# Patient Record
Sex: MALE | Race: BLACK OR AFRICAN AMERICAN | NOT HISPANIC OR LATINO | Employment: FULL TIME | URBAN - METROPOLITAN AREA
[De-identification: names, ages, dates, MRNs, and addresses within clinical notes are randomized per-mention and may not be internally consistent; named-entity substitution may affect disease eponyms.]

---

## 2017-02-28 ENCOUNTER — DOCUMENTATION ONLY (OUTPATIENT)
Dept: LAB | Facility: CLINIC | Age: 52
End: 2017-02-28

## 2017-02-28 DIAGNOSIS — E11.9 TYPE 2 DIABETES MELLITUS WITHOUT COMPLICATION, WITHOUT LONG-TERM CURRENT USE OF INSULIN (H): Primary | ICD-10-CM

## 2017-02-28 DIAGNOSIS — E78.5 HYPERLIPIDEMIA LDL GOAL <100: ICD-10-CM

## 2017-02-28 DIAGNOSIS — R79.89 ELEVATED LFTS: ICD-10-CM

## 2017-03-08 DIAGNOSIS — R79.89 ELEVATED LFTS: ICD-10-CM

## 2017-03-08 DIAGNOSIS — E78.5 HYPERLIPIDEMIA LDL GOAL <100: ICD-10-CM

## 2017-03-08 DIAGNOSIS — E11.9 TYPE 2 DIABETES MELLITUS WITHOUT COMPLICATION, WITHOUT LONG-TERM CURRENT USE OF INSULIN (H): ICD-10-CM

## 2017-03-08 LAB
ALBUMIN SERPL-MCNC: 4.1 G/DL (ref 3.4–5)
ALP SERPL-CCNC: 84 U/L (ref 40–150)
ALT SERPL W P-5'-P-CCNC: 75 U/L (ref 0–70)
ANION GAP SERPL CALCULATED.3IONS-SCNC: 7 MMOL/L (ref 3–14)
AST SERPL W P-5'-P-CCNC: 54 U/L (ref 0–45)
BILIRUB SERPL-MCNC: 0.5 MG/DL (ref 0.2–1.3)
BUN SERPL-MCNC: 13 MG/DL (ref 7–30)
CALCIUM SERPL-MCNC: 8.8 MG/DL (ref 8.5–10.1)
CHLORIDE SERPL-SCNC: 106 MMOL/L (ref 94–109)
CHOLEST SERPL-MCNC: 107 MG/DL
CO2 SERPL-SCNC: 26 MMOL/L (ref 20–32)
CREAT SERPL-MCNC: 1.18 MG/DL (ref 0.66–1.25)
GFR SERPL CREATININE-BSD FRML MDRD: 64 ML/MIN/1.7M2
GLUCOSE SERPL-MCNC: 100 MG/DL (ref 70–99)
HBA1C MFR BLD: 7.4 % (ref 4.3–6)
HDLC SERPL-MCNC: 34 MG/DL
LDLC SERPL CALC-MCNC: 56 MG/DL
NONHDLC SERPL-MCNC: 73 MG/DL
POTASSIUM SERPL-SCNC: 3.8 MMOL/L (ref 3.4–5.3)
PROT SERPL-MCNC: 7.5 G/DL (ref 6.8–8.8)
SODIUM SERPL-SCNC: 139 MMOL/L (ref 133–144)
TRIGL SERPL-MCNC: 85 MG/DL

## 2017-03-08 PROCEDURE — 80061 LIPID PANEL: CPT | Performed by: FAMILY MEDICINE

## 2017-03-08 PROCEDURE — 83036 HEMOGLOBIN GLYCOSYLATED A1C: CPT | Performed by: FAMILY MEDICINE

## 2017-03-08 PROCEDURE — 80053 COMPREHEN METABOLIC PANEL: CPT | Performed by: FAMILY MEDICINE

## 2017-03-08 PROCEDURE — 36415 COLL VENOUS BLD VENIPUNCTURE: CPT | Performed by: FAMILY MEDICINE

## 2017-03-10 ENCOUNTER — OFFICE VISIT (OUTPATIENT)
Dept: FAMILY MEDICINE | Facility: CLINIC | Age: 52
End: 2017-03-10
Payer: MEDICAID

## 2017-03-10 ENCOUNTER — TELEPHONE (OUTPATIENT)
Dept: FAMILY MEDICINE | Facility: CLINIC | Age: 52
End: 2017-03-10

## 2017-03-10 VITALS
WEIGHT: 248.13 LBS | DIASTOLIC BLOOD PRESSURE: 88 MMHG | BODY MASS INDEX: 33.61 KG/M2 | SYSTOLIC BLOOD PRESSURE: 148 MMHG | HEART RATE: 64 BPM | HEIGHT: 72 IN | TEMPERATURE: 98.4 F

## 2017-03-10 DIAGNOSIS — R79.89 ELEVATED LFTS: ICD-10-CM

## 2017-03-10 DIAGNOSIS — E11.65 TYPE 2 DIABETES MELLITUS WITH HYPERGLYCEMIA, WITHOUT LONG-TERM CURRENT USE OF INSULIN (H): Primary | ICD-10-CM

## 2017-03-10 DIAGNOSIS — I10 BENIGN ESSENTIAL HYPERTENSION: ICD-10-CM

## 2017-03-10 DIAGNOSIS — E11.9 TYPE 2 DIABETES MELLITUS WITHOUT COMPLICATION, WITHOUT LONG-TERM CURRENT USE OF INSULIN (H): Primary | ICD-10-CM

## 2017-03-10 DIAGNOSIS — E78.5 HYPERLIPIDEMIA LDL GOAL <100: ICD-10-CM

## 2017-03-10 DIAGNOSIS — M67.40 GANGLION CYST: ICD-10-CM

## 2017-03-10 DIAGNOSIS — G56.02 CARPAL TUNNEL SYNDROME OF LEFT WRIST: ICD-10-CM

## 2017-03-10 DIAGNOSIS — E66.01 MORBID OBESITY DUE TO EXCESS CALORIES (H): ICD-10-CM

## 2017-03-10 DIAGNOSIS — E11.65 TYPE 2 DIABETES MELLITUS WITH HYPERGLYCEMIA, WITHOUT LONG-TERM CURRENT USE OF INSULIN (H): ICD-10-CM

## 2017-03-10 DIAGNOSIS — Z13.89 SCREENING FOR DIABETIC PERIPHERAL NEUROPATHY: ICD-10-CM

## 2017-03-10 PROCEDURE — 99214 OFFICE O/P EST MOD 30 MIN: CPT | Performed by: FAMILY MEDICINE

## 2017-03-10 PROCEDURE — 99207 C FOOT EXAM  NO CHARGE: CPT | Performed by: FAMILY MEDICINE

## 2017-03-10 RX ORDER — ATORVASTATIN CALCIUM 20 MG/1
20 TABLET, FILM COATED ORAL DAILY
Qty: 90 TABLET | Refills: 3 | Status: SHIPPED | OUTPATIENT
Start: 2017-03-10 | End: 2018-03-06

## 2017-03-10 RX ORDER — LISINOPRIL 5 MG/1
5 TABLET ORAL DAILY
Qty: 90 TABLET | Refills: 0 | Status: SHIPPED | OUTPATIENT
Start: 2017-03-10 | End: 2018-03-06

## 2017-03-10 RX ORDER — GLIMEPIRIDE 4 MG/1
4 TABLET ORAL 2 TIMES DAILY
Qty: 180 TABLET | Refills: 0 | Status: SHIPPED | OUTPATIENT
Start: 2017-03-10 | End: 2018-03-06

## 2017-03-10 RX ORDER — LIRAGLUTIDE 6 MG/ML
1.8 INJECTION SUBCUTANEOUS DAILY
Qty: 3 ML | Refills: 3 | Status: SHIPPED
Start: 2017-03-10 | End: 2018-03-06

## 2017-03-10 NOTE — TELEPHONE ENCOUNTER
Per faxed request recieved, the patient's insurance requires a prior authorization for one or more of the patient's medications.  Please initiate prior authorization or call/fax pharmacy to change patient's medication.    Medication: liraglutide (VICTOZA) 18 MG/3ML soln     Prescription Insurance: Cover [employer]  Phone: [phone]    Additional Comments: Fax received and placed in MA folder    Paris Nunez,

## 2017-03-10 NOTE — PATIENT INSTRUCTIONS
Carpal Tunnel Syndrome        What is carpal tunnel syndrome?   Carpal tunnel syndrome is a common, painful disorder of the wrist and hand.   How does it occur?   Carpal tunnel syndrome is caused by pressure on the median nerve in your wrist. People who use their hands and wrists in the same motion over and over tend to develop carpal tunnel syndrome. It is common in cashiers, , assembly-line workers, and people who work on the computer.   Swelling from a broken bone or other injury can cause pressure on the nerve as well. Diseases such as arthritis, diabetes, or hypothyroidism can cause swelling and pressure in the wrist. Sometimes it happens during pregnancy.   What are the symptoms?   The symptoms include:   pain, numbness, or tingling in your hand and wrist, especially in the thumb and index and middle fingers; pain may radiate up into the forearm   increased pain with increased use of your hand, such as when you are driving or reading the newspaper   increased pain at night   weak  and tendency to drop objects held in the hand   sensitivity to cold   muscle deterioration especially in the thumb (in later stages)   How is it diagnosed?   Your healthcare provider will review your symptoms, examine you, and discuss the ways you use your hands. He or she may also do the following tests:   Your provider may tap the inside middle of your wrist over the median nerve. You may feel pain or a sensation like an electric shock.   You may be asked to bend your wrist down for one minute to see if this causes symptoms.   Your provider may arrange to test the response of your nerves and muscles to electrical stimulation.   How is it treated?   If you have a disease that is causing carpal tunnel syndrome (such as rheumatoid arthritis), treating the disease may relieve your symptoms.   Other treatment focuses on relieving irritation and pressure on the nerve in your wrist. To relieve pressure your  healthcare provider may suggest:   restricting use of your hand or changing the way you use it   changing the position of your desk, computer, and chair to one that irritates your wrist less   wearing a wrist splint   exercises   Your provider may prescribe a steroid medicine or a nonsteroidal anti-inflammatory medicine, such as ibuprofen. Nonsteroidal anti-inflammatory medicines (NSAIDs) may cause stomach bleeding and other problems. These risks increase with age. Read the label and take as directed. Unless recommended by your healthcare provider, do not take for more than 10 days.   Your provider may give you an injection of a corticosteroid medicine.   In some cases surgery may be necessary.   How long will the effects last?   How long the symptoms of carpal tunnel syndrome last depends on the cause and your response to treatment. Sometimes the symptoms go away without any treatment, or they may be relieved by nonsurgical treatment. Surgery may be needed to relieve the symptoms if they do not respond to treatment or they get worse. Surgery usually relieves the symptoms, especially if there is no permanent damage to the nerve.   Symptoms of carpal tunnel syndrome that occur during pregnancy usually disappear following delivery.   How can I take care of myself?   Follow your healthcare provider's recommendations. Also try the following:   Raise your arm on a pillow when you sit or lie down.   Avoid activities that overuse your hand.   When you use a computer mouse, use it with the hand that does not have carpal tunnel syndrome.   Find a different way to use your hand by using another tool or try to use the other hand.   Avoid bending your wrists.   When can I return to my normal activities?   Everyone recovers from an injury at a different rate. Return to your activities depends on how soon your wrist recovers, not by how many days or weeks it has been since your injury has occurred. In general, the longer you have  symptoms before you start treatment, the longer it will take to get better. The goal is to return to your normal activities as soon as is safely possible. If you return too soon you may worsen your injury.   You may return to your activities when you are able to painlessly  objects and have full range of motion and strength back in your wrist.   What can I do to help prevent carpal tunnel syndrome?   Make sure that your hands and wrists are supported, especially if you do repetitive work. Take regular breaks from the repetitive motion. Do not rest your wrists on hard or ridged surfaces for long periods of time.   In some cases the cause is not known and carpal tunnel syndrome cannot be prevented.     Carpal Tunnel Rehabilitation Exercises        You may do all of these exercises right away.   Wrist Range of Motion   0. Flexion: Gently bend your wrist forward. Hold for 5 seconds. Do 3 sets of 10.   0. Extension: Gently bend your wrist backward. Hold this position 5 seconds. Do 3 sets of 10.   0. Side to side: Gently move your wrist from side to side (a handshake motion). Hold for 5 seconds in each direction. Do 3 sets of 10.   Wrist stretch: Press the back of the hand on your injured side with your other hand to help bend your wrist. Hold for 15 to 30 seconds. Next, stretch the hand back by pressing the fingers in a backward direction. Hold for 15 to 30 seconds. Keep the arm on your injured side straight during this exercise. Do 3 sets.   Mid-trap exercise: Lie on your stomach on a firm surface and place a folded pillow underneath your chest. Place your arms out straight to your sides with your elbows straight and thumbs toward the ceiling. Slowly raise your arms toward the ceiling as you squeeze your shoulder blades together. Lower slowly. Do 3 sets of 15. As the exercise gets easier to do, hold soup cans or small weights in your hands.   Pectoralis stretch:  a doorway or corner with both hands slightly  above your head on the door frame or wall. Slowly lean forward until you feel a stretch in the front of your shoulders. Hold 15 to 30 seconds. Repeat 3 times.   Scalene stretch: Sit or stand and clasp both hands behind your back. Lower your left shoulder and tilt your head toward the right until you feel a stretch. Hold this position for 15 to 30 seconds and then come back to the starting position. Then lower your right shoulder and tilt your head toward the left. Hold for 15 to 30 seconds. Repeat 3 times on each side.   Thoracic extension: While sitting in a chair, clasp both arms behind your head. Gently arch backward and look up toward the ceiling. Repeat 10 times. Do this several times each day.   Scapular squeeze: While sitting or standing with your arms by your sides, squeeze your shoulder blades together and hold for 5 seconds. Do 3 sets of 10.   Wrist extension: Hold a soup can or hammer handle in your hand with your palm facing down. Slowly bend your wrist up. Slowly lower the weight down into the starting position. Do 3 sets of 10. Gradually increase the weight of the object you are holding.    strengthening: Squeeze a soft rubber ball and hold the squeeze for 5 seconds. Do 3 sets of 10.   Published by [employer].  This content is reviewed periodically and is subject to change as new health information becomes available. The information is intended to inform and educate and is not a replacement for medical evaluation, advice, diagnosis or treatment by a healthcare professional.   Written by Leslie Osborne, MS, PT, and Belinda Mccoy PT, [facility], [facility], for [email or web address].   ? 2010 [facility] and/or its affiliates. All Rights Reserved.   Copyright   Clinical Reference Systems 2011              PLAN:   Eat breakfast daily  Decrease portion sizes  Decrease eating out  No meals in front of TV screen  Purge house of food triggers  No meal skipping  Dietician visit of education  Meal planning  Increase activity as  tolerated    MY DIABETES TODAY:    1)  Goal A1C is under <7.0  Mine is:      Lab Results   Component Value Date    A1C 7.4 03/08/2017       2)  Goal LDL (bad cholesterol) under 70  (measured at least yearly)- I am currently at:   Lab Results   Component Value Date    LDL 56 03/08/2017       3)  Goal blood pressure under 130/80- mine was 148/88 today    4)  Take aspirin daily yes    5)  No tobacco use          ACTION PLAN CHANGES FROM TODAY:    Care Plan changes:    Start victoza   Stretches for numb thumb    Labs:     I will not need to fast for this lab appointment.    Follow up:    I will follow up with my physician:  In three months.    Will recheck A1c and liver at that time    Sirena Talley D.O.

## 2017-03-10 NOTE — NURSING NOTE
Chief Complaint   Patient presents with     Chronic Disease Management       Initial There were no vitals taken for this visit. Estimated body mass index is 34.07 kg/(m^2) as calculated from the following:    Height as of 7/26/16: 6' (1.829 m).    Weight as of 7/26/16: 251 lb 3.2 oz (113.9 kg).  Medication Reconciliation: complete   Humera Corley, CMA

## 2017-03-10 NOTE — PROGRESS NOTES
SUBJECTIVE:                                                    William Montes is a 51 year old male who presents to clinic today for the following health issues:      Diabetes Follow-up    Patient is checking blood sugars: once daily.  Results are as follows:         am - 80 to 120    Diabetic concerns: None     Symptoms of hypoglycemia (low blood sugar): shaky, dizzy, weak, Symptoms occur if sugars < 70's to low 80s.     Paresthesias (numbness or burning in feet) or sores: No     Date of last diabetic eye exam: Summer 2016    Metformin 1000 mg bid, amaryl 4 mg bid     Lab Results   Component Value Date    A1C 7.4 03/08/2017    A1C 8.3 06/08/2016    A1C 6.9 12/08/2014    A1C 6.9 12/04/2014    A1C 7.0 04/11/2014        Hyperlipidemia Follow-Up      Rate your low fat/cholesterol diet?: not monitoring fat    Taking statin?  Yes, possible muscle aches from statin    Other lipid medications/supplements?:  None  LDL Cholesterol Calculated   Date Value Ref Range Status   03/08/2017 56 <100 mg/dL Final     Comment:     Desirable:       <100 mg/dl        Hypertension Follow-up      Outpatient blood pressures are checked and from 130-140/80    Low Salt Diet: no added salt    Lisinopril 2.5 mg daily      He has a history of EDWARDS and has persistent elevated LFT's.        Amount of exercise or physical activity: Tries to at least 4 times per week    Problems taking medications regularly: Yes,  Hasn't started taking Victoza yet.  Just got back from [facility].    Medication side effects: muscle aches  Diet: regular (no restrictions)    Has has right arm numbness at night time that started a couple months ago.  When he gets the numbness he said if he rubs his arm it help it go away.   He noticed it in his thumb starting about 9 months ago.  The numbness goes up the arm at times.  He gets some pain in the neck also.     Also has a lump on right wrist that he has had for about 3-4 months.  Denies any pain with it. It is getting  vinod.      Problem list and histories reviewed & adjusted, as indicated.  Additional history: as documented    BP Readings from Last 3 Encounters:   03/10/17 148/88   07/26/16 132/77   06/13/16 132/82    Wt Readings from Last 3 Encounters:   03/10/17 248 lb 2 oz (112.5 kg)   07/26/16 251 lb 3.2 oz (113.9 kg)   06/08/16 240 lb (108.9 kg)           Reviewed and updated as needed this visit by clinical staff  Tobacco  Allergies  Med Hx  Surg Hx  Fam Hx  Soc Hx      Reviewed and updated as needed this visit by Provider         ROS:  Constitutional, HEENT, cardiovascular, pulmonary, GI, , musculoskeletal, neuro, skin, endocrine and psych systems are negative, except as otherwise noted.    OBJECTIVE:                                                    /88  Pulse 64  Temp 98.4  F (36.9  C) (Oral)  Ht 6' (1.829 m)  Wt 248 lb 2 oz (112.5 kg)  BMI 33.65 kg/m2  Body mass index is 33.65 kg/(m^2).  GENERAL: healthy, alert and no distress  HENT: normal cephalic/atraumatic, oropharynx clear, oral mucous membranes moist and oropharxnx crowded  NECK: no adenopathy, no asymmetry, masses, or scars and thyroid normal to palpation  RESP: lungs clear to auscultation - no rales, rhonchi or wheezes  CV: regular rate and rhythm, normal S1 S2, no S3 or S4, no murmur, click or rub, no peripheral edema and peripheral pulses strong  MS: normal range of motion, no edema and 2 cm soft mass on the plantar aspect of the right wrist  Neuro: negative dottie test, negative tinel's median and ulnar bilaterally   SKIN: no suspicious lesions or rashes  PSYCH: mentation appears normal, affect normal/bright  Diabetic foot exam: normal DP and PT pulses, no trophic changes or ulcerative lesions, normal sensory exam and normal monofilament exam    Diagnostic Test Results:  none      ASSESSMENT/PLAN:                                                    Diabetes Type II, A1c Uncontrolled, non-insulin dependent   Associated with the following  complications    Renal Complications:  None    Ophthalmologic Complications: None    Neurologic Complications: None    Peripheral Vascular Complications:  None    Other: None   Plan:  The following changes are made - start victozia     Hyperlipidemia; controlled   Plan:  No changes in the patient's current treatment plan    Hypertension; slightly worsened   Associated with the following complications:    Diabetes   Plan:  The following changes are made - increase to lisinopril 5 mg daily       BMI:   Estimated body mass index is 33.65 kg/(m^2) as calculated from the following:    Height as of this encounter: 6' (1.829 m).    Weight as of this encounter: 248 lb 2 oz (112.5 kg).   Weight management plan: Discussed healthy diet and exercise guidelines and patient will follow up in 3 months in clinic to re-evaluate.        ASSESSMENT/PLAN:      ICD-10-CM    1. Type 2 diabetes mellitus without complication, without long-term current use of insulin (H) E11.9    2. Screening for diabetic peripheral neuropathy Z13.89 FOOT EXAM  NO CHARGE [10331.114]   3. Hyperlipidemia LDL goal <100 E78.5 atorvastatin (LIPITOR) 20 MG tablet   4. Elevated LFTs R79.89    5. Type 2 diabetes mellitus with hyperglycemia, without long-term current use of insulin (H) E11.65 metFORMIN (GLUCOPHAGE) 500 MG tablet     glimepiride (AMARYL) 4 MG tablet   6. Benign essential hypertension I10 lisinopril (PRINIVIL/ZESTRIL) 5 MG tablet   7. Carpal tunnel syndrome of left wrist G56.02    8. Ganglion cyst M67.40    9. Morbid obesity due to excess calories (H) E66.01 liraglutide (VICTOZA) 18 MG/3ML soln   10. Type 2 diabetes mellitus without complication (H) E11.9 liraglutide (VICTOZA) 18 MG/3ML soln       Patient Instructions                   Carpal Tunnel Syndrome        What is carpal tunnel syndrome?   Carpal tunnel syndrome is a common, painful disorder of the wrist and hand.   How does it occur?   Carpal tunnel syndrome is caused by pressure on the  median nerve in your wrist. People who use their hands and wrists in the same motion over and over tend to develop carpal tunnel syndrome. It is common in cashiers, , assembly-line workers, and people who work on the computer.   Swelling from a broken bone or other injury can cause pressure on the nerve as well. Diseases such as arthritis, diabetes, or hypothyroidism can cause swelling and pressure in the wrist. Sometimes it happens during pregnancy.   What are the symptoms?   The symptoms include:   pain, numbness, or tingling in your hand and wrist, especially in the thumb and index and middle fingers; pain may radiate up into the forearm   increased pain with increased use of your hand, such as when you are driving or reading the newspaper   increased pain at night   weak  and tendency to drop objects held in the hand   sensitivity to cold   muscle deterioration especially in the thumb (in later stages)   How is it diagnosed?   Your healthcare provider will review your symptoms, examine you, and discuss the ways you use your hands. He or she may also do the following tests:   Your provider may tap the inside middle of your wrist over the median nerve. You may feel pain or a sensation like an electric shock.   You may be asked to bend your wrist down for one minute to see if this causes symptoms.   Your provider may arrange to test the response of your nerves and muscles to electrical stimulation.   How is it treated?   If you have a disease that is causing carpal tunnel syndrome (such as rheumatoid arthritis), treating the disease may relieve your symptoms.   Other treatment focuses on relieving irritation and pressure on the nerve in your wrist. To relieve pressure your healthcare provider may suggest:   restricting use of your hand or changing the way you use it   changing the position of your desk, computer, and chair to one that irritates your wrist less   wearing a wrist splint   exercises    Your provider may prescribe a steroid medicine or a nonsteroidal anti-inflammatory medicine, such as ibuprofen. Nonsteroidal anti-inflammatory medicines (NSAIDs) may cause stomach bleeding and other problems. These risks increase with age. Read the label and take as directed. Unless recommended by your healthcare provider, do not take for more than 10 days.   Your provider may give you an injection of a corticosteroid medicine.   In some cases surgery may be necessary.   How long will the effects last?   How long the symptoms of carpal tunnel syndrome last depends on the cause and your response to treatment. Sometimes the symptoms go away without any treatment, or they may be relieved by nonsurgical treatment. Surgery may be needed to relieve the symptoms if they do not respond to treatment or they get worse. Surgery usually relieves the symptoms, especially if there is no permanent damage to the nerve.   Symptoms of carpal tunnel syndrome that occur during pregnancy usually disappear following delivery.   How can I take care of myself?   Follow your healthcare provider's recommendations. Also try the following:   Raise your arm on a pillow when you sit or lie down.   Avoid activities that overuse your hand.   When you use a computer mouse, use it with the hand that does not have carpal tunnel syndrome.   Find a different way to use your hand by using another tool or try to use the other hand.   Avoid bending your wrists.   When can I return to my normal activities?   Everyone recovers from an injury at a different rate. Return to your activities depends on how soon your wrist recovers, not by how many days or weeks it has been since your injury has occurred. In general, the longer you have symptoms before you start treatment, the longer it will take to get better. The goal is to return to your normal activities as soon as is safely possible. If you return too soon you may worsen your injury.   You may return to  your activities when you are able to painlessly  objects and have full range of motion and strength back in your wrist.   What can I do to help prevent carpal tunnel syndrome?   Make sure that your hands and wrists are supported, especially if you do repetitive work. Take regular breaks from the repetitive motion. Do not rest your wrists on hard or ridged surfaces for long periods of time.   In some cases the cause is not known and carpal tunnel syndrome cannot be prevented.     Carpal Tunnel Rehabilitation Exercises        You may do all of these exercises right away.   Wrist Range of Motion   0. Flexion: Gently bend your wrist forward. Hold for 5 seconds. Do 3 sets of 10.   0. Extension: Gently bend your wrist backward. Hold this position 5 seconds. Do 3 sets of 10.   0. Side to side: Gently move your wrist from side to side (a handshake motion). Hold for 5 seconds in each direction. Do 3 sets of 10.   Wrist stretch: Press the back of the hand on your injured side with your other hand to help bend your wrist. Hold for 15 to 30 seconds. Next, stretch the hand back by pressing the fingers in a backward direction. Hold for 15 to 30 seconds. Keep the arm on your injured side straight during this exercise. Do 3 sets.   Mid-trap exercise: Lie on your stomach on a firm surface and place a folded pillow underneath your chest. Place your arms out straight to your sides with your elbows straight and thumbs toward the ceiling. Slowly raise your arms toward the ceiling as you squeeze your shoulder blades together. Lower slowly. Do 3 sets of 15. As the exercise gets easier to do, hold soup cans or small weights in your hands.   Pectoralis stretch:  a doorway or corner with both hands slightly above your head on the door frame or wall. Slowly lean forward until you feel a stretch in the front of your shoulders. Hold 15 to 30 seconds. Repeat 3 times.   Scalene stretch: Sit or stand and clasp both hands behind your  back. Lower your left shoulder and tilt your head toward the right until you feel a stretch. Hold this position for 15 to 30 seconds and then come back to the starting position. Then lower your right shoulder and tilt your head toward the left. Hold for 15 to 30 seconds. Repeat 3 times on each side.   Thoracic extension: While sitting in a chair, clasp both arms behind your head. Gently arch backward and look up toward the ceiling. Repeat 10 times. Do this several times each day.   Scapular squeeze: While sitting or standing with your arms by your sides, squeeze your shoulder blades together and hold for 5 seconds. Do 3 sets of 10.   Wrist extension: Hold a soup can or hammer handle in your hand with your palm facing down. Slowly bend your wrist up. Slowly lower the weight down into the starting position. Do 3 sets of 10. Gradually increase the weight of the object you are holding.    strengthening: Squeeze a soft rubber ball and hold the squeeze for 5 seconds. Do 3 sets of 10.   Published by [employer].  This content is reviewed periodically and is subject to change as new health information becomes available. The information is intended to inform and educate and is not a replacement for medical evaluation, advice, diagnosis or treatment by a healthcare professional.   Written by Leslie Osborne, MS, PT, and Belinda Mccoy PT, [facility], [facility], for [employer].   ? 2010 [facility] and/or its affiliates. All Rights Reserved.   Copyright   Clinical Reference Systems 2011              PLAN:   Eat breakfast daily  Decrease portion sizes  Decrease eating out  No meals in front of TV screen  Purge house of food triggers  No meal skipping  Dietician visit of education  Meal planning  Increase activity as tolerated    MY DIABETES TODAY:    1)  Goal A1C is under <7.0  Mine is:      Lab Results   Component Value Date    A1C 7.4 03/08/2017       2)  Goal LDL (bad cholesterol) under 70  (measured at least yearly)- I am currently  at:   Lab Results   Component Value Date    LDL 56 03/08/2017       3)  Goal blood pressure under 130/80- mine was 148/88 today    4)  Take aspirin daily yes    5)  No tobacco use          ACTION PLAN CHANGES FROM TODAY:    Care Plan changes:    Start victoza   Stretches for numb thumb    Labs:     I will not need to fast for this lab appointment.    Follow up:    I will follow up with my physician:  In three months.    Will recheck A1c and liver at that time    Sirena Talley D.O.                FUTURE APPOINTMENTS:       - Follow-up visit in 3 months for HTN and DM.  Check HTN next week in the pharmacy     Sirena Talley DO  [facility]

## 2017-03-10 NOTE — MR AVS SNAPSHOT
After Visit Summary   3/10/2017    William Montes    MRN: [id number]           Patient Information     Date Of Birth          1965        Visit Information        Provider Department      3/10/2017 9:20 AM Sirena Talley DO [facility]        Today's Diagnoses     Type 2 diabetes mellitus without complication, without long-term current use of insulin (H)    -  1    Screening for diabetic peripheral neuropathy        Hyperlipidemia LDL goal <100        Elevated LFTs        Type 2 diabetes mellitus with hyperglycemia, without long-term current use of insulin (H)        Benign essential hypertension        Carpal tunnel syndrome of left wrist        Ganglion cyst          Care Instructions                  Carpal Tunnel Syndrome        What is carpal tunnel syndrome?   Carpal tunnel syndrome is a common, painful disorder of the wrist and hand.   How does it occur?   Carpal tunnel syndrome is caused by pressure on the median nerve in your wrist. People who use their hands and wrists in the same motion over and over tend to develop carpal tunnel syndrome. It is common in cashiers, , assembly-line workers, and people who work on the computer.   Swelling from a broken bone or other injury can cause pressure on the nerve as well. Diseases such as arthritis, diabetes, or hypothyroidism can cause swelling and pressure in the wrist. Sometimes it happens during pregnancy.   What are the symptoms?   The symptoms include:   pain, numbness, or tingling in your hand and wrist, especially in the thumb and index and middle fingers; pain may radiate up into the forearm   increased pain with increased use of your hand, such as when you are driving or reading the newspaper   increased pain at night   weak  and tendency to drop objects held in the hand   sensitivity to cold   muscle deterioration especially in the thumb (in later stages)   How is it diagnosed?   Your healthcare provider  will review your symptoms, examine you, and discuss the ways you use your hands. He or she may also do the following tests:   Your provider may tap the inside middle of your wrist over the median nerve. You may feel pain or a sensation like an electric shock.   You may be asked to bend your wrist down for one minute to see if this causes symptoms.   Your provider may arrange to test the response of your nerves and muscles to electrical stimulation.   How is it treated?   If you have a disease that is causing carpal tunnel syndrome (such as rheumatoid arthritis), treating the disease may relieve your symptoms.   Other treatment focuses on relieving irritation and pressure on the nerve in your wrist. To relieve pressure your healthcare provider may suggest:   restricting use of your hand or changing the way you use it   changing the position of your desk, computer, and chair to one that irritates your wrist less   wearing a wrist splint   exercises   Your provider may prescribe a steroid medicine or a nonsteroidal anti-inflammatory medicine, such as ibuprofen. Nonsteroidal anti-inflammatory medicines (NSAIDs) may cause stomach bleeding and other problems. These risks increase with age. Read the label and take as directed. Unless recommended by your healthcare provider, do not take for more than 10 days.   Your provider may give you an injection of a corticosteroid medicine.   In some cases surgery may be necessary.   How long will the effects last?   How long the symptoms of carpal tunnel syndrome last depends on the cause and your response to treatment. Sometimes the symptoms go away without any treatment, or they may be relieved by nonsurgical treatment. Surgery may be needed to relieve the symptoms if they do not respond to treatment or they get worse. Surgery usually relieves the symptoms, especially if there is no permanent damage to the nerve.   Symptoms of carpal tunnel syndrome that occur during pregnancy  usually disappear following delivery.   How can I take care of myself?   Follow your healthcare provider's recommendations. Also try the following:   Raise your arm on a pillow when you sit or lie down.   Avoid activities that overuse your hand.   When you use a computer mouse, use it with the hand that does not have carpal tunnel syndrome.   Find a different way to use your hand by using another tool or try to use the other hand.   Avoid bending your wrists.   When can I return to my normal activities?   Everyone recovers from an injury at a different rate. Return to your activities depends on how soon your wrist recovers, not by how many days or weeks it has been since your injury has occurred. In general, the longer you have symptoms before you start treatment, the longer it will take to get better. The goal is to return to your normal activities as soon as is safely possible. If you return too soon you may worsen your injury.   You may return to your activities when you are able to painlessly  objects and have full range of motion and strength back in your wrist.   What can I do to help prevent carpal tunnel syndrome?   Make sure that your hands and wrists are supported, especially if you do repetitive work. Take regular breaks from the repetitive motion. Do not rest your wrists on hard or ridged surfaces for long periods of time.   In some cases the cause is not known and carpal tunnel syndrome cannot be prevented.     Carpal Tunnel Rehabilitation Exercises        You may do all of these exercises right away.   Wrist Range of Motion   0. Flexion: Gently bend your wrist forward. Hold for 5 seconds. Do 3 sets of 10.   0. Extension: Gently bend your wrist backward. Hold this position 5 seconds. Do 3 sets of 10.   0. Side to side: Gently move your wrist from side to side (a handshake motion). Hold for 5 seconds in each direction. Do 3 sets of 10.   Wrist stretch: Press the back of the hand on your injured side  with your other hand to help bend your wrist. Hold for 15 to 30 seconds. Next, stretch the hand back by pressing the fingers in a backward direction. Hold for 15 to 30 seconds. Keep the arm on your injured side straight during this exercise. Do 3 sets.   Mid-trap exercise: Lie on your stomach on a firm surface and place a folded pillow underneath your chest. Place your arms out straight to your sides with your elbows straight and thumbs toward the ceiling. Slowly raise your arms toward the ceiling as you squeeze your shoulder blades together. Lower slowly. Do 3 sets of 15. As the exercise gets easier to do, hold soup cans or small weights in your hands.   Pectoralis stretch:  a doorway or corner with both hands slightly above your head on the door frame or wall. Slowly lean forward until you feel a stretch in the front of your shoulders. Hold 15 to 30 seconds. Repeat 3 times.   Scalene stretch: Sit or stand and clasp both hands behind your back. Lower your left shoulder and tilt your head toward the right until you feel a stretch. Hold this position for 15 to 30 seconds and then come back to the starting position. Then lower your right shoulder and tilt your head toward the left. Hold for 15 to 30 seconds. Repeat 3 times on each side.   Thoracic extension: While sitting in a chair, clasp both arms behind your head. Gently arch backward and look up toward the ceiling. Repeat 10 times. Do this several times each day.   Scapular squeeze: While sitting or standing with your arms by your sides, squeeze your shoulder blades together and hold for 5 seconds. Do 3 sets of 10.   Wrist extension: Hold a soup can or hammer handle in your hand with your palm facing down. Slowly bend your wrist up. Slowly lower the weight down into the starting position. Do 3 sets of 10. Gradually increase the weight of the object you are holding.    strengthening: Squeeze a soft rubber ball and hold the squeeze for 5 seconds. Do 3  sets of 10.   Published by [employer].  This content is reviewed periodically and is subject to change as new health information becomes available. The information is intended to inform and educate and is not a replacement for medical evaluation, advice, diagnosis or treatment by a healthcare professional.   Written by Leslie Osborne, MS, PT, and Belinda Mccoy, PT, [facility], [facility], for [employer].   ? 2010 [facility] and/or its affiliates. All Rights Reserved.   Copyright   [employer] Reference Systems 2011              PLAN:   Eat breakfast daily  Decrease portion sizes  Decrease eating out  No meals in front of TV screen  Purge house of food triggers  No meal skipping  Dietician visit of education  Meal planning  Increase activity as tolerated    MY DIABETES TODAY:    1)  Goal A1C is under <7.0  Mine is:      Lab Results   Component Value Date    A1C 7.4 03/08/2017       2)  Goal LDL (bad cholesterol) under 70  (measured at least yearly)- I am currently at:   Lab Results   Component Value Date    LDL 56 03/08/2017       3)  Goal blood pressure under 130/80- mine was 148/88 today    4)  Take aspirin daily yes    5)  No tobacco use          ACTION PLAN CHANGES FROM TODAY:    Care Plan changes:    Start victoza   Stretches for numb thumb    Labs:     I will not need to fast for this lab appointment.    Follow up:    I will follow up with my physician:  In three months.    Will recheck A1c and liver at that time    Sirean Talley D.O.            Follow-ups after your visit        Who to contact     If you have questions or need follow up information about today's clinic visit or your schedule please contact [facility] directly at [phone].  Normal or non-critical lab and imaging results will be communicated to you by MyChart, letter or phone within 4 business days after the clinic has received the results. If you do not hear from us within 7 days, please contact the clinic through MyChart or phone. If you  have a critical or abnormal lab result, we will notify you by phone as soon as possible.  Submit refill requests through [employer] or call your pharmacy and they will forward the refill request to us. Please allow 3 business days for your refill to be completed.          Additional Information About Your Visit        [employer] Information     [employer] gives you secure access to your electronic health record. If you see a primary care provider, you can also send messages to your care team and make appointments. If you have questions, please call your primary care clinic.  If you do not have a primary care provider, please call [phone] and they will assist you.        Care EveryWhere ID     This is your Care EveryWhere ID. This could be used by other organizations to access your [facility] medical records  [id number]        Your Vitals Were     Pulse Temperature Height BMI (Body Mass Index)          64 98.4  F (36.9  C) (Oral) 6' (1.829 m) 33.65 kg/m2         Blood Pressure from Last 3 Encounters:   03/10/17 148/88   07/26/16 132/77   06/13/16 132/82    Weight from Last 3 Encounters:   03/10/17 248 lb 2 oz (112.5 kg)   07/26/16 251 lb 3.2 oz (113.9 kg)   06/08/16 240 lb (108.9 kg)              We Performed the Following     FOOT EXAM  NO CHARGE [19697.114]          Today's Medication Changes          These changes are accurate as of: 3/10/17 10:04 AM.  If you have any questions, ask your nurse or doctor.               These medicines have changed or have updated prescriptions.        Dose/Directions    lisinopril 5 MG tablet   Commonly known as:  PRINIVIL/ZESTRIL   This may have changed:    - medication strength  - how much to take   Used for:  Benign essential hypertension   Changed by:  Sirena Talley DO        Dose:  5 mg   Take 1 tablet (5 mg) by mouth daily   Quantity:  90 tablet   Refills:  0         Stop taking these medicines if you haven't already. Please contact your care team if you have questions.      ACE/ARB NOT PRESCRIBED (INTENTIONAL)   Stopped by:  Sirena Talley DO                Where to get your medicines      These medications were sent to [id number] - [facility], [place] - [address] AT [address]  [address], [place] [address]    Hours:  24-hours Phone:  [phone]     atorvastatin 20 MG tablet    glimepiride 4 MG tablet    lisinopril 5 MG tablet    metFORMIN 500 MG tablet                Primary Care Provider Office Phone # Fax #    Sirena Talley [phone]       [facility] [address] [place] [address]        Thank you!     Thank you for choosing [facility]  for your care. Our goal is always to provide you with excellent care. Hearing back from our patients is one way we can continue to improve our services. Please take a few minutes to complete the written survey that you may receive in the mail after your visit with us. Thank you!             Your Updated Medication List - Protect others around you: Learn how to safely use, store and throw away your medicines at www.disposemymeds.org.          This list is accurate as of: 3/10/17 10:04 AM.  Always use your most recent med list.                   Brand Name Dispense Instructions for use    aspirin 81 MG tablet     100    1 tab po QD (Once per day)       atorvastatin 20 MG tablet    LIPITOR    90 tablet    Take 1 tablet (20 mg) by mouth daily       blood glucose monitoring test strip    ACCU-CHEK SMARTVIEW    100 each    Use to test blood sugar 2 times daily or as directed.       cholecalciferol 1000 UNITS capsule    vitamin  -D     Take 1 capsule by mouth daily       glimepiride 4 MG tablet    AMARYL    180 tablet    Take 1 tablet (4 mg) by mouth 2 times daily       insulin pen needle 31G X 8 MM    B-D U/F    100 each    Use 1 pen needles daily or as directed.       liraglutide 18 MG/3ML soln    VICTOZA    3 mL    Inject 1.8 mg  Subcutaneous daily Start with 0.6mg daily for 1 week, then increase to 1.2mg daily for 1 week, then increase to 1.8mg daily thereafter       lisinopril 5 MG tablet    PRINIVIL/ZESTRIL    90 tablet    Take 1 tablet (5 mg) by mouth daily       metFORMIN 500 MG tablet    GLUCOPHAGE    360 tablet    Take 2 tablets (1,000 mg) by mouth 2 times daily (with meals)       * order for DME     3 Month    Equipment being ordered: Lancets, any brand covered by insurance. Test blood sugar 2 times daily.       * order for DME     3 Month    Equipment being ordered: Test strips for patient's glucometer, any brand covered by insurance. Test blood sugar 2 times daily.       * order for DME     1 Device    Equipment being ordered: Glucometer, any brand covered by insurance. Test blood sugar 2 times daily.       * Notice:  This list has 3 medication(s) that are the same as other medications prescribed for you. Read the directions carefully, and ask your doctor or other care provider to review them with you.

## 2017-03-10 NOTE — TELEPHONE ENCOUNTER
PA put into Dr. Talley' SIGN ME folder.    Fax completed form [facility] agent, fax #[phone]    Humera Corley CMA

## 2017-03-11 ENCOUNTER — MYC MEDICAL ADVICE (OUTPATIENT)
Dept: FAMILY MEDICINE | Facility: CLINIC | Age: 52
End: 2017-03-11

## 2017-03-11 DIAGNOSIS — E11.21 TYPE 2 DIABETES MELLITUS WITH DIABETIC NEPHROPATHY (H): ICD-10-CM

## 2017-03-14 NOTE — TELEPHONE ENCOUNTER
I'm doing back up work for this patient's PCP. I don't really know what is meant by this.  I will forward to MT to assist - we can fill whatever is covered.  Jennifer - can you weigh in / check on this and let us know if we need to change order.  Thank you for the help.  Shane Phipps

## 2017-03-14 NOTE — TELEPHONE ENCOUNTER
"Called insurance company ([employer] [facility] - [phone]) and they said the Victoza is covered IF the patient tries the \"2 pack\" first before ordering the \"3 pack\".    Otherwise \"Byetta\" is covered by patients insurance if need to switch prescription.    How do you want to proceed on this?    Humera Corley, [facility]    "

## 2017-03-16 NOTE — TELEPHONE ENCOUNTER
I think the patient can continue on Victoza, but must be on the 1.2mg dose for 1 month (which is a 2-pack) before insurance allows him to increase to the 1.8mg dose (the 3-pack). I would suggest changing the Rx to:  Start with 0.6mg daily for 1 week, then increase to 1.2mg daily.     Have patient follow-up in 1 month for recheck, then can increase to 1.8mg QD.    Jennifer Levy, Pharm.D., [facility]CP  Medication Therapy Management Pharmacist  [phone]

## 2017-03-16 NOTE — TELEPHONE ENCOUNTER
See below. Please call in to the pharmacy those specific details and clarify with them.   Thank you.  Shane Phipps, MPAS, PA-C

## 2017-03-20 PROBLEM — E11.65 TYPE 2 DIABETES MELLITUS WITH HYPERGLYCEMIA, WITHOUT LONG-TERM CURRENT USE OF INSULIN (H): Status: ACTIVE | Noted: 2017-03-20

## 2017-03-20 NOTE — TELEPHONE ENCOUNTER
Ok. Reviewed. Will hold for PCP in this case - Dr. Talley returns tomorrow and can determine a plan.  Shane Phipps, MPAS, PA-C

## 2017-03-20 NOTE — TELEPHONE ENCOUNTER
"Called the pharmacy and they ran the 2 pack through patients insurance and it would still need a PA for that one also.  However, they said that \"Byetta\" is an alternate that is covered by patients insurance.    How do you want to proceed on this?    Humera Corley, [facility]    "

## 2017-03-21 ENCOUNTER — MYC MEDICAL ADVICE (OUTPATIENT)
Dept: FAMILY MEDICINE | Facility: CLINIC | Age: 52
End: 2017-03-21

## 2017-03-21 NOTE — TELEPHONE ENCOUNTER
MyChart message was read by patient today at 10:26am.    Will close this encounter.    Humera Corley CMA

## 2017-03-21 NOTE — TELEPHONE ENCOUNTER
[employer] message was read by patient.    Will close this encounter.    Humera Corley [facility]

## 2017-03-21 NOTE — TELEPHONE ENCOUNTER
Patient's phone is not accepting calls at this time. [employer] message sent to patient.     Will postpone to make sure patient reads message.    Rosie Simons MA

## 2017-04-06 ENCOUNTER — TELEPHONE (OUTPATIENT)
Dept: FAMILY MEDICINE | Facility: CLINIC | Age: 52
End: 2017-04-06

## 2017-04-06 DIAGNOSIS — E11.65 TYPE 2 DIABETES MELLITUS WITH HYPERGLYCEMIA, WITHOUT LONG-TERM CURRENT USE OF INSULIN (H): Primary | ICD-10-CM

## 2017-04-06 NOTE — TELEPHONE ENCOUNTER
Per pharmacy, the patient's insurance requires a prior authorization for one or more of the patient's medications.  Please initiate prior authorization or call/fax pharmacy to change patient's medication.    Medication: Accu-check smartview test strips  Strength:   Sig: Use to test blood sugar 2 times daily or as directed.    Ph[facility]acy: [facility]  Phone: [phone]  Fax: [phone]    Prescription Insurance: [place] [facility]  Phone:   ID:     Additional Comments: Prior authorization needed

## 2017-04-07 NOTE — TELEPHONE ENCOUNTER
"Dr. Talley, do you want to proceed with a PA for the \"Accu-check Smartview Test Strips\"?   Or will you need to place order for new glucometer and test strips that IS covered by insurance?  Humera Corley, CMA    "

## 2018-03-06 ENCOUNTER — OFFICE VISIT (OUTPATIENT)
Dept: FAMILY MEDICINE | Facility: CLINIC | Age: 53
End: 2018-03-06
Payer: COMMERCIAL

## 2018-03-06 VITALS
HEART RATE: 71 BPM | RESPIRATION RATE: 16 BRPM | SYSTOLIC BLOOD PRESSURE: 130 MMHG | HEIGHT: 72 IN | DIASTOLIC BLOOD PRESSURE: 76 MMHG | OXYGEN SATURATION: 97 % | TEMPERATURE: 98.9 F | BODY MASS INDEX: 32.94 KG/M2 | WEIGHT: 243.2 LBS

## 2018-03-06 DIAGNOSIS — E11.65 TYPE 2 DIABETES MELLITUS WITH HYPERGLYCEMIA, WITHOUT LONG-TERM CURRENT USE OF INSULIN (H): Primary | ICD-10-CM

## 2018-03-06 DIAGNOSIS — M75.41 IMPINGEMENT SYNDROME OF SHOULDER REGION, RIGHT: ICD-10-CM

## 2018-03-06 DIAGNOSIS — Z12.11 SCREEN FOR COLON CANCER: ICD-10-CM

## 2018-03-06 DIAGNOSIS — Z13.89 SCREENING FOR DIABETIC PERIPHERAL NEUROPATHY: ICD-10-CM

## 2018-03-06 DIAGNOSIS — M22.2X1 PATELLOFEMORAL PAIN SYNDROME OF BOTH KNEES: ICD-10-CM

## 2018-03-06 DIAGNOSIS — H57.89 IRRITATION OF LEFT EYE: ICD-10-CM

## 2018-03-06 DIAGNOSIS — E78.5 HYPERLIPIDEMIA LDL GOAL <100: ICD-10-CM

## 2018-03-06 DIAGNOSIS — E66.01 MORBID OBESITY DUE TO EXCESS CALORIES (H): ICD-10-CM

## 2018-03-06 DIAGNOSIS — M22.2X2 PATELLOFEMORAL PAIN SYNDROME OF BOTH KNEES: ICD-10-CM

## 2018-03-06 DIAGNOSIS — L30.9 DERMATITIS: ICD-10-CM

## 2018-03-06 DIAGNOSIS — I10 BENIGN ESSENTIAL HYPERTENSION: ICD-10-CM

## 2018-03-06 LAB — HBA1C MFR BLD: 6.9 % (ref 4.3–6)

## 2018-03-06 PROCEDURE — 80061 LIPID PANEL: CPT | Performed by: FAMILY MEDICINE

## 2018-03-06 PROCEDURE — 82043 UR ALBUMIN QUANTITATIVE: CPT | Performed by: FAMILY MEDICINE

## 2018-03-06 PROCEDURE — 99214 OFFICE O/P EST MOD 30 MIN: CPT | Performed by: FAMILY MEDICINE

## 2018-03-06 PROCEDURE — 80048 BASIC METABOLIC PNL TOTAL CA: CPT | Performed by: FAMILY MEDICINE

## 2018-03-06 PROCEDURE — 83036 HEMOGLOBIN GLYCOSYLATED A1C: CPT | Performed by: FAMILY MEDICINE

## 2018-03-06 PROCEDURE — 36415 COLL VENOUS BLD VENIPUNCTURE: CPT | Performed by: FAMILY MEDICINE

## 2018-03-06 RX ORDER — LIRAGLUTIDE 6 MG/ML
1.8 INJECTION SUBCUTANEOUS DAILY
Qty: 3 ML | Refills: 3 | Status: SHIPPED | OUTPATIENT
Start: 2018-03-06 | End: 2018-08-13

## 2018-03-06 RX ORDER — ATORVASTATIN CALCIUM 20 MG/1
20 TABLET, FILM COATED ORAL DAILY
Qty: 90 TABLET | Refills: 3 | Status: SHIPPED | OUTPATIENT
Start: 2018-03-06 | End: 2019-03-06

## 2018-03-06 RX ORDER — GLIMEPIRIDE 4 MG/1
4 TABLET ORAL DAILY
Qty: 90 TABLET | Refills: 1 | Status: SHIPPED | OUTPATIENT
Start: 2018-03-06 | End: 2018-10-08

## 2018-03-06 RX ORDER — TRIAMCINOLONE ACETONIDE 1 MG/G
CREAM TOPICAL
Qty: 30 G | Refills: 0 | Status: SHIPPED | OUTPATIENT
Start: 2018-03-06 | End: 2018-11-18

## 2018-03-06 RX ORDER — LISINOPRIL 5 MG/1
5 TABLET ORAL DAILY
Qty: 90 TABLET | Refills: 1 | Status: SHIPPED | OUTPATIENT
Start: 2018-03-06 | End: 2018-10-08

## 2018-03-06 ASSESSMENT — PAIN SCALES - GENERAL: PAINLEVEL: NO PAIN (0)

## 2018-03-06 NOTE — MR AVS SNAPSHOT
After Visit Summary   3/6/2018    William Montes    MRN: [id number]           Patient Information     Date Of Birth          1965        Visit Information        Provider Department      3/6/2018 11:00 AM Sirena Talley,  [facility]        Today's Diagnoses     Type 2 diabetes mellitus with hyperglycemia, without long-term current use of insulin (H)    -  1    Hyperlipidemia LDL goal <100        Benign essential hypertension        Screen for colon cancer        Screening for diabetic peripheral neuropathy        Morbid obesity due to excess calories (H)        Irritation of left eye        Patellofemoral pain syndrome of both knees        Impingement syndrome of shoulder region, right        Dermatitis          Care Instructions    See the eye doctor for the eye irritation and diabetic eye exam    Do exercises for shoulder and knee     Use cream for rash     Follow up in 6 months for Diabetes     Sirena Talley D.OKyle            Follow-ups after your visit        Additional Services     OPHTHALMOLOGY ADULT REFERRAL       Your provider has referred you to: [facility]: [facility]ina [phone]   [email or web address]    Please be aware that coverage of these services is subject to the terms and limitations of your health insurance plan.  Call member services at your health plan with any benefit or coverage questions.      Please bring the following with you to your appointment:    (1) Any X-Rays, CTs or MRIs which have been performed.  Contact the facility where they were done to arrange for  prior to your scheduled appointment.    (2) List of current medications  (3) This referral request   (4) Any documents/labs given to you for this referral                  Future tests that were ordered for you today     Open Future Orders        Priority Expected Expires Ordered    Fecal colorectal cancer screen (FIT) Routine 3/27/2018 5/29/2018  3/6/2018            Who to contact     If you have questions or need follow up information about today's clinic visit or your schedule please contact [facility] directly at [phone].  Normal or non-critical lab and imaging results will be communicated to you by MyChart, letter or phone within 4 business days after the clinic has received the results. If you do not hear from us within 7 days, please contact the clinic through MyChart or phone. If you have a critical or abnormal lab result, we will notify you by phone as soon as possible.  Submit refill requests through [employer] or call your pharmacy and they will forward the refill request to us. Please allow 3 business days for your refill to be completed.          Additional Information About Your Visit        [employer] Information     [employer] gives you secure access to your electronic health record. If you see a primary care provider, you can also send messages to your care team and make appointments. If you have questions, please call your primary care clinic.  If you do not have a primary care provider, please call [phone] and they will assist you.        Care EveryWhere ID     This is your Care EveryWhere ID. This could be used by other organizations to access your [facility] medical records  [id number]        Your Vitals Were     Pulse Temperature Respirations Height Pulse Oximetry BMI (Body Mass Index)    71 98.9  F (37.2  C) (Oral) 16 6' (1.829 m) 97% 32.98 kg/m2       Blood Pressure from Last 3 Encounters:   03/06/18 130/76   03/10/17 148/88   07/26/16 132/77    Weight from Last 3 Encounters:   03/06/18 243 lb 3.2 oz (110.3 kg)   03/10/17 248 lb 2 oz (112.5 kg)   07/26/16 251 lb 3.2 oz (113.9 kg)              We Performed the Following     Albumin Random Urine Quantitative with Creat Ratio     Basic metabolic panel  (Ca, Cl, CO2, Creat, Gluc, K, Na, BUN)     HEMOGLOBIN A1C     Lipid panel reflex to direct LDL Fasting      OPHTHALMOLOGY ADULT REFERRAL          Today's Medication Changes          These changes are accurate as of 3/6/18 11:59 AM.  If you have any questions, ask your nurse or doctor.               Start taking these medicines.        Dose/Directions    triamcinolone 0.1 % cream   Commonly known as:  KENALOG   Used for:  Dermatitis   Started by:  Sirena Talley DO        Apply sparingly to affected area three times daily for 14 days.   Quantity:  30 g   Refills:  0         These medicines have changed or have updated prescriptions.        Dose/Directions    * order for DME   This may have changed:  Another medication with the same name was changed. Make sure you understand how and when to take each.   Used for:  Type 2 diabetes mellitus with hyperglycemia, without long-term current use of insulin (H)   Changed by:  Sirena Talley DO        Glucometer, brand as covered by insurance.   Quantity:  1 each   Refills:  3       * order for DME   This may have changed:  additional instructions   Used for:  Type 2 diabetes mellitus with hyperglycemia, without long-term current use of insulin (H)   Changed by:  Sirena Talley DO        Lancets.  Daily   Quantity:  100 each   Refills:  4       * order for DME   This may have changed:  Another medication with the same name was changed. Make sure you understand how and when to take each.   Used for:  Type 2 diabetes mellitus with hyperglycemia, without long-term current use of insulin (H)   Changed by:  Sirena Talley DO        Test strips for pt's glucometer, brand as covered by insurance. Test daily   Quantity:  100 each   Refills:  4       * Notice:  This list has 3 medication(s) that are the same as other medications prescribed for you. Read the directions carefully, and ask your doctor or other care provider to review them with you.      Stop taking these medicines if you haven't already. Please contact your care team if you have questions.     exenatide 5 MCG/0.02ML Sopn injection    Commonly known as:  BYETTA 5 MCG PEN   Stopped by:  Sirena Talley DO                Where to get your medicines      These medications were sent to [id number] - [place], [place] - [address] AT [facility]  [address] [address]    Hours:  24-hours Phone:  [phone]     atorvastatin 20 MG tablet    glimepiride 4 MG tablet    lisinopril 5 MG tablet    metFORMIN 500 MG tablet    triamcinolone 0.1 % cream         Some of these will need a paper prescription and others can be bought over the counter.  Ask your nurse if you have questions.     Bring a paper prescription for each of these medications     liraglutide 18 MG/3ML soln    order for DME    order for DME                Primary Care Provider Office Phone # Fax #    Sirena DO Gerri [phone]       [address] [address]        Equal Access to Services     GREG FENG : Hadii leni helm hadasho Soomaali, waaxda luqadaha, qaybta kaalmada adeegyada, waxay herbertin hayfrancisco j newberry . So [facility] [phone].    ATENCIÓN: Si habla español, tiene a chanel disposición servicios gratuitos de asistencia lingüística. Llame al [phone].    We comply with applicable federal civil rights laws and [place] laws. We do not discriminate on the basis of race, color, national origin, age, disability, sex, sexual orientation, or gender identity.            Thank you!     Thank you for choosing [facility]  for your care. Our goal is always to provide you with excellent care. Hearing back from our patients is one way we can continue to improve our services. Please take a few minutes to complete the written survey that you may receive in the mail after your visit with us. Thank you!             Your Updated Medication List - Protect others around you: Learn how to safely use, store and throw away your medicines at www.disposemymeds.org.          This list is  accurate as of 3/6/18 11:59 AM.  Always use your most recent med list.                   Brand Name Dispense Instructions for use Diagnosis    aspirin 81 MG tablet     100    1 tab po QD (Once per day)    Type II or unspecified type diabetes mellitus without mention of complication, not stated as uncontrolled       atorvastatin 20 MG tablet    LIPITOR    90 tablet    Take 1 tablet (20 mg) by mouth daily    Hyperlipidemia LDL goal <100       blood glucose monitoring test strip    ACCU-CHEK SMARTVIEW    200 each    Use to test blood sugar 2 times daily or as directed.    Type 2 diabetes mellitus with diabetic nephropathy (H)       cholecalciferol 1000 UNITS capsule    vitamin  -D     Take 1 capsule by mouth daily        glimepiride 4 MG tablet    AMARYL    90 tablet    Take 1 tablet (4 mg) by mouth daily    Type 2 diabetes mellitus with hyperglycemia, without long-term current use of insulin (H)       insulin pen needle 31G X 8 MM    B-D U/F    100 each    Use 1 pen needles daily or as directed.    Morbid obesity due to excess calories (H), Type 2 diabetes mellitus without complication (H)       liraglutide 18 MG/3ML soln    VICTOZA    3 mL    Inject 1.8 mg Subcutaneous daily Start with 0.6mg daily for 1 week, then increase to 1.2mg daily for 1 week, then increase to 1.8mg daily thereafter    Morbid obesity due to excess calories (H)       lisinopril 5 MG tablet    PRINIVIL/ZESTRIL    90 tablet    Take 1 tablet (5 mg) by mouth daily    Benign essential hypertension       metFORMIN 500 MG tablet    GLUCOPHAGE    360 tablet    Take 2 tablets (1,000 mg) by mouth 2 times daily (with meals)    Type 2 diabetes mellitus with hyperglycemia, without long-term current use of insulin (H)       * order for DME     3 Month    Equipment being ordered: Lancets, any brand covered by insurance. Test blood sugar 2 times daily.    Type 2 diabetes, HbA1c goal < 7% (H)       * order for DME     3 Month    Equipment being ordered: Test  strips for patient's glucometer, any brand covered by insurance. Test blood sugar 2 times daily.    Type 2 diabetes, HbA1c goal < 7% (H)       * order for DME     1 Device    Equipment being ordered: Glucometer, any brand covered by insurance. Test blood sugar 2 times daily.    Type 2 diabetes, HbA1c goal < 7% (H)       * order for DME     1 each    Glucometer, brand as covered by insurance.    Type 2 diabetes mellitus with hyperglycemia, without long-term current use of insulin (H)       * order for DME     100 each    Lancets.  Daily    Type 2 diabetes mellitus with hyperglycemia, without long-term current use of insulin (H)       * order for DME     100 each    Test strips for pt's glucometer, brand as covered by insurance. Test daily    Type 2 diabetes mellitus with hyperglycemia, without long-term current use of insulin (H)       triamcinolone 0.1 % cream    KENALOG    30 g    Apply sparingly to affected area three times daily for 14 days.    Dermatitis       * Notice:  This list has 6 medication(s) that are the same as other medications prescribed for you. Read the directions carefully, and ask your doctor or other care provider to review them with you.

## 2018-03-06 NOTE — PROGRESS NOTES
SUBJECTIVE:   William Montes is a 52 year old male who presents to clinic today for the following health issues:      Diabetes Follow-up    Patient is checking blood sugars: once daily.  Results are as follows:         am - 83, this am , 88 3/05/2018    Diabetic concerns: None     Symptoms of hypoglycemia (low blood sugar): shaky, weak, lethargy     Paresthesias (numbness or burning in feet) or sores: No     Date of last diabetic eye exam: about a year ago     He was prescribed a 3 month supply about a year ago of amaryl, victoza , and metformin.  He was getting medication in [facility]     He has been much better controlled since starting the vicotza he thinks it has helped him lose weight also   Lab Results   Component Value Date    A1C 7.4 03/08/2017    A1C 8.3 06/08/2016    A1C 6.9 12/08/2014    A1C 6.9 12/04/2014    A1C 7.0 04/11/2014           BP Readings from Last 2 Encounters:   03/06/18 130/76   03/10/17 148/88     Hemoglobin A1C (%)   Date Value   03/08/2017 7.4 (H)   06/08/2016 8.3 (H)     LDL Cholesterol Calculated (mg/dL)   Date Value   03/08/2017 56   06/08/2016 41     Hyperlipidemia Follow-Up      Rate your low fat/cholesterol diet?: fair    Taking statin?  Yes, possible muscle aches from statin    Other lipid medications/supplements?:  None    lipitor 20 mg daily      Amount of exercise or physical activity: 4-5 days/week for an average of 30-45 minutes    Problems taking medications regularly: No    Medication side effects: muscle aches- with chol. medf     Diet: low salt, low fat/cholesterol and carbohydrate counting        Hypertension Follow-up      Outpatient blood pressures are not being checked.    Low Salt Diet: no added salt    Lisinopril 5 mg daily       Problem list and histories reviewed & adjusted, as indicated.  Additional history: as documented    BP Readings from Last 3 Encounters:   03/06/18 130/76   03/10/17 148/88   07/26/16 132/77    Wt Readings from Last 3 Encounters:   03/06/18  243 lb 3.2 oz (110.3 kg)   03/10/17 248 lb 2 oz (112.5 kg)   07/26/16 251 lb 3.2 oz (113.9 kg)           Reviewed and updated as needed this visit by clinical staff  Tobacco  Allergies       Reviewed and updated as needed this visit by Provider         He is complaining of a sensation like sand is in his left eye.  He thought it was bacterial pink eye.  It started 3-4 weeks ago.  He has also been complaining of a stuffy nose.  It feels dry also with some nose bleeds.  It gets a little red a times.  He is not sneezing much.       He also has noticed some dry scaling itchy skin on his trunk.  This has been a month or so.      He has some knee pain with walking up steps.  It is mild but peristent. It has been more than 3 months.  He denies any knee trauma.  He denies any swelling.  But he has had some knee pain since he was in his 20's.  He has done some exercises at the gym for this.      ROS:  Constitutional, HEENT, cardiovascular, pulmonary, GI, , musculoskeletal, neuro, skin, endocrine and psych systems are negative, except as otherwise noted.    OBJECTIVE:     /76 (BP Location: Right arm, Patient Position: Chair, Cuff Size: Adult Large)  Pulse 71  Temp 98.9  F (37.2  C) (Oral)  Resp 16  Ht 6' (1.829 m)  Wt 243 lb 3.2 oz (110.3 kg)  SpO2 97%  BMI 32.98 kg/m2  Body mass index is 32.98 kg/(m^2).  GENERAL: healthy, alert and no distress  EYES: Eyes grossly normal to inspection, PERRL and conjunctivae and sclerae normal  HENT: ear canals and TM's normal, nose and mouth without ulcers or lesions  NECK: no adenopathy, no asymmetry, masses, or scars and thyroid normal to palpation  RESP: lungs clear to auscultation - no rales, rhonchi or wheezes  CV: regular rate and rhythm, normal S1 S2, no S3 or S4, no murmur, click or rub, no peripheral edema and peripheral pulses strong  MS: BLE exam reveals weak quad with lateral tracking of the patella, the rest of the knee exam was unremarkable.  Anterior right  shoulder.     SKIN: scaling and papules on trunk rare  NEURO: Normal strength and tone, mentation intact and speech normal  PSYCH: mentation appears normal, affect normal/bright    Diagnostic Test Results:  Results for orders placed or performed in visit on 03/06/18 (from the past 24 hour(s))   HEMOGLOBIN A1C   Result Value Ref Range    Hemoglobin A1C 6.9 (H) 4.3 - 6.0 %       ASSESSMENT/PLAN:     Diabetes Type II, A1c Controlled, non-insulin dependent   Associated with the following complications    Renal Complications:  None    Ophthalmologic Complications: None    Neurologic Complications: None    Peripheral Vascular Complications:  None    Other: None   Plan:  No changes in the patient's current treatment plan      Hyperlipidemia; controlled   Plan:  No changes in the patient's current treatment plan    Hypertension; controlled   Associated with the following complications:    Diabetes   Plan:  No changes in the patient's current treatment plan      ASSESSMENT/PLAN:      ICD-10-CM    1. Type 2 diabetes mellitus with hyperglycemia, without long-term current use of insulin (H) E11.65 Albumin Random Urine Quantitative with Creat Ratio     order for DME     order for DME     metFORMIN (GLUCOPHAGE) 500 MG tablet     glimepiride (AMARYL) 4 MG tablet     HEMOGLOBIN A1C     Basic metabolic panel  (Ca, Cl, CO2, Creat, Gluc, K, Na, BUN)   2. Hyperlipidemia LDL goal <100 E78.5 atorvastatin (LIPITOR) 20 MG tablet     Lipid panel reflex to direct LDL Fasting   3. Benign essential hypertension I10 lisinopril (PRINIVIL/ZESTRIL) 5 MG tablet     Basic metabolic panel  (Ca, Cl, CO2, Creat, Gluc, K, Na, BUN)   4. Screen for colon cancer Z12.11 Fecal colorectal cancer screen (FIT)   5. Screening for diabetic peripheral neuropathy Z13.89    6. Morbid obesity due to excess calories (H) E66.01 liraglutide (VICTOZA) 18 MG/3ML soln   7. Irritation of left eye H57.8 OPHTHALMOLOGY ADULT REFERRAL   8. Patellofemoral pain syndrome of both  knees M22.2X1     M22.2X2    9. Impingement syndrome of shoulder region, right M75.41    10. Dermatitis L30.9 triamcinolone (KENALOG) 0.1 % cream       Patient Instructions   See the eye doctor for the eye irritation and diabetic eye exam    Do exercises for shoulder and knee     Use cream for rash     Follow up in 6 months for Diabetes     Sirena Talley D.O.          Sirena Talley, DO  [facility]

## 2018-03-06 NOTE — PATIENT INSTRUCTIONS
See the eye doctor for the eye irritation and diabetic eye exam    Do exercises for shoulder and knee     Use cream for rash     Follow up in 6 months for Diabetes     Sirena Talley D.O.

## 2018-03-06 NOTE — NURSING NOTE
Chief Complaint   Patient presents with     Diabetes     labs done before appt      Lipids       Initial /76 (BP Location: Right arm, Patient Position: Chair, Cuff Size: Adult Large)  Pulse 71  Temp 98.9  F (37.2  C) (Oral)  Resp 16  Ht 6' (1.829 m)  Wt 243 lb 3.2 oz (110.3 kg)  SpO2 97%  BMI 32.98 kg/m2 Estimated body mass index is 32.98 kg/(m^2) as calculated from the following:    Height as of this encounter: 6' (1.829 m).    Weight as of this encounter: 243 lb 3.2 oz (110.3 kg).  Medication Reconciliation: complete     Maude Buck CMA

## 2018-03-06 NOTE — NURSING NOTE
2 DME scrips and one VICTOZA script given to patient.    Katt Sterling CMA ([facility])

## 2018-03-07 LAB
ANION GAP SERPL CALCULATED.3IONS-SCNC: 7 MMOL/L (ref 3–14)
BUN SERPL-MCNC: 15 MG/DL (ref 7–30)
CALCIUM SERPL-MCNC: 8.5 MG/DL (ref 8.5–10.1)
CHLORIDE SERPL-SCNC: 106 MMOL/L (ref 94–109)
CHOLEST SERPL-MCNC: 104 MG/DL
CO2 SERPL-SCNC: 24 MMOL/L (ref 20–32)
CREAT SERPL-MCNC: 1.24 MG/DL (ref 0.66–1.25)
CREAT UR-MCNC: 196 MG/DL
GFR SERPL CREATININE-BSD FRML MDRD: 61 ML/MIN/1.7M2
GLUCOSE SERPL-MCNC: 96 MG/DL (ref 70–99)
HDLC SERPL-MCNC: 33 MG/DL
LDLC SERPL CALC-MCNC: 49 MG/DL
MICROALBUMIN UR-MCNC: 19 MG/L
MICROALBUMIN/CREAT UR: 9.74 MG/G CR (ref 0–17)
NONHDLC SERPL-MCNC: 71 MG/DL
POTASSIUM SERPL-SCNC: 4.1 MMOL/L (ref 3.4–5.3)
SODIUM SERPL-SCNC: 137 MMOL/L (ref 133–144)
TRIGL SERPL-MCNC: 111 MG/DL

## 2018-03-17 PROCEDURE — 82274 ASSAY TEST FOR BLOOD FECAL: CPT | Performed by: FAMILY MEDICINE

## 2018-03-20 ENCOUNTER — MYC MEDICAL ADVICE (OUTPATIENT)
Dept: FAMILY MEDICINE | Facility: CLINIC | Age: 53
End: 2018-03-20

## 2018-03-20 DIAGNOSIS — Z12.11 SCREEN FOR COLON CANCER: ICD-10-CM

## 2018-03-20 LAB — HEMOCCULT STL QL IA: NEGATIVE

## 2018-03-21 ENCOUNTER — OFFICE VISIT (OUTPATIENT)
Dept: FAMILY MEDICINE | Facility: CLINIC | Age: 53
End: 2018-03-21
Payer: COMMERCIAL

## 2018-03-21 VITALS
BODY MASS INDEX: 32.7 KG/M2 | HEART RATE: 72 BPM | HEIGHT: 72 IN | TEMPERATURE: 98.4 F | WEIGHT: 241.4 LBS | DIASTOLIC BLOOD PRESSURE: 70 MMHG | SYSTOLIC BLOOD PRESSURE: 130 MMHG

## 2018-03-21 DIAGNOSIS — N48.9 LESION OF PENIS: ICD-10-CM

## 2018-03-21 DIAGNOSIS — R21 PENILE RASH: Primary | ICD-10-CM

## 2018-03-21 PROCEDURE — 99214 OFFICE O/P EST MOD 30 MIN: CPT | Performed by: INTERNAL MEDICINE

## 2018-03-21 RX ORDER — NYSTATIN 100000 U/G
CREAM TOPICAL 3 TIMES DAILY
Qty: 30 G | Refills: 0 | Status: SHIPPED | OUTPATIENT
Start: 2018-03-21 | End: 2018-04-04

## 2018-03-21 NOTE — PROGRESS NOTES
SUBJECTIVE:  William Montes is an 52 year old male who presents for red spots on penis.  Noticed two red spots on penis noticed two weeks ago.  Initially thought might be herpes, but didn't hurt.  The spots did itch and he took his acyclovir which didn't really help.  Last week went to  near his house and was given one dose zmax and an injection.  Did gonorrhea, chlamydia and syphilis tests which were all negative.  The initial sores have improved and are not longer raised, but feels itchy.  No penile discharge or drainage.  No pain with urination or increased frequency from baseline.         has a past medical history of ABNORMAL LIVER FUNCTION STUDY (1/4/2008); Chronic gingivitis; Esophageal reflux; Glaucoma; History of blood transfusion; Nonspecific abnormal results of liver function study; OBESITY NOS (12/13/2006); Pure hypercholesterolemia; Type II or unspecified type diabetes mellitus without mention of complication, not stated as uncontrolled; and Uncomplicated asthma.  Social History     Social History     Marital status:      Spouse name: Luis     Number of children: 4     Years of education: N/A     Social History Main Topics     Smoking status: Never Smoker     Smokeless tobacco: Never Used     Alcohol use No      Comment: none for 11/2011     Drug use: No     Sexual activity: Yes     Partners: Female     Birth control/ protection: IUD     Other Topics Concern     Parent/Sibling W/ Cabg, Mi Or Angioplasty Before 65f 55m? No     Social History Narrative    Dairy/d 0 servings/d. Caffeine 1 per weekExercise 2 x weekSunscreen used - NoSeatbelts used - YesWorking smoke/CO detectors in the home - YesGuns stored in the home - NoSelf Breast Exams - NoSelf Testicular Exam - YesEye Exam up to date - YesDental Exam up to date - YesPap Smear up to date - NAMammogram up to date - NAPSA up to date - NoDexa Scan up to date - NoFlex Sig / Colonoscopy up to date - NoImmunizations up to date - YesAbuse: Current  or Past(Physical, Sexual or Emotional)- NoDo you feel safe in your environment - YesConner Durham5/12/06        No tattoos or piercings, blood transfusions, no illicit IV or intranasal drug use.      Family History   Problem Relation Age of Onset     Hypertension Mother      DIABETES Sister      DIABETES Cousin      Hypertension Other      C.A.D. No family hx of      Cancer - colorectal No family hx of      Glaucoma No family hx of      Macular Degeneration No family hx of      CANCER No family hx of      CEREBROVASCULAR DISEASE No family hx of      Breast Cancer No family hx of      Prostate Cancer No family hx of      Thyroid Disease No family hx of        ALLERGIES:  No known drug allergies    Current Outpatient Prescriptions   Medication     order for DME     order for DME     metFORMIN (GLUCOPHAGE) 500 MG tablet     glimepiride (AMARYL) 4 MG tablet     atorvastatin (LIPITOR) 20 MG tablet     lisinopril (PRINIVIL/ZESTRIL) 5 MG tablet     liraglutide (VICTOZA) 18 MG/3ML soln     triamcinolone (KENALOG) 0.1 % cream     order for DME     blood glucose monitoring (ACCU-CHEK SMARTVIEW) test strip     insulin pen needle (B-D U/F) 31G X 8 MM     order for DME     order for DME     order for DME     cholecalciferol (VITAMIN  -D) 1000 UNITS capsule     ASPIRIN 81 MG OR TABS     No current facility-administered medications for this visit.          ROS:  ROS is done and is negative for general, constitutional, eye, ENT, Respiratory, cardiovascular, GI, , Skin, musculoskeletal except as noted elsewhere.      OBJECTIVE:  /70 (BP Location: Right arm, Patient Position: Sitting, Cuff Size: Adult Large)  Pulse 72  Temp 98.4  F (36.9  C) (Oral)  Ht 6' (1.829 m)  Wt 241 lb 6.4 oz (109.5 kg)  BMI 32.74 kg/m2  GENERAL APPEARANCE: Alert, in no acute distress  EYES: normal  NOSE:normal  OROPHARYNX:normal  NECK:No adenopathy,masses or thyromegaly  RESP: normal and clear to auscultation  CV:regular rate and rhythm and  no murmurs, clicks, or gallops  ABDOMEN: Abdomen soft, non-tender. BS normal. No masses, organomegaly  SKIN: no ulcers, lesions or rash, except   MUSCULOSKELETAL:Musculoskeletal normal  : two shallow ulcerations noted, approx 4-5 mm diameter, one on distal shaft and one on head of penis, non-tender, no surrounding erythema or edema, no drainage.  The proximal penile head has a 2-3 mm area of  mild erythema almost circumferentially with two 1mm satellite lesions.       RESULTS  Results for orders placed or performed in visit on 03/20/18   Fecal colorectal cancer screen (FIT)   Result Value Ref Range    Occult Blood Scn FIT Negative NEG^Negative   .  No results found for this or any previous visit (from the past 48 hour(s)).    ASSESSMENT/PLAN:    ASSESSMENT / PLAN:  (R21) Penile rash  (primary encounter diagnosis)  Comment: the area where he has itching has findings c/w yeast dermatitis, so will tx with nystatin.  His h/o diabetes increases risk of yeast.  Plan: nystatin (MYCOSTATIN) cream        Reviewed medication instructions and side effects. Follow up if experiences side effects.. I reviewed supportive care, expected course, and signs of concern.  Follow up as needed or if he does not improve within 10 day(s) or if worsens in any way.  Reviewed red flag symptoms and is to go to the ER if experiences any of these.    (N48.9) Lesion of penis  Comment: two lesions.  The shallow ulcerations currently are c/w with possible herpes, chancroid, or syphilis.  His pain level was less than expected with chancroid or herpes (he has a h/o herpes) but may still have been due to either.   He tested negative for syphilis, gonorrhea and chlamydia last week when initially seen elsewhere for these lesions.  He was treated then with abx injection and zmax prior to his test results which would have also treated hemophilus ducreyi, and he did take the acyclovir he has for h/o herpes  Plan: as has had resolution of pain and now  only has itching, the lesions are likely starting to heal.  If they have not fully resolved within 10 days, he is to f/u.  Some itching may be due to yeast as noted above.  I reviewed supportive care, expected course, and signs of concern.  Follow up as needed or if he does not improve within 10 day(s) or if worsens in any way.  Reviewed red flag symptoms and is to go to the ER if experiences any of these.      See [facility] for orders, medications, letters, patient instructions    Mikayla Carrion M.D.

## 2018-03-21 NOTE — MR AVS SNAPSHOT
After Visit Summary   3/21/2018    William Montes    MRN: [id number]           Patient Information     Date Of Birth          1965        Visit Information        Provider Department      3/21/2018 10:00 AM Mikayla Carrion MD [facility]        Today's Diagnoses     Penile rash    -  1    Lesion of penis           Follow-ups after your visit        Follow-up notes from your care team     Return in about 10 days (around 3/31/2018), or if symptoms worsen or fail to improve, for follow up with primary doctor.      Your next 10 appointments already scheduled     Mar 30, 2018  3:00 PM CDT   New Visit with Harry Hodgson MD   [facility] ([place] [id number] [facility] [address] [phone]              Who to contact     If you have questions or need follow up information about today's clinic visit or your schedule please contact [facility] directly at [phone].  Normal or non-critical lab and imaging results will be communicated to you by [employer], letter or phone within 4 business days after the clinic has received the results. If you do not hear from us within 7 days, please contact the clinic through "[employer]t or phone. If you have a critical or abnormal lab result, we will notify you by phone as soon as possible.  Submit refill requests through [employer] or call your pharmacy and they will forward the refill request to us. Please allow 3 business days for your refill to be completed.          Additional Information About Your Visit        MyChart Information     [employer] gives you secure access to your electronic health record. If you see a primary care provider, you can also send messages to your care team and make appointments. If you have questions, please call your primary care clinic.  If you do not have a primary care provider, please call [phone] and they will assist you.        Care EveryWhere ID      This is your Care EveryWhere ID. This could be used by other organizations to access your [place] medical records  [id number]        Your Vitals Were     Pulse Temperature Height BMI (Body Mass Index)          72 98.4  F (36.9  C) (Oral) 6' (1.829 m) 32.74 kg/m2         Blood Pressure from Last 3 Encounters:   03/21/18 130/70   03/06/18 130/76   03/10/17 148/88    Weight from Last 3 Encounters:   03/21/18 241 lb 6.4 oz (109.5 kg)   03/06/18 243 lb 3.2 oz (110.3 kg)   03/10/17 248 lb 2 oz (112.5 kg)              Today, you had the following     No orders found for display         Today's Medication Changes          These changes are accurate as of 3/21/18  4:02 PM.  If you have any questions, ask your nurse or doctor.               Start taking these medicines.        Dose/Directions    nystatin cream   Commonly known as:  MYCOSTATIN   Used for:  Penile rash   Started by:  Mikayla Carrion MD        Apply topically 3 times daily for 14 days   Quantity:  30 g   Refills:  0            Where to get your medicines      These medications were sent to [facility] [address].  [address] [place] [address], [place] [address]     Phone:  [phone]     nystatin cream                Primary Care Provider Office Phone # Fax #    Sirena Talley [phone]       [address] [place] [address]        Equal Access to Services     GREG FENG AH: Hadii leni meyero Soisaias, waaxda luqadaha, qaybta kaalmada louisayamelva, wax andrew avila. So [facility] [phone].    ATENCIÓN: Si habla español, tiene a chanel disposición servicios gratuitos de asistencia lingüística. Llame al [phone].    We comply with applicable federal civil rights laws and [place] laws. We do not discriminate on the basis of race, color, national origin, age, disability, sex, sexual orientation, or gender identity.            Thank you!     Thank you for  choosing [facility]  for your care. Our goal is always to provide you with excellent care. Hearing back from our patients is one way we can continue to improve our services. Please take a few minutes to complete the written survey that you may receive in the mail after your visit with us. Thank you!             Your Updated Medication List - Protect others around you: Learn how to safely use, store and throw away your medicines at www.disposemymeds.org.          This list is accurate as of 3/21/18  4:02 PM.  Always use your most recent med list.                   Brand Name Dispense Instructions for use Diagnosis    aspirin 81 MG tablet     100    1 tab po QD (Once per day)    Type II or unspecified type diabetes mellitus without mention of complication, not stated as uncontrolled       atorvastatin 20 MG tablet    LIPITOR    90 tablet    Take 1 tablet (20 mg) by mouth daily    Hyperlipidemia LDL goal <100       blood glucose monitoring test strip    ACCU-CHEK SMARTVIEW    200 each    Use to test blood sugar 2 times daily or as directed.    Type 2 diabetes mellitus with diabetic nephropathy (H)       cholecalciferol 1000 UNITS capsule    vitamin  -D     Take 1 capsule by mouth daily        glimepiride 4 MG tablet    AMARYL    90 tablet    Take 1 tablet (4 mg) by mouth daily    Type 2 diabetes mellitus with hyperglycemia, without long-term current use of insulin (H)       insulin pen needle 31G X 8 MM    B-D U/F    100 each    Use 1 pen needles daily or as directed.    Morbid obesity due to excess calories (H), Type 2 diabetes mellitus without complication (H)       liraglutide 18 MG/3ML soln    VICTOZA    3 mL    Inject 1.8 mg Subcutaneous daily Start with 0.6mg daily for 1 week, then increase to 1.2mg daily for 1 week, then increase to 1.8mg daily thereafter    Morbid obesity due to excess calories (H)       lisinopril 5 MG tablet    PRINIVIL/ZESTRIL    90 tablet    Take 1 tablet (5 mg) by mouth  daily    Benign essential hypertension       metFORMIN 500 MG tablet    GLUCOPHAGE    360 tablet    Take 2 tablets (1,000 mg) by mouth 2 times daily (with meals)    Type 2 diabetes mellitus with hyperglycemia, without long-term current use of insulin (H)       nystatin cream    MYCOSTATIN    30 g    Apply topically 3 times daily for 14 days    Penile rash       * order for DME     3 Month    Equipment being ordered: Lancets, any brand covered by insurance. Test blood sugar 2 times daily.    Type 2 diabetes, HbA1c goal < 7% (H)       * order for DME     3 Month    Equipment being ordered: Test strips for patient's glucometer, any brand covered by insurance. Test blood sugar 2 times daily.    Type 2 diabetes, HbA1c goal < 7% (H)       * order for DME     1 Device    Equipment being ordered: Glucometer, any brand covered by insurance. Test blood sugar 2 times daily.    Type 2 diabetes, HbA1c goal < 7% (H)       * order for DME     1 each    Glucometer, brand as covered by insurance.    Type 2 diabetes mellitus with hyperglycemia, without long-term current use of insulin (H)       * order for DME     100 each    Lancets.  Daily    Type 2 diabetes mellitus with hyperglycemia, without long-term current use of insulin (H)       * order for DME     100 each    Test strips for pt's glucometer, brand as covered by insurance. Test daily    Type 2 diabetes mellitus with hyperglycemia, without long-term current use of insulin (H)       triamcinolone 0.1 % cream    KENALOG    30 g    Apply sparingly to affected area three times daily for 14 days.    Dermatitis       * Notice:  This list has 6 medication(s) that are the same as other medications prescribed for you. Read the directions carefully, and ask your doctor or other care provider to review them with you.

## 2018-03-21 NOTE — NURSING NOTE
Chief Complaint   Patient presents with     Rash     Genital Area x 2 weeks     ER F/U     Was seen through Urgent Care 3/15/18-- STD tests were all normal. Records in Care Everywhere       Initial /70 (BP Location: Right arm, Patient Position: Sitting, Cuff Size: Adult Large)  Pulse 72  Temp 98.4  F (36.9  C) (Oral)  Ht 6' (1.829 m)  Wt 241 lb 6.4 oz (109.5 kg)  BMI 32.74 kg/m2 Estimated body mass index is 32.74 kg/(m^2) as calculated from the following:    Height as of this encounter: 6' (1.829 m).    Weight as of this encounter: 241 lb 6.4 oz (109.5 kg).  Medication Reconciliation: complete

## 2018-03-30 ENCOUNTER — OFFICE VISIT (OUTPATIENT)
Dept: OPHTHALMOLOGY | Facility: CLINIC | Age: 53
End: 2018-03-30
Payer: COMMERCIAL

## 2018-03-30 DIAGNOSIS — H40.003 GLAUCOMA SUSPECT, BILATERAL: ICD-10-CM

## 2018-03-30 DIAGNOSIS — E11.65 TYPE 2 DIABETES MELLITUS WITH HYPERGLYCEMIA, WITHOUT LONG-TERM CURRENT USE OF INSULIN (H): ICD-10-CM

## 2018-03-30 DIAGNOSIS — H52.4 PRESBYOPIA: ICD-10-CM

## 2018-03-30 DIAGNOSIS — Z01.01 ENCOUNTER FOR EXAMINATION OF EYES AND VISION WITH ABNORMAL FINDINGS: Primary | ICD-10-CM

## 2018-03-30 DIAGNOSIS — E11.3299 BACKGROUND DIABETIC RETINOPATHY (H): ICD-10-CM

## 2018-03-30 PROCEDURE — 92015 DETERMINE REFRACTIVE STATE: CPT | Performed by: OPHTHALMOLOGY

## 2018-03-30 PROCEDURE — 92014 COMPRE OPH EXAM EST PT 1/>: CPT | Performed by: OPHTHALMOLOGY

## 2018-03-30 ASSESSMENT — REFRACTION_WEARINGRX
SPECS_TYPE: PAL
OS_CYLINDER: +1.00
OS_AXIS: 120
OD_SPHERE: +1.00
OD_ADD: +2.00
OD_AXIS: 060
OS_SPHERE: +0.50
OD_CYLINDER: +0.25
OS_ADD: +2.00

## 2018-03-30 ASSESSMENT — REFRACTION_MANIFEST
OD_AXIS: 060
OD_SPHERE: +1.00
OS_ADD: +2.25
OD_CYLINDER: +0.25
OD_ADD: +2.25
OS_SPHERE: +0.75
OS_CYLINDER: +0.50
OS_AXIS: 120

## 2018-03-30 ASSESSMENT — TONOMETRY
OS_IOP_MMHG: 16
OD_IOP_MMHG: 15
IOP_METHOD: APPLANATION

## 2018-03-30 ASSESSMENT — VISUAL ACUITY
OD_CC: 20/20
OS_CC: 20/20
CORRECTION_TYPE: GLASSES
METHOD: SNELLEN - LINEAR

## 2018-03-30 ASSESSMENT — CONF VISUAL FIELD
OS_NORMAL: 1
OD_NORMAL: 1
METHOD: COUNTING FINGERS

## 2018-03-30 NOTE — MR AVS SNAPSHOT
After Visit Summary   3/30/2018    William Montes    MRN: [id number]           Patient Information     Date Of Birth          1965        Visit Information        Provider Department      3/30/2018 3:00 PM Harry Hodgson MD [facility]        Today's Diagnoses     Encounter for examination of eyes and vision with abnormal findings    -  1      Care Instructions    Glasses Rx given - optional  Use artificial tears up to 4 times daily both eyes. (Refresh Tears, Systane Ultra/Balance, or Theratears)   Continue observation with regard to glaucoma suspect status.  Return visit 6 months for intraocular pressure check, glaucoma OCT, and Bhandari Visual Field   Call to schedule [phone].  Harry Hodgson M.D.   Diabetes weakens the blood vessels all over the body, including the eyes. Damage to the blood vessels in the eyes can cause swelling or bleeding into part of the eye (called the retina). This is called diabetic retinopathy (GERALDINE-tin--pu-thee). If not treated, this disease can cause vision loss or blindness.   Symptoms may include blurred or distorted vision, but many people have no symptoms. It's important to see your eye doctor regularly to check for problems.   Early treatment and good control can help protect your vision. Here are the things you can do to help prevent vision loss:      1. Keep your blood sugar levels under tight control.      2. Bring high blood pressure under control.      3. No smoking.      4. Have yearly dilated eye exams.            Follow-ups after your visit        Who to contact     If you have questions or need follow up information about today's clinic visit or your schedule please contact [facility] directly at [phone].  Normal or non-critical lab and imaging results will be communicated to you by MyChart, letter or phone within 4 business days after the clinic has received the results. If you do not hear from us within 7  days, please contact the clinic through [employer] or phone. If you have a critical or abnormal lab result, we will notify you by phone as soon as possible.  Submit refill requests through [employer] or call your pharmacy and they will forward the refill request to us. Please allow 3 business days for your refill to be completed.          Additional Information About Your Visit        [employer] Information     [employer] gives you secure access to your electronic health record. If you see a primary care provider, you can also send messages to your care team and make appointments. If you have questions, please call your primary care clinic.  If you do not have a primary care provider, please call [phone] and they will assist you.        Care EveryWhere ID     This is your Care EveryWhere ID. This could be used by other organizations to access your [facility] medical records  [id number]         Blood Pressure from Last 3 Encounters:   03/21/18 130/70   03/06/18 130/76   03/10/17 148/88    Weight from Last 3 Encounters:   03/21/18 109.5 kg (241 lb 6.4 oz)   03/06/18 110.3 kg (243 lb 3.2 oz)   03/10/17 112.5 kg (248 lb 2 oz)              Today, you had the following     No orders found for display       Primary Care Provider Office Phone # Fax #    Sirena Talley [phone]       [address] [facility] [address]        Equal Access to Services     GREG FENG : Hadii leni ku hadasho Soomaali, waaxda luqadaha, qaybta kaalmada adeegyada, whitley newberry . So [facility] [phone].    ATENCIÓN: Si habla español, tiene a chanel disposición servicios gratuitos de asistencia lingüística. Llame al [phone].    We comply with applicable federal civil rights laws and [place] laws. We do not discriminate on the basis of race, color, national origin, age, disability, sex, sexual orientation, or gender identity.            Thank you!     Thank you for choosing [facility]  for  your care. Our goal is always to provide you with excellent care. Hearing back from our patients is one way we can continue to improve our services. Please take a few minutes to complete the written survey that you may receive in the mail after your visit with us. Thank you!             Your Updated Medication List - Protect others around you: Learn how to safely use, store and throw away your medicines at www.disposemymeds.org.          This list is accurate as of 3/30/18  4:40 PM.  Always use your most recent med list.                   Brand Name Dispense Instructions for use Diagnosis    aspirin 81 MG tablet     100    1 tab po QD (Once per day)    Type II or unspecified type diabetes mellitus without mention of complication, not stated as uncontrolled       atorvastatin 20 MG tablet    LIPITOR    90 tablet    Take 1 tablet (20 mg) by mouth daily    Hyperlipidemia LDL goal <100       blood glucose monitoring test strip    ACCU-CHEK SMARTVIEW    200 each    Use to test blood sugar 2 times daily or as directed.    Type 2 diabetes mellitus with diabetic nephropathy (H)       cholecalciferol 1000 UNITS capsule    vitamin  -D     Take 1 capsule by mouth daily        glimepiride 4 MG tablet    AMARYL    90 tablet    Take 1 tablet (4 mg) by mouth daily    Type 2 diabetes mellitus with hyperglycemia, without long-term current use of insulin (H)       insulin pen needle 31G X 8 MM    B-D U/F    100 each    Use 1 pen needles daily or as directed.    Morbid obesity due to excess calories (H), Type 2 diabetes mellitus without complication (H)       liraglutide 18 MG/3ML soln    VICTOZA    3 mL    Inject 1.8 mg Subcutaneous daily Start with 0.6mg daily for 1 week, then increase to 1.2mg daily for 1 week, then increase to 1.8mg daily thereafter    Morbid obesity due to excess calories (H)       lisinopril 5 MG tablet    PRINIVIL/ZESTRIL    90 tablet    Take 1 tablet (5 mg) by mouth daily    Benign essential hypertension        metFORMIN 500 MG tablet    GLUCOPHAGE    360 tablet    Take 2 tablets (1,000 mg) by mouth 2 times daily (with meals)    Type 2 diabetes mellitus with hyperglycemia, without long-term current use of insulin (H)       nystatin cream    MYCOSTATIN    30 g    Apply topically 3 times daily for 14 days    Penile rash       * order for DME     3 Month    Equipment being ordered: Lancets, any brand covered by insurance. Test blood sugar 2 times daily.    Type 2 diabetes, HbA1c goal < 7% (H)       * order for DME     3 Month    Equipment being ordered: Test strips for patient's glucometer, any brand covered by insurance. Test blood sugar 2 times daily.    Type 2 diabetes, HbA1c goal < 7% (H)       * order for DME     1 Device    Equipment being ordered: Glucometer, any brand covered by insurance. Test blood sugar 2 times daily.    Type 2 diabetes, HbA1c goal < 7% (H)       * order for DME     1 each    Glucometer, brand as covered by insurance.    Type 2 diabetes mellitus with hyperglycemia, without long-term current use of insulin (H)       * order for DME     100 each    Lancets.  Daily    Type 2 diabetes mellitus with hyperglycemia, without long-term current use of insulin (H)       * order for DME     100 each    Test strips for pt's glucometer, brand as covered by insurance. Test daily    Type 2 diabetes mellitus with hyperglycemia, without long-term current use of insulin (H)       triamcinolone 0.1 % cream    KENALOG    30 g    Apply sparingly to affected area three times daily for 14 days.    Dermatitis       * Notice:  This list has 6 medication(s) that are the same as other medications prescribed for you. Read the directions carefully, and ask your doctor or other care provider to review them with you.

## 2018-03-30 NOTE — LETTER
3/30/2018         RE: William Montes  [address] [place] [address]        Dear Colleague,    Thank you for referring your patient, William Montes, to the [facility]. Please see a copy of my visit note below.     Current Eye Medications:  Systane daily as needed both eyes.     Subjective:  Complete eye exam also diabetic. Feels alvaro and itchy for last 6 weeks. Vision is OK with glasses distance and near both eyes. Diabetic for last 18 years, Blood sugar is fine, was 93 this morning.    Lab Results   Component Value Date    A1C 6.9 03/06/2018    A1C 7.4 03/08/2017    A1C 8.3 06/08/2016    A1C 6.9 12/08/2014    A1C 6.9 12/04/2014        Objective:  See Ophthalmology Exam.       Assessment:  Stable intraocular pressure and discs in patient who is a glaucoma suspect.  Mild first time background diabetic retinopathy both eyes.      ICD-10-CM    1. Encounter for examination of eyes and vision with abnormal findings Z01.01 REFRACTIVE STATUS   2. Presbyopia H52.4 REFRACTIVE STATUS   3. Type 2 diabetes mellitus with hyperglycemia, without long-term current use of insulin (H) E11.65 EYE EXAM (SIMPLE-NONBILLABLE)   4. Background diabetic retinopathy, mild, ou E11.3299    5. Glaucoma suspect, bilateral H40.003         Plan:  Glasses Rx given - optional  Use artificial tears up to 4 times daily both eyes. (Refresh Tears, Systane Ultra/Balance, or Theratears)   Continue observation with regard to glaucoma suspect status.  Return visit 6 months for intraocular pressure check, glaucoma OCT, and Bhandari Visual Field   Call to schedule [phone].  Harry Hodgson M.D.           Again, thank you for allowing me to participate in the care of your patient.        Sincerely,        Harry Hodgson MD

## 2018-03-30 NOTE — PROGRESS NOTES
Current Eye Medications:  Systane daily as needed both eyes.     Subjective:  Complete eye exam also diabetic. Feels alvaro and itchy for last 6 weeks. Vision is OK with glasses distance and near both eyes. Diabetic for last 18 years, Blood sugar is fine, was 93 this morning.    Lab Results   Component Value Date    A1C 6.9 03/06/2018    A1C 7.4 03/08/2017    A1C 8.3 06/08/2016    A1C 6.9 12/08/2014    A1C 6.9 12/04/2014        Objective:  See Ophthalmology Exam.       Assessment:  Stable intraocular pressure and discs in patient who is a glaucoma suspect.  Mild first time background diabetic retinopathy both eyes.      ICD-10-CM    1. Encounter for examination of eyes and vision with abnormal findings Z01.01 REFRACTIVE STATUS   2. Presbyopia H52.4 REFRACTIVE STATUS   3. Type 2 diabetes mellitus with hyperglycemia, without long-term current use of insulin (H) E11.65 EYE EXAM (SIMPLE-NONBILLABLE)   4. Background diabetic retinopathy, mild, ou E11.3299    5. Glaucoma suspect, bilateral H40.003         Plan:  Glasses Rx given - optional  Use artificial tears up to 4 times daily both eyes. (Refresh Tears, Systane Ultra/Balance, or Theratears)   Continue observation with regard to glaucoma suspect status.  Return visit 6 months for intraocular pressure check, glaucoma OCT, and Bhandari Visual Field   Call to schedule [phone].  Harry Hodgson M.D.

## 2018-03-30 NOTE — PATIENT INSTRUCTIONS
Glasses Rx given - optional  Use artificial tears up to 4 times daily both eyes. (Refresh Tears, Systane Ultra/Balance, or Theratears)   Continue observation with regard to glaucoma suspect status.  Return visit 6 months for intraocular pressure check, glaucoma OCT, and Bhandari Visual Field   Call to schedule [phone].  Harry Hodgson M.D.       Diabetes weakens the blood vessels all over the body, including the eyes. Damage to the blood vessels in the eyes can cause swelling or bleeding into part of the eye (called the retina). This is called diabetic retinopathy (GERALDINE-tin--puh-thee). If not treated, this disease can cause vision loss or blindness.   Symptoms may include blurred or distorted vision, but many people have no symptoms. It's important to see your eye doctor regularly to check for problems.   Early treatment and good control can help protect your vision. Here are the things you can do to help prevent vision loss:      1. Keep your blood sugar levels under tight control.      2. Bring high blood pressure under control.      3. No smoking.      4. Have yearly dilated eye exams.

## 2018-04-01 PROBLEM — E11.3299 BACKGROUND DIABETIC RETINOPATHY (H): Status: ACTIVE | Noted: 2018-04-01

## 2018-04-01 ASSESSMENT — SLIT LAMP EXAM - LIDS
COMMENTS: NORMAL
COMMENTS: NORMAL

## 2018-04-01 ASSESSMENT — CUP TO DISC RATIO
OS_RATIO: 0.7
OD_RATIO: 0.7

## 2018-04-01 ASSESSMENT — EXTERNAL EXAM - LEFT EYE: OS_EXAM: NORMAL

## 2018-04-01 ASSESSMENT — EXTERNAL EXAM - RIGHT EYE: OD_EXAM: NORMAL

## 2018-08-13 DIAGNOSIS — E66.01 MORBID OBESITY DUE TO EXCESS CALORIES (H): ICD-10-CM

## 2018-08-13 NOTE — TELEPHONE ENCOUNTER
"Requested Prescriptions   Pending Prescriptions Disp Refills     VICTOZA PEN 18 MG/3ML soln [Pharmacy Med Name: VICTOZA 18MG/3ML INJ PEN 3ML]  Last Written Prescription Date:  3/6/2018  Last Fill Quantity: 3 mL,  # refills: 3   Last office visit: 3/6/2018 with prescribing provider:  VINCENT Talley   Future Office Visit:     9 mL 0     Sig: INJECT 0.6MG DAILY FOR 1 WEEK, THEN INCREASE TO 1.2MG DAILY FOR 1 WEEK, THEN INCREASE TO 1.8MG DAILY THEREAFTER    GLP-1 Agonists Protocol Passed    8/13/2018 12:26 PM       Passed - Blood pressure less than 140/90 in past 6 months    BP Readings from Last 3 Encounters:   03/21/18 130/70   03/06/18 130/76   03/10/17 148/88          Passed - LDL on file in past 12 months    Recent Labs   Lab Test  03/06/18   1100   LDL  49            Passed - Microalbumin on file in past 12 months    Recent Labs   Lab Test  03/06/18   1206   MICROL  19   UMALCR  9.74            Passed - HgbA1C in past 3 or 6 months    If HgbA1C is 8 or greater, it needs to be on file within the past 3 months.  If less than 8, must be on file within the past 6 months.     Recent Labs   Lab Test  03/06/18   1100   A1C  6.9*            Passed - Patient is age 18 or older       Passed - Normal serum creatinine on file in past 12 months    Recent Labs   Lab Test  03/06/18   1100   CR  1.24            Passed - Recent (6 mo) or future (30 days) visit within the authorizing provider's specialty    Patient had office visit in the last 6 months or has a visit in the next 30 days with authorizing provider.  See \"Patient Info\" tab in inbasket, or \"Choose Columns\" in Meds & Orders section of the refill encounter.              "

## 2018-08-14 DIAGNOSIS — E66.01 MORBID OBESITY DUE TO EXCESS CALORIES (H): ICD-10-CM

## 2018-08-14 RX ORDER — LIRAGLUTIDE 6 MG/ML
INJECTION SUBCUTANEOUS
Qty: 9 ML | Refills: 0 | Status: SHIPPED | OUTPATIENT
Start: 2018-08-14 | End: 2018-10-08

## 2018-08-14 NOTE — TELEPHONE ENCOUNTER
Prescription approved per [facility] Refill Protocol.  Vanna Doran,Clinic Rn  [place]

## 2018-08-14 NOTE — TELEPHONE ENCOUNTER
"Requested Prescriptions   Pending Prescriptions Disp Refills     insulin pen needle (B-D U/F) 31G X 8 MM  Last Written Prescription Date:  7/26/2016  Last Fill Quantity: 100 each,  # refills: 3   Last office visit: 3/21/2018 with prescribing provider:  Gerri   Future Office Visit:     100 each 3     Sig: Use 1 pen needles daily or as directed.    Diabetic Supplies Protocol Passed    8/14/2018  9:01 AM       Passed - Patient is 18 years of age or older       Passed - Recent (6 mo) or future (30 days) visit within the authorizing provider's specialty    Patient had office visit in the last 6 months or has a visit in the next 30 days with authorizing provider.  See \"Patient Info\" tab in inbasket, or \"Choose Columns\" in Meds & Orders section of the refill encounter.              "

## 2018-08-15 NOTE — TELEPHONE ENCOUNTER
Prescription approved per [facility] Refill Protocol.    Ale Cole RN

## 2018-10-08 ENCOUNTER — OFFICE VISIT (OUTPATIENT)
Dept: FAMILY MEDICINE | Facility: CLINIC | Age: 53
End: 2018-10-08
Payer: COMMERCIAL

## 2018-10-08 VITALS
DIASTOLIC BLOOD PRESSURE: 84 MMHG | TEMPERATURE: 99.1 F | WEIGHT: 249 LBS | BODY MASS INDEX: 33.72 KG/M2 | SYSTOLIC BLOOD PRESSURE: 134 MMHG | HEIGHT: 72 IN | HEART RATE: 84 BPM

## 2018-10-08 DIAGNOSIS — E11.65 TYPE 2 DIABETES MELLITUS WITH HYPERGLYCEMIA, WITHOUT LONG-TERM CURRENT USE OF INSULIN (H): Primary | ICD-10-CM

## 2018-10-08 DIAGNOSIS — Z11.4 SCREENING FOR HIV (HUMAN IMMUNODEFICIENCY VIRUS): ICD-10-CM

## 2018-10-08 DIAGNOSIS — E66.01 MORBID OBESITY DUE TO EXCESS CALORIES (H): ICD-10-CM

## 2018-10-08 DIAGNOSIS — M70.21 OLECRANON BURSITIS OF RIGHT ELBOW: ICD-10-CM

## 2018-10-08 DIAGNOSIS — I10 BENIGN ESSENTIAL HYPERTENSION: ICD-10-CM

## 2018-10-08 DIAGNOSIS — Z23 NEED FOR PROPHYLACTIC VACCINATION AND INOCULATION AGAINST INFLUENZA: ICD-10-CM

## 2018-10-08 DIAGNOSIS — Z13.89 SCREENING FOR DIABETIC PERIPHERAL NEUROPATHY: ICD-10-CM

## 2018-10-08 LAB — HBA1C MFR BLD: 6.7 % (ref 0–5.6)

## 2018-10-08 PROCEDURE — 84443 ASSAY THYROID STIM HORMONE: CPT | Performed by: FAMILY MEDICINE

## 2018-10-08 PROCEDURE — 99207 C FOOT EXAM  NO CHARGE: CPT | Performed by: FAMILY MEDICINE

## 2018-10-08 PROCEDURE — 99214 OFFICE O/P EST MOD 30 MIN: CPT | Mod: 25 | Performed by: FAMILY MEDICINE

## 2018-10-08 PROCEDURE — 90686 IIV4 VACC NO PRSV 0.5 ML IM: CPT | Performed by: FAMILY MEDICINE

## 2018-10-08 PROCEDURE — 90471 IMMUNIZATION ADMIN: CPT | Performed by: FAMILY MEDICINE

## 2018-10-08 PROCEDURE — 83036 HEMOGLOBIN GLYCOSYLATED A1C: CPT | Performed by: FAMILY MEDICINE

## 2018-10-08 PROCEDURE — 36415 COLL VENOUS BLD VENIPUNCTURE: CPT | Performed by: FAMILY MEDICINE

## 2018-10-08 RX ORDER — GLIMEPIRIDE 4 MG/1
4 TABLET ORAL DAILY
Qty: 90 TABLET | Refills: 1 | Status: SHIPPED | OUTPATIENT
Start: 2018-10-08 | End: 2018-11-14

## 2018-10-08 RX ORDER — LIRAGLUTIDE 6 MG/ML
1.8 INJECTION SUBCUTANEOUS DAILY
Qty: 21 ML | Refills: 1 | Status: SHIPPED | OUTPATIENT
Start: 2018-10-08 | End: 2019-02-14

## 2018-10-08 RX ORDER — LISINOPRIL 5 MG/1
5 TABLET ORAL DAILY
Qty: 90 TABLET | Refills: 1 | Status: SHIPPED | OUTPATIENT
Start: 2018-10-08 | End: 2019-04-09

## 2018-10-08 NOTE — NURSING NOTE
Prior to injection verified patient identity using patient's name and date of birth.  Due to injection administration, patient instructed to remain in clinic for 15 minutes  afterwards, and to report any adverse reaction to me immediately.    Humera Corley, CMA

## 2018-10-08 NOTE — MR AVS SNAPSHOT
After Visit Summary   10/8/2018    William Montes    MRN: [id number]           Patient Information     Date Of Birth          1965        Visit Information        Provider Department      10/8/2018 8:40 AM Sirena Talley DO [facility]        Today's Diagnoses     Type 2 diabetes mellitus with hyperglycemia, without long-term current use of insulin (H)    -  1    Morbid obesity due to excess calories (H)        Benign essential hypertension        Olecranon bursitis of right elbow        Screening for HIV (human immunodeficiency virus)        Screening for diabetic peripheral neuropathy        Need for prophylactic vaccination and inoculation against influenza          Care Instructions    You can see the orthopedist for your elbow bursitis.     Ice can help with the pain, and avoid any pressure on the elbow.     See the weight loss clinic.     If you can, it is helpful if you fill out a survey about your clinic visit if it is mailed to your house in a few weeks.   Bursitis of the Elbow (Olecranon)  Your elbow joint contains a small fluid-filled sac called a bursa. The bursa helps the muscles and tendons move smoothly over the bone. It also cushions and protects your elbow. Bursitis is when the bursa is inflamed or swollen. This is most often due to overuse of or injury to the elbow. Symptoms include swelling and pain. If the elbow is red and feels warm to the touch, the bursa itself may be infected.  In most cases, elbow bursitis resolves with medicine and self-care at home. It may take several weeks for the bursa to heal and the swelling to go away. In some cases, your healthcare provider may drain excess fluid from the bursa. Or, he or she may inject medicine directly into the bursa to help relieve symptoms. In severe cases, you may need surgery to remove the bursa may. If there is concern that the bursa is infected, your healthcare provider may prescribe antibiotics to treat  the infection.    Home care  Your healthcare provider may prescribe medicine to help relieve pain and swelling. This may be an over-the-counter pain reliever or prescription pain medicine. Take all medicines as directed. To help treat or prevent infection, your provider may prescribe antibiotics. If these are prescribed, take them as directed until they are gone.  The following are general care guidelines:    Apply an ice pack or bag of frozen peas wrapped in a thin towel to your elbow for 15 to 20 minutes at a time. Do this 3 to 4 times a day until pain and swelling improve.    Keep your elbow raised above the level of your heart whenever possible. This helps reduce swelling. When sitting or lying down, place your arm on a pillow that rests on your chest or on a pillow at your side.    Use an elastic wrap around the elbow joint to compress the area while it is healing. Make the wrap snug but not tight to the point of causing pain.    Rest your elbow to give it time to heal. You may need to wear an elbow pad to help protect and limit the movement of your elbow. During and after healing, avoid leaning on your elbows.  Follow-up care  Follow up with your healthcare provider, or as advised. If you have been referred to a specialist, make that appointment promptly.  When to seek medical advice  Call your healthcare provider right away if any of these occur:    Fever of 100.4 F (38 C) or higher, or as advised    Chills    Increased pain, swelling, warmth, redness, or drainage from the joint    Trouble moving the elbow joint    Numbness or tingling in the hand    Severe pain or swelling in forearm or hand    Loss of pink color and slow return of color after squeezing fingertip or hand  Date Last Reviewed: 6/1/2016 2000-2017 [employer]. [address] [place] [address], [place] [address]. All rights reserved. This information is not intended as a substitute for professional medical care. Always follow your  healthcare professional's instructions.      [facility]   Discharged by : Jennifer Lagos MA    Paper scripts provided to patient : no   If you have any questions regarding to your visit please contact your care team:     Team Silver              Clinic Hours Telephone Number     Dr. Josh Elizabeth PA-C   7am-7pm  Monday - Thursday   7am-5pm  Fridays  [phone]   (Appointment scheduling available 24/7)     RN Line  [phone] option 2     [facility] [place] [facility] [place] - 11am-9pm Monday-Friday Saturday-Sunday- 9am-5pm     [place] -   5pm-9pm Monday-Friday Saturday-Sunday- 9am-5pm    [phone] - [place]    [phone] - [place]     For a Price Quote for your services, please call our Consumer Price Line at [phone].     What options do I have for visits at the clinic other than the traditional office visit?     To expand how we care for you, many of our providers are utilizing electronic visits (e-visits) and telephone visits, when medically appropriate, for interactions with their patients rather than a visit in the clinic. We also offer nurse visits for many medical concerns. Just like any other service, we will bill your insurance company for this type of visit based on time spent on the phone with your provider. Not all insurance companies cover these visits. Please check with your medical insurance if this type of visit is covered. You will be responsible for any charges that are not paid by your insurance.   E-visits via [employer]: generally incur a $35.00 fee.     Telephone visits:   Time spent on the phone: *charged based on time that is spent on the phone in increments of 10 minutes. Estimated cost:   5-10 mins $30.00   11-20 mins. $59.00   21-30 mins. $85.00     Use [employer] (secure email communication and access to your chart) to send your primary care provider a message or make an  appointment. Ask someone on your Team how to sign up for [employer].     As always, Thank you for trusting us with your health care needs!      [facility]:    Scheduling Appointments  [facility]  Call: [phone]    [facility]  Call: [phone]    [facility]  Call: [phone]    For Gastroenterology referrals   [facility]   Clinics and Surgery [place], 4th Floor   [address] [place] [address]   Appointments: [phone]    WHERE TO GO FOR CARE?  Clinic    Make an appointment if you:       Are sick (cold, cough, flu, sore throat, earache or in pain).       Have a small injury (sprain, small cut, burn or broken bone).       Need a physical exam, Pap smear, vaccine or prescription refill.       Have questions about your health or medicines.    To reach us:      Call [phone] ([phone]). Open 24 hours every day. (For counseling services, call [phone].)    Log into [employer] at [email or web address]. (Visit [email or web address] to create an account.) Hospital emergency room    An emergency is a serious or life- threatening problem that must be treated right away.    Call 217 or get to the hospital if you have:      Very bad or sudden:            - Chest pain or pressure         - Bleeding         - Head or belly pain         - Dizziness or trouble seeing, walking or                          Speaking      Problems breathing      Blood in your vomit or you are coughing up blood      A major injury (knocked out, loss of a finger or limb, rape, broken bone protruding from skin)    A mental health crisis. (Or call the Mental Health Crisis line at 1-145.221.2955 or Suicide Prevention Hotline at 1-545.114.3059.)    Open 24 hours every day. You don't need an appointment.     Urgent care    Visit urgent care for sickness or small injuries when the clinic is closed. You don't need an  appointment. To check hours or find an urgent care near you, visit [email or web address]. Online care    Get online care from [facility] for more than 70 common problems, like colds, allergies and infections. Open 24 hours every day at:   [email or web address]   Need help deciding?    For advice about where to be seen, you may call your clinic and ask to speak with a nurse. We're here for you 24 hours every day.         If you are deaf or hard of hearing, please let us know. We provide many free services including sign language interpreters, oral interpreters, TTYs, telephone amplifiers, note takers and written materials.               Follow-ups after your visit        Additional Services     BARIATRIC ADULT REFERRAL       Your provider has referred you to: [facility]: [facility] [phone]. [email or web address]    Please be aware that coverage of these services is subject to the terms and limitations of your health insurance plan.  Call member services at your health plan with any benefit or coverage questions.      Please bring the following with you to your appointment:      (1) List of current medications   (2) This referral request   (3) Any documents/labs given to you for this referral            ORTHO  REFERRAL       [facility] is referring you to the Orthopedic  Services at [facility].       The  Representative will assist you in the coordination of your Orthopedic and Musculoskeletal Care as prescribed by your physician.    The  Representative will call you within 1 business day to help schedule your appointment, or you may contact the  Representative at:    All areas ~ [phone]     Type of Referral : Non Surgical / Sport Medicine       Timeframe requested: Routine    Coverage of these services is subject to the terms and  limitations of your health insurance plan.  Please call member services at your health plan with any benefit or coverage questions.      If X-rays, CT or MRI's have been performed, please contact the facility where they were done to arrange for , prior to your scheduled appointment.  Please bring this referral request to your appointment and present it to your specialist.                  Follow-up notes from your care team     Return in about 6 months (around 4/8/2019) for Diabetes.      Who to contact     If you have questions or need follow up information about today's clinic visit or your schedule please contact [facility] directly at [phone].  Normal or non-critical lab and imaging results will be communicated to you by MyChart, letter or phone within 4 business days after the clinic has received the results. If you do not hear from us within 7 days, please contact the clinic through [employer] or phone. If you have a critical or abnormal lab result, we will notify you by phone as soon as possible.  Submit refill requests through [employer] or call your pharmacy and they will forward the refill request to us. Please allow 3 business days for your refill to be completed.          Additional Information About Your Visit        [employer] Information     [employer] gives you secure access to your electronic health record. If you see a primary care provider, you can also send messages to your care team and make appointments. If you have questions, please call your primary care clinic.  If you do not have a primary care provider, please call [phone] and they will assist you.        Care EveryWhere ID     This is your Care EveryWhere ID. This could be used by other organizations to access your [facility] medical records  [id number]        Your Vitals Were     Pulse Temperature Height BMI (Body Mass Index)          84 99.1  F (37.3  C) (Oral) 6' (1.829 m) 33.77 kg/m2         Blood Pressure from  Last 3 Encounters:   10/08/18 134/84   03/21/18 130/70   03/06/18 130/76    Weight from Last 3 Encounters:   10/08/18 249 lb (112.9 kg)   03/21/18 241 lb 6.4 oz (109.5 kg)   03/06/18 243 lb 3.2 oz (110.3 kg)              We Performed the Following     BARIATRIC ADULT REFERRAL     FLU VACCINE, SPLIT VIRUS, IM (QUADRIVALENT) [51568]- >3 YRS     FOOT EXAM  NO CHARGE [14694.114]     HEMOGLOBIN A1C     ORTHO  REFERRAL     TSH WITH FREE T4 REFLEX     Vaccine Administration, Initial [95327]          Today's Medication Changes          These changes are accurate as of 10/8/18  9:37 AM.  If you have any questions, ask your nurse or doctor.               These medicines have changed or have updated prescriptions.        Dose/Directions    liraglutide 18 MG/3ML soln   Commonly known as:  VICTOZA PEN   This may have changed:  See the new instructions.   Used for:  Morbid obesity due to excess calories (H)   Changed by:  Sirena Talley DO        Dose:  1.8 mg   Inject 1.8 mg Subcutaneous daily   Quantity:  21 mL   Refills:  1       metFORMIN 1000 MG tablet   Commonly known as:  GLUCOPHAGE   This may have changed:  medication strength   Used for:  Type 2 diabetes mellitus with hyperglycemia, without long-term current use of insulin (H)   Changed by:  Sirena Talley DO        Dose:  1000 mg   Take 1 tablet (1,000 mg) by mouth 2 times daily (with meals)   Quantity:  180 tablet   Refills:  1            Where to get your medicines      These medications were sent to [id number] - [place], [place] - [address] AT [facility] [address]  [address], [place] [address]     Phone:  [phone]     glimepiride 4 MG tablet    liraglutide 18 MG/3ML soln    lisinopril 5 MG tablet    metFORMIN 1000 MG tablet                Primary Care Provider Office Phone # Fax #    Sirena Talley [phone] [address] [facility] [address]        Equal Access to Services      GREG FENG : Hadii aad volodymyr rosalie Cooper, waaxda luqadaha, qaybta kaalmada adeahsan, waxcarl andrew hayfrancisco j lopezsarahsabra newberry . So [facility] [phone].    ATENCIÓN: Si habla español, tiene a chanel disposición servicios gratuitos de asistencia lingüística. Llame al [phone].    We comply with applicable federal civil rights laws and [place] laws. We do not discriminate on the basis of race, color, national origin, age, disability, sex, sexual orientation, or gender identity.            Thank you!     Thank you for choosing [facility]  for your care. Our goal is always to provide you with excellent care. Hearing back from our patients is one way we can continue to improve our services. Please take a few minutes to complete the written survey that you may receive in the mail after your visit with us. Thank you!             Your Updated Medication List - Protect others around you: Learn how to safely use, store and throw away your medicines at www.disposemymeds.org.          This list is accurate as of 10/8/18  9:37 AM.  Always use your most recent med list.                   Brand Name Dispense Instructions for use Diagnosis    aspirin 81 MG tablet     100    1 tab po QD (Once per day)    Type II or unspecified type diabetes mellitus without mention of complication, not stated as uncontrolled       atorvastatin 20 MG tablet    LIPITOR    90 tablet    Take 1 tablet (20 mg) by mouth daily    Hyperlipidemia LDL goal <100       cholecalciferol 1000 units capsule    vitamin  -D     Take 1 capsule by mouth daily        glimepiride 4 MG tablet    AMARYL    90 tablet    Take 1 tablet (4 mg) by mouth daily    Type 2 diabetes mellitus with hyperglycemia, without long-term current use of insulin (H)       insulin pen needle 31G X 8 MM    B-D U/F    100 each    Use 1 pen needles daily or as directed.    Morbid obesity due to excess calories (H)       liraglutide 18 MG/3ML soln    VICTOZA PEN    21 mL     Inject 1.8 mg Subcutaneous daily    Morbid obesity due to excess calories (H)       lisinopril 5 MG tablet    PRINIVIL/ZESTRIL    90 tablet    Take 1 tablet (5 mg) by mouth daily    Benign essential hypertension       metFORMIN 1000 MG tablet    GLUCOPHAGE    180 tablet    Take 1 tablet (1,000 mg) by mouth 2 times daily (with meals)    Type 2 diabetes mellitus with hyperglycemia, without long-term current use of insulin (H)       order for DME     3 Month    Equipment being ordered: Lancets, any brand covered by insurance. Test blood sugar 2 times daily.    Type 2 diabetes, HbA1c goal < 7% (H)       * order for DME     1 each    Glucometer, brand as covered by insurance.    Type 2 diabetes mellitus with hyperglycemia, without long-term current use of insulin (H)       * order for DME     100 each    Lancets.  Daily    Type 2 diabetes mellitus with hyperglycemia, without long-term current use of insulin (H)       * order for DME     100 each    Test strips for pt's glucometer, brand as covered by insurance. Test daily    Type 2 diabetes mellitus with hyperglycemia, without long-term current use of insulin (H)       triamcinolone 0.1 % cream    KENALOG    30 g    Apply sparingly to affected area three times daily for 14 days.    Dermatitis       * Notice:  This list has 3 medication(s) that are the same as other medications prescribed for you. Read the directions carefully, and ask your doctor or other care provider to review them with you.

## 2018-10-08 NOTE — PROGRESS NOTES
SUBJECTIVE:   William Montes is a 53 year old male who presents to clinic today for the following health issues:      Diabetes Follow-up    Patient is checking blood sugars: once daily.  Results are as follows:         This morning it was 115.  Is getting between 100-120 usually    Diabetic concerns: None     Symptoms of hypoglycemia (low blood sugar): none     Paresthesias (numbness or burning in feet) or sores: No     Date of last diabetic eye exam: March 2018?    Metformin and Amaryl.  Victoza was added in March to help manage diabetes and weight.     Patient does not believe that Victoza has been helping with weight loss, but it has been better controlling his blood sugars. He reports that he joined a gym, but since he got a new job so he has not found a routine yet.     Hyperlipidemia Follow-Up      Rate your low fat/cholesterol diet?: fair    Taking statin?  Yes, no muscle aches from statin    Other lipid medications/supplements?:  none    Lipitor     Hypertension Follow-up      Outpatient blood pressures are not being checked.    Low Salt Diet: low salt    Lisinopril 5 mg    BP Readings from Last 2 Encounters:   10/08/18 134/84   03/21/18 130/70     Hemoglobin A1C (%)   Date Value   10/08/2018 6.7 (H)   03/06/2018 6.9 (H)     LDL Cholesterol Calculated (mg/dL)   Date Value   03/06/2018 49   03/08/2017 56       Diabetes Management Resources      Right Elbow Pain:  Patient reports right elbow pain onset in June 2018. He states that every time he applies pressure to the elbow it causes him pain. He notes that even when he wears a long sleeve shirt the pressure when he bends his arm causes pain. Patient believes that there may have been some fluid in the joint, and also notes some cracking occasionally.         Amount of exercise or physical activity: None recently    Problems taking medications regularly: No    Medication side effects: none    Diet: regular (no restrictions)        Problem list and histories  reviewed & adjusted, as indicated.  Additional history: as documented    Patient Active Problem List   Diagnosis     Chronic gingivitis     Esophageal reflux     Obesity     Hyperlipidemia LDL goal <100     Vitamin D deficiency     Elevated LFTs     Other chronic nonalcoholic liver disease     Glaucoma suspect, bilateral     Type 2 diabetes mellitus with hyperglycemia, without long-term current use of insulin (H)     Background diabetic retinopathy, mild, ou     Past Surgical History:   Procedure Laterality Date     BIOPSY      My son had bone marrow biopsy     ENT SURGERY      My son has had tympanoplasty     NO HISTORY OF SURGERY       ORTHOPEDIC SURGERY      My mother had left hip replacement surgery       Social History   Substance Use Topics     Smoking status: Never Smoker     Smokeless tobacco: Never Used     Alcohol use No      Comment: none for 11/2011     Family History   Problem Relation Age of Onset     Hypertension Mother      Diabetes Sister      Diabetes Cousin      Hypertension Other      C.A.D. No family hx of      Cancer - colorectal No family hx of      Glaucoma No family hx of      Macular Degeneration No family hx of      Cancer No family hx of      Cerebrovascular Disease No family hx of      Breast Cancer No family hx of      Prostate Cancer No family hx of      Thyroid Disease No family hx of            Reviewed and updated as needed this visit by clinical staff  Tobacco  Allergies  Meds  Med Hx  Surg Hx  Fam Hx  Soc Hx      Reviewed and updated as needed this visit by Provider         ROS:  Constitutional, HEENT, cardiovascular, pulmonary, gi and gu systems are negative, except as otherwise noted.    This document serves as a record of the services and decisions personally performed by CHAN RODAS. It was created on his/her behalf by Jamaica Maguire, a trained medical scribe. The creation of this document is based on the provider's statements to the medical scribe. Jamaica Maguire, October 8,  2018 9:17 AM    OBJECTIVE:     /84 (BP Location: Right arm, Cuff Size: Adult Large)  Pulse 84  Temp 99.1  F (37.3  C) (Oral)  Ht 1.829 m (6')  Wt 112.9 kg (249 lb)  BMI 33.77 kg/m2  Body mass index is 33.77 kg/(m^2).  GENERAL: healthy, alert and no distress  HENT: ear canals and TM's normal, nose and mouth without ulcers or lesions  NECK: no adenopathy, no asymmetry, masses, or scars and thyroid normal to palpation  RESP: lungs clear to auscultation - no rales, rhonchi or wheezes  CV: regular rate and rhythm, normal S1 S2, no S3 or S4, no murmur, click or rub, no peripheral edema and peripheral pulses strong  MS: Tenderness at the right olecranon, no effusion. Ganglion cyst on right wrist.   SKIN: no suspicious lesions or rashes  NEURO: Normal strength and tone, mentation intact and speech normal  PSYCH: mentation appears normal, affect normal/bright  Diabetic foot exam: normal DP and PT pulses, no trophic changes or ulcerative lesions, normal sensory exam and normal monofilament exam    Diagnostic Test Results:  Results for orders placed or performed in visit on 10/08/18 (from the past 24 hour(s))   HEMOGLOBIN A1C   Result Value Ref Range    Hemoglobin A1C 6.7 (H) 0 - 5.6 %       ASSESSMENT/PLAN:     Diabetes Type II, A1c Controlled, non-insulin dependent   Associated with the following complications    Renal Complications:  None    Ophthalmologic Complications: None    Neurologic Complications: None    Peripheral Vascular Complications:  None    Other: None   Plan:  No changes in the patient's current treatment plan      Hyperlipidemia; controlled   Plan:  No changes in the patient's current treatment plan    Hypertension; controlled   Associated with the following complications:    Diabetes   Plan:  No changes in the patient's current treatment plan        ICD-10-CM    1. Type 2 diabetes mellitus with hyperglycemia, without long-term current use of insulin (H) E11.65 HEMOGLOBIN A1C     glimepiride  (AMARYL) 4 MG tablet   2. Morbid obesity due to excess calories (H) E66.01 liraglutide (VICTOZA PEN) 18 MG/3ML soln   3. Benign essential hypertension I10 lisinopril (PRINIVIL/ZESTRIL) 5 MG tablet   4. Screening for HIV (human immunodeficiency virus) Z11.4    5. Screening for diabetic peripheral neuropathy Z13.89 FOOT EXAM  NO CHARGE [10238.137]   6. Need for prophylactic vaccination and inoculation against influenza Z23 FLU VACCINE, SPLIT VIRUS, IM (QUADRIVALENT) [20617]- >3 YRS     Vaccine Administration, Initial [59791]   7. Olecranon bursitis of right elbow M70.21      I refilled the patient's medications. Patient's diabetes is well controlled. I referred the patient to the weight loss clinic per his request. I provided him with information on olecranon bursitis and referred him to ortho. Patient will follow up with me in 6 months for DM.     Patient Instructions   You can see the orthopedist for your elbow bursitis.     Ice can help with the pain, and avoid any pressure on the elbow.     See the weight loss clinic.     If you can, it is helpful if you fill out a survey about your clinic visit if it is mailed to your house in a few weeks.   Bursitis of the Elbow (Olecranon)  Your elbow joint contains a small fluid-filled sac called a bursa. The bursa helps the muscles and tendons move smoothly over the bone. It also cushions and protects your elbow. Bursitis is when the bursa is inflamed or swollen. This is most often due to overuse of or injury to the elbow. Symptoms include swelling and pain. If the elbow is red and feels warm to the touch, the bursa itself may be infected.  In most cases, elbow bursitis resolves with medicine and self-care at home. It may take several weeks for the bursa to heal and the swelling to go away. In some cases, your healthcare provider may drain excess fluid from the bursa. Or, he or she may inject medicine directly into the bursa to help relieve symptoms. In severe cases, you may  need surgery to remove the bursa may. If there is concern that the bursa is infected, your healthcare provider may prescribe antibiotics to treat the infection.    Home care  Your healthcare provider may prescribe medicine to help relieve pain and swelling. This may be an over-the-counter pain reliever or prescription pain medicine. Take all medicines as directed. To help treat or prevent infection, your provider may prescribe antibiotics. If these are prescribed, take them as directed until they are gone.  The following are general care guidelines:    Apply an ice pack or bag of frozen peas wrapped in a thin towel to your elbow for 15 to 20 minutes at a time. Do this 3 to 4 times a day until pain and swelling improve.    Keep your elbow raised above the level of your heart whenever possible. This helps reduce swelling. When sitting or lying down, place your arm on a pillow that rests on your chest or on a pillow at your side.    Use an elastic wrap around the elbow joint to compress the area while it is healing. Make the wrap snug but not tight to the point of causing pain.    Rest your elbow to give it time to heal. You may need to wear an elbow pad to help protect and limit the movement of your elbow. During and after healing, avoid leaning on your elbows.  Follow-up care  Follow up with your healthcare provider, or as advised. If you have been referred to a specialist, make that appointment promptly.  When to seek medical advice  Call your healthcare provider right away if any of these occur:    Fever of 100.4 F (38 C) or higher, or as advised    Chills    Increased pain, swelling, warmth, redness, or drainage from the joint    Trouble moving the elbow joint    Numbness or tingling in the hand    Severe pain or swelling in forearm or hand    Loss of pink color and slow return of color after squeezing fingertip or hand  Date Last Reviewed: 6/1/2016 2000-2017 [employer]. [address] [facility],  [place] [address]. All rights reserved. This information is not intended as a substitute for professional medical care. Always follow your healthcare professional's instructions.            Sirena Talley, DO  [facility]    The information in this document, created by the medical scribe Jamaica Maguire for me, accurately reflects the services I personally performed and the decisions made by me. I have reviewed and approved this document for accuracy prior to leaving the patient care area.      Injectable Influenza Immunization Documentation    1.  Is the person to be vaccinated sick today?   No    2. Does the person to be vaccinated have an allergy to a component   of the vaccine?   No  Egg Allergy Algorithm Link    3. Has the person to be vaccinated ever had a serious reaction   to influenza vaccine in the past?   No    4. Has the person to be vaccinated ever had Guillain-Barré syndrome?   No    Form completed by Humera Corley CMA

## 2018-10-08 NOTE — PATIENT INSTRUCTIONS
You can see the orthopedist for your elbow bursitis.     Ice can help with the pain, and avoid any pressure on the elbow.     See the weight loss clinic.     If you can, it is helpful if you fill out a survey about your clinic visit if it is mailed to your house in a few weeks.   Bursitis of the Elbow (Olecranon)  Your elbow joint contains a small fluid-filled sac called a bursa. The bursa helps the muscles and tendons move smoothly over the bone. It also cushions and protects your elbow. Bursitis is when the bursa is inflamed or swollen. This is most often due to overuse of or injury to the elbow. Symptoms include swelling and pain. If the elbow is red and feels warm to the touch, the bursa itself may be infected.  In most cases, elbow bursitis resolves with medicine and self-care at home. It may take several weeks for the bursa to heal and the swelling to go away. In some cases, your healthcare provider may drain excess fluid from the bursa. Or, he or she may inject medicine directly into the bursa to help relieve symptoms. In severe cases, you may need surgery to remove the bursa may. If there is concern that the bursa is infected, your healthcare provider may prescribe antibiotics to treat the infection.    Home care  Your healthcare provider may prescribe medicine to help relieve pain and swelling. This may be an over-the-counter pain reliever or prescription pain medicine. Take all medicines as directed. To help treat or prevent infection, your provider may prescribe antibiotics. If these are prescribed, take them as directed until they are gone.  The following are general care guidelines:    Apply an ice pack or bag of frozen peas wrapped in a thin towel to your elbow for 15 to 20 minutes at a time. Do this 3 to 4 times a day until pain and swelling improve.    Keep your elbow raised above the level of your heart whenever possible. This helps reduce swelling. When sitting or lying down, place your arm on a  pillow that rests on your chest or on a pillow at your side.    Use an elastic wrap around the elbow joint to compress the area while it is healing. Make the wrap snug but not tight to the point of causing pain.    Rest your elbow to give it time to heal. You may need to wear an elbow pad to help protect and limit the movement of your elbow. During and after healing, avoid leaning on your elbows.  Follow-up care  Follow up with your healthcare provider, or as advised. If you have been referred to a specialist, make that appointment promptly.  When to seek medical advice  Call your healthcare provider right away if any of these occur:    Fever of 100.4 F (38 C) or higher, or as advised    Chills    Increased pain, swelling, warmth, redness, or drainage from the joint    Trouble moving the elbow joint    Numbness or tingling in the hand    Severe pain or swelling in forearm or hand    Loss of pink color and slow return of color after squeezing fingertip or hand  Date Last Reviewed: 6/1/2016 2000-2017 [employer]. [address] [place] [address]. All rights reserved. This information is not intended as a substitute for professional medical care. Always follow your healthcare professional's instructions.      [facility]   Discharged by : Jennifer Lagos MA    Paper scripts provided to patient : no   If you have any questions regarding to your visit please contact your care team:     Team [facility]              Clinic Hours Telephone Number     Dr. Josh Elizabeth PA-C   7am-7pm  Monday - Thursday   7am-5pm  Fridays  [phone]   (Appointment scheduling available 24/7)     RN Line  [phone] option 2     Urgent Care - [place] - 11am-9pm Monday-Friday Saturday-Sunday- 9am-5pm     [place] -   5pm-9pm Monday-Friday Saturday-Sunday- 9am-5pm    [phone] - [place]    [phone]  - [place]     For a Price Quote for your services, please call our Consumer Price Line at [phone].     What options do I have for visits at the clinic other than the traditional office visit?     To expand how we care for you, many of our providers are utilizing electronic visits (e-visits) and telephone visits, when medically appropriate, for interactions with their patients rather than a visit in the clinic. We also offer nurse visits for many medical concerns. Just like any other service, we will bill your insurance company for this type of visit based on time spent on the phone with your provider. Not all insurance companies cover these visits. Please check with your medical insurance if this type of visit is covered. You will be responsible for any charges that are not paid by your insurance.   E-visits via [employer] Lane: generally incur a $35.00 fee.     Telephone visits:   Time spent on the phone: *charged based on time that is spent on the phone in increments of 10 minutes. Estimated cost:   5-10 mins $30.00   11-20 mins. $59.00   21-30 mins. $85.00     Use [employer] [facility] (secure email communication and access to your chart) to send your primary care provider a message or make an appointment. Ask someone on your Team how to sign up for Change Lane.     As always, Thank you for trusting us with your health care needs!      [facility]:    Scheduling Appointments  [facility]  Call: [phone]    [facility]  Call: [phone]    [facility]  Call: [phone]    For Gastroenterology referrals   [facility]   Clinics and Surgery Center, 4th Floor   [address] [place] [id number]   Appointments: [phone]    WHERE TO GO FOR CARE?  Clinic    Make an appointment if you:       Are sick (cold, cough, flu, sore throat, earache or in pain).       Have a small injury (sprain, small cut, burn or broken bone).        Need a physical exam, Pap smear, vaccine or prescription refill.       Have questions about your health or medicines.    To reach us:      Call [phone] ([phone]). Open 24 hours every day. (For counseling services, call [phone].)    Log into [employer] at [email or web address]. (Visit [email or web address] to create an account.) Hospital emergency room    An emergency is a serious or life- threatening problem that must be treated right away.    Call 911 or get to the hospital if you have:      Very bad or sudden:            - Chest pain or pressure         - Bleeding         - Head or belly pain         - Dizziness or trouble seeing, walking or                          Speaking      Problems breathing      Blood in your vomit or you are coughing up blood      A major injury (knocked out, loss of a finger or limb, rape, broken bone protruding from skin)    A mental health crisis. (Or call the Mental Health Crisis line at 1-976.136.6374 or Suicide Prevention Hotline at 1-796.675.1132.)    Open 24 hours every day. You don't need an appointment.     Urgent care    Visit urgent care for sickness or small injuries when the clinic is closed. You don't need an appointment. To check hours or find an urgent care near you, visit [email or web address]. Online care    Get [facility] from [facility] for more than 70 common problems, like colds, allergies and infections. Open 24 hours every day at:   [email or web address]   Need help deciding?    For advice about where to be seen, you may call your clinic and ask to speak with a nurse. We're here for you 24 hours every day.         If you are deaf or hard of hearing, please let us know. We provide many free services including sign language interpreters, oral interpreters, TTYs, telephone amplifiers, note takers and written materials.

## 2018-10-09 LAB — TSH SERPL DL<=0.005 MIU/L-ACNC: 3.21 MU/L (ref 0.4–4)

## 2018-10-11 ENCOUNTER — RADIANT APPOINTMENT (OUTPATIENT)
Dept: GENERAL RADIOLOGY | Facility: CLINIC | Age: 53
End: 2018-10-11
Attending: FAMILY MEDICINE
Payer: COMMERCIAL

## 2018-10-11 ENCOUNTER — OFFICE VISIT (OUTPATIENT)
Dept: ORTHOPEDICS | Facility: CLINIC | Age: 53
End: 2018-10-11
Payer: COMMERCIAL

## 2018-10-11 VITALS
BODY MASS INDEX: 33.72 KG/M2 | DIASTOLIC BLOOD PRESSURE: 81 MMHG | SYSTOLIC BLOOD PRESSURE: 134 MMHG | WEIGHT: 249 LBS | HEIGHT: 72 IN

## 2018-10-11 DIAGNOSIS — I10 BENIGN ESSENTIAL HYPERTENSION: ICD-10-CM

## 2018-10-11 DIAGNOSIS — M25.521 RIGHT ELBOW PAIN: Primary | ICD-10-CM

## 2018-10-11 DIAGNOSIS — E11.65 TYPE 2 DIABETES MELLITUS WITH HYPERGLYCEMIA, WITHOUT LONG-TERM CURRENT USE OF INSULIN (H): ICD-10-CM

## 2018-10-11 DIAGNOSIS — M25.521 RIGHT ELBOW PAIN: ICD-10-CM

## 2018-10-11 PROCEDURE — 99243 OFF/OP CNSLTJ NEW/EST LOW 30: CPT | Performed by: FAMILY MEDICINE

## 2018-10-11 PROCEDURE — 73080 X-RAY EXAM OF ELBOW: CPT | Mod: RT

## 2018-10-11 RX ORDER — LISINOPRIL 5 MG/1
TABLET ORAL
Qty: 90 TABLET | Refills: 0 | OUTPATIENT
Start: 2018-10-11

## 2018-10-11 RX ORDER — GLIMEPIRIDE 4 MG/1
TABLET ORAL
Qty: 90 TABLET | Refills: 0 | OUTPATIENT
Start: 2018-10-11

## 2018-10-11 NOTE — MR AVS SNAPSHOT
After Visit Summary   10/11/2018    William Montes    MRN: [id number]           Patient Information     Date Of Birth          1965        Visit Information        Provider Department      10/11/2018 9:00 AM Lonnie Graves,  [facility]        Today's Diagnoses     Right elbow pain    -  1      Care Instructions    1. Right elbow pain      Bony changes at the olecranon due to traction from the triceps tendon   Otherwise, no concerning findings on x-ray  You have great strength in your elbow    Follow-up as needed.    Official Walk with a Doc Chapter  Join me downstairs in the lobby of the [facility] the 1st Saturday of every month at 1pm for a free health talk. Come, listen and walk at your own pace!              Follow-ups after your visit        Your next 10 appointments already scheduled     Nov 07, 2018  7:45 AM CST   (Arrive by 7:30 AM)   New Bariatric Visit with SETH Balderas [facility] ([facility])    [address] [place] [id number] [phone]            Nov 07, 2018  8:30 AM CST   (Arrive by 8:15 AM)   New Bariatric Nutrition Visit with DARIAN Davis [facility] ([facility])    [address] [place] [id number] [phone]              Who to contact     If you have questions or need follow up information about today's clinic visit or your schedule please contact [facility] directly at [phone].  Normal or non-critical lab and imaging results will be communicated to you by MyChart, letter or phone within 4 business days after the clinic has received the results. If you do not hear from us within 7 days, please contact the clinic through MyChart or phone. If you have a critical or abnormal lab result, we will notify you by phone as soon as  possible.  Submit refill requests through [employer] or call your pharmacy and they will forward the refill request to us. Please allow 3 business days for your refill to be completed.          Additional Information About Your Visit        [employer] Information     [employer] gives you secure access to your electronic health record. If you see a primary care provider, you can also send messages to your care team and make appointments. If you have questions, please call your primary care clinic.  If you do not have a primary care provider, please call [phone] and they will assist you.        Care EveryWhere ID     This is your Care EveryWhere ID. This could be used by other organizations to access your [facility] medical records  [id number]        Your Vitals Were     Height BMI (Body Mass Index)                6' (1.829 m) 33.77 kg/m2           Blood Pressure from Last 3 Encounters:   10/11/18 134/81   10/08/18 134/84   03/21/18 130/70    Weight from Last 3 Encounters:   10/11/18 249 lb (112.9 kg)   10/08/18 249 lb (112.9 kg)   03/21/18 241 lb 6.4 oz (109.5 kg)               Primary Care Provider Office Phone # Fax #    Sirena Talley [phone]       [facility] [address]        Equal Access to Services     GREG FENG AH: Hadii leni helm hadasho Soomaali, waaxda luqadaha, qaybta kaalmada adeegyada, whitley morales hayfrancisco j avila. So [facility] [phone].    ATENCIÓN: Si habla español, tiene a chanel disposición servicios gratuitos de asistencia lingüística. Llame al [phone].    We comply with applicable federal civil rights laws and [place] laws. We do not discriminate on the basis of race, color, national origin, age, disability, sex, sexual orientation, or gender identity.            Thank you!     Thank you for choosing [facility]  for your care. Our goal is always to provide you with excellent care. Hearing back from our patients is one way we can  continue to improve our services. Please take a few minutes to complete the written survey that you may receive in the mail after your visit with us. Thank you!             Your Updated Medication List - Protect others around you: Learn how to safely use, store and throw away your medicines at www.disposemymeds.org.          This list is accurate as of 10/11/18  3:37 PM.  Always use your most recent med list.                   Brand Name Dispense Instructions for use Diagnosis    aspirin 81 MG tablet     100    1 tab po QD (Once per day)    Type II or unspecified type diabetes mellitus without mention of complication, not stated as uncontrolled       atorvastatin 20 MG tablet    LIPITOR    90 tablet    Take 1 tablet (20 mg) by mouth daily    Hyperlipidemia LDL goal <100       cholecalciferol 1000 units capsule    vitamin  -D     Take 1 capsule by mouth daily        glimepiride 4 MG tablet    AMARYL    90 tablet    Take 1 tablet (4 mg) by mouth daily    Type 2 diabetes mellitus with hyperglycemia, without long-term current use of insulin (H)       insulin pen needle 31G X 8 MM    B-D U/F    100 each    Use 1 pen needles daily or as directed.    Morbid obesity due to excess calories (H)       liraglutide 18 MG/3ML soln    VICTOZA PEN    21 mL    Inject 1.8 mg Subcutaneous daily    Morbid obesity due to excess calories (H)       lisinopril 5 MG tablet    PRINIVIL/ZESTRIL    90 tablet    Take 1 tablet (5 mg) by mouth daily    Benign essential hypertension       metFORMIN 1000 MG tablet    GLUCOPHAGE    180 tablet    Take 1 tablet (1,000 mg) by mouth 2 times daily (with meals)    Type 2 diabetes mellitus with hyperglycemia, without long-term current use of insulin (H)       order for DME     3 Month    Equipment being ordered: Lancets, any brand covered by insurance. Test blood sugar 2 times daily.    Type 2 diabetes, HbA1c goal < 7% (H)       * order for DME     1 each    Glucometer, brand as covered by insurance.     Type 2 diabetes mellitus with hyperglycemia, without long-term current use of insulin (H)       * order for DME     100 each    Lancets.  Daily    Type 2 diabetes mellitus with hyperglycemia, without long-term current use of insulin (H)       * order for DME     100 each    Test strips for pt's glucometer, brand as covered by insurance. Test daily    Type 2 diabetes mellitus with hyperglycemia, without long-term current use of insulin (H)       triamcinolone 0.1 % cream    KENALOG    30 g    Apply sparingly to affected area three times daily for 14 days.    Dermatitis       * Notice:  This list has 3 medication(s) that are the same as other medications prescribed for you. Read the directions carefully, and ask your doctor or other care provider to review them with you.

## 2018-10-11 NOTE — LETTER
10/11/2018         RE: William Montes  [address]  [place] [address]        Dear Colleague,    Thank you for referring your patient, William Montes, to the [facility]. Please see a copy of my visit note below.    ASSESSMENT & PLAN    1. Right elbow pain      Olecrenon enthesophyte per xray  Otherwise benign exam and 5/5 strength    Follow-up as needed.    -----    SUBJECTIVE  William Montes is a/an 53 year old Right handed male who is seen in consultation at the request of  Sirena Talley D.O. for evaluation of right elbow pain. The patient is seen by themselves.    Onset: 4 month(s) ago. Reports insidious onset without acute precipitating event.  Location of Pain: right posterior elbow  Rating of Pain at worst: 6/10  Rating of Pain Currently: 0/10  Worsened by: bumping elbow, resting the elbow on hard surface, wearing a long sleeve shirt  Better with: modifying activity   Treatments tried: ibuprofen  Associated symptoms: posterior elbow swelling  Orthopedic history: NO  Relevant surgical history: NO  Patient Social History: works as a medical case manager    Patient's past medical, surgical, social, and family histories were reviewed today and no changes are noted.    REVIEW OF SYSTEMS:  10 point ROS is negative other than symptoms noted above in HPI, Past Medical History or as stated below  Constitutional: NEGATIVE for fever, chills, change in weight  Skin: NEGATIVE for worrisome rashes, moles or lesions  GI/: NEGATIVE for bowel or bladder changes  Neuro: NEGATIVE for weakness, dizziness or paresthesias    OBJECTIVE:  /81  Ht 6' (1.829 m)  Wt 249 lb (112.9 kg)  BMI 33.77 kg/m2   General: healthy, alert and in no distress  HEENT: no scleral icterus or conjunctival erythema  Skin: no suspicious lesions or rash. No jaundice.  CV: regular rhythm by palpation  Resp: normal respiratory effort without conversational dyspnea   Psych: normal mood and affect  Gait: normal  steady gait with appropriate coordination and balance  Neuro: Normal sensory exam of bilateral hands.   MSK:  RIGHT ELBOW  Inspection:    No swelling, bruising, discoloration, or obvious deformity or asymmetry  Palpation:    NonTender about the common extensor tendon, common flexor tendon, extensor muscle of forearm, flexor muscle of forearm, olecranon bursa. Remainder of bony, ligamentous and tendinous landmarks are nontender.    Crepitus is Absent  Range of Motion:     Extension full / flexion full / pronation full / supination full  Strength:    Flexion full extension full pronation full supination full  Special Tests:    Negative: Pain with resisted wrist extension, pain with resisted middle finger extension, pain with resisted wrist flexion    Independent visualization of the below image:  Recent Results (from the past 24 hour(s))   XR Elbow Right G/E 3 Views    Narrative    RIGHT ELBOW THREE VIEWS  10/11/2018 9:15 AM    HISTORY: Right elbow pain.    COMPARISON:  None.    FINDINGS: There is prominent spur formation arising from the posterior  aspect of the olecranon. No fracture or osseous lesion is seen. The  joint spaces are well preserved. No adjacent soft tissue pathology is  seen.      Impression    IMPRESSION: Prominent spur formation along the posterior olecranon. No  acute abnormality is seen.    KWAN SOMERS MD     Patient's onditions were thoroughly discussed during today's visit with greater than 50% of the visit spent counseling the patient with total time spent face-to-face with the patient being 15 minutes.    Lonnie Graves DO [facility]        Again, thank you for allowing me to participate in the care of your patient.        Sincerely,        Lonnie Graves DO

## 2018-10-11 NOTE — TELEPHONE ENCOUNTER
Duplicates from the same pharmacy and we received a confirmation receipt on 10/8. Sent a message with my call back number asking if they received it.  rAy Corley RN

## 2018-10-11 NOTE — PATIENT INSTRUCTIONS
1. Right elbow pain      Bony changes at the olecranon due to traction from the triceps tendon   Otherwise, no concerning findings on x-ray  You have great strength in your elbow    Follow-up as needed.    Official Walk with a Doc Chapter  Join me downstairs in the lobby of the [facility] the 1st Saturday of every month at 1pm for a free health talk. Come, listen and walk at your own pace!

## 2018-10-11 NOTE — TELEPHONE ENCOUNTER
"Requested Prescriptions   Pending Prescriptions Disp Refills     lisinopril (PRINIVIL/ZESTRIL) 5 MG tablet [Pharmacy Med Name: LISINOPRIL 5MG TABLETS] 90 tablet 0    Last Written Prescription Date:  10/8/18  Last Fill Quantity: 90,  # refills: 1   Last office visit: 10/8/2018 with prescribing provider:  Gerri   Future Office Visit:     Sig: TAKE 1 TABLET(5 MG) BY MOUTH DAILY    ACE Inhibitors (Including Combos) Protocol Passed    10/11/2018  7:48 AM       Passed - Blood pressure under 140/90 in past 12 months    BP Readings from Last 3 Encounters:   10/08/18 134/84   03/21/18 130/70   03/06/18 130/76                Passed - Recent (12 mo) or future (30 days) visit within the authorizing provider's specialty    Patient had office visit in the last 12 months or has a visit in the next 30 days with authorizing provider or within the authorizing provider's specialty.  See \"Patient Info\" tab in inbasket, or \"Choose Columns\" in Meds & Orders section of the refill encounter.           Passed - Patient is age 18 or older       Passed - Normal serum creatinine on file in past 12 months    Recent Labs   Lab Test  03/06/18   1100   CR  1.24            Passed - Normal serum potassium on file in past 12 months    Recent Labs   Lab Test  03/06/18   1100   POTASSIUM  4.1             glimepiride (AMARYL) 4 MG tablet [Pharmacy Med Name: GLIMEPIRIDE 4MG TABLETS] 90 tablet 0    Last Written Prescription Date:  10/8/18  Last Fill Quantity: 90,  # refills: 1   Last office visit: 10/8/2018 with prescribing provider:  Gerri   Future Office Visit:     Sig: TAKE 1 TABLET(4 MG) BY MOUTH DAILY    Sulfonylurea Agents Passed    10/11/2018  7:48 AM       Passed - Blood pressure less than 140/90 in past 6 months    BP Readings from Last 3 Encounters:   10/08/18 134/84   03/21/18 130/70   03/06/18 130/76                Passed - Patient has documented LDL within the past 12 mos.    Recent Labs   Lab Test  03/06/18   1100   LDL  49            " "Passed - Patient has had a Microalbumin in the past 12 mos.    Recent Labs   Lab Test  03/06/18   1206   MICROL  19   UMALCR  9.74            Passed - Patient has documented A1c within the specified period of time.    If HgbA1C is 8 or greater, it needs to be on file within the past 3 months.  If less than 8, must be on file within the past 6 months.     Recent Labs   Lab Test  10/08/18   0834   A1C  6.7*            Passed - Patient is age 18 or older       Passed - Patient has a recent creatinine (normal) within the past 12 mos.    Recent Labs   Lab Test  03/06/18   1100   CR  1.24            Passed - Recent (6 mo) or future (30 days) visit within the authorizing provider's specialty    Patient had office visit in the last 6 months or has a visit in the next 30 days with authorizing provider or within the authorizing provider's specialty.  See \"Patient Info\" tab in inbasket, or \"Choose Columns\" in Meds & Orders section of the refill encounter.            metFORMIN (GLUCOPHAGE) 500 MG tablet [Pharmacy Med Name: METFORMIN 500MG TABLETS] 360 tablet 0    Last Written Prescription Date:  10/8/18  Last Fill Quantity: 180,  # refills: 1   Last office visit: 10/8/2018 with prescribing provider:  pooja   Future Office Visit:     Sig: TAKE 2 TABLETS(1000 MG) BY MOUTH TWICE DAILY WITH MEALS    Biguanide Agents Passed    10/11/2018  7:48 AM       Passed - Blood pressure less than 140/90 in past 6 months    BP Readings from Last 3 Encounters:   10/08/18 134/84   03/21/18 130/70   03/06/18 130/76                Passed - Patient has documented LDL within the past 12 mos.    Recent Labs   Lab Test  03/06/18   1100   LDL  49            Passed - Patient has had a Microalbumin in the past 15 mos.    Recent Labs   Lab Test  03/06/18   1206   MICROL  19   UMALCR  9.74            Passed - Patient is age 10 or older       Passed - Patient has documented A1c within the specified period of time.    If HgbA1C is 8 or greater, it needs to " "be on file within the past 3 months.  If less than 8, must be on file within the past 6 months.     Recent Labs   Lab Test  10/08/18   0834   A1C  6.7*            Passed - Patient's CR is NOT>1.4 OR Patient's EGFR is NOT<45 within past 12 mos.    Recent Labs   Lab Test  03/06/18   1100   GFRESTIMATED  61   GFRESTBLACK  73       Recent Labs   Lab Test  03/06/18   1100   CR  1.24            Passed - Patient does NOT have a diagnosis of CHF.       Passed - Recent (6 mo) or future (30 days) visit within the authorizing provider's specialty    Patient had office visit in the last 6 months or has a visit in the next 30 days with authorizing provider or within the authorizing provider's specialty.  See \"Patient Info\" tab in inbasket, or \"Choose Columns\" in Meds & Orders section of the refill encounter.              "

## 2018-10-11 NOTE — PROGRESS NOTES
ASSESSMENT & PLAN    1. Right elbow pain      Olecrenon enthesophyte per xray  Otherwise benign exam and 5/5 strength    Follow-up as needed.    -----    SUBJECTIVE  William Montes is a/an 53 year old Right handed male who is seen in consultation at the request of  Sirena Talley D.O. for evaluation of right elbow pain. The patient is seen by themselves.    Onset: 4 month(s) ago. Reports insidious onset without acute precipitating event.  Location of Pain: right posterior elbow  Rating of Pain at worst: 6/10  Rating of Pain Currently: 0/10  Worsened by: bumping elbow, resting the elbow on hard surface, wearing a long sleeve shirt  Better with: modifying activity   Treatments tried: ibuprofen  Associated symptoms: posterior elbow swelling  Orthopedic history: NO  Relevant surgical history: NO  Patient Social History: works as a medical case manager    Patient's past medical, surgical, social, and family histories were reviewed today and no changes are noted.    REVIEW OF SYSTEMS:  10 point ROS is negative other than symptoms noted above in HPI, Past Medical History or as stated below  Constitutional: NEGATIVE for fever, chills, change in weight  Skin: NEGATIVE for worrisome rashes, moles or lesions  GI/: NEGATIVE for bowel or bladder changes  Neuro: NEGATIVE for weakness, dizziness or paresthesias    OBJECTIVE:  /81  Ht 6' (1.829 m)  Wt 249 lb (112.9 kg)  BMI 33.77 kg/m2   General: healthy, alert and in no distress  HEENT: no scleral icterus or conjunctival erythema  Skin: no suspicious lesions or rash. No jaundice.  CV: regular rhythm by palpation  Resp: normal respiratory effort without conversational dyspnea   Psych: normal mood and affect  Gait: normal steady gait with appropriate coordination and balance  Neuro: Normal sensory exam of bilateral hands.   MSK:  RIGHT ELBOW  Inspection:    No swelling, bruising, discoloration, or obvious deformity or asymmetry  Palpation:    NonTender about the common  extensor tendon, common flexor tendon, extensor muscle of forearm, flexor muscle of forearm, olecranon bursa. Remainder of bony, ligamentous and tendinous landmarks are nontender.    Crepitus is Absent  Range of Motion:     Extension full / flexion full / pronation full / supination full  Strength:    Flexion full extension full pronation full supination full  Special Tests:    Negative: Pain with resisted wrist extension, pain with resisted middle finger extension, pain with resisted wrist flexion    Independent visualization of the below image:  Recent Results (from the past 24 hour(s))   XR Elbow Right G/E 3 Views    Narrative    RIGHT ELBOW THREE VIEWS  10/11/2018 9:15 AM    HISTORY: Right elbow pain.    COMPARISON:  None.    FINDINGS: There is prominent spur formation arising from the posterior  aspect of the olecranon. No fracture or osseous lesion is seen. The  joint spaces are well preserved. No adjacent soft tissue pathology is  seen.      Impression    IMPRESSION: Prominent spur formation along the posterior olecranon. No  acute abnormality is seen.    KWAN SOMERS MD     Patient's onditions were thoroughly discussed during today's visit with greater than 50% of the visit spent counseling the patient with total time spent face-to-face with the patient being 15 minutes.    Lonnie Graves,  [facility] [place]

## 2018-10-26 ENCOUNTER — TELEPHONE (OUTPATIENT)
Dept: SURGERY | Facility: CLINIC | Age: 53
End: 2018-10-26

## 2018-10-26 NOTE — TELEPHONE ENCOUNTER
Patient interested in weight loss surgery.    Name: William Montes    Left message regarding appointment on Wednesday 11/07/2018.     Instructed to check with insurance company regarding exclusions prior to first appt.    Scheduled to see Marine Flores PA-C at 7:45am followed by an appt with a dietician at 8:30am.      Instructed to view seminar and complete pre-visit questionnaires prior to visit at [email or web address].    [phone] Contact Center phone number

## 2018-11-07 ENCOUNTER — OFFICE VISIT (OUTPATIENT)
Dept: SURGERY | Facility: CLINIC | Age: 53
End: 2018-11-07
Attending: FAMILY MEDICINE
Payer: COMMERCIAL

## 2018-11-07 VITALS
WEIGHT: 249.1 LBS | OXYGEN SATURATION: 98 % | BODY MASS INDEX: 33.74 KG/M2 | HEART RATE: 65 BPM | DIASTOLIC BLOOD PRESSURE: 83 MMHG | HEIGHT: 72 IN | TEMPERATURE: 98.7 F | SYSTOLIC BLOOD PRESSURE: 140 MMHG

## 2018-11-07 DIAGNOSIS — E66.09 CLASS 1 OBESITY DUE TO EXCESS CALORIES WITH BODY MASS INDEX (BMI) OF 33.0 TO 33.9 IN ADULT: ICD-10-CM

## 2018-11-07 DIAGNOSIS — E66.811 CLASS 1 OBESITY DUE TO EXCESS CALORIES WITH BODY MASS INDEX (BMI) OF 33.0 TO 33.9 IN ADULT: ICD-10-CM

## 2018-11-07 DIAGNOSIS — E66.09 CLASS 1 OBESITY DUE TO EXCESS CALORIES WITH BODY MASS INDEX (BMI) OF 33.0 TO 33.9 IN ADULT: Primary | ICD-10-CM

## 2018-11-07 DIAGNOSIS — E66.811 CLASS 1 OBESITY DUE TO EXCESS CALORIES WITH BODY MASS INDEX (BMI) OF 33.0 TO 33.9 IN ADULT: Primary | ICD-10-CM

## 2018-11-07 DIAGNOSIS — E66.9 OBESITY (BMI 30-39.9): Primary | ICD-10-CM

## 2018-11-07 LAB
ALBUMIN SERPL-MCNC: 4 G/DL (ref 3.4–5)
ALP SERPL-CCNC: 68 U/L (ref 40–150)
ALT SERPL W P-5'-P-CCNC: 96 U/L (ref 0–70)
ANION GAP SERPL CALCULATED.3IONS-SCNC: 8 MMOL/L (ref 3–14)
AST SERPL W P-5'-P-CCNC: 75 U/L (ref 0–45)
BILIRUB SERPL-MCNC: 0.4 MG/DL (ref 0.2–1.3)
BUN SERPL-MCNC: 10 MG/DL (ref 7–30)
CALCIUM SERPL-MCNC: 8.6 MG/DL (ref 8.5–10.1)
CHLORIDE SERPL-SCNC: 108 MMOL/L (ref 94–109)
CO2 SERPL-SCNC: 23 MMOL/L (ref 20–32)
CREAT SERPL-MCNC: 1.22 MG/DL (ref 0.66–1.25)
DEPRECATED CALCIDIOL+CALCIFEROL SERPL-MC: 42 UG/L (ref 20–75)
ERYTHROCYTE [DISTWIDTH] IN BLOOD BY AUTOMATED COUNT: 12.5 % (ref 10–15)
GFR SERPL CREATININE-BSD FRML MDRD: 62 ML/MIN/1.7M2
GLUCOSE SERPL-MCNC: 101 MG/DL (ref 70–99)
HCT VFR BLD AUTO: 43.9 % (ref 40–53)
HGB BLD-MCNC: 14.5 G/DL (ref 13.3–17.7)
MCH RBC QN AUTO: 28.8 PG (ref 26.5–33)
MCHC RBC AUTO-ENTMCNC: 33 G/DL (ref 31.5–36.5)
MCV RBC AUTO: 87 FL (ref 78–100)
PLATELET # BLD AUTO: 134 10E9/L (ref 150–450)
POTASSIUM SERPL-SCNC: 4.2 MMOL/L (ref 3.4–5.3)
PROT SERPL-MCNC: 7.6 G/DL (ref 6.8–8.8)
PTH-INTACT SERPL-MCNC: 59 PG/ML (ref 18–80)
RBC # BLD AUTO: 5.03 10E12/L (ref 4.4–5.9)
SODIUM SERPL-SCNC: 139 MMOL/L (ref 133–144)
WBC # BLD AUTO: 3.5 10E9/L (ref 4–11)

## 2018-11-07 RX ORDER — TOPIRAMATE 25 MG/1
TABLET, FILM COATED ORAL
Qty: 90 TABLET | Refills: 1 | Status: SHIPPED | OUTPATIENT
Start: 2018-11-07 | End: 2019-01-11

## 2018-11-07 NOTE — PATIENT INSTRUCTIONS
You saw Marine Flores PA-C today.     Instructions per today's visit:   Get labs on your way out today  See dietician today and then monthly after  Start Topiramate  Continue victoza  Consider stopping glimepiride- Can discuss with Elida at your visit  Follow up with [facility] pharmacist in 4 weeks  Follow up with Marine Flores PA-C in 8 weeks      Please call during clinic hours Monday through Friday 8:00a - 4:00p if you have questions or you can contact us via [employer] at anytime.      General Surgery Call Center: [phone]  Fax: [phone]  Surgery Scheduler: [phone]    Please call the hospital at [phone] to speak with our on call MDs if you have urgent needs after hours, during weekends, or holidays.  It was a pleasure meeting with you today.     Thank you for allowing us the privilege of caring for you. We hope we provided you with the excellent service you deserve.     Please let us know if there is anything else we can do for you so that we can be sure you are leaving completely satisfied with your care experience.     MEDICATION STARTED AT THIS APPOINTMENT  We are starting topiramate at bedtime.  Start one tab, 25 mg, for a week. Go up to 50 mg (2 tabs) for the next week. At the third week, take   3 tabs (75 mg).  Stay at 3 tabs until you are seen again. Call the nurse at [phone] if you have any questions or concerns. (Do not stop taking it if you don't think it's working. For some people it works even though they do not feel much different.)    Topiramate (Topamax) is a medication that is used most often to treat migraine headaches or for seizures. It has also been found to help with weight loss. Although it's not currently FDA approved for weight loss, it has been used safely for a number of years to help people who are carrying extra weight.     Just how topiramate helps with weight loss has not been exactly determined. However it seems to work on areas of the brain to quiet  down signals related to eating.      Topiramate may make you:    >feel less interest in eating in between meals   >think less about food and eating   >find it easier to push the plate away   >find giving up pop easier    >have an easier time eating less    For some of our patients, the pills work right away. They feel and think quite differently about food. Other patients don't feel much of a change but find in fact they have lost weight! Like all weight loss medications, topiramate works best when you help it work.  This means:    1) Have less tempting high calorie (fattening) food around the house or office    2) Have lower calorie food (fruits, vegetables,low fat meats and dairy) for snacks    3) Eat out only one time or less each week.   4) Eat your meals at a table with the TV or computer off.    Side-effects. Topiramate is generally well tolerated. The main side-effects we see are:   Tingling in hands,feet, or face (usually not very troublesome)   Mental confusion and word finding trouble (about 10% of patients have this.)     Feeling sleepy or a bit dopey- this goes away very soon after starting.    One of the dangers of topiramate is the possibility of birth defects--if you get pregnant when you are on it, there is the risk that your baby will be born with a cleft lip or palate.  If you are on topiramate and of child bearing age, you need to be on a reliable form of birth control or refrain from sexual intercourse.     Please refer to the pharmacy insert for more information on side-effects. Since many pharmacists are not familiar with the use of topiramate in weight loss, calling the clinic will get you the most accurate information on the use of this medication for weight loss.     In order to get refills of this or any medication we prescribe you must be seen in the medical weight mgmt clinic every 2-3 months. Please have your pharmacy fax a refill request to [phone].

## 2018-11-07 NOTE — MR AVS SNAPSHOT
"                  MRN:[id number]                      After Visit Summary   11/7/2018    William Montes    MRN: [id number]           Visit Information        Provider Department      11/7/2018 8:30 AM Alannah Bonilla RD [facility] Surgical Weight Management        Care Instructions    Goals:   GOALS:  Relating To Eating:  Eat slowly (20-30 minutes per meal), chewing foods well (25 chews per bite/applesauce consistency)  9\" Plate method (1/2 plate non-starchy vegetables/fruit, 1/4 plate lean protein, 1/4 plate whole grain starch - no more than 1/2 cup carb/meal)    Relating to beverages:  Continue drink 48-64 ounces of fluid per day    Relating to activity:  Increase activity level.  Exercise 3-4 days/week for 30-60 minutes    Follow up with RD in one month    Alannah Bonilla MS, RD, LD  If you need to schedule or reschedule with a dietitian please call [phone].         [employer] Information     [employer] gives you secure access to your electronic health record. If you see a primary care provider, you can also send messages to your care team and make appointments. If you have questions, please call your primary care clinic.  If you do not have a primary care provider, please call [phone] and they will assist you.      [employer] is an electronic gateway that provides easy, online access to your medical records. With [employer], you can request a clinic appointment, read your test results, renew a prescription or communicate with your care team.     To access your existing account, please contact your [facility] or call [phone] for assistance.        Care EveryWhere ID     This is your Care EveryWhere ID. This could be used by other organizations to access your [place] medical records  [id number]        Equal Access to Services     GREG FENG AH: jason Andrea qaybta kaalmada adeegyada, waxay idiin hayaan adeeg kharash la'aan ah. So wacarmen " [phone].    ATENCIÓN: Si habla español, tiene a chanel disposición servicios gratuitos de asistencia lingüística. Llame al [phone].    We comply with applicable federal civil rights laws and [place] laws. We do not discriminate on the basis of race, color, national origin, age, disability, sex, sexual orientation, or gender identity.

## 2018-11-07 NOTE — MR AVS SNAPSHOT
After Visit Summary   11/7/2018    William Montes    MRN: [id number]           Patient Information     Date Of Birth          1965        Visit Information        Provider Department      11/7/2018 7:45 AM Marine Flores PA-C [facility] Management        Today's Diagnoses     Class 1 obesity due to excess calories with body mass index (BMI) of 33.0 to 33.9 in adult    -  1      Care Instructions    You saw Marine Flores PA-C today.     Instructions per today's visit:   Get labs on your way out today  See dietician today and then monthly after  Start Topiramate  Continue victoza  Consider stopping glimepiride- Can discuss with Elida at your visit  Follow up with [facility] pharmacist in 4 weeks  Follow up with Marine Flores PA-C in 8 weeks      Please call during clinic hours Monday through Friday 8:00a - 4:00p if you have questions or you can contact us via [employer] at anytime.      General Surgery Call Center: [phone]  Fax: [phone]  Surgery Scheduler: [phone]    Please call the hospital at [phone] to speak with our on call MDs if you have urgent needs after hours, during weekends, or holidays.  It was a pleasure meeting with you today.     Thank you for allowing us the privilege of caring for you. We hope we provided you with the excellent service you deserve.     Please let us know if there is anything else we can do for you so that we can be sure you are leaving completely satisfied with your care experience.     MEDICATION STARTED AT THIS APPOINTMENT  We are starting topiramate at bedtime.  Start one tab, 25 mg, for a week. Go up to 50 mg (2 tabs) for the next week. At the third week, take   3 tabs (75 mg).  Stay at 3 tabs until you are seen again. Call the nurse at [phone] if you have any questions or concerns. (Do not stop taking it if you don't think it's working. For some people it works even though they do not feel much  different.)    Topiramate (Topamax) is a medication that is used most often to treat migraine headaches or for seizures. It has also been found to help with weight loss. Although it's not currently FDA approved for weight loss, it has been used safely for a number of years to help people who are carrying extra weight.     Just how topiramate helps with weight loss has not been exactly determined. However it seems to work on areas of the brain to quiet down signals related to eating.      Topiramate may make you:    >feel less interest in eating in between meals   >think less about food and eating   >find it easier to push the plate away   >find giving up pop easier    >have an easier time eating less    For some of our patients, the pills work right away. They feel and think quite differently about food. Other patients don't feel much of a change but find in fact they have lost weight! Like all weight loss medications, topiramate works best when you help it work.  This means:    1) Have less tempting high calorie (fattening) food around the house or office    2) Have lower calorie food (fruits, vegetables,low fat meats and dairy) for snacks    3) Eat out only one time or less each week.   4) Eat your meals at a table with the TV or computer off.    Side-effects. Topiramate is generally well tolerated. The main side-effects we see are:   Tingling in hands,feet, or face (usually not very troublesome)   Mental confusion and word finding trouble (about 10% of patients have this.)     Feeling sleepy or a bit dopey- this goes away very soon after starting.    One of the dangers of topiramate is the possibility of birth defects--if you get pregnant when you are on it, there is the risk that your baby will be born with a cleft lip or palate.  If you are on topiramate and of child bearing age, you need to be on a reliable form of birth control or refrain from sexual intercourse.     Please refer to the pharmacy insert for  more information on side-effects. Since many pharmacists are not familiar with the use of topiramate in weight loss, calling the clinic will get you the most accurate information on the use of this medication for weight loss.     In order to get refills of this or any medication we prescribe you must be seen in the medical weight mgmt clinic every 2-3 months. Please have your pharmacy fax a refill request to [phone].                  Follow-ups after your visit        Additional Services     MED THERAPY MANAGE REFERRAL       Your provider has referred you to: **[facility] Scheduling (numerous locations) [phone]   [email or web address]  Elida Fay    Reason for Referral: Started topiramate, considering stopping glimepiride?    The [facility] department will contact you to schedule an appointment.  You may also schedule the appointment by calling [phone].  For [facility] [place] patients, please call [phone] to confirm/schedule your appointment on the next business day.    This service is designed to help you get the most from your medications.  A specially trained Pharmacist will work closely with you and your providers to solve any questions, concerns, issues or problems related to your medications.    Please bring all of your prescription and non-prescription medications (such as vitamins, over-the-counter medications, and herbals) or a detailed medication list to your appointment.    If you have a glucose meter or other home monitoring information, please also bring this to your appointment (i.e. blood glucose log, blood pressure log, pain log, etc.).                  Future tests that were ordered for you today     Open Future Orders        Priority Expected Expires Ordered    CBC with platelets Routine  11/7/2019 11/7/2018    Comprehensive metabolic panel Routine  11/7/2019 11/7/2018  "   Hemoglobin A1c Routine  2/5/2019 11/7/2018    Parathyroid Hormone Intact Routine  11/7/2019 11/7/2018    Vitamin D Deficiency Routine  11/7/2019 11/7/2018            Who to contact     Please call your clinic at [phone] to:    Ask questions about your health    Make or cancel appointments    Discuss your medicines    Learn about your test results    Speak to your doctor            Additional Information About Your Visit        [employer] Information     [employer] gives you secure access to your electronic health record. If you see a primary care provider, you can also send messages to your care team and make appointments. If you have questions, please call your primary care clinic.  If you do not have a primary care provider, please call [phone] and they will assist you.      [employer] is an electronic gateway that provides easy, online access to your medical records. With [employer], you can request a clinic appointment, read your test results, renew a prescription or communicate with your care team.     To access your existing account, please contact your [facility] or call [phone] for assistance.        Care EveryWhere ID     This is your Care EveryWhere ID. This could be used by other organizations to access your [place] medical records  [id number]        Your Vitals Were     Pulse Temperature Height Pulse Oximetry BMI (Body Mass Index)       65 98.7  F (37.1  C) (Oral) 6' 0.05\" 98% 33.74 kg/m2        Blood Pressure from Last 3 Encounters:   11/07/18 140/83   10/11/18 134/81   10/08/18 134/84    Weight from Last 3 Encounters:   11/07/18 249 lb 1.6 oz   10/11/18 249 lb   10/08/18 249 lb              We Performed the Following     MED THERAPY MANAGE REFERRAL          Today's Medication Changes          These changes are accurate as of 11/7/18  9:07 AM.  If you have any questions, ask your nurse or doctor.               Start taking these medicines.        Dose/Directions "    topiramate 25 MG tablet   Commonly known as:  TOPAMAX   Used for:  Class 1 obesity due to excess calories with body mass index (BMI) of 33.0 to 33.9 in adult   Started by:  Marine Flores PA-C        25mg at bedtime for week 1, 50mg at bedtime for 1 week, and 75mg at bedtime thereafter   Quantity:  90 tablet   Refills:  1            Where to get your medicines      These medications were sent to [id number] - [place], [place] - [address]  [address], [place] [address]     Phone:  [phone]     topiramate 25 MG tablet                Primary Care Provider Office Phone # Fax #    Sirena Talley  [phone]       [address] [facility] [address]        Equal Access to Services     [facility] : Hadii leni helm hadasho Soisaias, waaxda luqadaha, qaybta kaalmada adeegyada, whitley newberry . So [facility] [phone].    ATENCIÓN: Si habla español, tiene a chanel disposición servicios gratuitos de asistencia lingüística. Llame al [phone].    We comply with applicable federal civil rights laws and [place] laws. We do not discriminate on the basis of race, color, national origin, age, disability, sex, sexual orientation, or gender identity.            Thank you!     Thank you for choosing [facility] SURGICAL WE[facility] MANAGEMENT  for your care. Our goal is always to provide you with excellent care. Hearing back from our patients is one way we can continue to improve our services. Please take a few minutes to complete the written survey that you may receive in the mail after your visit with us. Thank you!             Your Updated Medication List - Protect others around you: Learn how to safely use, store and throw away your medicines at www.disposemymeds.org.          This list is accurate as of 11/7/18  9:07 AM.  Always use your most recent med list.                   Brand Name Dispense Instructions  for use Diagnosis    aspirin 81 MG tablet     100    1 tab po QD (Once per day)    Type II or unspecified type diabetes mellitus without mention of complication, not stated as uncontrolled       atorvastatin 20 MG tablet    LIPITOR    90 tablet    Take 1 tablet (20 mg) by mouth daily    Hyperlipidemia LDL goal <100       cholecalciferol 1000 units capsule    vitamin  -D     Take 1 capsule by mouth daily        glimepiride 4 MG tablet    AMARYL    90 tablet    Take 1 tablet (4 mg) by mouth daily    Type 2 diabetes mellitus with hyperglycemia, without long-term current use of insulin (H)       insulin pen needle 31G X 8 MM    B-D U/F    100 each    Use 1 pen needles daily or as directed.    Morbid obesity due to excess calories (H)       liraglutide 18 MG/3ML soln    VICTOZA PEN    21 mL    Inject 1.8 mg Subcutaneous daily    Morbid obesity due to excess calories (H)       lisinopril 5 MG tablet    PRINIVIL/ZESTRIL    90 tablet    Take 1 tablet (5 mg) by mouth daily    Benign essential hypertension       metFORMIN 1000 MG tablet    GLUCOPHAGE    180 tablet    Take 1 tablet (1,000 mg) by mouth 2 times daily (with meals)    Type 2 diabetes mellitus with hyperglycemia, without long-term current use of insulin (H)       order for DME     3 Month    Equipment being ordered: Lancets, any brand covered by insurance. Test blood sugar 2 times daily.    Type 2 diabetes, HbA1c goal < 7% (H)       * order for DME     1 each    Glucometer, brand as covered by insurance.    Type 2 diabetes mellitus with hyperglycemia, without long-term current use of insulin (H)       * order for DME     100 each    Lancets.  Daily    Type 2 diabetes mellitus with hyperglycemia, without long-term current use of insulin (H)       * order for DME     100 each    Test strips for pt's glucometer, brand as covered by insurance. Test daily    Type 2 diabetes mellitus with hyperglycemia, without long-term current use of insulin (H)       topiramate 25 MG  tablet    TOPAMAX    90 tablet    25mg at bedtime for week 1, 50mg at bedtime for 1 week, and 75mg at bedtime thereafter    Class 1 obesity due to excess calories with body mass index (BMI) of 33.0 to 33.9 in adult       triamcinolone 0.1 % cream    KENALOG    30 g    Apply sparingly to affected area three times daily for 14 days.    Dermatitis       * Notice:  This list has 3 medication(s) that are the same as other medications prescribed for you. Read the directions carefully, and ask your doctor or other care provider to review them with you.

## 2018-11-07 NOTE — PATIENT INSTRUCTIONS
"Goals:   GOALS:  Relating To Eating:  Eat slowly (20-30 minutes per meal), chewing foods well (25 chews per bite/applesauce consistency)  9\" Plate method (1/2 plate non-starchy vegetables/fruit, 1/4 plate lean protein, 1/4 plate whole grain starch - no more than 1/2 cup carb/meal)    Relating to beverages:  Continue drink 48-64 ounces of fluid per day    Relating to activity:  Increase activity level.  Exercise 3-4 days/week for 30-60 minutes    Follow up with RD in one month    Alannah Bonilla, MS, RD, LD  If you need to schedule or reschedule with a dietitian please call [phone].  "

## 2018-11-07 NOTE — PROGRESS NOTES
"New Bariatric Surgery Consultation Note    RE: William Montes  MR#: [id number]  : 1965      Referring provider:       10/26/2018   Who referred you? Dr. Katie Jackson       Chief Complaint/Reason for visit: evaluation for possible weight loss surgery    Dear Sirena Talley DO (General),    I had the pleasure of seeing your patient, William Montes, to evaluate his obesity and consider him for possible weight loss surgery. As you know, William Montes is 53 year old.  He has a height of 6' .047\", a weight of 249 lbs 1.6 oz, and calculated Body mass index is 33.74 kg/(m^2).    He is interested in weight management due to hx of EDWARDS and type 2 DM.     HISTORY OF PRESENT ILLNESS:  Weight Loss History Reviewed with Patient 10/26/2018   How long have you been overweight? Since late 20's to early 40's   What is the most that you have ever weighed? 250   What is the most weight you have lost? 20   I have tried the following methods to lose weight Watching portions or calories, Slimfast   I have tried the following weight loss medications? (Check all that apply) Xenical/Orlistat/Lupillo, Victoza/liraglutide   Have you ever had weight loss surgery? No       CO-MORBIDITIES OF OBESITY INCLUDE:     10/26/2018   I have the following co-morbidities associated with obesity: Type II Diabetes, GERD (Reflux), Fatty Liver   What was your most recent hemoglobin a1c  6.7   How many years have you had diabetes? 18   Are you taking daily medication for heartburn, acid reflux, or GERD (acid reflux disease)? No       PAST MEDICAL HISTORY:  Past Medical History:   Diagnosis Date     ABNORMAL LIVER FUNCTION STUDY 2008    increased ast, alt Normal since      Chronic gingivitis      Esophageal reflux      Glaucoma      History of blood transfusion     My son has aplastic anemia and has had bld transfusions     Nonspecific abnormal results of liver function study      OBESITY NOS 2006     Pure hypercholesterolemia      Type II or " unspecified type diabetes mellitus without mention of complication, not stated as uncontrolled      Uncomplicated asthma     My mother has asthma       PAST SURGICAL HISTORY:  Past Surgical History:   Procedure Laterality Date     BIOPSY      My son had bone marrow biopsy     ENT SURGERY      My son has had tympanoplasty     NO HISTORY OF SURGERY       ORTHOPEDIC SURGERY      My mother had left hip replacement surgery       FAMILY HISTORY:   Family History   Problem Relation Age of Onset     Hypertension Mother      Diabetes Sister      Diabetes Cousin      Hypertension Other      C.A.D. 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 Yes   Who do you have in your support network that can be available to help you for the first 2 weeks after surgery? my wife   Who can you count on for support throughout your weight loss surgery journey? my wife   Can you afford 3 meals a day?  Yes   Can you afford 50-60 dollars a month for vitamins? Yes   5 boys, 1 left at home    HABITS:     10/26/2018   How often do you drink alcohol? Never   Do you currently use any of the following Nicotine products? No   Have you ever used any of the following nicotine products? No   Have you or are you currently using street drugs or prescription strength medication for which you do not have a prescription for? No   Do you have a history of chemical dependency  (alcohol or drug abuse)? No       PSYCHOLOGICAL HISTORY:   Psychological History Reviewed With Patient 10/26/2018   Have you ever attempted suicide? Never.   Have you had thoughts of suicide in the past year? No   Have you ever been hospitalized for mental illness or a suicide attempt? Never.   Do you have a history of chronic pain? No   Have you ever been diagnosed with fibromyalgia? No   Are you currently being treated for any of the following? (select all that apply) I do not have a mental illness       ROS:     10/26/2018   Skin:  None of the above   HEENT: None of these   Musculoskeletal: Joint Pain   Cardiovascular: None of the above   Pulmonary: Snoring   Gastrointestinal: Heartburn   Genitourinary: None of the above   Hematological: None of the above   Neurological: None of the above       EATING BEHAVIORS:     10/26/2018   Have you or anyone else thought that you had an eating disorder? No   Do you currently binge eat (eat a large amount of food in a short time)? No   Are you an emotional eater? No   Do you get up to eat after falling asleep? No       EXERCISE:     10/26/2018   How often do you exercise? 1 to 2 times per week   What is the duration of your exercise (in minutes)? 60+ Minutes   What types of exercise do you do? gym membership   What keeps you from being more active?  I should be more active but I just have not gotten around to it, Too tired       MEDICATIONS:  Current Outpatient Prescriptions   Medication     ASPIRIN 81 MG OR TABS     atorvastatin (LIPITOR) 20 MG tablet     cholecalciferol (VITAMIN  -D) 1000 UNITS capsule     glimepiride (AMARYL) 4 MG tablet     insulin pen needle (B-D U/F) 31G X 8 MM     liraglutide (VICTOZA PEN) 18 MG/3ML soln     lisinopril (PRINIVIL/ZESTRIL) 5 MG tablet     metFORMIN (GLUCOPHAGE) 1000 MG tablet     order for DME     order for DME     order for DME     order for DME     triamcinolone (KENALOG) 0.1 % cream     No current facility-administered medications  "for this visit.        ALLERGIES:  Allergies   Allergen Reactions     No Known Drug Allergies        LABS/IMAGING/MEDICAL RECORDS REVIEW:           PHYSICAL EXAM:  /83  Pulse 65  Temp 98.7  F (37.1  C) (Oral)  Ht 6' 0.05\"  Wt 249 lb 1.6 oz  SpO2 98%  BMI 33.74 kg/m2  General: NAD  Neurologic: A & O x 3, gait normal  Head: normocephalic, atraumatic  HEENT: PERRL, EOMI.   Respiratory: respirations unlabored  Abdomen: Obese, Soft NT ND   Extremities: No LE swelling   Skin: warm and dry.  No rashes on exposed skin  Psychiatric: Mentation and Affect appear normal      In summary, William Montes has Class II obesity with a body mass index of Body mass index is 33.74 kg/(m^2). kg/m2 and the comorbidities stated above. He is interested in weight management and possibly weight loss surgery but is not a candidate with current BMI 33. There is a EDWARDS study that he may qualify for and I will discuss with Dr Burton.  Labs today  See dietician today and then monthly after  Start Topiramate ramp to 75mg  Continue victoza  Consider stopping glimepiride- Can discuss with Elida at your visit  Follow up with [facility] pharmacist in 4 weeks  Follow up with Marine Flores PA-C in 8 weeks    Sincerely,    Marine Flores PA-C    I spent a total of 30 minutes face to face with William during today's office visit. Over 50% of this time was spent counseling the patient and/or coordinating care.          "

## 2018-11-07 NOTE — PROGRESS NOTES
"New Bariatric Nutrition Consultation Note    Reason For Visit: Nutrition Assessment    William Montes is a 53 year old presenting today for new bariatric nutrition consult.  Pt is going to continue with medical weight loss management at this time. He is interested in weight loss surgery but currently does not meet the BMI requirement for surgery. Patient is accompanied by self. Pt was referred by Marine Flores.      Support System Reviewed With Patient 10/26/2018   Who do you have in your support network that can be available to help you for the first 2 weeks after surgery? my wife   Who can you count on for support throughout your weight loss surgery journey? my wife       ANTHROPOMETRICS:  Estimated body mass index is 33.74 kg/(m^2) as calculated from the following:    Height as of an earlier encounter on 11/7/18: 1.83 m (6' 0.05\").    Weight as of an earlier encounter on 11/7/18: 113 kg (249 lb 1.6 oz).       10/26/2018   I have tried the following methods to lose weight Watching portions or calories, Slimfast       Weight Loss Questions Reviewed With Patient 10/26/2018   How long have you been overweight? Since late 20's to early 40's       SUPPLEMENT INFORMATION:  Vitamin D 1,000 international unit(s)     NUTRITION HISTORY:  Recall Diet Questions Reviewed With Patient 10/26/2018   Describe what you typically consume for breakfast (typical or most recent): wheat bread and fried eggs, oatmeal, breakfast sausage and muffin   Describe what you typically consume for lunch (typical or most recent): pizza, turkey sandwich, gyro   Describe what you typically consume for supper (typical or most recent): rice and stew or rice and greens   Describe what you typically consume as snacks (typical or most recent): Almonds (a lot). peanut. potato chips - not every day some times grapes ( a lot) and a pear.     How many ounces of water, or other low calorie drinks, do you drink daily (8 oz=1 glass)? 24 oz   How many ounces of " "caffeine (coffee, tea, pop) do you drink daily (8 oz=1 glass)? 8 oz - coffee (cream a lot of cream) , no pop  Unsweetened uice tea with lemon    Please indicate the type of milk: 2%   How often do you drink alcohol? Never       Eating Habits 10/26/2018   Do you have any dietary restrictions? No   Do you currently binge eat (eat a large amount of food in a short time)? No   Are you an emotional eater? No   Do you get up to eat after falling asleep? No   What foods do you crave? carbohydrates       ADDITIONAL INFORMATION:  Off and on: gym membership: go on for walks, go for a few days. Try to stay for an hour. Some knee pain (gone now)     Does not like to feel hungry, can't sleep and focus.     Dining Out History Reviewed With Patient 10/26/2018   How often do you dine out? Around once a week.   Where do you dine out? (select all that apply) sit-down restaurants, fast food chains   What types of food do you order when you dine out? pasta, sandwich turkey or chicken, pizza       Physical Activity Reviewed With Patient 10/26/2018   How often do you exercise? 1 to 2 times per week   What is the duration of your exercise (in minutes)? 60+ Minutes   What types of exercise do you do? gym membership   What keeps you from being more active?  I should be more active but I just have not gotten around to it, Too tired       NUTRITION DIAGNOSIS:  Obesity r/t long history of self-monitoring deficit and excessive energy intake aeb BMI >30 kg/m2.    INTERVENTION:  Intervention Provided/Education Provided on post-op diet guidelines, vitamins/minerals essential post-operatively, GI anatomy of bariatric surgeries, ways to help prepare for post-op diet guidelines pre-operatively, portion/calorie-control, 9\" plate method   Provided pt with list of goals RD contact information.    - Spoke with patient today and he states that his two biggest issues were transposing the information that is given to him into what foods his wife makes for " "him and that he does not like to feel hungry. Dicussed the 9\" plate method in depth and discussed how to modify this for foods that he will eat at home.   - Discussed and explained why eating fast can lead to over eating. Pt expressed that he was taught to clean your plate. Explained using smaller dish and saving the rest for left overs.     Questions Reviewed With Patient 10/26/2018   How ready are you to make changes regarding your weight? Number 1 = Not ready at all to make changes up to 10 = very ready. 10   How confident are you that you can change? 1 = Not confident that you will be successful making changes up to 10 = very confident. 10       Patient Understanding: good  Expected Compliance: good    GOALS:  Relating To Eating:  Eat slowly (20-30 minutes per meal), chewing foods well (25 chews per bite/applesauce consistency)  9\" Plate method (1/2 plate non-starchy vegetables/fruit, 1/4 plate lean protein, 1/4 plate whole grain starch - no more than 1/2 cup carb/meal)    Relating to beverages:  Continue drink 48-64 ounces of fluid per day    Relating to activity:  Increase activity level.  Exercise 3-4 days/week for 30-60 minutes    Follow-Up:   Recommend 1 month follow up visits to assist with lifestyle changes or per insurance.      Time spent with patient: 30 minutes.  Alannah Bonilla, MS, RD, LD     "

## 2018-11-07 NOTE — LETTER
"11/7/2018       RE: William Montes  [address] [place] [address] [address]  [place] [address]     Dear Colleague,    Thank you for referring your patient, William Montes, to the [facility] SURGICAL WEIGHT MANAGEMENT at [facility]. Please see a copy of my visit note below.    New Bariatric Nutrition Consultation Note    Reason For Visit: Nutrition Assessment    William Montes is a 53 year old presenting today for new bariatric nutrition consult.  Pt is going to continue with medical weight loss management at this time. He is interested in weight loss surgery but currently does not meet the BMI requirement for surgery. Patient is accompanied by self. Pt was referred by Marine Flores.      Support System Reviewed With Patient 10/26/2018   Who do you have in your support network that can be available to help you for the first 2 weeks after surgery? my wife   Who can you count on for support throughout your weight loss surgery journey? my wife       ANTHROPOMETRICS:  Estimated body mass index is 33.74 kg/(m^2) as calculated from the following:    Height as of an earlier encounter on 11/7/18: 1.83 m (6' 0.05\").    Weight as of an earlier encounter on 11/7/18: 113 kg (249 lb 1.6 oz).       10/26/2018   I have tried the following methods to lose weight Watching portions or calories, Slimfast       Weight Loss Questions Reviewed With Patient 10/26/2018   How long have you been overweight? Since late 20's to early 40's       SUPPLEMENT INFORMATION:  Vitamin D 1,000 international unit(s)     NUTRITION HISTORY:  Recall Diet Questions Reviewed With Patient 10/26/2018   Describe what you typically consume for breakfast (typical or most recent): wheat bread and fried eggs, oatmeal, breakfast sausage and muffin   Describe what you typically consume for lunch (typical or most recent): pizza, turkey sandwich, gyro   Describe what you typically consume for supper (typical or most recent): rice and stew or rice " and greens   Describe what you typically consume as snacks (typical or most recent): Almonds (a lot). peanut. potato chips - not every day some times grapes ( a lot) and a pear.     How many ounces of water, or other low calorie drinks, do you drink daily (8 oz=1 glass)? 24 oz   How many ounces of caffeine (coffee, tea, pop) do you drink daily (8 oz=1 glass)? 8 oz - coffee (cream a lot of cream) , no pop  Unsweetened uice tea with lemon    Please indicate the type of milk: 2%   How often do you drink alcohol? Never       Eating Habits 10/26/2018   Do you have any dietary restrictions? No   Do you currently binge eat (eat a large amount of food in a short time)? No   Are you an emotional eater? No   Do you get up to eat after falling asleep? No   What foods do you crave? carbohydrates       ADDITIONAL INFORMATION:  Off and on: gym membership: go on for walks, go for a few days. Try to stay for an hour. Some knee pain (gone now)     Does not like to feel hungry, can't sleep and focus.     Dining Out History Reviewed With Patient 10/26/2018   How often do you dine out? Around once a week.   Where do you dine out? (select all that apply) sit-down restaurants, fast food chains   What types of food do you order when you dine out? pasta, sandwich turkey or chicken, pizza       Physical Activity Reviewed With Patient 10/26/2018   How often do you exercise? 1 to 2 times per week   What is the duration of your exercise (in minutes)? 60+ Minutes   What types of exercise do you do? gym membership   What keeps you from being more active?  I should be more active but I just have not gotten around to it, Too tired       NUTRITION DIAGNOSIS:  Obesity r/t long history of self-monitoring deficit and excessive energy intake aeb BMI >30 kg/m2.    INTERVENTION:  Intervention Provided/Education Provided on post-op diet guidelines, vitamins/minerals essential post-operatively, GI anatomy of bariatric surgeries, ways to help prepare for  "post-op diet guidelines pre-operatively, portion/calorie-control, 9\" plate method   Provided pt with list of goals RD contact information.    - Spoke with patient today and he states that his two biggest issues were transposing the information that is given to him into what foods his wife makes for him and that he does not like to feel hungry. Dicussed the 9\" plate method in depth and discussed how to modify this for foods that he will eat at home.   - Discussed and explained why eating fast can lead to over eating. Pt expressed that he was taught to clean your plate. Explained using smaller dish and saving the rest for left overs.     Questions Reviewed With Patient 10/26/2018   How ready are you to make changes regarding your weight? Number 1 = Not ready at all to make changes up to 10 = very ready. 10   How confident are you that you can change? 1 = Not confident that you will be successful making changes up to 10 = very confident. 10       Patient Understanding: good  Expected Compliance: good    GOALS:  Relating To Eating:  Eat slowly (20-30 minutes per meal), chewing foods well (25 chews per bite/applesauce consistency)  9\" Plate method (1/2 plate non-starchy vegetables/fruit, 1/4 plate lean protein, 1/4 plate whole grain starch - no more than 1/2 cup carb/meal)    Relating to beverages:  Continue drink 48-64 ounces of fluid per day    Relating to activity:  Increase activity level.  Exercise 3-4 days/week for 30-60 minutes    Follow-Up:   Recommend 1 month follow up visits to assist with lifestyle changes or per insurance.      Time spent with patient: 30 minutes.  Alannah Bonilla, MS, RD, LD       Again, thank you for allowing me to participate in the care of your patient.      Sincerely,    Alannah Bonilla RD      "

## 2018-11-07 NOTE — NURSING NOTE
"(   Chief Complaint   Patient presents with     Consult     NBS, BMI 33.8    )    ( Weight: 249 lb 1.6 oz )  ( Height: 6' 0.05\" )  ( BMI (Calculated): 33.81 )  ( Initial Weight: 249 lb 1.6 oz )  ( Cumulative weight loss (lbs): 0 )  (   )  (   )  ( Waist Circumference (cm): 120 cm )  (   )    ( BP: 140/83 )  (   )  ( Temp: 98.7  F (37.1  C) )  ( Temp src: Oral )  ( Pulse: 65 )  (   )  ( SpO2: 98 % )    (   Patient Active Problem List   Diagnosis     Chronic gingivitis     Esophageal reflux     Obesity     Hyperlipidemia LDL goal <100     Vitamin D deficiency     Elevated LFTs     Other chronic nonalcoholic liver disease     Glaucoma suspect, bilateral     Type 2 diabetes mellitus with hyperglycemia, without long-term current use of insulin (H)     Background diabetic retinopathy, mild, ou    )  (   Current Outpatient Prescriptions   Medication Sig Dispense Refill     ASPIRIN 81 MG OR TABS 1 tab po QD (Once per day) 100 3     atorvastatin (LIPITOR) 20 MG tablet Take 1 tablet (20 mg) by mouth daily 90 tablet 3     cholecalciferol (VITAMIN  -D) 1000 UNITS capsule Take 1 capsule by mouth daily       glimepiride (AMARYL) 4 MG tablet Take 1 tablet (4 mg) by mouth daily 90 tablet 1     insulin pen needle (B-D U/F) 31G X 8 MM Use 1 pen needles daily or as directed. 100 each 3     liraglutide (VICTOZA PEN) 18 MG/3ML soln Inject 1.8 mg Subcutaneous daily 21 mL 1     lisinopril (PRINIVIL/ZESTRIL) 5 MG tablet Take 1 tablet (5 mg) by mouth daily 90 tablet 1     metFORMIN (GLUCOPHAGE) 1000 MG tablet Take 1 tablet (1,000 mg) by mouth 2 times daily (with meals) 180 tablet 1     order for DME Lancets.  Daily 100 each 4     order for DME Test strips for pt's glucometer, brand as covered by insurance. Test daily 100 each 4     order for DME Glucometer, brand as covered by insurance. 1 each 3     order for DME Equipment being ordered: Lancets, any brand covered by insurance. Test blood sugar 2 times daily. 3 Month prn     triamcinolone " (KENALOG) 0.1 % cream Apply sparingly to affected area three times daily for 14 days. (Patient not taking: Reported on 11/7/2018) 30 g 0    )  ( Diabetes Eval:    )    ( Pain Eval:  Data Unavailable )    ( Wound Eval:       )    (   History   Smoking Status     Never Smoker   Smokeless Tobacco     Never Used    )    ( Signed By:  Jacqui Hudson; November 7, 2018; 7:37 AM )

## 2018-11-07 NOTE — LETTER
"2018       RE: William Montes  [facility] [place] [address]     Dear Colleague,    Thank you for referring your patient, William Montes, to the [facility] SURGICAL WEIGHT MANAGEMENT at [facility]. Please see a copy of my visit note below.    New Bariatric Surgery Consultation Note    RE: William Montes  MR#: [id number]  : 1965      Referring provider:       10/26/2018   Who referred you? Dr. Katie Jackson       Chief Complaint/Reason for visit: evaluation for possible weight loss surgery    Dear Sirena Talley DO (General),    I had the pleasure of seeing your patient, William Montes, to evaluate his obesity and consider him for possible weight loss surgery. As you know, William Montes is 53 year old.  He has a height of 6' .047\", a weight of 249 lbs 1.6 oz, and calculated Body mass index is 33.74 kg/(m^2).    He is interested in weight management due to hx of EDWARDS and type 2 DM.     HISTORY OF PRESENT ILLNESS:  Weight Loss History Reviewed with Patient 10/26/2018   How long have you been overweight? Since late 20's to early 40's   What is the most that you have ever weighed? 250   What is the most weight you have lost? 20   I have tried the following methods to lose weight Watching portions or calories, Slimfast   I have tried the following weight loss medications? (Check all that apply) Xenical/Orlistat/Lupillo, Victoza/liraglutide   Have you ever had weight loss surgery? No       CO-MORBIDITIES OF OBESITY INCLUDE:     10/26/2018   I have the following co-morbidities associated with obesity: Type II Diabetes, GERD (Reflux), Fatty Liver   What was your most recent hemoglobin a1c  6.7   How many years have you had diabetes? 18   Are you taking daily medication for heartburn, acid reflux, or GERD (acid reflux disease)? No       PAST MEDICAL HISTORY:  Past Medical History:   Diagnosis Date     ABNORMAL LIVER FUNCTION STUDY 2008    increased ast, alt Normal " since 2008     Chronic gingivitis      Esophageal reflux      Glaucoma      History of blood transfusion     My son has aplastic anemia and has had bld transfusions     Nonspecific abnormal results of liver function study      OBESITY NOS 12/13/2006     Pure hypercholesterolemia      Type II or unspecified type diabetes mellitus without mention of complication, not stated as uncontrolled      Uncomplicated asthma     My mother has asthma       PAST SURGICAL HISTORY:  Past Surgical History:   Procedure Laterality Date     BIOPSY      My son had bone marrow biopsy     ENT SURGERY      My son has had tympanoplasty     NO HISTORY OF SURGERY       ORTHOPEDIC SURGERY      My mother had left hip replacement surgery       FAMILY HISTORY:   Family History   Problem Relation Age of Onset     Hypertension Mother      Diabetes Sister      Diabetes Cousin      Hypertension Other      C.A.D. 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 Yes   Who do you have in your support network that can be available to help you for the first 2 weeks after surgery? my wife   Who can you count on for support throughout your weight loss surgery journey? my wife   Can you afford 3 meals a day?  Yes   Can you afford 50-60 dollars a month for vitamins? Yes   5 boys, 1 left at home    HABITS:     10/26/2018   How often do you drink alcohol? Never   Do you currently use  any of the following Nicotine products? No   Have you ever used any of the following nicotine products? No   Have you or are you currently using street drugs or prescription strength medication for which you do not have a prescription for? No   Do you have a history of chemical dependency (alcohol or drug abuse)? No       PSYCHOLOGICAL HISTORY:   Psychological History Reviewed With Patient 10/26/2018   Have you ever attempted suicide? Never.   Have you had thoughts of suicide in the past year? No   Have you ever been hospitalized for mental illness or a suicide attempt? Never.   Do you have a history of chronic pain? No   Have you ever been diagnosed with fibromyalgia? No   Are you currently being treated for any of the following? (select all that apply) I do not have a mental illness       ROS:     10/26/2018   Skin:  None of the above   HEENT: None of these   Musculoskeletal: Joint Pain   Cardiovascular: None of the above   Pulmonary: Snoring   Gastrointestinal: Heartburn   Genitourinary: None of the above   Hematological: None of the above   Neurological: None of the above       EATING BEHAVIORS:     10/26/2018   Have you or anyone else thought that you had an eating disorder? No   Do you currently binge eat (eat a large amount of food in a short time)? No   Are you an emotional eater? No   Do you get up to eat after falling asleep? No       EXERCISE:     10/26/2018   How often do you exercise? 1 to 2 times per week   What is the duration of your exercise (in minutes)? 60+ Minutes   What types of exercise do you do? gym membership   What keeps you from being more active?  I should be more active but I just have not gotten around to it, Too tired       MEDICATIONS:  Current Outpatient Prescriptions   Medication     ASPIRIN 81 MG OR TABS     atorvastatin (LIPITOR) 20 MG tablet     cholecalciferol (VITAMIN  -D) 1000 UNITS capsule     glimepiride (AMARYL) 4 MG tablet     insulin pen needle (B-D U/F) 31G X 8 MM      "liraglutide (VICTOZA PEN) 18 MG/3ML soln     lisinopril (PRINIVIL/ZESTRIL) 5 MG tablet     metFORMIN (GLUCOPHAGE) 1000 MG tablet     order for DME     order for DME     order for DME     order for DME     triamcinolone (KENALOG) 0.1 % cream     No current facility-administered medications for this visit.        ALLERGIES:  Allergies   Allergen Reactions     No Known Drug Allergies        LABS/IMAGING/MEDICAL RECORDS REVIEW:           PHYSICAL EXAM:  /83  Pulse 65  Temp 98.7  F (37.1  C) (Oral)  Ht 6' 0.05\"  Wt 249 lb 1.6 oz  SpO2 98%  BMI 33.74 kg/m2  General: NAD  Neurologic: A & O x 3, gait normal  Head: normocephalic, atraumatic  HEENT: PERRL, EOMI.   Respiratory: respirations unlabored  Abdomen: Obese, Soft NT ND   Extremities: No LE swelling   Skin: warm and dry.  No rashes on exposed skin  Psychiatric: Mentation and Affect appear normal      In summary, William Montes has Class II obesity with a body mass index of Body mass index is 33.74 kg/(m^2). kg/m2 and the comorbidities stated above. He is interested in weight management and possibly weight loss surgery but is not a candidate with current BMI 33. There is a EDWARDS study that he may qualify for and I will discuss with Dr Burton.  Labs today  See dietician today and then monthly after  Start Topiramate ramp to 75mg  Continue victoza  Consider stopping glimepiride- Can discuss with Elida at your visit  Follow up with [facility] pharmacist in 4 weeks  Follow up with Marine Flores PA-C in 8 weeks    Sincerely,    Marine Flores PA-C    I spent a total of 30 minutes face to face with William during today's office visit. Over 50% of this time was spent counseling the patient and/or coordinating care.                "

## 2018-11-07 NOTE — MR AVS SNAPSHOT
"              After Visit Summary   11/7/2018    William Montes    MRN: [id number]           Patient Information     Date Of Birth          1965        Visit Information        Provider Department      11/7/2018 8:30 AM Alannah Bonilla RD  [facility]        Today's Diagnoses     Class 1 obesity due to excess calories with body mass index (BMI) of 33.0 to 33.9 in adult    -  1      Care Instructions    Goals:   GOALS:  Relating To Eating:  Eat slowly (20-30 minutes per meal), chewing foods well (25 chews per bite/applesauce consistency)  9\" Plate method (1/2 plate non-starchy vegetables/fruit, 1/4 plate lean protein, 1/4 plate whole grain starch - no more than 1/2 cup carb/meal)    Relating to beverages:  Continue drink 48-64 ounces of fluid per day    Relating to activity:  Increase activity level.  Exercise 3-4 days/week for 30-60 minutes    Follow up with RD in one month    Alannah Bonilla, MS, RD, LD  If you need to schedule or reschedule with a dietitian please call [phone].          Follow-ups after your visit        Future tests that were ordered for you today     Open Future Orders        Priority Expected Expires Ordered    CBC with platelets Routine  11/7/2019 11/7/2018    Comprehensive metabolic panel Routine  11/7/2019 11/7/2018    Hemoglobin A1c Routine  2/5/2019 11/7/2018    Parathyroid Hormone Intact Routine  11/7/2019 11/7/2018    Vitamin D Deficiency Routine  11/7/2019 11/7/2018            Who to contact     Please call your clinic at [phone] to:    Ask questions about your health    Make or cancel appointments    Discuss your medicines    Learn about your test results    Speak to your doctor            Additional Information About Your Visit        [employer] Information     [employer] gives you secure access to your electronic health record. If you see a primary care provider, you can also send messages to your care team and make appointments. If you have " questions, please call your primary care clinic.  If you do not have a primary care provider, please call [phone] and they will assist you.      [employer] is an electronic gateway that provides easy, online access to your medical records. With [employer], you can request a clinic appointment, read your test results, renew a prescription or communicate with your care team.     To access your existing account, please contact your [facility] or call [phone] for assistance.        Care EveryWhere ID     This is your Care EveryWhere ID. This could be used by other organizations to access your [place] medical records  [id number]         Blood Pressure from Last 3 Encounters:   11/07/18 140/83   10/11/18 134/81   10/08/18 134/84    Weight from Last 3 Encounters:   11/07/18 249 lb 1.6 oz   10/11/18 249 lb   10/08/18 249 lb              Today, you had the following     No orders found for display         Today's Medication Changes          These changes are accurate as of 11/7/18  9:07 AM.  If you have any questions, ask your nurse or doctor.               Start taking these medicines.        Dose/Directions    topiramate 25 MG tablet   Commonly known as:  TOPAMAX   Used for:  Class 1 obesity due to excess calories with body mass index (BMI) of 33.0 to 33.9 in adult   Started by:  Marine Flores PA-C        25mg at bedtime for week 1, 50mg at bedtime for 1 week, and 75mg at bedtime thereafter   Quantity:  90 tablet   Refills:  1            Where to get your medicines      These medications were sent to [id number] - [place], [place] - [address] AT [address]  [address], [place] [address]     Phone:  [phone]     topiramate 25 MG tablet                Primary Care Provider Office Phone # Fax #    Sirena Talley [phone] [address] [address]        Equal Access to Services      GREG FENG : Hadii aad volodymyr rosalie Cooper, waaxda luqadaha, qaybta kaalmada clarisse, whitley andrew hayfrancisco j lopezsarahsabra newberry . So [facility] [phone].    ATENCIÓN: Si habla español, tiene a chanel disposición servicios gratuitos de asistencia lingüística. Llame al [phone].    We comply with applicable federal civil rights laws and [place] laws. We do not discriminate on the basis of race, color, national origin, age, disability, sex, sexual orientation, or gender identity.            Thank you!     Thank you for choosing [facility]  for your care. Our goal is always to provide you with excellent care. Hearing back from our patients is one way we can continue to improve our services. Please take a few minutes to complete the written survey that you may receive in the mail after your visit with us. Thank you!             Your Updated Medication List - Protect others around you: Learn how to safely use, store and throw away your medicines at www.disposemymeds.org.          This list is accurate as of 11/7/18  9:07 AM.  Always use your most recent med list.                   Brand Name Dispense Instructions for use Diagnosis    aspirin 81 MG tablet     100    1 tab po QD (Once per day)    Type II or unspecified type diabetes mellitus without mention of complication, not stated as uncontrolled       atorvastatin 20 MG tablet    LIPITOR    90 tablet    Take 1 tablet (20 mg) by mouth daily    Hyperlipidemia LDL goal <100       cholecalciferol 1000 units capsule    vitamin  -D     Take 1 capsule by mouth daily        glimepiride 4 MG tablet    AMARYL    90 tablet    Take 1 tablet (4 mg) by mouth daily    Type 2 diabetes mellitus with hyperglycemia, without long-term current use of insulin (H)       insulin pen needle 31G X 8 MM    B-D U/F    100 each    Use 1 pen needles daily or as directed.    Morbid obesity due to excess calories (H)       liraglutide 18 MG/3ML soln    VICTOZA PEN    21 mL     Inject 1.8 mg Subcutaneous daily    Morbid obesity due to excess calories (H)       lisinopril 5 MG tablet    PRINIVIL/ZESTRIL    90 tablet    Take 1 tablet (5 mg) by mouth daily    Benign essential hypertension       metFORMIN 1000 MG tablet    GLUCOPHAGE    180 tablet    Take 1 tablet (1,000 mg) by mouth 2 times daily (with meals)    Type 2 diabetes mellitus with hyperglycemia, without long-term current use of insulin (H)       order for DME     3 Month    Equipment being ordered: Lancets, any brand covered by insurance. Test blood sugar 2 times daily.    Type 2 diabetes, HbA1c goal < 7% (H)       * order for DME     1 each    Glucometer, brand as covered by insurance.    Type 2 diabetes mellitus with hyperglycemia, without long-term current use of insulin (H)       * order for DME     100 each    Lancets.  Daily    Type 2 diabetes mellitus with hyperglycemia, without long-term current use of insulin (H)       * order for DME     100 each    Test strips for pt's glucometer, brand as covered by insurance. Test daily    Type 2 diabetes mellitus with hyperglycemia, without long-term current use of insulin (H)       topiramate 25 MG tablet    TOPAMAX    90 tablet    25mg at bedtime for week 1, 50mg at bedtime for 1 week, and 75mg at bedtime thereafter    Class 1 obesity due to excess calories with body mass index (BMI) of 33.0 to 33.9 in adult       triamcinolone 0.1 % cream    KENALOG    30 g    Apply sparingly to affected area three times daily for 14 days.    Dermatitis       * Notice:  This list has 3 medication(s) that are the same as other medications prescribed for you. Read the directions carefully, and ask your doctor or other care provider to review them with you.

## 2018-11-12 ENCOUNTER — OFFICE VISIT (OUTPATIENT)
Dept: PHARMACY | Facility: CLINIC | Age: 53
End: 2018-11-12
Payer: COMMERCIAL

## 2018-11-12 VITALS
DIASTOLIC BLOOD PRESSURE: 83 MMHG | WEIGHT: 250.4 LBS | SYSTOLIC BLOOD PRESSURE: 134 MMHG | BODY MASS INDEX: 33.92 KG/M2 | HEART RATE: 69 BPM

## 2018-11-12 DIAGNOSIS — E66.811 CLASS 1 OBESITY DUE TO EXCESS CALORIES WITH BODY MASS INDEX (BMI) OF 33.0 TO 33.9 IN ADULT, UNSPECIFIED WHETHER SERIOUS COMORBIDITY PRESENT: Primary | ICD-10-CM

## 2018-11-12 DIAGNOSIS — E66.09 CLASS 1 OBESITY DUE TO EXCESS CALORIES WITH BODY MASS INDEX (BMI) OF 33.0 TO 33.9 IN ADULT, UNSPECIFIED WHETHER SERIOUS COMORBIDITY PRESENT: Primary | ICD-10-CM

## 2018-11-12 DIAGNOSIS — E78.5 HYPERLIPIDEMIA LDL GOAL <100: ICD-10-CM

## 2018-11-12 DIAGNOSIS — E11.65 TYPE 2 DIABETES MELLITUS WITH HYPERGLYCEMIA, WITHOUT LONG-TERM CURRENT USE OF INSULIN (H): ICD-10-CM

## 2018-11-12 DIAGNOSIS — E55.9 VITAMIN D DEFICIENCY: ICD-10-CM

## 2018-11-12 PROCEDURE — 99605 MTMS BY PHARM NP 15 MIN: CPT | Performed by: PHARMACIST

## 2018-11-12 PROCEDURE — 99607 MTMS BY PHARM ADDL 15 MIN: CPT | Performed by: PHARMACIST

## 2018-11-12 NOTE — MR AVS SNAPSHOT
After Visit Summary   11/12/2018    William Montes    MRN: [id number]           Patient Information     Date Of Birth          1965        Visit Information        Provider Department      11/12/2018 3:30 PM Elida Fay, [facility] MTM        Care Instructions    Recommendations from today's MTM visit:                                                    MTM (medication therapy management) is a service provided by a clinical pharmacist designed to help you get the most of out of your medicines.   Today we reviewed what your medicines are for, how to know if they are working, that your medicines are safe and how to make your medicine regimen as easy as possible.     1. Can stop glimepiride. In order to make sure blood sugars do not become elevated, please make the dietary adjustments we discussed. Implementing more protein and less carbohydrates.    2. Start Topiramate: 25 mg nightly for 1 week, 50 mg nightly for 1 week, then 75 mg nightly thereafter.     To schedule another MTM appointment, please call the clinic directly or you may call the MTM scheduling line at [phone] or toll-free at [phone].     My Clinical Pharmacist's contact information:                                                      It was a pleasure talking with you today!  Please feel free to contact me with any questions or concerns you have.      Elida Fay, PharmD  Medication Therapy Management Pharmacist   [facility]   Phone: [phone]    You may receive a survey about the MTM services you received.  I would appreciate your feedback to help me serve you better in the future. Please fill it out and return it when you can. Your comments will be anonymous.                    Follow-ups after your visit        Your next 10 appointments already scheduled     Dec 10, 2018  8:30 AM CST   (Arrive by 8:15 AM)   Return Nutrition with DARIAN Acosta  [facility] ([facility])    [address] [id number] [phone]            Feb 14, 2019  7:45 AM CST   (Arrive by 7:30 AM)   Return Bariatric Visit with Marine Flores PA-C   [facility] ([facility])    [address] [place] [id number] [phone]              Who to contact     If you have questions or need follow up information about today's clinic visit or your schedule please contact [facility] directly at [phone].  Normal or non-critical lab and imaging results will be communicated to you by eBreviahart, letter or phone within 4 business days after the clinic has received the results. If you do not hear from us within 7 days, please contact the clinic through myeasydocst or phone. If you have a critical or abnormal lab result, we will notify you by phone as soon as possible.  Submit refill requests through [employer] or call your pharmacy and they will forward the refill request to us. Please allow 3 business days for your refill to be completed.          Additional Information About Your Visit        [employer] Information     [employer] gives you secure access to your electronic health record. If you see a primary care provider, you can also send messages to your care team and make appointments. If you have questions, please call your primary care clinic.  If you do not have a primary care provider, please call [phone] and they will assist you.        Care EveryWhere ID     This is your Care EveryWhere ID. This could be used by other organizations to access your [place] medical records  [id number]        Your Vitals Were     Pulse BMI (Body Mass Index)                69 33.92 kg/m2           Blood Pressure from Last 3 Encounters:   11/12/18 134/83   11/07/18 140/83   10/11/18 134/81    Weight from Last 3 Encounters:    11/12/18 250 lb 6.4 oz (113.6 kg)   11/07/18 249 lb 1.6 oz (113 kg)   10/11/18 249 lb (112.9 kg)              Today, you had the following     No orders found for display       Primary Care Provider Office Phone # Fax #    Sirena Talley [phone]       [address] [facility] [address]        Equal Access to Services     GREG FENG : Hadii aad ku hadasho Soomaali, waaxda luqadaha, qaybta kaalmada adeegyada, waxay idiin hayaan adeamanda lopezsarahsabra newberry . So [facility] [phone].    ATENCIÓN: Si prudencio leslie, tiene a chanel disposición servicios gratuitos de asistencia lingüística. Llame al [phone].    We comply with applicable federal civil rights laws and [place] laws. We do not discriminate on the basis of race, color, national origin, age, disability, sex, sexual orientation, or gender identity.            Thank you!     Thank you for choosing [facility]  for your care. Our goal is always to provide you with excellent care. Hearing back from our patients is one way we can continue to improve our services. Please take a few minutes to complete the written survey that you may receive in the mail after your visit with us. Thank you!             Your Updated Medication List - Protect others around you: Learn how to safely use, store and throw away your medicines at www.disposemymeds.org.          This list is accurate as of 11/12/18  4:14 PM.  Always use your most recent med list.                   Brand Name Dispense Instructions for use Diagnosis    aspirin 81 MG tablet     100    1 tab po QD (Once per day)    Type II or unspecified type diabetes mellitus without mention of complication, not stated as uncontrolled       atorvastatin 20 MG tablet    LIPITOR    90 tablet    Take 1 tablet (20 mg) by mouth daily    Hyperlipidemia LDL goal <100       cholecalciferol 1000 units capsule    vitamin  -D     Take 1 capsule by mouth daily        glimepiride 4 MG tablet    AMARYL    90  tablet    Take 1 tablet (4 mg) by mouth daily    Type 2 diabetes mellitus with hyperglycemia, without long-term current use of insulin (H)       insulin pen needle 31G X 8 MM    B-D U/F    100 each    Use 1 pen needles daily or as directed.    Morbid obesity due to excess calories (H)       liraglutide 18 MG/3ML soln    VICTOZA PEN    21 mL    Inject 1.8 mg Subcutaneous daily    Morbid obesity due to excess calories (H)       lisinopril 5 MG tablet    PRINIVIL/ZESTRIL    90 tablet    Take 1 tablet (5 mg) by mouth daily    Benign essential hypertension       metFORMIN 1000 MG tablet    GLUCOPHAGE    180 tablet    Take 1 tablet (1,000 mg) by mouth 2 times daily (with meals)    Type 2 diabetes mellitus with hyperglycemia, without long-term current use of insulin (H)       order for DME     3 Month    Equipment being ordered: Lancets, any brand covered by insurance. Test blood sugar 2 times daily.    Type 2 diabetes, HbA1c goal < 7% (H)       * order for DME     1 each    Glucometer, brand as covered by insurance.    Type 2 diabetes mellitus with hyperglycemia, without long-term current use of insulin (H)       * order for DME     100 each    Lancets.  Daily    Type 2 diabetes mellitus with hyperglycemia, without long-term current use of insulin (H)       * order for DME     100 each    Test strips for pt's glucometer, brand as covered by insurance. Test daily    Type 2 diabetes mellitus with hyperglycemia, without long-term current use of insulin (H)       topiramate 25 MG tablet    TOPAMAX    90 tablet    25mg at bedtime for week 1, 50mg at bedtime for 1 week, and 75mg at bedtime thereafter        triamcinolone 0.1 % cream    KENALOG    30 g    Apply sparingly to affected area three times daily for 14 days.    Dermatitis       * Notice:  This list has 3 medication(s) that are the same as other medications prescribed for you. Read the directions carefully, and ask your doctor or other care provider to review them with  you.

## 2018-11-12 NOTE — PATIENT INSTRUCTIONS
Recommendations from today's MTM visit:                                                    MTM (medication therapy management) is a service provided by a clinical pharmacist designed to help you get the most of out of your medicines.   Today we reviewed what your medicines are for, how to know if they are working, that your medicines are safe and how to make your medicine regimen as easy as possible.     1. Can stop glimepiride. In order to make sure blood sugars do not become elevated, please make the dietary adjustments we discussed. Implementing more protein and less carbohydrates.    2. Start Topiramate: 25 mg nightly for 1 week, 50 mg nightly for 1 week, then 75 mg nightly thereafter.     To schedule another MTM appointment, please call the clinic directly or you may call the MTM scheduling line at [phone] or toll-free at [phone].     My Clinical Pharmacist's contact information:                                                      It was a pleasure talking with you today!  Please feel free to contact me with any questions or concerns you have.      Elida Fay, PharmD  Medication Therapy Management Pharmacist   [facility]   Phone: [phone]    You may receive a survey about the MTM services you received.  I would appreciate your feedback to help me serve you better in the future. Please fill it out and return it when you can. Your comments will be anonymous.

## 2018-11-12 NOTE — PROGRESS NOTES
SUBJECTIVE/OBJECTIVE:                           William Montes is a 53 year old male coming in for an initial visit for Medication Therapy Management.  He was referred to me from Marine Flores PA-C.    Chief Complaint: Would like to know more about topiramate, did not start yet.     Allergies/ADRs: Reviewed in Epic  Tobacco: No tobacco use  Alcohol: none  Caffeine: 1-2 cups/day of coffee  PMH: Reviewed in Epic    Medication Adherence/Access:  no issues reported    Obesity: Current medication(s) include: not started Topiramate yet, patient is leary of starting as he looked online and became concerned about potential side effects, wanted to discuss medication with pharmacist before starting. Medication was prescribed on  by Marine Flores PA-C . Insurance does not approve weight loss surgery as he does not qualify.   Diet/Eating Habits: Patient reports using a smaller bowl since meeting with dietician. Breakfast: oatmeal and peanut butter sandwich, lunch: bringing food from home spaghetti or pizza, sandwhich: brown rice, greens, meat (chicken, fish).  Failed medications include: Orlistat - not effective.     Current weight today: 250 lbs 6.4 oz   Weight when weight loss medication was prescribed: 249 lb 1.6 oz. BMI 33.81 kg/m^2.    Diabetes:  Pt currently taking metformin 1000 mg BID, Victoza 1.8 mg daily, glimepiride 4 mg daily before a meal. Lived with Diabetes since . Pt is not experiencing side effects. Patient does not like the fact that the glimepiride could be exacerbating weight gain. He would like to trial off glimepiride. He understands that he will need to make better dietary considerations if going to stop glimepiride.  Patient states that oftentimes he is worried about hypoglycemia with glimepiride, so will eat more carbohydrates than what he feels like he would if he was not on glimepiride.  SMBG: one time daily.   Ranges (patient reported): 100-120, AM fastin, 101   Patient is not  experiencing hypoglycemia  Recent symptoms of high blood sugar? none  Eye exam: up to date  Foot exam: up to date  ACEi/ARB: Yes: lisinopril 5 mg once daily.   Urine Albumin:   Lab Results   Component Value Date    UMALCR 9.74 03/06/2018      Aspirin: Taking 81mg daily and denies side effects.  Lab Results   Component Value Date    A1C 6.7 10/08/2018    A1C 6.9 03/06/2018    A1C 7.4 03/08/2017    A1C 8.3 06/08/2016    A1C 6.9 12/08/2014     Hyperlipidemia: Current therapy includes atorvastatin 20mg once daily.  Pt reports no significant myalgias or other side effects.    Hx Vitamin D Deficiency: Currently taking vitamin D 1000 international unit(s) daily. No concerns today.   Last Vitamin D: 42 on 11/7/18.    Today's Vitals: /83  Pulse 69  Wt 250 lb 6.4 oz (113.6 kg)  BMI 33.92 kg/m2    ASSESSMENT:                             Current medications were reviewed today.     Medication Adherence: good, no issues identified    Obesity: Needs improvement.  After discussion of topiramate, patient feels comfortable with starting.  Patient to start topiramate titration to 75 mg nightly at bedtime.  Discussed that if he starts to have AM grogginess can move to taking before dinner.    Diabetes:  Needs improvement. A1c is at goal of <7%.  Patient would benefit from trial off of glimepiride to see if this reduces impact on weight due to A1c being at goal.  Patient to make improvements to diet which would include decreasing daily carbohydrates, such as reducing daily intake of rice    Hyperlipidemia: Stable. LDL goal <100.    Hx Vitamin D Deficiency: Stable.     PLAN:                            1.  Patient to start titration of topiramate to 75 mg nightly at bedtime.    2.  Patient to trial off of glimepiride.  Patient to work on improving diet and reducing carbohydrates.    I spent 45 minutes with this patient today. All changes were made via verbal approval with Marine Flores PA-C. A copy of the visit note was  provided to the patient's referring provider.    Will follow up in 1 month.    The patient was given a summary of these recommendations as an after visit summary.     Chart documentation was completed in part with Dragon voice-recognition software. Even though reviewed, some grammatical, spelling, and word errors may remain.    Elida Fay, PharmD  Medication Therapy Management Pharmacist   [facility]   Phone: [phone]

## 2018-11-12 NOTE — Clinical Note
ROSE no action needed. He did not start topiramate yet. After discussing he is starting topiramate. He is going to trial off glimepiride as blood sugars are in goal range.   Elida SORIANO

## 2018-12-10 ENCOUNTER — OFFICE VISIT (OUTPATIENT)
Dept: GASTROENTEROLOGY | Facility: CLINIC | Age: 53
End: 2018-12-10
Payer: COMMERCIAL

## 2018-12-10 ENCOUNTER — TELEPHONE (OUTPATIENT)
Dept: PHARMACY | Facility: CLINIC | Age: 53
End: 2018-12-10

## 2018-12-10 VITALS — BODY MASS INDEX: 32.31 KG/M2 | WEIGHT: 238.56 LBS

## 2018-12-10 DIAGNOSIS — E66.9 OBESITY: Primary | ICD-10-CM

## 2018-12-10 DIAGNOSIS — Z71.3 NUTRITIONAL COUNSELING: ICD-10-CM

## 2018-12-10 NOTE — PROGRESS NOTES
"Bariatric Nutrition Consultation Note    Reason For Visit: Nutrition Assessment    William Montes is a 53 year old presenting today for follow up medical weight management visit. Pt is going to continue with medical weight loss management at this time. He is interested in weight loss surgery but currently does not meet the BMI requirement for surgery. Patient is accompanied by self. Pt with history that includes obesity, type 2 diabetes, hypercholesteremia, esophageal reflux. Pt was referred by Marine Flores.      Support System Reviewed With Patient 10/26/2018   Who do you have in your support network that can be available to help you for the first 2 weeks after surgery? my wife   Who can you count on for support throughout your weight loss surgery journey? my wife       ANTHROPOMETRICS:  Estimated body mass index is 33.92 kg/m  as calculated from the following:    Height as of 11/7/18: 1.83 m (6' 0.05\").    Last Weight as of 11/12/18: 113.6 kg (250 lb 6.4 oz).  Current weight: 238.9 lbs (-12 lbs from initial visit)    SUPPLEMENT INFORMATION:  Vitamin D 1,000 international unit(s)     NUTRITION HISTORY:  Patient stated that over the past month he has decreased the amount of almonds he eats and now also eating a smaller portion of fruit when having these foods as a snack.. Has 1-2 snacks per day. Typically has one snack between breakfast and lunch and sometimes a second one between dinner and bedtime. He stated that the topiramate has decreased his appetite so able to eat smaller portions. See diet recall below as well as progress towards previous goals.    Diet recall:   Breakfast: cold cereal (honey bunches of oats or shredded wheat with 2% milk) OR eggs and toast   Lunch: sandwich with turkey or ham with cheese and lettuce and tomato with alvarado) sometimes with fruit.  Dinner: ~1 cup rice/cassova/potato with stir chen of fermín/spinachleaves of sweet potato leaves or kidney bean or split peas with " "beef/chicken/shrimp  Snacks: 2 snacksalmonds, apple, pears  Beverages: ~50 oz or more water, ~3x/week has coffee with cream (does not consistently drink it; does not like it much). Sometimes tea/unsweetened iced tea (if eating out)  Alcohol: none since 11/2011.    Frequency of eating/taking out meals: 2x over the past month    Progress towards previous goals:   Relating To Eating:  Eat slowly (20-30 minutes per meal), chewing foods well (25 chews per bite/applesauce consistency)-trying to do this but reported that he is used to eating fast  9\" Plate method (1/2 plate non-starchy vegetables/fruit, 1/4 plate lean protein, 1/4 plate whole grain starch - no more than 1/2 cup carb/meal)-using a smaller bowl now    Relating to beverages:  Continue drink 48-64 ounces of fluid per day-meeting    Relating to activity:  Increase activity level.  Exercise 3-4 days/week for 30-60 minutes-Not meeting. Not currently exercising or going to gym but feels like he is ready to restart exercise at gym at least 3 times per week.    ADDITIONAL INFORMATION:    Physical activity: has a gym membership but has not been exercising or going to the gym (had low energy because he was not eating because was not hungry after initially starting topiramate. Is feeling better and eating, so plans to start using the treadmill and some weights 3 times per week.    NUTRITION DIAGNOSIS:  Obesity r/t long history of self-monitoring deficit and excessive energy intake aeb BMI >30 kg/m2.    INTERVENTION:  Provided diet education review of weight loss guidelines including eating slowly (20-30 minutes at for meals) and eating 3 balanced meals using the Plate method for general portion size recommendations for getting balanced meals. Discussed general exercise recommendation and recommended pt restart going to gym at least 3 times per week for at least 30 minutes. Discussed other tips and suggestions specific to patient and to answer his questions. Commended " "pt on changes made and weight loss. Provided pt with list of goals RD contact information.      Patient Understanding: good  Expected Compliance: good    GOALS:  Relating To Eating:  Eat slowly (20-30 minutes per meal), chewing foods well (25 chews per bite/applesauce consistency)  9\" Plate method (1/2 plate non-starchy vegetables/fruit, 1/4 plate lean protein, 1/4 plate whole grain starch - no more than 1/2 cup carb/meal)    Relating to beverages:  Continue drink 48-64 ounces of fluid per day    Relating to activity:  Increase activity level.  Exercise 3-4 days/week for 30-60 minutes    Follow-Up:   Recommend 1 month follow up visits to assist with lifestyle changes or per insurance.     Time spent with patient: 30 minutes.  Carol Ann Steele, MS, RD, LD       "

## 2018-12-10 NOTE — TELEPHONE ENCOUNTER
Called to follow up on medications. Patient states he is tolerating topiramate without issues. At first was having issues of tingling, but has subsided.     Stopped Glimepiride. Blood sugars are AM fasting 100-120s. No issues of hypo/hyperglycemia.     Patient states that he is doing fine, would like call in 1 month to check in and seeprogress at that time.     Elida Fay, PharmD  Medication Therapy Management Pharmacist   [facility]   Phone: [phone]

## 2018-12-10 NOTE — PATIENT INSTRUCTIONS
"It was nice meeting you today:    GOALS:  Relating To Eating:  Eat slowly (20-30 minutes per meal), chewing foods well (25 chews per bite/applesauce consistency)  9\" Plate method (1/2 plate non-starchy vegetables/fruit, 1/4 plate lean protein, 1/4 plate whole grain starch - no more than 1/2 cup carb/meal)    Relating to beverages:  Continue drink 48-64 ounces of fluid per day    Relating to activity:  Increase activity level.  Exercise 3-4 days/week for 30-60 minutes    Carol Ann Steele, MS, RD, LD    If you would like to schedule a follow up appointment with Carol Ann Steele, Registered Dietitian, please call [phone].    "

## 2018-12-10 NOTE — LETTER
"12/10/2018       RE: William Montes  [address] [place] [address] [address] [place] [address]     Dear Colleague,    Thank you for referring your patient, William Montes, to the [facility] at [facility]. Please see a copy of my visit note below.    Bariatric Nutrition Consultation Note    Reason For Visit: Nutrition Assessment    William Montes is a 53 year old presenting today for follow up medical weight management visit. Pt is going to continue with medical weight loss management at this time. He is interested in weight loss surgery but currently does not meet the BMI requirement for surgery. Patient is accompanied by self. Pt with history that includes obesity, type 2 diabetes, hypercholesteremia, esophageal reflux. Pt was referred by Marine Flores.      Support System Reviewed With Patient 10/26/2018   Who do you have in your support network that can be available to help you for the first 2 weeks after surgery? my wife   Who can you count on for support throughout your weight loss surgery journey? my wife       ANTHROPOMETRICS:  Estimated body mass index is 33.92 kg/m  as calculated from the following:    Height as of 11/7/18: 1.83 m (6' 0.05\").    Last Weight as of 11/12/18: 113.6 kg (250 lb 6.4 oz).  Current weight: 238.9 lbs (-12 lbs from initial visit)    SUPPLEMENT INFORMATION:  Vitamin D 1,000 international unit(s)     NUTRITION HISTORY:  Patient stated that over the past month he has decreased the amount of almonds he eats and now also eating a smaller portion of fruit when having these foods as a snack.. Has 1-2 snacks per day. Typically has one snack between breakfast and lunch and sometimes a second one between dinner and bedtime. He stated that the topiramate has decreased his appetite so able to eat smaller portions. See diet recall below as well as progress towards previous goals.    Diet recall:   Breakfast: cold cereal (honey bunches of " "oats or shredded wheat with 2% milk) OR eggs and toast   Lunch: sandwich with turkey or ham with cheese and lettuce and tomato with alvarado) sometimes with fruit.  Dinner: ~1 cup rice/cassova/potato with stir chen of fermín/spinachleaves of sweet potato leaves or kidney bean or split peas with beef/chicken/shrimp  Snacks: 2 snacksalmonds, apple, pears  Beverages: ~50 oz or more water, ~3x/week has coffee with cream (does not consistently drink it; does not like it much). Sometimes tea/unsweetened iced tea (if eating out)  Alcohol: none since 11/2011.    Frequency of eating/taking out meals: 2x over the past month    Progress towards previous goals:   Relating To Eating:  Eat slowly (20-30 minutes per meal), chewing foods well (25 chews per bite/applesauce consistency)-trying to do this but reported that he is used to eating fast  9\" Plate method (1/2 plate non-starchy vegetables/fruit, 1/4 plate lean protein, 1/4 plate whole grain starch - no more than 1/2 cup carb/meal)-using a smaller bowl now    Relating to beverages:  Continue drink 48-64 ounces of fluid per day-meeting    Relating to activity:  Increase activity level.  Exercise 3-4 days/week for 30-60 minutes-Not meeting. Not currently exercising or going to gym but feels like he is ready to restart exercise at gym at least 3 times per week.    ADDITIONAL INFORMATION:    Physical activity: has a gym membership but has not been exercising or going to the gym (had low energy because he was not eating because was not hungry after initially starting topiramate. Is feeling better and eating, so plans to start using the treadmill and some weights 3 times per week.    NUTRITION DIAGNOSIS:  Obesity r/t long history of self-monitoring deficit and excessive energy intake aeb BMI >30 kg/m2.    INTERVENTION:  Provided diet education review of weight loss guidelines including eating slowly (20-30 minutes at for meals) and eating 3 balanced meals using the Plate method for " "general portion size recommendations for getting balanced meals. Discussed general exercise recommendation and recommended pt restart going to gym at least 3 times per week for at least 30 minutes. Discussed other tips and suggestions specific to patient and to answer his questions. Commended pt on changes made and weight loss. Provided pt with list of goals RD contact information.      Patient Understanding: good  Expected Compliance: good    GOALS:  Relating To Eating:  Eat slowly (20-30 minutes per meal), chewing foods well (25 chews per bite/applesauce consistency)  9\" Plate method (1/2 plate non-starchy vegetables/fruit, 1/4 plate lean protein, 1/4 plate whole grain starch - no more than 1/2 cup carb/meal)    Relating to beverages:  Continue drink 48-64 ounces of fluid per day    Relating to activity:  Increase activity level.  Exercise 3-4 days/week for 30-60 minutes    Follow-Up:   Recommend 1 month follow up visits to assist with lifestyle changes or per insurance.     Time spent with patient: 30 minutes.  Carol Ann Steele, MS, RD, LD       "

## 2019-01-11 ENCOUNTER — MYC REFILL (OUTPATIENT)
Dept: SURGERY | Facility: CLINIC | Age: 54
End: 2019-01-11

## 2019-01-11 DIAGNOSIS — E66.9 OBESITY: Primary | ICD-10-CM

## 2019-01-11 RX ORDER — TOPIRAMATE 25 MG/1
TABLET, FILM COATED ORAL
Qty: 90 TABLET | Refills: 1 | Status: SHIPPED | OUTPATIENT
Start: 2019-01-11 | End: 2019-02-14

## 2019-01-11 NOTE — TELEPHONE ENCOUNTER
Patient requesting refill of Topiramate. Patient has follow up appointment in February. Rx sent to pharmacy.

## 2019-01-31 ENCOUNTER — MYC MEDICAL ADVICE (OUTPATIENT)
Dept: PHARMACY | Facility: CLINIC | Age: 54
End: 2019-01-31

## 2019-01-31 NOTE — TELEPHONE ENCOUNTER
Attempt #2: Sent [employer] message about scheduling follow up appointment with [facility] pharmacist at [facility].     Omega JensenD  Medication Therapy Management Pharmacist   [facility]   Phone: [phone]

## 2019-02-14 ENCOUNTER — OFFICE VISIT (OUTPATIENT)
Dept: SURGERY | Facility: CLINIC | Age: 54
End: 2019-02-14

## 2019-02-14 ENCOUNTER — MYC MEDICAL ADVICE (OUTPATIENT)
Dept: PHARMACY | Facility: CLINIC | Age: 54
End: 2019-02-14

## 2019-02-14 VITALS
OXYGEN SATURATION: 99 % | DIASTOLIC BLOOD PRESSURE: 81 MMHG | BODY MASS INDEX: 30.53 KG/M2 | WEIGHT: 225.4 LBS | HEART RATE: 64 BPM | TEMPERATURE: 98.9 F | SYSTOLIC BLOOD PRESSURE: 128 MMHG

## 2019-02-14 DIAGNOSIS — E66.811 CLASS 1 OBESITY WITH BODY MASS INDEX (BMI) OF 33.0 TO 33.9 IN ADULT, UNSPECIFIED OBESITY TYPE, UNSPECIFIED WHETHER SERIOUS COMORBIDITY PRESENT: ICD-10-CM

## 2019-02-14 DIAGNOSIS — E66.811 CLASS 1 OBESITY WITH SERIOUS COMORBIDITY AND BODY MASS INDEX (BMI) OF 30.0 TO 30.9 IN ADULT, UNSPECIFIED OBESITY TYPE: ICD-10-CM

## 2019-02-14 DIAGNOSIS — E11.8 TYPE 2 DIABETES MELLITUS WITH COMPLICATION, WITHOUT LONG-TERM CURRENT USE OF INSULIN (H): Primary | ICD-10-CM

## 2019-02-14 RX ORDER — LIRAGLUTIDE 6 MG/ML
1.8 INJECTION SUBCUTANEOUS DAILY
Qty: 21 ML | Refills: 5 | Status: SHIPPED | OUTPATIENT
Start: 2019-02-14 | End: 2019-02-14 | Stop reason: ALTCHOICE

## 2019-02-14 RX ORDER — TOPIRAMATE 25 MG/1
50 TABLET, FILM COATED ORAL 2 TIMES DAILY
Qty: 120 TABLET | Refills: 5 | Status: SHIPPED | OUTPATIENT
Start: 2019-02-14 | End: 2019-05-02

## 2019-02-14 NOTE — PROGRESS NOTES
Return Medical Weight Management Note     William Montes  MRN:  [id number]  :  1965  NAKUL:  2019    Dear Sirena Talley,    I had the pleasure of seeing your patient William Montes.  He is a 53 year old male who I am continuing to see for treatment of obesity related to:       10/26/2018   I have the following co-morbidities associated with obesity: Type II Diabetes, GERD (Reflux), Fatty Liver   What was your most recent hemoglobin a1c  6.7   How many years have you had diabetes? 18   Are you taking daily medication for heartburn, acid reflux, or GERD (acid reflux disease)? No       INTERVAL HISTORY:  53 y.o. Male here for [facility] follow up.  Seen in Oct for New Bariatric surgery consult but not a candidate due to BMI 33. Discussed possible EDWARDS study and sent info to  to review his history.  Started on topiramate in Nov  Taking victoza, stopped glimepiride in Nov.   Follow up call by MTM in Dec he was tolerating topiramate and blood sugars under good control with victoza and off glimiperide.   He has lost from 249 lbs to 225 lbs, 24 lbs total.    CURRENT WEIGHT:   225 lbs 6.4 oz    Wt Readings from Last 4 Encounters:   19 102.2 kg (225 lb 6.4 oz)   12/10/18 108.2 kg (238 lb 9 oz)   18 113.6 kg (250 lb 6.4 oz)   18 113 kg (249 lb 1.6 oz)       Height:  Data Unavailable  Body Mass Index:  Body mass index is 30.53 kg/m .  Vitals: /81   Pulse 64   Temp 98.9  F (37.2  C) (Oral)   Wt 102.2 kg (225 lb 6.4 oz)   SpO2 99%   BMI 30.53 kg/m      Initial consult weight was 249 on 18.  Weight change since last seen on 2018 is down 24 pounds.   Total loss is 24 pounds.    Diet and Activity Changes Since Last Visit Reviewed With Patient 2019   I have made the following changes to my diet since my last visit: Working with the dietician seen 2x smaller portion sizes and less carbohydrates.   With regards to my diet, I am still struggling with: Still focus on food  too much. Feelings of hunger when just ate meals, occasionally.   I have made the following changes to my activity/exercise since my last visit: not much   With regards to my activity/exercise, I am still struggling with: too cold to go out       MEDICATIONS:   Current Outpatient Medications   Medication     ASPIRIN 81 MG OR TABS     atorvastatin (LIPITOR) 20 MG tablet     cholecalciferol (VITAMIN  -D) 1000 UNITS capsule     insulin pen needle (B-D U/F) 31G X 8 MM miscellaneous     liraglutide (VICTOZA PEN) 18 MG/3ML solution     lisinopril (PRINIVIL/ZESTRIL) 5 MG tablet     metFORMIN (GLUCOPHAGE) 1000 MG tablet     order for DME     order for DME     order for DME     topiramate (TOPAMAX) 25 MG tablet     order for DME     No current facility-administered medications for this visit.        Weight Loss Medication History Reviewed With Patient 2/14/2019   Which weight loss medications are you currently taking on a regular basis?  Topamax (topiramate)   Are you having any side effects from the weight loss medication that we have prescribed you? Yes   If you are having side effects please describe: Left hand and sole of foot feels tingly sometimes.       ASSESSMENT:   53 y.o. Male here for [facility] follow up.  He was most interested in sleeve gastrectomy but didn't qualify due to BMI 33.  He is doing well on topiramate 75mg and Victoza 1.8 mg.  He is interested in checking to see if the once weekly ozempic is an option for him.  His hunger and cravings are fairly well controlled but occasionally still struggles.     PLAN:   See Marine in 3 months for return medical weight management    Continue Victoza 1.8mg, will discuss once a week Ozempic with MTM pharmacist.    Continue Topiramate and increase to 50mg (2 tablets) twice daily    Will discuss possible EDWARDS study with Henrietta        FOLLOW-UP:    12 weeks.    Time: 15 min spent on evaluation, management, counseling, education, & motivational interviewing with greater than  50 % of the total time was spent on counseling and coordinating care    Sincerely,    Marine Flores PA-C

## 2019-02-14 NOTE — LETTER
2019       RE: William Montes  [address]  [place] [address]     Dear Colleague,    Thank you for referring your patient, William Montes, to the [facility] SURGICAL WEIGHT MANAGEMENT at [facility]. Please see a copy of my visit note below.    Return Medical Weight Management Note     William Montes  MRN:  [id number]  :  1965  NAKUL:  2019    Dear Sirena Talley,    I had the pleasure of seeing your patient William Montes.  He is a 53 year old male who I am continuing to see for treatment of obesity related to:       10/26/2018   I have the following co-morbidities associated with obesity: Type II Diabetes, GERD (Reflux), Fatty Liver   What was your most recent hemoglobin a1c  6.7   How many years have you had diabetes? 18   Are you taking daily medication for heartburn, acid reflux, or GERD (acid reflux disease)? No       INTERVAL HISTORY:  53 y.o. Male here for [facility] follow up.  Seen in Oct for New Bariatric surgery consult but not a candidate due to BMI 33. Discussed possible EDWARDS study and sent info to  to review his history.  Started on topiramate in Nov  Taking victoza, stopped glimepiride in Nov.   Follow up call by MTM in Dec he was tolerating topiramate and blood sugars under good control with victoza and off glimiperide.   He has lost from 249 lbs to 225 lbs, 24 lbs total.    CURRENT WEIGHT:   225 lbs 6.4 oz    Wt Readings from Last 4 Encounters:   19 102.2 kg (225 lb 6.4 oz)   12/10/18 108.2 kg (238 lb 9 oz)   18 113.6 kg (250 lb 6.4 oz)   18 113 kg (249 lb 1.6 oz)       Height:  Data Unavailable  Body Mass Index:  Body mass index is 30.53 kg/m .  Vitals: /81   Pulse 64   Temp 98.9  F (37.2  C) (Oral)   Wt 102.2 kg (225 lb 6.4 oz)   SpO2 99%   BMI 30.53 kg/m       Initial consult weight was 249 on 18.  Weight change since last seen on 2018 is down 24 pounds.   Total loss is 24  pounds.    Diet and Activity Changes Since Last Visit Reviewed With Patient 2/14/2019   I have made the following changes to my diet since my last visit: Working with the dietician seen 2x smaller portion sizes and less carbohydrates.   With regards to my diet, I am still struggling with: Still focus on food too much. Feelings of hunger when just ate meals, occasionally.   I have made the following changes to my activity/exercise since my last visit: not much   With regards to my activity/exercise, I am still struggling with: too cold to go out       MEDICATIONS:   Current Outpatient Medications   Medication     ASPIRIN 81 MG OR TABS     atorvastatin (LIPITOR) 20 MG tablet     cholecalciferol (VITAMIN  -D) 1000 UNITS capsule     insulin pen needle (B-D U/F) 31G X 8 MM miscellaneous     liraglutide (VICTOZA PEN) 18 MG/3ML solution     lisinopril (PRINIVIL/ZESTRIL) 5 MG tablet     metFORMIN (GLUCOPHAGE) 1000 MG tablet     order for DME     order for DME     order for DME     topiramate (TOPAMAX) 25 MG tablet     order for DME     No current facility-administered medications for this visit.        Weight Loss Medication History Reviewed With Patient 2/14/2019   Which weight loss medications are you currently taking on a regular basis?  Topamax (topiramate)   Are you having any side effects from the weight loss medication that we have prescribed you? Yes   If you are having side effects please describe: Left hand and sole of foot feels tingly sometimes.       ASSESSMENT:   53 y.o. Male here for [facility] follow up.  He was most interested in sleeve gastrectomy but didn't qualify due to BMI 33.  He is doing well on topiramate 75mg and Victoza 1.8 mg.  He is interested in checking to see if the once weekly ozempic is an option for him.  His hunger and cravings are fairly well controlled but occasionally still struggles.     PLAN:   See Marine in 3 months for return medical weight management    Continue Victoza 1.8mg, will  discuss once a week Ozempic with MTM pharmacist.    Continue Topiramate and increase to 50mg (2 tablets) twice daily    Will discuss possible EDWARDS study with Henrietta    FOLLOW-UP:    12 weeks.    Time: 15 min spent on evaluation, management, counseling, education, & motivational interviewing with greater than 50 % of the total time was spent on counseling and coordinating care    Sincerely,  Marine Flores PA-C

## 2019-02-14 NOTE — TELEPHONE ENCOUNTER
Patient is to stop Victoza, start Ozempic 0.5 mg once weekly, approved by Marine Flores PA-C.     Lauren Turner Bloch, PharmD  Medication Therapy Management Pharmacist   [facility]   Phone: [phone]

## 2019-02-14 NOTE — NURSING NOTE
Chief Complaint   Patient presents with     Weight Problem     RMWM       Vitals:    02/14/19 0753   BP: 128/81   Pulse: 64   Temp: 98.9  F (37.2  C)   TempSrc: Oral   SpO2: 99%   Weight: 102.2 kg (225 lb 6.4 oz)       Body mass index is 30.53 kg/m .      Paris Chow, CMA

## 2019-02-14 NOTE — PATIENT INSTRUCTIONS
See Marine in 3 months for return medical weight management    Continue Victoza 1.8mg, will discuss once a week Ozempic with MTM pharmacist.    Continue Topiramate and increase to 50mg (2 tablets) twice daily    Will discuss possible EDWARDS study with Henrietta

## 2019-03-06 ENCOUNTER — TELEPHONE (OUTPATIENT)
Dept: FAMILY MEDICINE | Facility: CLINIC | Age: 54
End: 2019-03-06

## 2019-03-06 DIAGNOSIS — E78.5 HYPERLIPIDEMIA LDL GOAL <100: ICD-10-CM

## 2019-03-06 DIAGNOSIS — E11.65 TYPE 2 DIABETES MELLITUS WITH HYPERGLYCEMIA, WITHOUT LONG-TERM CURRENT USE OF INSULIN (H): Primary | ICD-10-CM

## 2019-03-07 RX ORDER — ATORVASTATIN CALCIUM 20 MG/1
TABLET, FILM COATED ORAL
Qty: 30 TABLET | Refills: 0 | Status: SHIPPED | OUTPATIENT
Start: 2019-03-07 | End: 2019-04-09

## 2019-03-07 NOTE — TELEPHONE ENCOUNTER
"Requested Prescriptions   Pending Prescriptions Disp Refills     atorvastatin (LIPITOR) 20 MG tablet [Pharmacy Med Name: ATORVASTATIN 20MG TABLETS]  Last Written Prescription Date:  3/6/2018  Last Fill Quantity: 90 tablet,  # refills: 3   Last office visit: 10/8/2018 with prescribing provider:  VINCENT Talley   Future Office Visit:   Next 5 appointments (look out 90 days)    Mar 14, 2019  8:30 AM CDT  (Arrive by 8:15 AM)  SHORT with Lauren Turner Bloch, RPH [facility] ([facility]) [address] [place] [id number] [phone]   Apr 08, 2019  9:00 AM CDT  Toyin Nur with Sirena Talley DO  [facility] ([address] [address] [phone]          90 tablet 0     Sig: TAKE 1 TABLET(20 MG) BY MOUTH DAILY    Statins Protocol Passed - 3/6/2019  7:23 PM       Passed - LDL on file in past 12 months    Recent Labs   Lab Test 03/06/18  1100   LDL 49            Passed - No abnormal creatine kinase in past 12 months    No lab results found.            Passed - Recent (12 mo) or future (30 days) visit within the authorizing provider's specialty    Patient had office visit in the last 12 months or has a visit in the next 30 days with authorizing provider or within the authorizing provider's specialty.  See \"Patient Info\" tab in inbasket, or \"Choose Columns\" in Meds & Orders section of the refill encounter.             Passed - Medication is active on med list       Passed - Patient is age 18 or older                ONETOUCH ULTRA test strip [Pharmacy Med Name: ONE TOUCH ULTRA BLUE TEST ST(NEW)50]  Last Written Prescription Date:  3/6/2018  Last Fill Quantity: 100 each,  # refills: 4   Last office visit: 10/8/2018 with prescribing provider:  VINCENT Talley   Future Office Visit:   Next 5 appointments (look out 90 days)    Mar 14, 2019  8:30 AM CDT  (Arrive by 8:15 AM)  SHORT with Lauren Turner Bloch, RPH [facility] [place] ([facility]) [address] [address]  [phone]   Apr 08, 2019  9:00 AM CDT  Toyin Nur with Sirena Talley DO  [facility] ([facility]) [address] [place] [id number] [phone]          100 strip 0     Sig: TEST DAILY    Diabetic Supplies Protocol Failed - 3/6/2019  7:23 PM       Failed - Medication is active on med list       Passed - Patient is 18 years of age or older       Passed - Recent (6 mo) or future (30 days) visit within the authorizing provider's specialty    Patient had office visit in the last 6 months or has a visit in the next 30 days with authorizing provider.  See \"Patient Info\" tab in inbasket, or \"Choose Columns\" in Meds & Orders section of the refill encounter.              "

## 2019-03-08 NOTE — TELEPHONE ENCOUNTER
Spoke to patient and he set up apt via [employer] for 04/08. He would like to come in to have labs done the Thursday before, apt set up. Please place orders for needed labs also he would like to have labs to check kidneys and liver.    Thank you,  Pastora PARDO    NE Team Niru

## 2019-03-08 NOTE — TELEPHONE ENCOUNTER
Jazmine given x1 today. Patient needs office visit, please schedule. Patient is due for diabetes visit and fasting labs next month.    Thanks!  José Miguel Jensen RN

## 2019-03-21 ENCOUNTER — OFFICE VISIT (OUTPATIENT)
Dept: PHARMACY | Facility: CLINIC | Age: 54
End: 2019-03-21
Payer: COMMERCIAL

## 2019-03-21 VITALS
BODY MASS INDEX: 29.96 KG/M2 | DIASTOLIC BLOOD PRESSURE: 67 MMHG | SYSTOLIC BLOOD PRESSURE: 127 MMHG | WEIGHT: 221.2 LBS | HEART RATE: 69 BPM

## 2019-03-21 DIAGNOSIS — E11.8 TYPE 2 DIABETES MELLITUS WITH COMPLICATION, WITHOUT LONG-TERM CURRENT USE OF INSULIN (H): ICD-10-CM

## 2019-03-21 DIAGNOSIS — E66.811 CLASS 1 OBESITY WITH BODY MASS INDEX (BMI) OF 33.0 TO 33.9 IN ADULT, UNSPECIFIED OBESITY TYPE, UNSPECIFIED WHETHER SERIOUS COMORBIDITY PRESENT: Primary | ICD-10-CM

## 2019-03-21 PROCEDURE — 99207 ZZC NO CHARGE LOS: CPT | Performed by: PHARMACIST

## 2019-03-21 NOTE — PROGRESS NOTES
THERAPY MANAGEMENT VISIT:                William Montes is a 53 year old male coming in for Therapy Management.  He was referred to me from Marine Flores PA-C.     Chief Complaint: Patient has questions regarding Ozempic.    Obesity: Current medication(s) include: topiramate 50 mg BID, Ozempic 0.5 mg weekly (changed from Victoza to Ozempic 1 month ago). Followed by Marine Flores PA-C, last seen 2/14/19. Insurance does not approve weight loss surgery as he does not qualify. No medication side effects. He is concerned about the potential for thyroid cancer and pancreatitis.   Diet/Eating Habits: Patient reports since medication changes, he is not focused on food, when he does eat he eats less. Gets full sooner. Will try drinking water first when he is hungry and finds out he was more thirsty.   Failed medications include: Orlistat - not effective.     Current weight today: 221 lb 3.2 oz   Initial consult weight: 249 lb 1.6 oz. BMI 33.81 kg/m^2.  Last clinic visit: 225 lb 6.4 oz  Cumulative Weight loss: -27.9 lb, -11.2% weight loss     Diabetes:  Pt currently taking metformin 1000 mg BID, Ozempic 0.5 mg weekly. Pt is not experiencing side effects none. He is hoping int he future to stop one of these medications. He is considering stopping Ozempic in the near future when he gets to a weight he is comfortable with, he is worried about side effects of Ozempic.    SMBG: one time daily.   Ranges (patient reported): Fasting AM always under 100 (usually ), this AM.   Patient is not experiencing hypoglycemia None   Recent symptoms of high blood sugar? none  Eye exam: up to date  Foot exam: up to date  ACEi/ARB: Yes: lisinopril 5 mg once daily.   Urine Albumin:   Lab Results   Component Value Date    UMALCR 9.74 03/06/2018      Aspirin: Taking 81mg daily and denies side effects.  Lab Results   Component Value Date    A1C 6.7 10/08/2018    A1C 6.9 03/06/2018    A1C 7.4 03/08/2017    A1C 8.3 06/08/2016    A1C 6.9  12/08/2014     Today's Vitals: /67   Pulse 69   Wt 221 lb 3.2 oz (100.3 kg)   BMI 29.96 kg/m      DISCUSSION:              Current medications were reviewed today as discussed above.      Medication Adherence: good, no issues identified    Obesity: Stable. Answered patient's questions regarding Ozempic. Patient has seen >10% weight loss since working with weight management clinic. Discussed his questions regarding potential safety risks of GLP1 agonists, and after discussion wishes to continue Ozempic for now. Due to current blood sugars, increased dose of Ozempic is not advised.     Diabetes: Stable. Patient is meeting A1c goal of < 7%. Self monitoring of blood glucose is at goal of fasting  mg/dL.    PLAN:                  1. Continue current regimen.     Lauren Turner Bloch, PharmD  Medication Therapy Management Pharmacist   [facility]   Phone: [phone]

## 2019-04-03 ENCOUNTER — OFFICE VISIT (OUTPATIENT)
Dept: OPHTHALMOLOGY | Facility: CLINIC | Age: 54
End: 2019-04-03
Payer: COMMERCIAL

## 2019-04-03 DIAGNOSIS — E11.65 TYPE 2 DIABETES MELLITUS WITH HYPERGLYCEMIA, WITHOUT LONG-TERM CURRENT USE OF INSULIN (H): Primary | ICD-10-CM

## 2019-04-03 DIAGNOSIS — H52.4 PRESBYOPIA: ICD-10-CM

## 2019-04-03 DIAGNOSIS — E11.3299 BACKGROUND DIABETIC RETINOPATHY (H): ICD-10-CM

## 2019-04-03 DIAGNOSIS — H40.003 GLAUCOMA SUSPECT, BILATERAL: ICD-10-CM

## 2019-04-03 PROCEDURE — 92014 COMPRE OPH EXAM EST PT 1/>: CPT | Performed by: OPHTHALMOLOGY

## 2019-04-03 PROCEDURE — 92015 DETERMINE REFRACTIVE STATE: CPT | Performed by: OPHTHALMOLOGY

## 2019-04-03 ASSESSMENT — CONF VISUAL FIELD
METHOD: COUNTING FINGERS
OD_NORMAL: 1
OS_NORMAL: 1

## 2019-04-03 ASSESSMENT — REFRACTION_MANIFEST
OS_ADD: +2.25
OD_ADD: +2.25
OD_SPHERE: +1.25
OS_SPHERE: +1.25
OS_AXIS: 135
OS_CYLINDER: +0.25
OD_CYLINDER: +0.25
OD_AXIS: 035

## 2019-04-03 ASSESSMENT — REFRACTION_WEARINGRX
OD_ADD: +2.00
OD_SPHERE: +1.00
OD_AXIS: 060
OD_CYLINDER: +0.25
OS_ADD: +2.00
SPECS_TYPE: PAL
OS_CYLINDER: +1.00
OS_SPHERE: +0.50
OS_AXIS: 120

## 2019-04-03 ASSESSMENT — TONOMETRY
OD_IOP_MMHG: 13
IOP_METHOD: APPLANATION
OS_IOP_MMHG: 14

## 2019-04-03 ASSESSMENT — VISUAL ACUITY
METHOD: SNELLEN - LINEAR
OS_CC: 20/20
CORRECTION_TYPE: GLASSES
OD_CC: 20/20

## 2019-04-03 ASSESSMENT — SLIT LAMP EXAM - LIDS
COMMENTS: NORMAL
COMMENTS: NORMAL

## 2019-04-03 ASSESSMENT — EXTERNAL EXAM - LEFT EYE: OS_EXAM: NORMAL

## 2019-04-03 ASSESSMENT — CUP TO DISC RATIO
OS_RATIO: 0.7
OD_RATIO: 0.7

## 2019-04-03 ASSESSMENT — EXTERNAL EXAM - RIGHT EYE: OD_EXAM: NORMAL

## 2019-04-03 NOTE — LETTER
4/3/2019         RE: William Montes  [facility]  [place] [address]        Dear Colleague,    Thank you for referring your patient, William Montes, to the [facility]. Please see a copy of my visit note below.     Current Eye Medications:  none     Subjective:  Complete eye exam. Vision is doing fine both eyes in distance. Having trouble on computer, having to hold glasses up. Patient feels [employer] did a poor job of adjusting them. No eye pain or discomfort in either eye.   Diabetic for last 19 years. Blood sugar has been perfect, 96 this am.    Lab Results   Component Value Date    A1C 6.7 10/08/2018    A1C 6.9 03/06/2018    A1C 7.4 03/08/2017    A1C 8.3 06/08/2016    A1C 6.9 12/08/2014        Objective:  See Ophthalmology Exam.       Assessment:  Stable mild background diabetic retinopathy both eyes.  Stable intraocular pressure and discs in patient who is a glaucoma suspect.      ICD-10-CM    1. Type 2 diabetes mellitus with hyperglycemia, without long-term current use of insulin (H) E11.65 EYE EXAM (SIMPLE-NONBILLABLE)   2. Background diabetic retinopathy, mild, ou E11.3299    3. Glaucoma suspect, bilateral H40.003    4. Presbyopia H52.4 REFRACTION         Plan:  Glasses Rx given - will try computer glasses.  Use artificial tears up to 4 times daily both eyes.  (Refresh Tears, Systane Ultra/Balance, or Theratears)  Continue observation with regard to glaucoma suspect status.  Return visit in 6 months for intraocular pressure check, glaucoma OCT and Bhandari Visual Field.     Harry Hodgson M.D.  [phone]             Again, thank you for allowing me to participate in the care of your patient.        Sincerely,        Harry Hodgson MD

## 2019-04-03 NOTE — PROGRESS NOTES
Current Eye Medications:  none     Subjective:  Complete eye exam. Vision is doing fine both eyes in distance. Having trouble on computer, having to hold glasses up. Patient feels [employer] did a poor job of adjusting them. No eye pain or discomfort in either eye.   Diabetic for last 19 years. Blood sugar has been perfect, 96 this am.    Lab Results   Component Value Date    A1C 6.7 10/08/2018    A1C 6.9 03/06/2018    A1C 7.4 03/08/2017    A1C 8.3 06/08/2016    A1C 6.9 12/08/2014        Objective:  See Ophthalmology Exam.       Assessment:  Stable mild background diabetic retinopathy both eyes.  Stable intraocular pressure and discs in patient who is a glaucoma suspect.      ICD-10-CM    1. Type 2 diabetes mellitus with hyperglycemia, without long-term current use of insulin (H) E11.65 EYE EXAM (SIMPLE-NONBILLABLE)   2. Background diabetic retinopathy, mild, ou E11.3299    3. Glaucoma suspect, bilateral H40.003    4. Presbyopia H52.4 REFRACTION         Plan:  Glasses Rx given - will try computer glasses.  Use artificial tears up to 4 times daily both eyes.  (Refresh Tears, Systane Ultra/Balance, or Theratears)  Continue observation with regard to glaucoma suspect status.  Return visit in 6 months for intraocular pressure check, glaucoma OCT and Bhandari Visual Field.     Harry Hodgson M.D.  [phone]

## 2019-04-03 NOTE — PATIENT INSTRUCTIONS
Glasses Rx given - optional.  Use artificial tears up to 4 times daily both eyes.  (Refresh Tears, Systane Ultra/Balance, or Theratears)  Continue observation with regard to glaucoma suspect status.  Return visit in 6 months for intraocular pressure check, glaucoma OCT and Bhandari Visual Field.     Harry Hodgson M.D.  [phone]

## 2019-04-04 DIAGNOSIS — E11.65 TYPE 2 DIABETES MELLITUS WITH HYPERGLYCEMIA, WITHOUT LONG-TERM CURRENT USE OF INSULIN (H): ICD-10-CM

## 2019-04-04 DIAGNOSIS — E78.5 HYPERLIPIDEMIA LDL GOAL <100: ICD-10-CM

## 2019-04-04 LAB
ALBUMIN SERPL-MCNC: 4.2 G/DL (ref 3.4–5)
ALP SERPL-CCNC: 66 U/L (ref 40–150)
ALT SERPL W P-5'-P-CCNC: 35 U/L (ref 0–70)
ANION GAP SERPL CALCULATED.3IONS-SCNC: 5 MMOL/L (ref 3–14)
AST SERPL W P-5'-P-CCNC: 21 U/L (ref 0–45)
BILIRUB SERPL-MCNC: 0.5 MG/DL (ref 0.2–1.3)
BUN SERPL-MCNC: 15 MG/DL (ref 7–30)
CALCIUM SERPL-MCNC: 9.6 MG/DL (ref 8.5–10.1)
CHLORIDE SERPL-SCNC: 110 MMOL/L (ref 94–109)
CHOLEST SERPL-MCNC: 108 MG/DL
CO2 SERPL-SCNC: 24 MMOL/L (ref 20–32)
CREAT SERPL-MCNC: 1.47 MG/DL (ref 0.66–1.25)
CREAT UR-MCNC: 175 MG/DL
GFR SERPL CREATININE-BSD FRML MDRD: 53 ML/MIN/{1.73_M2}
GLUCOSE SERPL-MCNC: 98 MG/DL (ref 70–99)
HBA1C MFR BLD: 5.6 % (ref 0–5.6)
HDLC SERPL-MCNC: 30 MG/DL
LDLC SERPL CALC-MCNC: 60 MG/DL
MICROALBUMIN UR-MCNC: 8 MG/L
MICROALBUMIN/CREAT UR: 4.32 MG/G CR (ref 0–17)
NONHDLC SERPL-MCNC: 78 MG/DL
POTASSIUM SERPL-SCNC: 4.2 MMOL/L (ref 3.4–5.3)
PROT SERPL-MCNC: 7.7 G/DL (ref 6.8–8.8)
SODIUM SERPL-SCNC: 139 MMOL/L (ref 133–144)
TRIGL SERPL-MCNC: 91 MG/DL

## 2019-04-04 PROCEDURE — 80053 COMPREHEN METABOLIC PANEL: CPT | Performed by: FAMILY MEDICINE

## 2019-04-04 PROCEDURE — 36415 COLL VENOUS BLD VENIPUNCTURE: CPT | Performed by: FAMILY MEDICINE

## 2019-04-04 PROCEDURE — 83036 HEMOGLOBIN GLYCOSYLATED A1C: CPT | Performed by: FAMILY MEDICINE

## 2019-04-04 PROCEDURE — 80061 LIPID PANEL: CPT | Performed by: FAMILY MEDICINE

## 2019-04-04 PROCEDURE — 82043 UR ALBUMIN QUANTITATIVE: CPT | Performed by: FAMILY MEDICINE

## 2019-04-08 ENCOUNTER — OFFICE VISIT (OUTPATIENT)
Dept: FAMILY MEDICINE | Facility: CLINIC | Age: 54
End: 2019-04-08
Payer: COMMERCIAL

## 2019-04-08 VITALS
HEART RATE: 72 BPM | HEIGHT: 72 IN | WEIGHT: 217.4 LBS | BODY MASS INDEX: 29.45 KG/M2 | SYSTOLIC BLOOD PRESSURE: 112 MMHG | TEMPERATURE: 98.7 F | DIASTOLIC BLOOD PRESSURE: 72 MMHG

## 2019-04-08 DIAGNOSIS — I10 ESSENTIAL HYPERTENSION: ICD-10-CM

## 2019-04-08 DIAGNOSIS — R74.8 ELEVATED CREATINE KINASE: ICD-10-CM

## 2019-04-08 DIAGNOSIS — K75.81 NASH (NONALCOHOLIC STEATOHEPATITIS): ICD-10-CM

## 2019-04-08 DIAGNOSIS — E11.65 TYPE 2 DIABETES MELLITUS WITH HYPERGLYCEMIA, WITHOUT LONG-TERM CURRENT USE OF INSULIN (H): Primary | ICD-10-CM

## 2019-04-08 DIAGNOSIS — Z12.11 SCREEN FOR COLON CANCER: ICD-10-CM

## 2019-04-08 PROCEDURE — 99215 OFFICE O/P EST HI 40 MIN: CPT | Performed by: FAMILY MEDICINE

## 2019-04-08 ASSESSMENT — MIFFLIN-ST. JEOR: SCORE: 1859.12

## 2019-04-08 NOTE — PROGRESS NOTES
SUBJECTIVE:                                                    William Montes is a 53 year old male who presents to clinic today for the following health issues:    Diabetes Follow-up    Patient is checking blood sugars: once daily.  Results are as f ollows:         am - 96     Diabetic concerns: None     Symptoms of hypoglycemia (low blood sugar): none     Paresthesias (numbness or burning in feet) or sores: No     Date of last diabetic eye exam: 04/03/19    Metformin 1000 mg BID, Ozempic 0.5 mg once weekly.     BP Readings from Last 2 Encounters:   04/08/19 112/72   03/21/19 127/67     Hemoglobin A1C (%)   Date Value   04/04/2019 5.6   10/08/2018 6.7 (H)     LDL Cholesterol Calculated (mg/dL)   Date Value   04/04/2019 60   03/06/2018 49       Diabetes Management Resources    Hyperlipidemia Follow-Up      Rate your low fat/cholesterol diet?: good    Taking statin?  Yes, no muscle aches from statin    Other lipid medications/supplements?:  none    Hypertension Follow-up      Outpatient blood pressures are not being checked.    Low Salt Diet: low salt      Weight Management:  Patient was started on Topamax about 5 months ago. Since then, he has lost about 30 pounds. He is feeling that this is working well for him.     Pain:  He reports that he takes Advil occasionally to help with pain. He notes that he has whole body aches at night. He also notes that when he takes advil before bed, he does not get up to use the bathroom. Without Advil, he wakes 2-3 times at night. His pain is mostly in his joints, lower back, and he also has some headaches. Patient mentions that he got a new mattress, and it seems to be too soft. He reports that the aches and pain increase after working out, or with increased activity.       Amount of exercise or physical activity: None    Problems taking medications regularly: No    Medication side effects: none    Diet: regular (no restrictions)      Problem list and histories reviewed &  adjusted, as indicated.  Additional history: as documented    BP Readings from Last 3 Encounters:   04/08/19 112/72   03/21/19 127/67   02/14/19 128/81    Wt Readings from Last 3 Encounters:   04/08/19 98.6 kg (217 lb 6.4 oz)   03/21/19 100.3 kg (221 lb 3.2 oz)   02/14/19 102.2 kg (225 lb 6.4 oz)                    ROS:  Constitutional, HEENT, cardiovascular, pulmonary, gi and gu systems are negative, except as otherwise noted.    This document serves as a record of the services and decisions personally performed by CHAN RODAS. It was created on his/her behalf by Jamaica Maguire, a trained medical scribe. The creation of this document is based on the provider's statements to the medical scribe. Jamaica Maguire, April 8, 2019 9:11 AM    OBJECTIVE:     /72 (BP Location: Right arm, Patient Position: Chair, Cuff Size: Adult Large)   Pulse 72   Temp 98.7  F (37.1  C) (Oral)   Ht 1.829 m (6')   Wt 98.6 kg (217 lb 6.4 oz)   BMI 29.48 kg/m    Body mass index is 29.48 kg/m .  GENERAL: healthy, alert and no distress  HENT: ear canals and TM's normal, nose and mouth without ulcers or lesions  RESP: lungs clear to auscultation - no rales, rhonchi or wheezes  CV: regular rate and rhythm, normal S1 S2, no S3 or S4, no murmur, click or rub, no peripheral edema and peripheral pulses strong  ABDOMEN: soft, nontender, without hepatosplenomegaly or masses and bowel sounds normal  MS: no gross musculoskeletal defects noted, no edema  SKIN: no suspicious lesions or rashes  NEURO: Normal strength and tone, mentation intact and speech normal  PSYCH: mentation appears normal, affect normal/bright    ASSESSMENT/PLAN:     Diabetes Type II, A1c Controlled, non-insulin dependent   Associated with the following complications    Renal Complications:  None    Ophthalmologic Complications: None    Neurologic Complications: None    Peripheral Vascular Complications:  None    Other: None   Plan:  Medications:  Discontinue Ozempic.        Hyperlipidemia; controlled   Plan:  No changes in the patient's current treatment plan    Hypertension; controlled   Associated with the following complications:    Diabetes   Plan:  No changes in the patient's current treatment plan        ICD-10-CM    1. Type 2 diabetes mellitus with hyperglycemia, without long-term current use of insulin (H) E11.65 metFORMIN (GLUCOPHAGE) 1000 MG tablet     **Basic metabolic panel FUTURE 2mo   2. EDWARDS (nonalcoholic steatohepatitis) K75.81 GASTROENTEROLOGY ADULT REF CONSULT ONLY     US Abdomen Limited   3. Essential hypertension I10 **Basic metabolic panel FUTURE 2mo   4. Elevated creatine kinase R74.8    5. Screen for colon cancer Z12.11      Patient's diabetes is well controlled. I discontinued his Ozempic, and he will continue metformin. I ordered a repeat liver US to recheck his fatty liver, and he will follow up with GI as he has not been seen for this in about 5 years. I advised him to discontinue use of Tylenol and Advil until he sees GI. He will follow up in 1 month for repeat lab work, and follow up with me in clinic in 3 months.     More than 50 % of the 45 minute visit spent counseling the patient.       Patient Instructions   Try to avoid Tylenol and Advil until you see GI. You can consider using a topical cream for your pain.     Oxyrub is a topical pain relief that does not have a strong smell.    Complete your liver ultrasound, then schedule and appointment with GI.     Discontinue Ozempic.     Complete your colonoscopy.     Follow up for lab work in 1 month.    Follow up with me in 3 months.     If you can, it is helpful if you fill out a survey about your clinic visit if it is mailed to your house in a few weeks.       The information in this document, created by the medical scribmaame Maguire for me, accurately reflects the services I personally performed and the decisions made by me. I have reviewed and approved this document for accuracy prior to leaving  the patient care area.    Sirena Talley DO  [facility]

## 2019-04-08 NOTE — PATIENT INSTRUCTIONS
Try to avoid Tylenol and Advil until you see GI. You can consider using a topical cream for your pain.     Oxyrub is a topical pain relief that does not have a strong smell.    Complete your liver ultrasound, then schedule and appointment with GI.     Discontinue Ozempic.     Complete your colonoscopy.     Follow up for lab work in 1 month.    Follow up with me in 3 months.     If you can, it is helpful if you fill out a survey about your clinic visit if it is mailed to your house in a few weeks.

## 2019-04-09 DIAGNOSIS — E78.5 HYPERLIPIDEMIA LDL GOAL <100: ICD-10-CM

## 2019-04-09 DIAGNOSIS — I10 BENIGN ESSENTIAL HYPERTENSION: ICD-10-CM

## 2019-04-09 NOTE — TELEPHONE ENCOUNTER
"Requested Prescriptions   Pending Prescriptions Disp Refills     ONETOUCH ULTRA test strip [Pharmacy Med Name: ONE TOUCH ULTRA BLUE TEST ST(NEW)50]  Last Written Prescription Date:  3/7/2019  Last Fill Quantity: 100 strip,  # refills: 0   Last office visit: 4/8/2019 with prescribing provider:  VINCENT Talley   Future Office Visit:     100 strip 0     Sig: TEST DAILY       Diabetic Supplies Protocol Passed - 4/9/2019  9:13 AM        Passed - Medication is active on med list        Passed - Patient is 18 years of age or older        Passed - Recent (6 mo) or future (30 days) visit within the authorizing provider's specialty     Patient had office visit in the last 6 months or has a visit in the next 30 days with authorizing provider.  See \"Patient Info\" tab in inbasket, or \"Choose Columns\" in Meds & Orders section of the refill encounter.                    atorvastatin (LIPITOR) 20 MG tablet [Pharmacy Med Name: ATORVASTATIN 20MG TABLETS]  Last Written Prescription Date:  3/7/2019  Last Fill Quantity: 30 tablet,  # refills: 0   Last office visit: 4/8/2019 with prescribing provider:  VINCENT Talley   Future Office Visit:     30 tablet 0     Sig: TAKE 1 TABLET(20 MG) BY MOUTH DAILY       Statins Protocol Passed - 4/9/2019  9:13 AM        Passed - LDL on file in past 12 months     Recent Labs   Lab Test 04/04/19  0902   LDL 60             Passed - No abnormal creatine kinase in past 12 months     No lab results found.             Passed - Recent (12 mo) or future (30 days) visit within the authorizing provider's specialty     Patient had office visit in the last 12 months or has a visit in the next 30 days with authorizing provider or within the authorizing provider's specialty.  See \"Patient Info\" tab in inbasket, or \"Choose Columns\" in Meds & Orders section of the refill encounter.              Passed - Medication is active on med list        Passed - Patient is age 18 or older                lisinopril (PRINIVIL/ZESTRIL) 5 MG " "tablet [Pharmacy Med Name: LISINOPRIL 5MG TABLETS]  Last Written Prescription Date:  10/8/2018  Last Fill Quantity: 90 tablet,  # refills: 1   Last office visit: 4/8/2019 with prescribing provider:  VINCENT Talley   Future Office Visit:     90 tablet 0     Sig: TAKE 1 TABLET(5 MG) BY MOUTH DAILY       ACE Inhibitors (Including Combos) Protocol Failed - 4/9/2019  9:13 AM        Failed - Normal serum creatinine on file in past 12 months     Recent Labs   Lab Test 04/04/19  0902   CR 1.47*             Passed - Blood pressure under 140/90 in past 12 months     BP Readings from Last 3 Encounters:   04/08/19 112/72   03/21/19 127/67   02/14/19 128/81           Passed - Recent (12 mo) or future (30 days) visit within the authorizing provider's specialty     Patient had office visit in the last 12 months or has a visit in the next 30 days with authorizing provider or within the authorizing provider's specialty.  See \"Patient Info\" tab in inbasket, or \"Choose Columns\" in Meds & Orders section of the refill encounter.              Passed - Medication is active on med list        Passed - Patient is age 18 or older        Passed - Normal serum potassium on file in past 12 months     Recent Labs   Lab Test 04/04/19  0902   POTASSIUM 4.2               "

## 2019-04-10 ENCOUNTER — ANCILLARY PROCEDURE (OUTPATIENT)
Dept: ULTRASOUND IMAGING | Facility: CLINIC | Age: 54
End: 2019-04-10
Attending: FAMILY MEDICINE
Payer: COMMERCIAL

## 2019-04-10 DIAGNOSIS — K75.81 NASH (NONALCOHOLIC STEATOHEPATITIS): ICD-10-CM

## 2019-04-10 PROCEDURE — 76705 ECHO EXAM OF ABDOMEN: CPT

## 2019-04-10 RX ORDER — ATORVASTATIN CALCIUM 20 MG/1
TABLET, FILM COATED ORAL
Qty: 30 TABLET | Refills: 0 | Status: SHIPPED | OUTPATIENT
Start: 2019-04-10 | End: 2019-05-02

## 2019-04-10 RX ORDER — LISINOPRIL 5 MG/1
TABLET ORAL
Qty: 90 TABLET | Refills: 0 | Status: SHIPPED | OUTPATIENT
Start: 2019-04-10 | End: 2019-07-06

## 2019-05-02 ENCOUNTER — TELEPHONE (OUTPATIENT)
Dept: GASTROENTEROLOGY | Facility: CLINIC | Age: 54
End: 2019-05-02

## 2019-05-02 ENCOUNTER — OFFICE VISIT (OUTPATIENT)
Dept: ENDOCRINOLOGY | Facility: CLINIC | Age: 54
End: 2019-05-02
Payer: COMMERCIAL

## 2019-05-02 VITALS
OXYGEN SATURATION: 99 % | HEART RATE: 64 BPM | WEIGHT: 219 LBS | DIASTOLIC BLOOD PRESSURE: 68 MMHG | BODY MASS INDEX: 29.66 KG/M2 | HEIGHT: 72 IN | SYSTOLIC BLOOD PRESSURE: 119 MMHG

## 2019-05-02 DIAGNOSIS — E78.5 HYPERLIPIDEMIA LDL GOAL <100: ICD-10-CM

## 2019-05-02 DIAGNOSIS — K75.81 NASH (NONALCOHOLIC STEATOHEPATITIS): Primary | ICD-10-CM

## 2019-05-02 DIAGNOSIS — E66.811 CLASS 1 OBESITY WITH SERIOUS COMORBIDITY AND BODY MASS INDEX (BMI) OF 30.0 TO 30.9 IN ADULT, UNSPECIFIED OBESITY TYPE: ICD-10-CM

## 2019-05-02 RX ORDER — TOPIRAMATE 25 MG/1
75 TABLET, FILM COATED ORAL 2 TIMES DAILY
Qty: 180 TABLET | Refills: 5 | Status: SHIPPED | OUTPATIENT
Start: 2019-05-02 | End: 2019-08-01

## 2019-05-02 ASSESSMENT — MIFFLIN-ST. JEOR: SCORE: 1866.38

## 2019-05-02 NOTE — TELEPHONE ENCOUNTER
"Requested Prescriptions   Pending Prescriptions Disp Refills     atorvastatin (LIPITOR) 20 MG tablet [Pharmacy Med Name: ATORVASTATIN 20MG TABLETS]  Last Written Prescription Date:  4/10/2019  Last Fill Quantity: 30 tabs,  # refills: 0   Last office visit: 4/8/2019 with prescribing provider:  Gerri   Future Office Visit:     30 tablet 0     Sig: TAKE 1 TABLET(20 MG) BY MOUTH DAILY       Statins Protocol Passed - 5/2/2019  6:12 PM        Passed - LDL on file in past 12 months     Recent Labs   Lab Test 04/04/19  0902   LDL 60             Passed - No abnormal creatine kinase in past 12 months     No lab results found.             Passed - Recent (12 mo) or future (30 days) visit within the authorizing provider's specialty     Patient had office visit in the last 12 months or has a visit in the next 30 days with authorizing provider or within the authorizing provider's specialty.  See \"Patient Info\" tab in inbasket, or \"Choose Columns\" in Meds & Orders section of the refill encounter.              Passed - Medication is active on med list        Passed - Patient is age 18 or older          "

## 2019-05-02 NOTE — NURSING NOTE
Chief Complaint   Patient presents with     Weight Problem     RMWM     Vitals:    05/02/19 1333   BP: 119/68   Pulse: 64   SpO2: 99%   Weight: 99.3 kg (219 lb)   Height: 1.829 m (6')     Body mass index is 29.7 kg/m .  Jacqui Hudson CMA

## 2019-05-02 NOTE — PROGRESS NOTES
"Return Medical Weight Management Note     William Montes  MRN:  [id number]  :  1965  NAKUL:  2019    Dear Sirena Talley,    I had the pleasure of seeing your patient William Montes.  He is a 53 year old male who I am continuing to see for treatment of obesity related to:       10/26/2018   I have the following co-morbidities associated with obesity: Type II Diabetes, GERD (Reflux), Fatty Liver   What was your most recent hemoglobin a1c  6.7   How many years have you had diabetes? 18   Are you taking daily medication for heartburn, acid reflux, or GERD (acid reflux disease)? No     \"53 y.o. Male here for [facility] follow up.  Seen in Oct for New Bariatric surgery consult but not a candidate due to BMI 33. Discussed possible EDWARDS study and sent info to  to review his history.  Started on topiramate in Nov  Taking victoza, stopped glimepiride in Nov.   Follow up call by MTM in Dec he was tolerating topiramate and blood sugars under good control with victoza and off glimiperide.   He has lost from 249 lbs to 225 lbs, 24 lbs total.\"      INTERVAL HISTORY:  Off ozempic since 1 month ago because A1C 5.6.  He was worried about side effect of thyroid cancer and wanted to stop it.  .  Doing well on topiramate 50mg twice daily    A1C 5.6 at last check      CURRENT WEIGHT:   219 lbs 0 oz    Wt Readings from Last 4 Encounters:   19 99.3 kg (219 lb)   19 98.6 kg (217 lb 6.4 oz)   19 100.3 kg (221 lb 3.2 oz)   19 102.2 kg (225 lb 6.4 oz)       Height:  6' 0\"  Body Mass Index:  Body mass index is 29.7 kg/m .  Vitals: /68   Pulse 64   Ht 1.829 m (6')   Wt 99.3 kg (219 lb)   SpO2 99%   BMI 29.70 kg/m        Initial consult weight was 249.  Weight change since last seen on Visit date not found is down 6 pounds.   Total loss is 30 pounds.    Diet and Activity Changes Since Last Visit Reviewed With Patient 2019   I have made the following changes to my diet since my last visit: " smaller portion sizes, less carbohydrates   With regards to my diet, I am still struggling with: craving or wanting to eat even though I know I should not be hungry.   I have made the following changes to my activity/exercise since my last visit: minimum exercise   With regards to my activity/exercise, I am still struggling with: wanting to exercise       MEDICATIONS:   Current Outpatient Medications   Medication     ASPIRIN 81 MG OR TABS     atorvastatin (LIPITOR) 20 MG tablet     cholecalciferol (VITAMIN  -D) 1000 UNITS capsule     lisinopril (PRINIVIL/ZESTRIL) 5 MG tablet     metFORMIN (GLUCOPHAGE) 1000 MG tablet     ONETOUCH ULTRA test strip     order for DME     order for DME     topiramate (TOPAMAX) 25 MG tablet     No current facility-administered medications for this visit.        Weight Loss Medication History Reviewed With Patient 5/2/2019   Which weight loss medications are you currently taking on a regular basis?  Topamax (topiramate)   Are you having any side effects from the weight loss medication that we have prescribed you? Yes   If you are having side effects please describe: mild tingling in my left palm and sole       ASSESSMENT/PLAN:    53 y.o. Male here for [facility] follow up.  He has lost 30 lbs since starting.  He is doing well on topiramate 50mg twice daily  Increase topiramate to 75 mg twice daily (3 tabs in the am and 3 tabs in the pm)  He prefers to stay off of Ozempic due to reading about risk of thyroid cancer.  Recent A1C 5.6. Will reassess future A1C and weight loss. Blood sugars recently have been stable since off Ozempic for the last month.    FOLLOW-UP:    12 weeks.    Time: 15 min spent on evaluation, management, counseling, education, & motivational interviewing with greater than 50 % of the total time was spent on counseling and coordinating care    Sincerely,    Marine Flores PA-C

## 2019-05-02 NOTE — LETTER
"2019       RE: William Montes  [address]  [place] [address]     Dear Colleague,    Thank you for referring your patient, William Montes, to the [facility] MEDICAL WEIGHT MANAGEMENT at [facility]. Please see a copy of my visit note below.    Return Medical Weight Management Note     William Montes  MRN:  [id number]  :  1965  NAKUL:  2019    Dear Sirena Talley,    I had the pleasure of seeing your patient William Montes.  He is a 53 year old male who I am continuing to see for treatment of obesity related to:       10/26/2018   I have the following co-morbidities associated with obesity: Type II Diabetes, GERD (Reflux), Fatty Liver   What was your most recent hemoglobin a1c  6.7   How many years have you had diabetes? 18   Are you taking daily medication for heartburn, acid reflux, or GERD (acid reflux disease)? No     \"53 y.o. Male here for M follow up.  Seen in Oct for New Bariatric surgery consult but not a candidate due to BMI 33. Discussed possible EDWARDS study and sent info to  to review his history.  Started on topiramate in Nov  Taking victoza, stopped glimepiride in Nov.   Follow up call by MTM in Dec he was tolerating topiramate and blood sugars under good control with victoza and off glimiperide.   He has lost from 249 lbs to 225 lbs, 24 lbs total.\"      INTERVAL HISTORY:  Off ozempic since 1 month ago because A1C 5.6.  He was worried about side effect of thyroid cancer and wanted to stop it.  .  Doing well on topiramate 50mg twice daily    A1C 5.6 at last check      CURRENT WEIGHT:   219 lbs 0 oz    Wt Readings from Last 4 Encounters:   19 99.3 kg (219 lb)   19 98.6 kg (217 lb 6.4 oz)   19 100.3 kg (221 lb 3.2 oz)   19 102.2 kg (225 lb 6.4 oz)       Height:  6' 0\"  Body Mass Index:  Body mass index is 29.7 kg/m .  Vitals: /68   Pulse 64   Ht 1.829 m (6')   Wt 99.3 kg (219 lb)   SpO2 99%   " BMI 29.70 kg/m         Initial consult weight was 249.  Weight change since last seen on Visit date not found is down 6 pounds.   Total loss is 30 pounds.    Diet and Activity Changes Since Last Visit Reviewed With Patient 5/2/2019   I have made the following changes to my diet since my last visit: smaller portion sizes, less carbohydrates   With regards to my diet, I am still struggling with: craving or wanting to eat even though I know I should not be hungry.   I have made the following changes to my activity/exercise since my last visit: minimum exercise   With regards to my activity/exercise, I am still struggling with: wanting to exercise       MEDICATIONS:   Current Outpatient Medications   Medication     ASPIRIN 81 MG OR TABS     atorvastatin (LIPITOR) 20 MG tablet     cholecalciferol (VITAMIN  -D) 1000 UNITS capsule     lisinopril (PRINIVIL/ZESTRIL) 5 MG tablet     metFORMIN (GLUCOPHAGE) 1000 MG tablet     ONETOUCH ULTRA test strip     order for DME     order for DME     topiramate (TOPAMAX) 25 MG tablet     No current facility-administered medications for this visit.        Weight Loss Medication History Reviewed With Patient 5/2/2019   Which weight loss medications are you currently taking on a regular basis?  Topamax (topiramate)   Are you having any side effects from the weight loss medication that we have prescribed you? Yes   If you are having side effects please describe: mild tingling in my left palm and sole       ASSESSMENT/PLAN:    53 y.o. Male here for [facility] follow up.  He has lost 30 lbs since starting.  He is doing well on topiramate 50mg twice daily  Increase topiramate to 75 mg twice daily (3 tabs in the am and 3 tabs in the pm)  He prefers to stay off of Ozempic due to reading about risk of thyroid cancer.  Recent A1C 5.6. Will reassess future A1C and weight loss. Blood sugars recently have been stable since off Ozempic for the last month.    FOLLOW-UP:    12 weeks.    Time: 15 min spent on  evaluation, management, counseling, education, & motivational interviewing with greater than 50 % of the total time was spent on counseling and coordinating care    Sincerely,    Marine Flores PA-C

## 2019-05-02 NOTE — TELEPHONE ENCOUNTER
Referring Provider: Marine Flores PA-C    What is your current height? 6    What is your current weight?219    Are you on daily home oxygen? no    Have you had a heart or lung transplant? no      In the past year, have you had any heart related issues  Including cardiomyopathy or a MI within 6 months, that required cardiac stenting or other implantable devices? no    What type of implantable device do you have? no    Do you take nitroglycerin? If yes, how often? no    Are you currently taking any blood thinners?Aspirin      (Females) Are you currently pregnant? no  If yes, how many weeks?      Have you had a procedure in the past that was difficult to tolerate with conscious sedation? Any allergies to Fentanyl or Versed no        Do you currently use any of the following?    Are you taking any scheduled prescription narcotics more than once daily? no    Drink alcohol daily? no    Currently using any street drugs or methadone?no    Do you have any history of post-traumatic stress syndrome or mental health issues? no

## 2019-05-02 NOTE — PATIENT INSTRUCTIONS
Increase topiramate to 75 mg twice daily (3 tabs in the am and 3 tabs in the pm)    See Marine Flores in 3 months for return MW follow up    Schedule GI consult today at , referral entered.  Follow up EDWARDS    Ordered colonoscopy today for screening due

## 2019-05-06 RX ORDER — ATORVASTATIN CALCIUM 20 MG/1
TABLET, FILM COATED ORAL
Qty: 90 TABLET | Refills: 0 | Status: SHIPPED | OUTPATIENT
Start: 2019-05-06 | End: 2019-08-02

## 2019-05-06 NOTE — TELEPHONE ENCOUNTER
3 mos follow up advised at 4/8/19 visit.    Lab current for this med.    Prescription approved per [facility] Refill Protocol.    Marion Cota RN  [facility]

## 2019-05-14 ENCOUNTER — TELEPHONE (OUTPATIENT)
Dept: GASTROENTEROLOGY | Facility: CLINIC | Age: 54
End: 2019-05-14

## 2019-05-14 DIAGNOSIS — Z12.11 SPECIAL SCREENING FOR MALIGNANT NEOPLASMS, COLON: Primary | ICD-10-CM

## 2019-05-14 NOTE — TELEPHONE ENCOUNTER
Patient scheduled for Colonoscopy    Indication for procedure. screening    Referring Provider. Eva Flores PA-C    ? no    Arrival time verified? 8:45 am    Facility location verified? [address]; [address] floor    Instructions given regarding prep and procedure    Prep Type golytely e script sent to [facility], [address], [place], [place]    Are you taking any anticoagulants or blood thinners? ASA 81 mg    Instructions given? Yes, received in mail    Electronic implanted devices? no    Pre procedure teaching completed? Yes    Transportation from procedure? Yes, verbalized understanding of expectations of designated  and  6 hour after exam    H&P / Pre op physical completed? n/a

## 2019-05-22 ENCOUNTER — HOSPITAL ENCOUNTER (OUTPATIENT)
Facility: AMBULATORY SURGERY CENTER | Age: 54
End: 2019-05-22
Attending: INTERNAL MEDICINE
Payer: COMMERCIAL

## 2019-05-22 VITALS
DIASTOLIC BLOOD PRESSURE: 83 MMHG | RESPIRATION RATE: 14 BRPM | OXYGEN SATURATION: 97 % | HEART RATE: 49 BPM | BODY MASS INDEX: 29.12 KG/M2 | SYSTOLIC BLOOD PRESSURE: 128 MMHG | WEIGHT: 215 LBS | TEMPERATURE: 97.8 F | HEIGHT: 72 IN

## 2019-05-22 LAB
COLONOSCOPY: NORMAL
GLUCOSE BLDC GLUCOMTR-MCNC: 100 MG/DL (ref 70–99)
GLUCOSE BLDC GLUCOMTR-MCNC: 107 MG/DL (ref 70–99)

## 2019-05-22 RX ORDER — SIMETHICONE
LIQUID (ML) MISCELLANEOUS PRN
Status: DISCONTINUED | OUTPATIENT
Start: 2019-05-22 | End: 2019-05-22 | Stop reason: HOSPADM

## 2019-05-22 RX ORDER — LIDOCAINE 40 MG/G
CREAM TOPICAL
Status: DISCONTINUED | OUTPATIENT
Start: 2019-05-22 | End: 2019-05-22 | Stop reason: HOSPADM

## 2019-05-22 RX ORDER — FENTANYL CITRATE 50 UG/ML
INJECTION, SOLUTION INTRAMUSCULAR; INTRAVENOUS PRN
Status: DISCONTINUED | OUTPATIENT
Start: 2019-05-22 | End: 2019-05-22 | Stop reason: HOSPADM

## 2019-05-22 RX ORDER — ONDANSETRON 2 MG/ML
4 INJECTION INTRAMUSCULAR; INTRAVENOUS
Status: DISCONTINUED | OUTPATIENT
Start: 2019-05-22 | End: 2019-05-22 | Stop reason: HOSPADM

## 2019-05-22 RX ORDER — FLUMAZENIL 0.1 MG/ML
0.2 INJECTION, SOLUTION INTRAVENOUS
Status: ACTIVE | OUTPATIENT
Start: 2019-05-22 | End: 2019-05-22

## 2019-05-22 RX ORDER — ONDANSETRON 4 MG/1
4 TABLET, ORALLY DISINTEGRATING ORAL EVERY 6 HOURS PRN
Status: DISCONTINUED | OUTPATIENT
Start: 2019-05-22 | End: 2019-05-23 | Stop reason: HOSPADM

## 2019-05-22 RX ORDER — NALOXONE HYDROCHLORIDE 0.4 MG/ML
.1-.4 INJECTION, SOLUTION INTRAMUSCULAR; INTRAVENOUS; SUBCUTANEOUS
Status: DISCONTINUED | OUTPATIENT
Start: 2019-05-22 | End: 2019-05-23 | Stop reason: HOSPADM

## 2019-05-22 RX ORDER — ONDANSETRON 2 MG/ML
4 INJECTION INTRAMUSCULAR; INTRAVENOUS EVERY 6 HOURS PRN
Status: DISCONTINUED | OUTPATIENT
Start: 2019-05-22 | End: 2019-05-23 | Stop reason: HOSPADM

## 2019-05-22 ASSESSMENT — MIFFLIN-ST. JEOR: SCORE: 1848.23

## 2019-05-22 NOTE — DISCHARGE INSTRUCTIONS
Discharge Instructions after Colonoscopy  or Sigmoidoscopy    Today you had a _x___ Colonoscopy ____ Sigmoidoscopy    Activity and Diet  You were given medicine for pain. You may be dizzy or sleepy.  For 24 hours:    Do not drive or use heavy equipment.    Do not make important decisions.    Do not drink any alcohol.  You may return to your normal diet and medicines.    Discomfort    Air was placed in your colon during the exam in order to see it. Walking helps to pass the air.    You may take Tylenol (acetaminophen) for pain unless your doctor has told you not to.  Do not take aspirin or ibuprofen (Advil, Motrin, or other anti-inflammatory  drugs) for _____ days.    Follow-up  ____ We took small tissue samples or polyps to study. Your doctor will call you with the results  within two weeks.    When to call:    Call right away if you have:    Unusual pain in belly or chest pain not relieved with passing air.    More than 1 to 2 Tablespoons of bleeding from your rectum.    Fever above 100.6  F (37.5  C).    If you have severe pain, bleeding, or shortness of breath, go to an emergency room.    If you have questions, call:  Monday to Friday, 7 a.m. to 4:30 p.m.  Endoscopy: [phone] (We may have to call you back)    After hours  Hospital: [phone] (Ask for the GI fellow on call)

## 2019-05-22 NOTE — LETTER
May 22, 2019      TO: William Montes  [address] [place] [address]         Dear Mr. William Montes,    We received and reviewed your test results done on 05/22/2019.  You may receive more than one letter if we receive the results on multiple days.  Please share all lab and test results with your primary care provider and keep a copy for your own records.        Your test results are normal.    If you have any questions, feel free contact us at the Call Center [phone].      Resulted Orders   COLONOSCOPY   Result Value Ref Range    COLONOSCOPY       [facility]  [address] [place] [address], [place] [address] [phone]     Endoscopy Department  _______________________________________________________________________________  Patient Name: William Montes            Procedure Date: 5/22/2019 8:59 AM  MRN: [id number]                       Account Number: [id number]  YOB: 1965              Admit Type: Outpatient  Age: 53                                Gender: Male  Note Status: Finalized                Attending MD: Thomas M Leventhal , MD  Total Sedation Time:                    _______________________________________________________________________________     Procedure:           Colonoscopy  Indications:         Screening for colorectal malignant neoplasm  Providers:           Thomas M. Leventhal, MD, Polly Hoyt, RN  Referring MD:        PHYLLIS Cabral  Medicines:           Midazolam 3 mg IV, Fentanyl 150 micrograms IV  Complications:       No immediate complications.  ______________________________ _________________________________________________  Procedure:           Pre-Anesthesia Assessment:                       - Prior to the procedure, a History and Physical was                        performed, and patient medications and allergies were                        reviewed. The patient is competent. The risks and                        benefits of the  procedure and the sedation options and                        risks were discussed with the patient. All questions                        were answered and informed consent was obtained. Patient                        identification and proposed procedure were verified by                        the physician and the nurse in the pre-procedure area.                        Mental Status Examination: alert and oriented. Airway                        Examination: normal oropharyngeal airway and neck                        mobility. Respiratory Examination: clear to                        auscultation. CV Examination: regular rate and  rhythm.                        Prophylactic Antibiotics: The patient does not require                        prophylactic antibiotics. Prior Anticoagulants: The                        patient has taken no previous anticoagulant or                        antiplatelet agents. ASA Grade Assessment: II - A                        patient with mild systemic disease. After reviewing the                        risks and benefits, the patient was deemed in                        satisfactory condition to undergo the procedure. The                        anesthesia plan was to use moderate sedation / analgesia                        (conscious sedation). Immediately prior to                        administration of medications, the patient was                        re-assessed for adequacy to receive sedatives. The heart                        rate, respiratory rate, oxygen saturations, blood                        pressure, adequacy of pulmonary ventilation, and                        respons e to care were monitored throughout the                        procedure. The physical status of the patient was                        re-assessed after the procedure.                       After obtaining informed consent, the colonoscope was                        passed under direct vision. Throughout the  procedure,                        the patient's blood pressure, pulse, and oxygen                        saturations were monitored continuously. The Colonoscope                        was introduced through the anus and advanced to the                        cecum, identified by appendiceal orifice and ileocecal                        valve. The colonoscopy was performed without difficulty.                        The patient tolerated the procedure well. The quality of                        the bowel preparation was good.                                                                                   Findings:       The perianal and digital rectal examinations were normal.        The colon (entire examined portion) appeared normal.       Retroflexion in the right colon was performed.       The retroflexed view of the distal rectum and anal verge was normal and        showed no anal or rectal abnormalities.                                                                                   Moderate Sedation:       The administration of moderate sedation was initiated at 09:35 AM.       Moderate (conscious) sedation was administered by the endoscopy nurse        and supervised by the endoscopist. The following parameters were        monitored: oxygen saturation, heart rate, blood pressure, and response        to care. Total physician intraservice time was 28 minutes.       An independent trained observer was present and continuously monitored        the patient.       This was in 1:1 supervision of sedated patient  Impression:          - The entire examined colon is normal.                       - No specimens collected.  Recommendation:      - Discharge patient  to home.                       - Resume previous diet.                       - Continue present medications.                       - Repeat colonoscopy in 10 years for screening purposes.                       - Return to primary care physician as  previously                        scheduled.                                                                                     ______________________  Thomas M Leventhal, MD  5/22/2019 10:07:24 AM  Number of Addenda: 0    Note Initiated On: 5/22/2019 8:59 AM  Scope In:  Scope Out:             Sincerely,    Juan Diego Cedillo, RN

## 2019-06-10 ENCOUNTER — PRE VISIT (OUTPATIENT)
Dept: GASTROENTEROLOGY | Facility: CLINIC | Age: 54
End: 2019-06-10

## 2019-06-10 ENCOUNTER — OFFICE VISIT (OUTPATIENT)
Dept: GASTROENTEROLOGY | Facility: CLINIC | Age: 54
End: 2019-06-10
Attending: INTERNAL MEDICINE
Payer: COMMERCIAL

## 2019-06-10 VITALS
HEART RATE: 51 BPM | WEIGHT: 217 LBS | DIASTOLIC BLOOD PRESSURE: 72 MMHG | SYSTOLIC BLOOD PRESSURE: 131 MMHG | BODY MASS INDEX: 29.39 KG/M2 | HEIGHT: 72 IN | TEMPERATURE: 98.8 F

## 2019-06-10 DIAGNOSIS — R79.89 ELEVATED LFTS: ICD-10-CM

## 2019-06-10 DIAGNOSIS — K75.81 NASH (NONALCOHOLIC STEATOHEPATITIS): ICD-10-CM

## 2019-06-10 DIAGNOSIS — E11.65 TYPE 2 DIABETES MELLITUS WITH HYPERGLYCEMIA, WITHOUT LONG-TERM CURRENT USE OF INSULIN (H): ICD-10-CM

## 2019-06-10 DIAGNOSIS — K74.00 HEPATIC FIBROSIS: ICD-10-CM

## 2019-06-10 DIAGNOSIS — E78.5 HYPERLIPIDEMIA LDL GOAL <100: ICD-10-CM

## 2019-06-10 DIAGNOSIS — K75.81 NASH (NONALCOHOLIC STEATOHEPATITIS): Primary | ICD-10-CM

## 2019-06-10 PROCEDURE — 91200 LIVER ELASTOGRAPHY: CPT | Mod: ZF

## 2019-06-10 PROCEDURE — G0463 HOSPITAL OUTPT CLINIC VISIT: HCPCS | Mod: ZF

## 2019-06-10 ASSESSMENT — MIFFLIN-ST. JEOR: SCORE: 1852.31

## 2019-06-10 ASSESSMENT — PAIN SCALES - GENERAL: PAINLEVEL: NO PAIN (0)

## 2019-06-10 NOTE — PATIENT INSTRUCTIONS
"- Fibrosis Scan Today    Non-Alcoholic Fatty Liver Disease  - I had a long discussion with the patient about nonalcoholic fatty liver disease (NAFLD).  We discussed how fat deposits in the liver, how this leads to inflammation, and how chronic inflammation (EDWARDS) can ultimately lead to scarring and cirrhosis.  The long-term complications of fatty liver disease were discussed with the patient, including the increased risk of cardiovascular disease complications,the risk of developing diabetes (if not already), and variable progression to cirrhosis and end stage liver disease.    Management of NAFLD/EDWARDS  - I did discuss with the patient about an appropriate diet, exercise and weight loss plan.    - The patient should exercise regularly 4+ times per week, with an average of about 45 minutes per day.   - The patient should be on low-carbohydrate, low-calorie diet (5598-7668 calories per day). The weight loss plan is to lose 7-10% of body weight in the next three to six months' time.  It has shown that patients who exercise regularly can have improvement of insulin resistance and resolution of fatty liver disease, even if they are not able to lose weight.   - Diabetes management is crucial with ideal goal Hgb A1c goal of =/< 7%. If possible addition of insulin sensitizing agents like metformin of liraglutide will be helpful.    - Management of elevated cholesterol is crucial in this population.  The use of \"statins\" (HMG-CoA reductase medications) are an effective means of therapy and are not contra-indicated in those with abnormal liver tests OR those with cirrhosis.  The value of these medications in this population far outweigh the minor risks of abnormal liver tests.  Goal LDL in those with EDWARDS are < 100 mg/dL  - Consider the utility of liberalizing coffee consumption as some data that this may slow progression and reverse effects of EDWARDS-related fibrosis.      You can take up to 2,000 mg of acetaminophen/Tylenol " in a 24 hour period.  It is important to avoid medications such as ibuprofen, Advil, naproxen, Aleve, high-dose aspirin or other NSAIDs in people with advanced liver disease as these can cause kidney injury.

## 2019-06-10 NOTE — NURSING NOTE
/72   Pulse 51   Temp 98.8  F (37.1  C) (Oral)   Ht 1.829 m (6')   Wt 98.4 kg (217 lb)   BMI 29.43 kg/m    Chief Complaint   Patient presents with     Consult     establish care with JERRY, tzimmer cma

## 2019-06-10 NOTE — PROGRESS NOTES
Date of Service: 6/10/2019     Referring Provider: Marine Flores PA-C    Subjective:            William Montes is a 54 year old male presenting for evaluation of abnormal liver tests    History of Present Illness   William Montes is a 54 year old male with past medical history of obesity, diabetes mellitus, hypercholesterolemia, and known fatty liver disease who presents with concerns of fatty liver disease and abnormal liver tests.    First time he notes having abnormal liver test was in 2007, and he was given a diagnosis of fatty liver disease in 2011.  He notes being overweight for much of his adult life.  He struggled with weight loss, and ultimately has been engaged with the weight loss and management clinic at the [facility].  He was evaluated for bariatric surgery however was not a candidate given his minimally elevated BMI.  He has been started on lifestyle modifications and topiramate with excellent results, going from a peak of 251 pounds down to 217 pounds on today's clinic visit.  He has had diabetes for many years which is now very well controlled with metformin and dietary modifications.     He denies significant have history of alcohol use and denies any history of alcohol misuse.  Denies a history of IV or inhaled drugs of abuse.      In regards to social history he is originally from [facility], Moved to [place] between 1996 and 2012.  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  He works as a , previously with 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  Has been back in [place] since 2017.    Past Medical History:  Past Medical History:   Diagnosis Date     ABNORMAL LIVER FUNCTION STUDY 1/4/2008    increased ast, alt Normal since 2008     Chronic gingivitis      Esophageal reflux      Glaucoma      History of blood transfusion     My son has aplastic anemia and has had bld transfusions     Nonspecific  abnormal results of liver function study      OBESITY NOS 12/13/2006     Pure hypercholesterolemia      Type II or unspecified type diabetes mellitus without mention of complication, not stated as uncontrolled      Uncomplicated asthma     My mother has asthma       Surgical History:  Past Surgical History:   Procedure Laterality Date     BIOPSY      My son had bone marrow biopsy     COLONOSCOPY N/A 5/22/2019    Procedure: COLONOSCOPY;  Surgeon: Leventhal, Thomas Michael, MD;  Location: [facility] OR     ENT SURGERY      My son has had tympanoplasty     NO HISTORY OF SURGERY       ORTHOPEDIC SURGERY      My mother had left hip replacement surgery       Social History:  Social History     Tobacco Use     Smoking status: Never Smoker     Smokeless tobacco: Never Used   Substance Use Topics     Alcohol use: No     Comment: none for 11/2011     Drug use: No        Family History:  Family History   Problem Relation Age of Onset     Hypertension Mother      Diabetes Sister      Diabetes Cousin      Hypertension Other      C.A.D. No family hx of      Cancer - colorectal No family hx of      Glaucoma No family hx of      Macular Degeneration No family hx of      Cancer No family hx of      Cerebrovascular Disease No family hx of      Breast Cancer No family hx of      Prostate Cancer No family hx of      Thyroid Disease No family hx of        Medications:  Current Outpatient Medications   Medication     ASPIRIN 81 MG OR TABS     atorvastatin (LIPITOR) 20 MG tablet     cholecalciferol (VITAMIN  -D) 1000 UNITS capsule     lisinopril (PRINIVIL/ZESTRIL) 5 MG tablet     metFORMIN (GLUCOPHAGE) 1000 MG tablet     ONETOUCH ULTRA test strip     order for DME     order for DME     topiramate (TOPAMAX) 25 MG tablet     No current facility-administered medications for this visit.        Review of Systems  A complete 10 point review of systems was asked and answered in the negative unless specifically commented upon in the HPI    Objective:          Vitals:    06/10/19 0826   BP: 131/72   Pulse: 51   Temp: 98.8  F (37.1  C)   TempSrc: Oral   Weight: 98.4 kg (217 lb)   Height: 1.829 m (6')     Body mass index is 29.43 kg/m .     Physical Exam    Vitals reviewed.   Constitutional: Well-developed, well-nourished, in no apparent distress.    HEENT: Normocephalic. No scleral icterus. Moist oral mucosa. Dentition normal  Neck/Lymph: Normal ROM, supple. No thyromegaly.  No lymphadenopathy  Cardiac:  Regular rate and rhythm.  No overt murmurs  Respiratory: Clear to auscultation bilaterally.  No wheezes or rales  GI:  Abdomen soft, non-distended, non-tender. BS present. no shifting dullness. No overt hepatosplenomegaly.     Skin:  Skin is warm and dry. No rash noted.  no jaundice. no spider nevi noted.  no palmar erythema  Peripheral Vascular: no lower extremity edema. 2+ pulses in all extremities  Musculoskeletal:  ROM intact, normal muscle bulk    Psychiatric: Normal mood and affect. Behavior is normal.  Neuro:  no asterixis, no tremor    Labs and Diagnostic tests:  Lab Results   Component Value Date    BILITOTAL 0.5 2019    ALT 35 2019    AST 21 2019    ALKPHOS 66 2019     Lab Results   Component Value Date    ALBUMIN 4.2 2019    PROTTOTAL 7.7 2019          Imagin/10/2019  FINDINGS: There is diffuse fatty infiltration of the liver. No focal  hepatic lesions are identified. The gallbladder is unremarkable, with  no evidence for shadowing gallstones or gallbladder wall thickening.  Negative sonographic Fernando sign. No intra or extrahepatic bile duct  dilatation. The common duct could not be visualized in its entirety.  Limited evaluation of the right kidney is unremarkable. Pancreas is  partially obscured by overlying bowel gas, but appears normal where  seen.      Procedures:  Fibrosis Scan:  Median Fibrosis: 9.2kPa  Consistent with F2-F3 fibrosis    Assessment and Plan:    Abnormal Liver Tests:    -Based on available data  and history, as well as imaging it is felt that the patient's abnormal liver tests are likely related to nonalcoholic fatty liver disease.  We were able to see an excellent trend from November 2018 until April 2019 when his liver tests were cut in third with corresponding weight loss  -Noted that he is negative for chronic hepatitis C and is immune via vaccine to hepatitis B  -He does not have a history of risk factors for alcohol-related liver disease  - We have deferred further liver tests at this time, but will reassess when he is seen in approximately 6 months    Non-Alcoholic Fatty Liver Disease  - I had a long discussion with the patient about nonalcoholic fatty liver disease (NAFLD).  We discussed how fat deposits in the liver, how this leads to inflammation, and how chronic inflammation (EDWARDS) can ultimately lead to scarring and cirrhosis.  The long-term complications of fatty liver disease were discussed with the patient, including the increased risk of cardiovascular disease complications,the risk of developing diabetes (if not already), and variable progression to cirrhosis and end stage liver disease.  - At this time, the only way to differentiate between benign steatosis vs. nonalcoholic steatohepatitis (EDWARDS) is to perform a liver biopsy.  The liver biopsy would be important for diagnostic, as well as a prognostic and management perspective     - If the patient only has benign steatosis, they will have low risk of progression and no treatment will be needed at that time except for lifestyle modifications.  - It is important to note that liver tests alone as a screening test for EDWARDS are not sensitive, as up to 20-30% of patients can have EDWARDS with fibrosis despite having normal liver chemistries.   - We we will obtain a fibrosis scan today for a noninvasive means of evaluating for hepatic fibrosis    Management of NAFLD/EDWARDS  - I did discuss with the patient about an appropriate diet, exercise and  "weight loss plan.    - The patient should exercise regularly 4+ times per week, with an average of about 45 minutes per day.   - The patient should be on low-carbohydrate, low-calorie diet (8695-1545 calories per day). The weight loss plan is to lose 7-10% of body weight in the next three to six months' time.  It has shown that patients who exercise regularly can have improvement of insulin resistance and resolution of fatty liver disease, even if they are not able to lose weight.   - Diabetes management is crucial with ideal goal Hgb A1c goal of =/< 7%. If possible addition of insulin sensitizing agents like metformin of liraglutide will be helpful.    - Management of elevated cholesterol is crucial in this population.  The use of \"statins\" (HMG-CoA reductase medications) are an effective means of therapy and are not contra-indicated in those with abnormal liver tests OR those with cirrhosis.  The value of these medications in this population far outweigh the minor risks of abnormal liver tests.  Goal LDL in those with EDWARDS are < 100 mg/dL  - Consider the utility of liberalizing coffee consumption as some data that this may slow progression and reverse effects of EDWARDS-related fibrosis.  - We plan to order liver tests to be checked every 6 months to assess for dynamic changes, as a potential marker of another cause of chronic liver disease.    Routine Health Care in Patient with Chronic Liver Disease:  - We recommend screening for hepatitis A and B, please vaccinate if not immune  - All patients with liver disease, particularly those with cholestatic liver disease, are at an increased risk for osteoporosis.  We strongly recommend screening for Vitamin D deficiency at least twice yearly with aggressive supplementation/replacement as indicated.    - We also recommend a screening DEXA scan to evaluate for osteoporosis.  If present, should treat with calcium, Vitamin D supplementation, and recommend consideration of " bisphosphonate therapy.  Also recommend follow up DEXA scans to evaluate for improvement of bone density on therapy.  - All patients with liver disease should avoid the use of Non-steroidal Anti-Inflammatory (NSAID) medications as they can cause significant injury to the kidneys in this population    Follow Up:  6-7 months     Thank you very much for the opportunity to participate in the care of this patient.  If you have any further questions, please don't hesitate to contact our office.    Thomas M. Leventhal, M.D.   of Medicine  Advanced & Transplant Hepatology  [facility]

## 2019-06-10 NOTE — LETTER
6/10/2019       RE: Willima Montes  [address]  [place] [address]     Dear Colleague,    Thank you for referring your patient, William Montes, to the [facility] at [facility] Please see a copy of my visit note below.    Date of Service: 6/10/2019     Referring Provider: Marine Flores PA-C    Subjective:            William Montes is a 54 year old male presenting for evaluation of abnormal liver tests    History of Present Illness   William Montes is a 54 year old male with past medical history of obesity, diabetes mellitus, hypercholesterolemia, and known fatty liver disease who presents with concerns of fatty liver disease and abnormal liver tests.    First time he notes having abnormal liver test was in 2007, and he was given a diagnosis of fatty liver disease in 2011.  He notes being overweight for much of his adult life.  He struggled with weight loss, and ultimately has been engaged with the weight loss and management clinic at the [facility].  He was evaluated for bariatric surgery however was not a candidate given his minimally elevated BMI.  He has been started on lifestyle modifications and topiramate with excellent results, going from a peak of 251 pounds down to 217 pounds on today's clinic visit.  He has had diabetes for many years which is now very well controlled with metformin and dietary modifications.     He denies significant have history of alcohol use and denies any history of alcohol misuse.  Denies a history of IV or inhaled drugs of abuse.      In regards to social history he is originally from [place] [facility], Moved to [place] between 1996 and [place].  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  Has been back in [place] since 2017.    Past Medical History:  Past  Medical History:   Diagnosis Date     ABNORMAL LIVER FUNCTION STUDY 1/4/2008    increased ast, alt Normal since 2008     Chronic gingivitis      Esophageal reflux      Glaucoma      History of blood transfusion     My son has aplastic anemia and has had bld transfusions     Nonspecific abnormal results of liver function study      OBESITY NOS 12/13/2006     Pure hypercholesterolemia      Type II or unspecified type diabetes mellitus without mention of complication, not stated as uncontrolled      Uncomplicated asthma     My mother has asthma       Surgical History:  Past Surgical History:   Procedure Laterality Date     BIOPSY      My son had bone marrow biopsy     COLONOSCOPY N/A 5/22/2019    Procedure: COLONOSCOPY;  Surgeon: Leventhal, Thomas Michael, MD;  [facility]: [facility] OR     ENT SURGERY      My son has had tympanoplasty     NO HISTORY OF SURGERY       ORTHOPEDIC SURGERY      My mother had left hip replacement surgery       Social History:  Social History     Tobacco Use     Smoking status: Never Smoker     Smokeless tobacco: Never Used   Substance Use Topics     Alcohol use: No     Comment: none for 11/2011     Drug use: No        Family History:  Family History   Problem Relation Age of Onset     Hypertension Mother      Diabetes Sister      Diabetes Cousin      Hypertension Other      C.A.D. No family hx of      Cancer - colorectal No family hx of      Glaucoma No family hx of      Macular Degeneration No family hx of      Cancer No family hx of      Cerebrovascular Disease No family hx of      Breast Cancer No family hx of      Prostate Cancer No family hx of      Thyroid Disease No family hx of        Medications:  Current Outpatient Medications   Medication     ASPIRIN 81 MG OR TABS     atorvastatin (LIPITOR) 20 MG tablet     cholecalciferol (VITAMIN  -D) 1000 UNITS capsule     lisinopril (PRINIVIL/ZESTRIL) 5 MG tablet     metFORMIN (GLUCOPHAGE) 1000 MG tablet     ONETOUCH ULTRA test strip     order for DME      order for DME     topiramate (TOPAMAX) 25 MG tablet     No current facility-administered medications for this visit.        Review of Systems  A complete 10 point review of systems was asked and answered in the negative unless specifically commented upon in the HPI    Objective:         Vitals:    06/10/19 0826   BP: 131/72   Pulse: 51   Temp: 98.8  F (37.1  C)   TempSrc: Oral   Weight: 98.4 kg (217 lb)   Height: 1.829 m (6')     Body mass index is 29.43 kg/m .     Physical Exam    Vitals reviewed.   Constitutional: Well-developed, well-nourished, in no apparent distress.    HEENT: Normocephalic. No scleral icterus. Moist oral mucosa. Dentition normal  Neck/Lymph: Normal ROM, supple. No thyromegaly.  No lymphadenopathy  Cardiac:  Regular rate and rhythm.  No overt murmurs  Respiratory: Clear to auscultation bilaterally.  No wheezes or rales  GI:  Abdomen soft, non-distended, non-tender. BS present. no shifting dullness. No overt hepatosplenomegaly.     Skin:  Skin is warm and dry. No rash noted.  no jaundice. no spider nevi noted.  no palmar erythema  Peripheral Vascular: no lower extremity edema. 2+ pulses in all extremities  Musculoskeletal:  ROM intact, normal muscle bulk    Psychiatric: Normal mood and affect. Behavior is normal.  Neuro:  no asterixis, no tremor    Labs and Diagnostic tests:  Lab Results   Component Value Date    BILITOTAL 0.5 2019    ALT 35 2019    AST 21 2019    ALKPHOS 66 2019     Lab Results   Component Value Date    ALBUMIN 4.2 2019    PROTTOTAL 7.7 2019          Imagin/10/2019  FINDINGS: There is diffuse fatty infiltration of the liver. No focal  hepatic lesions are identified. The gallbladder is unremarkable, with  no evidence for shadowing gallstones or gallbladder wall thickening.  Negative sonographic Fernando sign. No intra or extrahepatic bile duct  dilatation. The common duct could not be visualized in its entirety.  Limited evaluation of  the right kidney is unremarkable. Pancreas is  partially obscured by overlying bowel gas, but appears normal where  seen.      Procedures:  Fibrosis Scan:  Median Fibrosis: 9.2kPa  Consistent with F2-F3 fibrosis    Assessment and Plan:    Abnormal Liver Tests:    -Based on available data and history, as well as imaging it is felt that the patient's abnormal liver tests are likely related to nonalcoholic fatty liver disease.  We were able to see an excellent trend from November 2018 until April 2019 when his liver tests were cut in third with corresponding weight loss  -Noted that he is negative for chronic hepatitis C and is immune via vaccine to hepatitis B  -He does not have a history of risk factors for alcohol-related liver disease  - We have deferred further liver tests at this time, but will reassess when he is seen in approximately 6 months    Non-Alcoholic Fatty Liver Disease  - I had a long discussion with the patient about nonalcoholic fatty liver disease (NAFLD).  We discussed how fat deposits in the liver, how this leads to inflammation, and how chronic inflammation (EDWARDS) can ultimately lead to scarring and cirrhosis.  The long-term complications of fatty liver disease were discussed with the patient, including the increased risk of cardiovascular disease complications,the risk of developing diabetes (if not already), and variable progression to cirrhosis and end stage liver disease.  - At this time, the only way to differentiate between benign steatosis vs. nonalcoholic steatohepatitis (EDWARDS) is to perform a liver biopsy.  The liver biopsy would be important for diagnostic, as well as a prognostic and management perspective     - If the patient only has benign steatosis, they will have low risk of progression and no treatment will be needed at that time except for lifestyle modifications.  - It is important to note that liver tests alone as a screening test for EDWARDS are not sensitive, as up to 20-30%  "of patients can have EDWARDS with fibrosis despite having normal liver chemistries.   - We we will obtain a fibrosis scan today for a noninvasive means of evaluating for hepatic fibrosis    Management of NAFLD/EDWARDS  - I did discuss with the patient about an appropriate diet, exercise and weight loss plan.    - The patient should exercise regularly 4+ times per week, with an average of about 45 minutes per day.   - The patient should be on low-carbohydrate, low-calorie diet (7120-3626 calories per day). The weight loss plan is to lose 7-10% of body weight in the next three to six months' time.  It has shown that patients who exercise regularly can have improvement of insulin resistance and resolution of fatty liver disease, even if they are not able to lose weight.   - Diabetes management is crucial with ideal goal Hgb A1c goal of =/< 7%. If possible addition of insulin sensitizing agents like metformin of liraglutide will be helpful.    - Management of elevated cholesterol is crucial in this population.  The use of \"statins\" (HMG-CoA reductase medications) are an effective means of therapy and are not contra-indicated in those with abnormal liver tests OR those with cirrhosis.  The value of these medications in this population far outweigh the minor risks of abnormal liver tests.  Goal LDL in those with EDWARDS are < 100 mg/dL  - Consider the utility of liberalizing coffee consumption as some data that this may slow progression and reverse effects of EDWARDS-related fibrosis.  - We plan to order liver tests to be checked every 6 months to assess for dynamic changes, as a potential marker of another cause of chronic liver disease.    Routine Health Care in Patient with Chronic Liver Disease:  - We recommend screening for hepatitis A and B, please vaccinate if not immune  - All patients with liver disease, particularly those with cholestatic liver disease, are at an increased risk for osteoporosis.  We strongly recommend " screening for Vitamin D deficiency at least twice yearly with aggressive supplementation/replacement as indicated.    - We also recommend a screening DEXA scan to evaluate for osteoporosis.  If present, should treat with calcium, Vitamin D supplementation, and recommend consideration of bisphosphonate therapy.  Also recommend follow up DEXA scans to evaluate for improvement of bone density on therapy.  - All patients with liver disease should avoid the use of Non-steroidal Anti-Inflammatory (NSAID) medications as they can cause significant injury to the kidneys in this population    Follow Up:  6-7 months     Thank you very much for the opportunity to participate in the care of this patient.  If you have any further questions, please don't hesitate to contact our office.    Thomas M. Leventhal, M.D.   of Medicine  Advanced & Transplant Hepatology  [facility]

## 2019-08-01 ENCOUNTER — OFFICE VISIT (OUTPATIENT)
Dept: SURGERY | Facility: CLINIC | Age: 54
End: 2019-08-01
Payer: COMMERCIAL

## 2019-08-01 VITALS
DIASTOLIC BLOOD PRESSURE: 75 MMHG | SYSTOLIC BLOOD PRESSURE: 128 MMHG | OXYGEN SATURATION: 98 % | HEART RATE: 47 BPM | WEIGHT: 217.1 LBS | BODY MASS INDEX: 29.4 KG/M2 | HEIGHT: 72 IN

## 2019-08-01 DIAGNOSIS — E66.811 CLASS 1 OBESITY WITH SERIOUS COMORBIDITY AND BODY MASS INDEX (BMI) OF 30.0 TO 30.9 IN ADULT, UNSPECIFIED OBESITY TYPE: ICD-10-CM

## 2019-08-01 DIAGNOSIS — E11.65 TYPE 2 DIABETES MELLITUS WITH HYPERGLYCEMIA, WITHOUT LONG-TERM CURRENT USE OF INSULIN (H): ICD-10-CM

## 2019-08-01 DIAGNOSIS — E66.811 CLASS 1 OBESITY WITH SERIOUS COMORBIDITY AND BODY MASS INDEX (BMI) OF 30.0 TO 30.9 IN ADULT, UNSPECIFIED OBESITY TYPE: Primary | ICD-10-CM

## 2019-08-01 DIAGNOSIS — I10 ESSENTIAL HYPERTENSION: ICD-10-CM

## 2019-08-01 LAB
ALBUMIN SERPL-MCNC: 4.2 G/DL (ref 3.4–5)
ALP SERPL-CCNC: 67 U/L (ref 40–150)
ALT SERPL W P-5'-P-CCNC: 30 U/L (ref 0–70)
ANION GAP SERPL CALCULATED.3IONS-SCNC: 7 MMOL/L (ref 3–14)
AST SERPL W P-5'-P-CCNC: 20 U/L (ref 0–45)
BILIRUB SERPL-MCNC: 0.5 MG/DL (ref 0.2–1.3)
BUN SERPL-MCNC: 13 MG/DL (ref 7–30)
CALCIUM SERPL-MCNC: 8.7 MG/DL (ref 8.5–10.1)
CHLORIDE SERPL-SCNC: 111 MMOL/L (ref 94–109)
CO2 SERPL-SCNC: 21 MMOL/L (ref 20–32)
CREAT SERPL-MCNC: 1.31 MG/DL (ref 0.66–1.25)
GFR SERPL CREATININE-BSD FRML MDRD: 60 ML/MIN/{1.73_M2}
GLUCOSE SERPL-MCNC: 97 MG/DL (ref 70–99)
HBA1C MFR BLD: 6.3 % (ref 0–5.6)
POTASSIUM SERPL-SCNC: 4.1 MMOL/L (ref 3.4–5.3)
PROT SERPL-MCNC: 7.7 G/DL (ref 6.8–8.8)
SODIUM SERPL-SCNC: 139 MMOL/L (ref 133–144)

## 2019-08-01 RX ORDER — TOPIRAMATE 100 MG/1
100 TABLET, FILM COATED ORAL 2 TIMES DAILY
Qty: 60 TABLET | Refills: 3 | Status: SHIPPED | OUTPATIENT
Start: 2019-08-01 | End: 2019-11-11

## 2019-08-01 ASSESSMENT — MIFFLIN-ST. JEOR: SCORE: 1852.76

## 2019-08-01 ASSESSMENT — PAIN SCALES - GENERAL: PAINLEVEL: NO PAIN (0)

## 2019-08-01 NOTE — NURSING NOTE
Chief Complaint   Patient presents with     RECHECK     MWM return.        Vitals:    08/01/19 0845   BP: 128/75   BP Location: Left arm   Patient Position: Sitting   Cuff Size: Adult Large   Pulse: (!) 47   SpO2: 98%   Weight: 217 lb 1.6 oz   Height: 6'       Body mass index is 29.44 kg/m .    Chang Ogden LPN

## 2019-08-01 NOTE — LETTER
"2019       RE: William Montes  [address]  [place] [address]       Return Medical Weight Management Note     William Montes  MRN:  [id number]  :  1965  NAKUL:  2019    Dear Sirena Talley,    I had the pleasure of seeing your patient William Montes.  He is a 54 year old male who I am continuing to see for treatment of obesity related to:       10/26/2018   I have the following co-morbidities associated with obesity: Type II Diabetes, GERD (Reflux), Fatty Liver   What was your most recent hemoglobin a1c  6.7   How many years have you had diabetes? 18   Are you taking daily medication for heartburn, acid reflux, or GERD (acid reflux disease)? No        \"53 y.o. Male here for MWM follow up.  Seen in Oct for New Bariatric surgery consult but not a candidate due to BMI 33. Discussed possible EDWARDS study and sent info to  to review his history.  Started on topiramate in Nov  Taking victoza, stopped glimepiride in Nov.   Follow up call by MTM in Dec he was tolerating topiramate and blood sugars under good control with victoza and off glimiperide.   He has lost from 249 lbs to 225 lbs, 24 lbs total.\"    INTERVAL HISTORY:  Off ozempic since 2019 due to being worried about side effect and wants to stop it.  Lost 2 lbs since last visit 19  Last visit I increased topiramate to 75mg BID.   Last A1C 5.6 19  BS have been low 100's fasting    CURRENT WEIGHT:   217 lbs 1.6 oz    Wt Readings from Last 4 Encounters:   19 98.5 kg (217 lb 1.6 oz)   06/10/19 98.4 kg (217 lb)   19 97.5 kg (215 lb)   19 99.3 kg (219 lb)       Height:  6' 0\"  Body Mass Index:  Body mass index is 29.44 kg/m .  Vitals:  /75 (BP Location: Left arm, Patient Position: Sitting, Cuff Size: Adult Large)   Pulse (!) 47   Ht 1.829 m (6')   Wt 98.5 kg (217 lb 1.6 oz)   SpO2 98%   BMI 29.44 kg/m         Initial consult weight was 249.  Weight change since last seen on 19is " down 2 pounds.   Total loss is 31 pounds.    Diet and Activity Changes Since Last Visit Reviewed With Patient 8/1/2019   I have made the following changes to my diet since my last visit: none   With regards to my diet, I am still struggling with: portion size   I have made the following changes to my activity/exercise since my last visit: exercise 3 times a week   With regards to my activity/exercise, I am still struggling with: I am afraid I will gain weight with exercise       MEDICATIONS:   Current Outpatient Medications   Medication     ASPIRIN 81 MG OR TABS     atorvastatin (LIPITOR) 20 MG tablet     cholecalciferol (VITAMIN  -D) 1000 UNITS capsule     lisinopril (PRINIVIL/ZESTRIL) 5 MG tablet     metFORMIN (GLUCOPHAGE) 1000 MG tablet     ONETOUCH ULTRA test strip     order for DME     order for DME     topiramate (TOPAMAX) 25 MG tablet     No current facility-administered medications for this visit.        Weight Loss Medication History Reviewed With Patient 8/1/2019   Which weight loss medications are you currently taking on a regular basis?  Topamax (topiramate)   Are you having any side effects from the weight loss medication that we have prescribed you? No   If you are having side effects please describe: -       ASSESSMENT:   MWM follow up. He has lost 31 lbs in total.  Tolerating topiramate 75mg BID and feels it is helping.    PLAN:   CMP and A1C today  Increase topiramate to 100mg twice daily    FOLLOW-UP:    12 weeks.    Time: 15 min spent on evaluation, management, counseling, education, & motivational interviewing with greater than 50 % of the total time was spent on counseling and coordinating care    Sincerely,    Marine Flores PA-C

## 2019-08-01 NOTE — PROGRESS NOTES
"Return Medical Weight Management Note     William Montes  MRN:  [id number]  :  1965  NAKUL:  2019    Dear Sirena Talley,    I had the pleasure of seeing your patient William Montes.  He is a 54 year old male who I am continuing to see for treatment of obesity related to:       10/26/2018   I have the following co-morbidities associated with obesity: Type II Diabetes, GERD (Reflux), Fatty Liver   What was your most recent hemoglobin a1c  6.7   How many years have you had diabetes? 18   Are you taking daily medication for heartburn, acid reflux, or GERD (acid reflux disease)? No        \"53 y.o. Male here for MWM follow up.  Seen in Oct for New Bariatric surgery consult but not a candidate due to BMI 33. Discussed possible EDWARDS study and sent info to  to review his history.  Started on topiramate in Nov  Taking victoza, stopped glimepiride in Nov.   Follow up call by MTM in Dec he was tolerating topiramate and blood sugars under good control with victoza and off glimiperide.   He has lost from 249 lbs to 225 lbs, 24 lbs total.\"    INTERVAL HISTORY:  Off ozempic since 2019 due to being worried about side effect and wants to stop it.  Lost 2 lbs since last visit 19  Last visit I increased topiramate to 75mg BID.   Last A1C 5.6 19  BS have been low 100's fasting    CURRENT WEIGHT:   217 lbs 1.6 oz    Wt Readings from Last 4 Encounters:   19 98.5 kg (217 lb 1.6 oz)   06/10/19 98.4 kg (217 lb)   19 97.5 kg (215 lb)   19 99.3 kg (219 lb)       Height:  6' 0\"  Body Mass Index:  Body mass index is 29.44 kg/m .  Vitals:  /75 (BP Location: Left arm, Patient Position: Sitting, Cuff Size: Adult Large)   Pulse (!) 47   Ht 1.829 m (6')   Wt 98.5 kg (217 lb 1.6 oz)   SpO2 98%   BMI 29.44 kg/m        Initial consult weight was 249.  Weight change since last seen on 19is down 2 pounds.   Total loss is 31 pounds.    Diet and Activity Changes Since Last Visit " Reviewed With Patient 8/1/2019   I have made the following changes to my diet since my last visit: none   With regards to my diet, I am still struggling with: portion size   I have made the following changes to my activity/exercise since my last visit: exercise 3 times a week   With regards to my activity/exercise, I am still struggling with: I am afraid I will gain weight with exercise       MEDICATIONS:   Current Outpatient Medications   Medication     ASPIRIN 81 MG OR TABS     atorvastatin (LIPITOR) 20 MG tablet     cholecalciferol (VITAMIN  -D) 1000 UNITS capsule     lisinopril (PRINIVIL/ZESTRIL) 5 MG tablet     metFORMIN (GLUCOPHAGE) 1000 MG tablet     ONETOUCH ULTRA test strip     order for DME     order for DME     topiramate (TOPAMAX) 25 MG tablet     No current facility-administered medications for this visit.        Weight Loss Medication History Reviewed With Patient 8/1/2019   Which weight loss medications are you currently taking on a regular basis?  Topamax (topiramate)   Are you having any side effects from the weight loss medication that we have prescribed you? No   If you are having side effects please describe: -       ASSESSMENT:   MWM follow up. He has lost 31 lbs in total.  Tolerating topiramate 75mg BID and feels it is helping.    PLAN:   CMP and A1C today  Increase topiramate to 100mg twice daily      FOLLOW-UP:    12 weeks.    Time: 15 min spent on evaluation, management, counseling, education, & motivational interviewing with greater than 50 % of the total time was spent on counseling and coordinating care    Sincerely,    Marine Flores PA-C

## 2019-08-02 DIAGNOSIS — E11.65 TYPE 2 DIABETES MELLITUS WITH HYPERGLYCEMIA, WITHOUT LONG-TERM CURRENT USE OF INSULIN (H): ICD-10-CM

## 2019-08-02 DIAGNOSIS — E78.5 HYPERLIPIDEMIA LDL GOAL <100: ICD-10-CM

## 2019-08-02 NOTE — Clinical Note
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  If she is still having upper abdominal discomfort then please have her follow-up with Ms. Williams NP or with myself in the office.. Thx. kjh   FYI

## 2019-08-02 NOTE — TELEPHONE ENCOUNTER
Requested Prescriptions   Pending Prescriptions Disp Refills     metFORMIN (GLUCOPHAGE) 1000 MG tablet [Pharmacy Med Name: METFORMIN 1000MG TABLETS]  Last Written Prescription Date:    Last Fill Quantity: 4/8/2019,  # refills: 180 tablet   Last office visit: 4/8/2019 with prescribing provider:  VINCENT Talley   Future Office Visit:   Next 5 appointments (look out 90 days)    Oct 03, 2019  8:15 AM CDT  Return Visit with Harry Hodgson MD  [facility] ([address] [address]  [phone]          180 tablet 0     Sig: TAKE 1 TABLET(1000 MG) BY MOUTH TWICE DAILY WITH MEALS       Biguanide Agents Passed - 8/2/2019  1:05 PM        Passed - Blood pressure less than 140/90 in past 6 months     BP Readings from Last 3 Encounters:   08/01/19 128/75   06/10/19 131/72   05/22/19 128/83           Passed - Patient has documented LDL within the past 12 mos.     Recent Labs   Lab Test 04/04/19  0902   LDL 60             Passed - Patient has had a Microalbumin in the past 15 mos.     Recent Labs   Lab Test 04/04/19  0906   MICROL 8   UMALCR 4.32             Passed - Patient is age 10 or older        Passed - Patient has documented A1c within the specified period of time.     If HgbA1C is 8 or greater, it needs to be on file within the past 3 months.  If less than 8, must be on file within the past 6 months.     Recent Labs   Lab Test 08/01/19  0944   A1C 6.3*             Passed - Patient's CR is NOT>1.4 OR Patient's EGFR is NOT<45 within past 12 mos.     Recent Labs   Lab Test 08/01/19  0944   GFRESTIMATED 60*   GFRESTBLACK 70       Recent Labs   Lab Test 08/01/19  0944   CR 1.31*             Passed - Patient does NOT have a diagnosis of CHF.        Passed - Medication is active on med list        Passed - Recent (6 mo) or future (30 days) visit within the authorizing provider's specialty     Patient had office visit in the last 6 months or has a visit in the next 30 days  "with authorizing provider or within the authorizing provider's specialty.  See \"Patient Info\" tab in inbasket, or \"Choose Columns\" in Meds & Orders section of the refill encounter.                    ONETOUCH ULTRA test strip [Pharmacy Med Name: ONE TOUCH ULTRA BLUE TEST ST(NEW)50]  Last Written Prescription Date:  4/10/2019  Last Fill Quantity: 100 strip,  # refills: 0   Last office visit: 4/8/2019 with prescribing provider:  VINCENT Talley   Future Office Visit:   Next 5 appointments (look out 90 days)    Oct 03, 2019  8:15 AM CDT  Return Visit with Harry Hodgson MD  [facility] ([facility] [address] [place] [id number]  [phone]          100 strip 0     Sig: TEST DAILY       Diabetic Supplies Protocol Passed - 8/2/2019  1:05 PM        Passed - Medication is active on med list        Passed - Patient is 18 years of age or older        Passed - Recent (6 mo) or future (30 days) visit within the authorizing provider's specialty     Patient had office visit in the last 6 months or has a visit in the next 30 days with authorizing provider.  See \"Patient Info\" tab in inbasket, or \"Choose Columns\" in Meds & Orders section of the refill encounter.                      atorvastatin (LIPITOR) 20 MG tablet [Pharmacy Med Name: ATORVASTATIN 20MG TABLETS]  Last Written Prescription Date:  5/6/2019  Last Fill Quantity: 90 tablet,  # refills: 0   Last office visit: 4/8/2019 with prescribing provider:  VINCENT Talley   Future Office Visit:   Next 5 appointments (look out 90 days)    Oct 03, 2019  8:15 AM CDT  Return Visit with Harry Hodgson MD  [facility] ([facility] [address] [place] [id number]  [phone]          90 tablet 0     Sig: TAKE 1 TABLET(20 MG) BY MOUTH DAILY       Statins Protocol Passed - 8/2/2019  1:05 PM        Passed - LDL on file in past 12 months     Recent Labs   Lab Test 04/04/19  0902   LDL 60             Passed - No " "abnormal creatine kinase in past 12 months     No lab results found.             Passed - Recent (12 mo) or future (30 days) visit within the authorizing provider's specialty     Patient had office visit in the last 12 months or has a visit in the next 30 days with authorizing provider or within the authorizing provider's specialty.  See \"Patient Info\" tab in inbasket, or \"Choose Columns\" in Meds & Orders section of the refill encounter.              Passed - Medication is active on med list        Passed - Patient is age 18 or older          "

## 2019-08-06 RX ORDER — ATORVASTATIN CALCIUM 20 MG/1
TABLET, FILM COATED ORAL
Qty: 90 TABLET | Refills: 0 | Status: SHIPPED | OUTPATIENT
Start: 2019-08-06 | End: 2019-09-05

## 2019-08-06 NOTE — TELEPHONE ENCOUNTER
Prescription approved per [facility] Refill Protocol.  Vanna Doran,Clinic Rn  [facility]

## 2019-08-08 ENCOUNTER — TELEPHONE (OUTPATIENT)
Dept: FAMILY MEDICINE | Facility: CLINIC | Age: 54
End: 2019-08-08

## 2019-08-08 DIAGNOSIS — I10 BENIGN ESSENTIAL HYPERTENSION: ICD-10-CM

## 2019-08-08 RX ORDER — LISINOPRIL 5 MG/1
TABLET ORAL
Qty: 30 TABLET | Refills: 0 | Status: SHIPPED | OUTPATIENT
Start: 2019-08-08 | End: 2019-08-30

## 2019-08-08 NOTE — TELEPHONE ENCOUNTER
Vahid refill sent. At the last office visit on 4/8/19, patient was to follow up in 3 months.   Reached out to patient to assist with scheduling follow up for 9/5/19. Also relayed that we did not have any recent refill requests and informed patient of vahid refill.     Yessenia Walton RN

## 2019-08-08 NOTE — TELEPHONE ENCOUNTER
Reason for Call:  Medication or medication refill:    Do you use a [facility]?  Name of the pharmacy and phone number for the current request:  [facility], [place] [facility]    Name of the medication requested: lisinopril (PRINIVIL/ZESTRIL)     Other request: Patient called and stated his pharmacy keeps telling him they sent the refill request but have not heard back from the clinic. Patient also states he needs this refill as soon as possible since he only has medication left for a few days.    Can we leave a detailed message on this number? YES    Phone number patient can be reached at: Cell number on file:    Telephone Information:   Mobile [phone]       Best Time: ASAP    Call taken on 8/8/2019 at 2:32 PM by Taina Cobos

## 2019-08-30 ENCOUNTER — TELEPHONE (OUTPATIENT)
Dept: FAMILY MEDICINE | Facility: CLINIC | Age: 54
End: 2019-08-30

## 2019-08-30 DIAGNOSIS — E11.65 TYPE 2 DIABETES MELLITUS WITH HYPERGLYCEMIA, WITHOUT LONG-TERM CURRENT USE OF INSULIN (H): ICD-10-CM

## 2019-08-30 DIAGNOSIS — E78.5 HYPERLIPIDEMIA LDL GOAL <100: ICD-10-CM

## 2019-08-30 DIAGNOSIS — I10 BENIGN ESSENTIAL HYPERTENSION: ICD-10-CM

## 2019-08-30 NOTE — TELEPHONE ENCOUNTER
"Requested Prescriptions   Pending Prescriptions Disp Refills     lisinopril (PRINIVIL/ZESTRIL) 5 MG tablet [Pharmacy Med Name: LISINOPRIL 5MG TABLETS]  Last Written Prescription Date:  8/8/2091  Last Fill Quantity: 30 tablet,  # refills: 0   Last office visit: 4/8/2019 with prescribing provider:  VINCENT Talley   Future Office Visit:   Next 5 appointments (look out 90 days)    Sep 05, 2019  9:40 AM CDT  PHYSICAL with Sirena Talley DO  [facility] ([facility] [address] [place] [id number] [phone]   Oct 03, 2019  8:15 AM CDT  Return Visit with Harry Hodgson MD  [facility] ([address] [address] [phone]          30 tablet 0     Sig: TAKE 1 TABLET(5 MG) BY MOUTH DAILY       ACE Inhibitors (Including Combos) Protocol Failed - 8/30/2019  5:08 AM        Failed - Normal serum creatinine on file in past 12 months     Recent Labs   Lab Test 08/01/19  0944   CR 1.31*             Passed - Blood pressure under 140/90 in past 12 months     BP Readings from Last 3 Encounters:   08/01/19 128/75   06/10/19 131/72   05/22/19 128/83             Passed - Recent (12 mo) or future (30 days) visit within the authorizing provider's specialty     Patient had office visit in the last 12 months or has a visit in the next 30 days with authorizing provider or within the authorizing provider's specialty.  See \"Patient Info\" tab in inbasket, or \"Choose Columns\" in Meds & Orders section of the refill encounter.              Passed - Medication is active on med list        Passed - Patient is age 18 or older        Passed - Normal serum potassium on file in past 12 months     Recent Labs   Lab Test 08/01/19  0944   POTASSIUM 4.1               "

## 2019-09-04 NOTE — TELEPHONE ENCOUNTER
Routing refill request to provider for review/approval because:  Labs out of range:  Creatinine - reviewed lab notes from ordering provider but no mention of plan regarding elevated creatinine.    John Zhang RN    Creatinine   Date Value Ref Range Status   08/01/2019 1.31 (H) 0.66 - 1.25 mg/dL Final       Hi William,     Your creatinine is still slightly elevated but down from last check 3 months ago. Your liver enzymes are normal.     Thanks Marine Flores PA-C

## 2019-09-05 ENCOUNTER — OFFICE VISIT (OUTPATIENT)
Dept: FAMILY MEDICINE | Facility: CLINIC | Age: 54
End: 2019-09-05
Payer: COMMERCIAL

## 2019-09-05 ENCOUNTER — ALLIED HEALTH/NURSE VISIT (OUTPATIENT)
Dept: NURSING | Facility: CLINIC | Age: 54
End: 2019-09-05
Payer: COMMERCIAL

## 2019-09-05 VITALS
DIASTOLIC BLOOD PRESSURE: 72 MMHG | HEIGHT: 72 IN | BODY MASS INDEX: 29.26 KG/M2 | SYSTOLIC BLOOD PRESSURE: 116 MMHG | HEART RATE: 42 BPM | TEMPERATURE: 98 F | WEIGHT: 216 LBS

## 2019-09-05 DIAGNOSIS — M22.2X2 PATELLOFEMORAL PAIN SYNDROME OF BOTH KNEES: ICD-10-CM

## 2019-09-05 DIAGNOSIS — E55.9 VITAMIN D DEFICIENCY: ICD-10-CM

## 2019-09-05 DIAGNOSIS — M22.2X1 PATELLOFEMORAL PAIN SYNDROME OF BOTH KNEES: ICD-10-CM

## 2019-09-05 DIAGNOSIS — R00.1 BRADYCARDIA: ICD-10-CM

## 2019-09-05 DIAGNOSIS — E11.65 TYPE 2 DIABETES MELLITUS WITH HYPERGLYCEMIA, WITHOUT LONG-TERM CURRENT USE OF INSULIN (H): ICD-10-CM

## 2019-09-05 DIAGNOSIS — Z00.00 ROUTINE GENERAL MEDICAL EXAMINATION AT A HEALTH CARE FACILITY: Primary | ICD-10-CM

## 2019-09-05 DIAGNOSIS — E78.5 HYPERLIPIDEMIA LDL GOAL <100: ICD-10-CM

## 2019-09-05 LAB
ERYTHROCYTE [DISTWIDTH] IN BLOOD BY AUTOMATED COUNT: 13 % (ref 10–15)
HCT VFR BLD AUTO: 43.4 % (ref 40–53)
HGB BLD-MCNC: 14.3 G/DL (ref 13.3–17.7)
MCH RBC QN AUTO: 29.2 PG (ref 26.5–33)
MCHC RBC AUTO-ENTMCNC: 32.9 G/DL (ref 31.5–36.5)
MCV RBC AUTO: 89 FL (ref 78–100)
PLATELET # BLD AUTO: 134 10E9/L (ref 150–450)
RBC # BLD AUTO: 4.9 10E12/L (ref 4.4–5.9)
WBC # BLD AUTO: 3.4 10E9/L (ref 4–11)

## 2019-09-05 PROCEDURE — 85027 COMPLETE CBC AUTOMATED: CPT | Performed by: FAMILY MEDICINE

## 2019-09-05 PROCEDURE — 99214 OFFICE O/P EST MOD 30 MIN: CPT | Mod: 25 | Performed by: FAMILY MEDICINE

## 2019-09-05 PROCEDURE — 36415 COLL VENOUS BLD VENIPUNCTURE: CPT | Performed by: FAMILY MEDICINE

## 2019-09-05 PROCEDURE — 99207 ZZC NO CHARGE NURSE ONLY: CPT

## 2019-09-05 PROCEDURE — 93000 ELECTROCARDIOGRAM COMPLETE: CPT | Performed by: FAMILY MEDICINE

## 2019-09-05 PROCEDURE — 84443 ASSAY THYROID STIM HORMONE: CPT | Performed by: FAMILY MEDICINE

## 2019-09-05 PROCEDURE — 99396 PREV VISIT EST AGE 40-64: CPT | Performed by: FAMILY MEDICINE

## 2019-09-05 RX ORDER — ATORVASTATIN CALCIUM 20 MG/1
20 TABLET, FILM COATED ORAL DAILY
Qty: 90 TABLET | Refills: 3 | Status: SHIPPED | OUTPATIENT
Start: 2019-09-05 | End: 2020-09-14

## 2019-09-05 RX ORDER — LISINOPRIL 5 MG/1
TABLET ORAL
Qty: 90 TABLET | Refills: 1 | Status: SHIPPED | OUTPATIENT
Start: 2019-09-05 | End: 2020-02-27

## 2019-09-05 ASSESSMENT — ENCOUNTER SYMPTOMS
HEARTBURN: 0
COUGH: 0
SHORTNESS OF BREATH: 0
ARTHRALGIAS: 0
HEADACHES: 0
PALPITATIONS: 0
ABDOMINAL PAIN: 0
HEMATOCHEZIA: 0
DIZZINESS: 0
CONSTIPATION: 0
WEAKNESS: 0
PARESTHESIAS: 0
CHILLS: 0
EYE PAIN: 0
JOINT SWELLING: 0
NAUSEA: 0
DIARRHEA: 0
MYALGIAS: 0
FEVER: 0
FREQUENCY: 0
NERVOUS/ANXIOUS: 0
HEMATURIA: 0
DYSURIA: 0
SORE THROAT: 0

## 2019-09-05 ASSESSMENT — MIFFLIN-ST. JEOR: SCORE: 1843.8

## 2019-09-05 NOTE — PROGRESS NOTES
SUBJECTIVE:   CC: William Montes is an 54 year old male who presents for preventative health visit.     Healthy Habits:     Getting at least 3 servings of Calcium per day:  Yes    Bi-annual eye exam:  Yes    Dental care twice a year:  Yes    Sleep apnea or symptoms of sleep apnea:  None    Diet:  Carbohydrate counting    Frequency of exercise:  2-3 days/week    Duration of exercise:  45-60 minutes    Taking medications regularly:  Yes    Medication side effects:  No muscle aches    PHQ-2 Total Score: 0    Additional concerns today:  No      Today's PHQ-2 Score:   PHQ-2 ( 1999 [employer]) 9/5/2019   Q1: Little interest or pleasure in doing things 0   Q2: Feeling down, depressed or hopeless 0   PHQ-2 Score 0   Q1: Little interest or pleasure in doing things Not at all   Q2: Feeling down, depressed or hopeless Not at all   PHQ-2 Score 0       Abuse: Current or Past(Physical, Sexual or Emotional)- No  Do you feel safe in your environment? Yes    Social History     Tobacco Use     Smoking status: Never Smoker     Smokeless tobacco: Never Used   Substance Use Topics     Alcohol use: No     Comment: none for 11/2011     If you drink alcohol do you typically have >3 drinks per day or >7 drinks per week? No    Alcohol Use 9/5/2019   Prescreen: >3 drinks/day or >7 drinks/week? Not Applicable   Prescreen: >3 drinks/day or >7 drinks/week? -   No flowsheet data found.    Last PSA: No results found for: PSA    Reviewed orders with patient. Reviewed health maintenance and updated orders accordingly - Yes  BP Readings from Last 3 Encounters:   09/05/19 116/72   08/01/19 128/75   06/10/19 131/72    Wt Readings from Last 3 Encounters:   09/05/19 98 kg (216 lb)   08/01/19 98.5 kg (217 lb 1.6 oz)   06/10/19 98.4 kg (217 lb)                  Diabetes Follow-up  A1c was drawn last month, and was 6.3.  His kidney levels were normal.     BP Readings from Last 2 Encounters:   09/05/19 116/72   08/01/19 128/75     Hemoglobin A1C (%)   Date Value    08/01/2019 6.3 (H)   04/04/2019 5.6     LDL Cholesterol Calculated (mg/dL)   Date Value   04/04/2019 60   03/06/2018 49       Diabetes Management Resources    GI  Patient has EDWARDS, saw GI specialist. They were happy that he lost 40 lbs with Topamax. They suggested a liver biopsy and said that they were getting a fibrosis scan.    Patient also did dietary counseling and will follow-up with liver enzymes every 6 months.  Doctor's also recommended hepatis A and B vaccines.   They recommended that he discontinue NSAID use.  They recommended a dexa scan.   He has an appointment scheduled for January.     Hyperlipidemia Follow-Up  Patient is complaint with medication     Are you having any of the following symptoms? (Select all that apply)  No complaints of shortness of breath, chest pain or pressure.  No increased sweating or nausea with activity.  No left-sided neck or arm pain.  No complaints of pain in calves when walking 1-2 blocks.    Are you regularly taking any medication or supplement to lower your cholesterol?   Yes- daily    Are you having muscle aches or other side effects that you think could be caused by your cholesterol lowering medication?  No      Hypertension Follow-up  BP is 116/72 today.     Do you check your blood pressure regularly outside of the clinic? No     Are you following a low salt diet? Yes    Are your blood pressures ever more than 140 on the top number (systolic) OR more   than 90 on the bottom number (diastolic), for example 140/90? No      BRADYCARDIA  Feels that he has always been tired, notes no difference between low pulse energy levels and previously.  Previously had a heart murmur, was diagnosed with mild regurgitation in 1990 from cardio specialist. He did have a follow-up appointment later on and the findings were normal.   Patient denies having a past sleep study. He confirms morning headaches and snoring, but denies the other symptoms of sleep apnea.     COLONOSCOPY  Patient  is UTD on his colonoscopy.     OTHER  He notes minor pain in his knees while walking stairs.     Patient has an itchy spot in the middle of his back, where a mole lies.       Reviewed and updated as needed this visit by clinical staff    Reviewed and updated as needed this visit by Provider        Past Medical History:   Diagnosis Date     ABNORMAL LIVER FUNCTION STUDY 1/4/2008    increased ast, alt Normal since 2008     Chronic gingivitis      Esophageal reflux      Glaucoma      History of blood transfusion     My son has aplastic anemia and has had bld transfusions     Nonspecific abnormal results of liver function study      OBESITY NOS 12/13/2006     Pure hypercholesterolemia      Type II or unspecified type diabetes mellitus without mention of complication, not stated as uncontrolled      Uncomplicated asthma     My mother has asthma      Past Surgical History:   Procedure Laterality Date     BIOPSY      My son had bone marrow biopsy     COLONOSCOPY N/A 5/22/2019    Procedure: COLONOSCOPY;  Surgeon: Leventhal, Thomas Michael, MD;  Location: [facility] OR     ENT SURGERY      My son has had tympanoplasty     NO HISTORY OF SURGERY       ORTHOPEDIC SURGERY      My mother had left hip replacement surgery     OB History   No data available       Review of Systems   Constitutional: Negative for chills and fever.   HENT: Negative for congestion, ear pain, hearing loss and sore throat.    Eyes: Negative for pain and visual disturbance.   Respiratory: Negative for cough and shortness of breath.    Cardiovascular: Negative for chest pain, palpitations and peripheral edema.   Gastrointestinal: Negative for abdominal pain, constipation, diarrhea, heartburn, hematochezia and nausea.   Genitourinary: Negative for discharge, dysuria, frequency, genital sores, hematuria, impotence and urgency.   Musculoskeletal: Negative for arthralgias, joint swelling and myalgias.   Skin: Negative for rash.   Neurological: Negative for dizziness,  "weakness, headaches and paresthesias.   Psychiatric/Behavioral: Negative for mood changes. The patient is not nervous/anxious.      This document serves as a record of the services and decisions personally performed and made by Sirena Talley DO. It was created on her behalf by Shiv Quispe, a trained medical scribe. The creation of this document is based on the provider's statements to the medical scribe.  Shiv Quispe September 5, 2019 10:05 AM      OBJECTIVE:   /72 (BP Location: Right arm, Patient Position: Chair, Cuff Size: Adult Large)   Pulse (!) 42   Temp 98  F (36.7  C) (Oral)   Ht 1.822 m (5' 11.75\")   Wt 98 kg (216 lb)   BMI 29.50 kg/m      Physical Exam  GENERAL: healthy, alert and no distress  HENT: ear canals and TM's normal, nose and mouth without ulcers or lesions  NECK: no adenopathy, no asymmetry, masses, or scars and thyroid normal to palpation  RESP: lungs clear to auscultation - no rales, rhonchi or wheezes  CV: regular rate and rhythm, normal S1 S2, no S3 or S4, no murmur, click or rub, no peripheral edema and peripheral pulses strong  ABDOMEN: soft, nontender, no hepatosplenomegaly, no masses and bowel sounds normal   (male): normal male genitalia without lesions or urethral discharge, no hernia  MS: LE exam reveals weak quad with lateral tracking of the patella, the rest of the knee exam was unremarkable. Patellofemoral syndrome.   SKIN: no suspicious lesions or rashes  NEURO: Normal strength and tone, mentation intact and speech normal  PSYCH: mentation appears normal, affect normal/bright    Diagnostic Test Results:  Labs reviewed in Epic    EKG showed sinus bradycardia    No results found for this or any previous visit (from the past 24 hour(s)).    ASSESSMENT/PLAN:   (Z00.00) Routine general medical examination at a health care facility  (primary encounter diagnosis)  Comment: Health Maintenance      (E11.65) Type 2 diabetes mellitus with hyperglycemia, without long-term current " "use of insulin (H)  Comment: Patient is complaint with his medication and notes no negative side-effects.   Plan: I have renewed his medication. Patient is to take medication as recommended and follow-up as needed.     (E78.5) Hyperlipidemia LDL goal <100  Comment: Patient is complaint with medication and reports no negative side-effects.  Plan: Patient is to take medication as recommended and follow-up.     (E55.9) Vitamin D deficiency  Comment: Patient has a vitamin D deficiency  Plan: Patient is to use vitamin D supplement as recommended and follow-up.     (R00.1) Bradycardia  Comment: Patient had complaints of low pulse. He has seen a cardiologist in the past for mild regurgitation. BP today was 116/72.  He is asymptomatic for bradycardia.  Am concerned about sleep apnea being a possible cause.  But he declines a sleep study at this time  Plan: EKG 12-lead complete w/read - Clinics, TSH with        free T4 reflex, CBC with platelets, Holter         Monitor 48 hour Adult Pediatric, CARDIO          ADULT REFERRAL        An EKG was ordered and the results were reviewed with the patient.     (M22.2X1,  M22.2X2) Patellofemoral pain syndrome of both knees  Comment: Patient noted knee pain after walking on stairs.   Plan: Patient was diagnosed with Patellofemoral pain syndrome. I have advised him to start Patellofemoral exercises and gave him a hand-out as guidance.     COUNSELING:   Reviewed preventive health counseling, as reflected in patient instructions       Regular exercise       Healthy diet/nutrition       Consider Hep C screening for patients born between 1945 and 1965    Estimated body mass index is 29.5 kg/m  as calculated from the following:    Height as of this encounter: 1.822 m (5' 11.75\").    Weight as of this encounter: 98 kg (216 lb).          reports that he has never smoked. He has never used smokeless tobacco.      Counseling Resources:  ATP IV Guidelines  Pooled Cohorts Equation " Calculator  FRAX Risk Assessment  ICSI Preventive Guidelines  Dietary Guidelines for Americans, 2010  USDA's MyPlate  ASA Prophylaxis  Lung CA Screening    The information in this document, created by the medical scribe, Shiv Quispe, for me, accurately reflects the services I personally performed and the decisions made by me. I have reviewed and approved this document for accuracy prior to leaving the patient care area.    Sirena Talley,   [facility]

## 2019-09-05 NOTE — PATIENT INSTRUCTIONS
I will give you a handout regarding strength/stretch exercises to improve your knee pain.    Come back to put on the Holter monitor, you should use this for 2 days as follow-up for your low pulse. I also put in a referral for a cardiologist.       Preventive Health Recommendations  Male Ages 50 - 64    Yearly exam:             See your health care provider every year in order to  o   Review health changes.   o   Discuss preventive care.    o   Review your medicines if your doctor has prescribed any.     Have a cholesterol test every 5 years, or more frequently if you are at risk for high cholesterol/heart disease.     Have a diabetes test (fasting glucose) every three years. If you are at risk for diabetes, you should have this test more often.     Have a colonoscopy at age 50, or have a yearly FIT test (stool test). These exams will check for colon cancer.      Talk with your health care provider about whether or not a prostate cancer screening test (PSA) is right for you.    You should be tested each year for STDs (sexually transmitted diseases), if you re at risk.     Shots: Get a flu shot each year. Get a tetanus shot every 10 years.     Nutrition:    Eat at least 5 servings of fruits and vegetables daily.     Eat whole-grain bread, whole-wheat pasta and brown rice instead of white grains and rice.     Get adequate Calcium and Vitamin D.     Lifestyle    Exercise for at least 150 minutes a week (30 minutes a day, 5 days a week). This will help you control your weight and prevent disease.     Limit alcohol to one drink per day.     No smoking.     Wear sunscreen to prevent skin cancer.     See your dentist every six months for an exam and cleaning.     See your eye doctor every 1 to 2 years.

## 2019-09-05 NOTE — TELEPHONE ENCOUNTER
His creatinine was elevated but his glomerular filtration rate was in the normal limits refills placed

## 2019-09-06 LAB — TSH SERPL DL<=0.005 MIU/L-ACNC: 1.84 MU/L (ref 0.4–4)

## 2019-09-09 NOTE — TELEPHONE ENCOUNTER
RECORDS RECEIVED FROM: Internal   DATE RECEIVED: 9-9   NOTES STATUS DETAILS   OFFICE NOTE from referring provider    Internal 9-5-19 Dr. Talley   OFFICE NOTE from other cardiologist    N/A    DISCHARGE SUMMARY from hospital    N/A    DISCHARGE REPORT from the ER   N/A    OPERATIVE REPORT    N/A    MEDICATION LIST   Internal    LABS     BMP   Internal 3-6-18   CBC   Internal 9-5-19   CMP   Internal 8-1-19   Lipids   Internal 4-4-19   TSH   Internal 9-5-19   DIAGNOSTIC PROCEDURES     EKG   Internal 9-5-19   Monitor Reports   In process Placed 9-5-19   IMAGING (DISC & REPORT)      Echo   N/A    Stress Tests   N/A    Cath   N/A    MRI/MRA   N/A    CT/CTA   N/A

## 2019-09-12 NOTE — PROGRESS NOTES
CARDIOLOGY CLINIC INITIAL CONSULTATION    HPI:  Mr. William Montes is a 53 yo male who receives primary care from Dr. Sirena Talley and also attends the weight loss clinic. He was referred to cardiology clinic by Dr. Talley due to asymptomatic bradycardia.     Mr. Montes is a very pleasant man with a history of T2D, HLD, CKD, NAFLD who was noted on multiple occasions to have heart rates in the 40s. He is completely asymptomatic at this heart rate. Specifically he denies CP, SOB, fatigue, lightheadedness. He exercises regularly and is able to increase his HR to the 160s while doing so. No history of CVD. No orthopnea, PND, LE edema. He is a non-smoker.     William is well treated with statin, ACEI, aspirin. No bleeding issues on aspirin. He is in the weight loss clinic where he has had success with both medical therapy and lifestyle modification.     He works as a mental health .  No family history of ASCVD.    ASSESSMENT AND PLAN  Mr. Montes is a 53 yo male with a history of T2D, HLD, CKD, NAFLD who presents for asymptomatic bradycardia.  He has normal chronotropic competence and a normal ECG and 48 hour holter. As long he remains asymptomatic, there is no need to treat his low heart rate.   Blood pressure is well controlled.  LDL is 60 on a moderate statin dose, a decrease of 50%.   I encouraged him to continue making lifestyle modifications as directed through the weight loss clinic.    Follow up with cardiology clinic as needed.     Thank you for the opportunity to participate in the care of Mr. Montes. Please feel free to contact me with any additional questions or concerns.     Chidi Liang MD    Preventive Cardiology  Pager: [phone]    PAST MEDICAL HISTORY:  Patient Active Problem List   Diagnosis     Chronic gingivitis     Esophageal reflux     Obesity     Hyperlipidemia LDL goal <100     Vitamin D deficiency     Elevated LFTs     Other chronic nonalcoholic liver disease      Glaucoma suspect, bilateral     Type 2 diabetes mellitus with hyperglycemia, without long-term current use of insulin (H)     Background diabetic retinopathy, mild, ou     Past Medical History:   Diagnosis Date     ABNORMAL LIVER FUNCTION STUDY 1/4/2008    increased ast, alt Normal since 2008     Chronic gingivitis      Esophageal reflux      Glaucoma      History of blood transfusion     My son has aplastic anemia and has had bld transfusions     Nonspecific abnormal results of liver function study      OBESITY NOS 12/13/2006     Pure hypercholesterolemia      Type II or unspecified type diabetes mellitus without mention of complication, not stated as uncontrolled      Uncomplicated asthma     My mother has asthma       CURRENT MEDICATIONS:  Current Outpatient Medications   Medication Sig Dispense Refill     ASPIRIN 81 MG OR TABS 1 tab po QD (Once per day) 100 3     atorvastatin (LIPITOR) 20 MG tablet Take 1 tablet (20 mg) by mouth daily 90 tablet 3     cholecalciferol (VITAMIN  -D) 1000 UNITS capsule Take 1 capsule by mouth daily       lisinopril (PRINIVIL/ZESTRIL) 5 MG tablet TAKE 1 TABLET(5 MG) BY MOUTH DAILY 90 tablet 1     metFORMIN (GLUCOPHAGE) 1000 MG tablet TAKE 1 TABLET(1000 MG) BY MOUTH TWICE DAILY WITH MEALS 180 tablet 1     ONETOUCH ULTRA test strip TEST DAILY 100 strip 0     order for DME Lancets.  Daily 100 each 4     order for DME Glucometer, brand as covered by insurance. 1 each 3     topiramate (TOPAMAX) 100 MG tablet Take 1 tablet (100 mg) by mouth 2 times daily 60 tablet 3       PAST SURGICAL HISTORY:  Past Surgical History:   Procedure Laterality Date     BIOPSY      My son had bone marrow biopsy     COLONOSCOPY N/A 5/22/2019    Procedure: COLONOSCOPY;  Surgeon: Leventhal, Thomas Michael, MD;  Location: [facility] OR     ENT SURGERY      My son has had tympanoplasty     NO HISTORY OF SURGERY       ORTHOPEDIC SURGERY      My mother had left hip replacement surgery       ALLERGIES  No known drug  allergies    FAMILY HX:  Family History   Problem Relation Age of Onset     Hypertension Mother      Diabetes Sister      Diabetes Cousin      Hypertension Other      C.A.D. No family hx of      Cancer - colorectal No family hx of      Glaucoma No family hx of      Macular Degeneration No family hx of      Cancer No family hx of      Cerebrovascular Disease No family hx of      Breast Cancer No family hx of      Prostate Cancer No family hx of      Thyroid Disease No family hx of      SOCIAL HX:  Social History     Tobacco Use     Smoking status: Never Smoker     Smokeless tobacco: Never Used   Substance Use Topics     Alcohol use: No     Comment: none for 11/2011     Drug use: No        ROS:  Comprehensive ROS is negative except as noted in HPI.    VITAL SIGNS:  /88 (BP Location: Right arm, Patient Position: Chair, Cuff Size: Adult Regular)   Pulse 73   Ht 1.829 m (6')   Wt 98 kg (216 lb)   SpO2 99%   BMI 29.29 kg/m    Body mass index is 29.29 kg/m .  Wt Readings from Last 2 Encounters:   09/13/19 98 kg (216 lb)   09/05/19 98 kg (216 lb)       PHYSICAL EXAM  Gen: pleasant male sitting comfortably in NAD  Head: nc/at  Eyes: EOMI, PERRL  ENT: no OP lesions or erythema  Neck: supple, no LAD  CV: nml s1/s2, no murmur or gallop; no JVD  Chest: clear lungs  Abd: soft, NT, NABS  Ext: warm, no LE edema  Skin: no rash on limited exam  Neuro: awake, alert, oriented, nml speech and gait  Vasc: 2+ bilateral carotid, brachial, radial, DP and PT pulses; no bruits noted    LABS: personally reviewed  CMP  Recent Labs   Lab Test 08/01/19  0944 04/04/19  0902 11/07/18  0939 03/06/18  1100 03/08/17  0949    139 139 137 139   POTASSIUM 4.1 4.2 4.2 4.1 3.8   CHLORIDE 111* 110* 108 106 106   CO2 21 24 23 24 26   ANIONGAP 7 5 8 7 7   GLC 97 98 101* 96 100*   BUN 13 15 10 15 13   CR 1.31* 1.47* 1.22 1.24 1.18   GFRESTIMATED 60* 53* 62 61 64   GFRESTBLACK 70 61 75 73 78   SHANA 8.7 9.6 8.6 8.5 8.8   PROTTOTAL 7.7 7.7 7.6  --   7.5   ALBUMIN 4.2 4.2 4.0  --  4.1   BILITOTAL 0.5 0.5 0.4  --  0.5   ALKPHOS 67 66 68  --  84   AST 20 21 75*  --  54*   ALT 30 35 96*  --  75*     CBC  Recent Labs   Lab Test 09/05/19  1255 11/07/18  0939 12/08/14  1426   WBC 3.4* 3.5* 4.1   RBC 4.90 5.03 4.78   HGB 14.3 14.5 13.8   HCT 43.4 43.9 42.2   MCV 89 87 88   MCH 29.2 28.8 28.9   MCHC 32.9 33.0 32.7   RDW 13.0 12.5 13.4   * 134* 126*     INR  Recent Labs   Lab Test 04/17/14   INR 1.0     Recent Labs   Lab Test 04/04/19  0902 03/06/18  1100  12/08/14  1426 04/11/14  1010   CHOL 108 104   < > 135 151   HDL 30* 33*   < > 34* 41   LDL 60 49   < > 54 92   TRIG 91 111   < > 237* 90   CHOLHDLRATIO  --   --   --  4.0 3.7    < > = values in this interval not displayed.       Most recent EKG: personally reviewed and discussed with the patient   9/5/19: sinus bradycardia, normal intervals, otherwise normal    48 hour holter monitor: personally reviewed and discussed with the patient.  9/5/19: minimum HR of 44. No concerning arrhythmias.

## 2019-09-13 ENCOUNTER — OFFICE VISIT (OUTPATIENT)
Dept: CARDIOLOGY | Facility: CLINIC | Age: 54
End: 2019-09-13
Attending: FAMILY MEDICINE
Payer: COMMERCIAL

## 2019-09-13 ENCOUNTER — PRE VISIT (OUTPATIENT)
Dept: CARDIOLOGY | Facility: CLINIC | Age: 54
End: 2019-09-13

## 2019-09-13 VITALS
BODY MASS INDEX: 29.26 KG/M2 | DIASTOLIC BLOOD PRESSURE: 88 MMHG | OXYGEN SATURATION: 99 % | SYSTOLIC BLOOD PRESSURE: 138 MMHG | HEIGHT: 72 IN | WEIGHT: 216 LBS | HEART RATE: 73 BPM

## 2019-09-13 DIAGNOSIS — R00.1 BRADYCARDIA: ICD-10-CM

## 2019-09-13 PROCEDURE — G0463 HOSPITAL OUTPT CLINIC VISIT: HCPCS | Mod: ZF

## 2019-09-13 PROCEDURE — 99203 OFFICE O/P NEW LOW 30 MIN: CPT | Mod: ZP | Performed by: INTERNAL MEDICINE

## 2019-09-13 ASSESSMENT — MIFFLIN-ST. JEOR: SCORE: 1847.77

## 2019-09-13 ASSESSMENT — PAIN SCALES - GENERAL: PAINLEVEL: NO PAIN (0)

## 2019-09-13 NOTE — NURSING NOTE
Chief Complaint   Patient presents with     New Patient     present for bradycardia and discuss zio patch results     Vitals were taken and medications reconciled.     Jabier Howard CMA  8:45 AM

## 2019-09-13 NOTE — LETTER
9/13/2019      RE: William Montes  [address]  [facility] [address]       Dear Colleague,    Thank you for the opportunity to participate in the care of your patient, William Montes, at the [facility] HEART [facility]. Please see a copy of my visit note below.    CARDIOLOGY CLINIC INITIAL CONSULTATION    HPI:  Mr. William Montes is a 53 yo male who receives primary care from Dr. Sirena Talley and also attends the weight loss clinic. He was referred to cardiology clinic by Dr. Talley due to asymptomatic bradycardia.     Mr. Montes is a very pleasant man with a history of T2D, HLD, CKD, NAFLD who was noted on multiple occasions to have heart rates in the 40s. He is completely asymptomatic at this heart rate. Specifically he denies CP, SOB, fatigue, lightheadedness. He exercises regularly and is able to increase his HR to the 160s while doing so. No history of CVD. No orthopnea, PND, LE edema. He is a non-smoker.     William is well treated with statin, ACEI, aspirin. No bleeding issues on aspirin. He is in the weight loss clinic where he has had success with both medical therapy and lifestyle modification.     He works as a mental health .  No family history of ASCVD.    ASSESSMENT AND PLAN  Mr. Montes is a 53 yo male with a history of T2D, HLD, CKD, NAFLD who presents for asymptomatic bradycardia.  He has normal chronotropic competence and a normal ECG and 48 hour holter. As long he remains asymptomatic, there is no need to treat his low heart rate.   Blood pressure is well controlled.  LDL is 60 on a moderate statin dose, a decrease of 50%.   I encouraged him to continue making lifestyle modifications as directed through the weight loss clinic.    Follow up with cardiology clinic as needed.     Thank you for the opportunity to participate in the care of Mr. Montes. Please feel free to contact me with any additional questions or concerns.     Chidi Liang,  MD    Preventive Cardiology  Pager: [phone]    PAST MEDICAL HISTORY:  Patient Active Problem List   Diagnosis     Chronic gingivitis     Esophageal reflux     Obesity     Hyperlipidemia LDL goal <100     Vitamin D deficiency     Elevated LFTs     Other chronic nonalcoholic liver disease     Glaucoma suspect, bilateral     Type 2 diabetes mellitus with hyperglycemia, without long-term current use of insulin (H)     Background diabetic retinopathy, mild, ou     Past Medical History:   Diagnosis Date     ABNORMAL LIVER FUNCTION STUDY 1/4/2008    increased ast, alt Normal since 2008     Chronic gingivitis      Esophageal reflux      Glaucoma      History of blood transfusion     My son has aplastic anemia and has had bld transfusions     Nonspecific abnormal results of liver function study      OBESITY NOS 12/13/2006     Pure hypercholesterolemia      Type II or unspecified type diabetes mellitus without mention of complication, not stated as uncontrolled      Uncomplicated asthma     My mother has asthma       CURRENT MEDICATIONS:  Current Outpatient Medications   Medication Sig Dispense Refill     ASPIRIN 81 MG OR TABS 1 tab po QD (Once per day) 100 3     atorvastatin (LIPITOR) 20 MG tablet Take 1 tablet (20 mg) by mouth daily 90 tablet 3     cholecalciferol (VITAMIN  -D) 1000 UNITS capsule Take 1 capsule by mouth daily       lisinopril (PRINIVIL/ZESTRIL) 5 MG tablet TAKE 1 TABLET(5 MG) BY MOUTH DAILY 90 tablet 1     metFORMIN (GLUCOPHAGE) 1000 MG tablet TAKE 1 TABLET(1000 MG) BY MOUTH TWICE DAILY WITH MEALS 180 tablet 1     ONETOUCH ULTRA test strip TEST DAILY 100 strip 0     order for DME Lancets.  Daily 100 each 4     order for DME Glucometer, brand as covered by insurance. 1 each 3     topiramate (TOPAMAX) 100 MG tablet Take 1 tablet (100 mg) by mouth 2 times daily 60 tablet 3       PAST SURGICAL HISTORY:  Past Surgical History:   Procedure Laterality Date     BIOPSY      My son had bone  marrow biopsy     COLONOSCOPY N/A 5/22/2019    Procedure: COLONOSCOPY;  Surgeon: Leventhal, Thomas Michael, MD;  Location: [facility] OR     ENT SURGERY      My son has had tympanoplasty     NO HISTORY OF SURGERY       ORTHOPEDIC SURGERY      My mother had left hip replacement surgery       ALLERGIES  No known drug allergies    FAMILY HX:  Family History   Problem Relation Age of Onset     Hypertension Mother      Diabetes Sister      Diabetes Cousin      Hypertension Other      C.A.D. No family hx of      Cancer - colorectal No family hx of      Glaucoma No family hx of      Macular Degeneration No family hx of      Cancer No family hx of      Cerebrovascular Disease No family hx of      Breast Cancer No family hx of      Prostate Cancer No family hx of      Thyroid Disease No family hx of      SOCIAL HX:  Social History     Tobacco Use     Smoking status: Never Smoker     Smokeless tobacco: Never Used   Substance Use Topics     Alcohol use: No     Comment: none for 11/2011     Drug use: No        ROS:  Comprehensive ROS is negative except as noted in HPI.    VITAL SIGNS:  /88 (BP Location: Right arm, Patient Position: Chair, Cuff Size: Adult Regular)   Pulse 73   Ht 1.829 m (6')   Wt 98 kg (216 lb)   SpO2 99%   BMI 29.29 kg/m     Body mass index is 29.29 kg/m .  Wt Readings from Last 2 Encounters:   09/13/19 98 kg (216 lb)   09/05/19 98 kg (216 lb)       PHYSICAL EXAM  Gen: pleasant male sitting comfortably in NAD  Head: nc/at  Eyes: EOMI, PERRL  ENT: no OP lesions or erythema  Neck: supple, no LAD  CV: nml s1/s2, no murmur or gallop; no JVD  Chest: clear lungs  Abd: soft, NT, NABS  Ext: warm, no LE edema  Skin: no rash on limited exam  Neuro: awake, alert, oriented, nml speech and gait  Vasc: 2+ bilateral carotid, brachial, radial, DP and PT pulses; no bruits noted    LABS: personally reviewed  CMP  Recent Labs   Lab Test 08/01/19  0944 04/04/19  0902 11/07/18  0939 03/06/18  1100 03/08/17  0949    139  139 137 139   POTASSIUM 4.1 4.2 4.2 4.1 3.8   CHLORIDE 111* 110* 108 106 106   CO2 21 24 23 24 26   ANIONGAP 7 5 8 7 7   GLC 97 98 101* 96 100*   BUN 13 15 10 15 13   CR 1.31* 1.47* 1.22 1.24 1.18   GFRESTIMATED 60* 53* 62 61 64   GFRESTBLACK 70 61 75 73 78   SHANA 8.7 9.6 8.6 8.5 8.8   PROTTOTAL 7.7 7.7 7.6  --  7.5   ALBUMIN 4.2 4.2 4.0  --  4.1   BILITOTAL 0.5 0.5 0.4  --  0.5   ALKPHOS 67 66 68  --  84   AST 20 21 75*  --  54*   ALT 30 35 96*  --  75*     CBC  Recent Labs   Lab Test 09/05/19  1255 11/07/18  0939 12/08/14  1426   WBC 3.4* 3.5* 4.1   RBC 4.90 5.03 4.78   HGB 14.3 14.5 13.8   HCT 43.4 43.9 42.2   MCV 89 87 88   MCH 29.2 28.8 28.9   MCHC 32.9 33.0 32.7   RDW 13.0 12.5 13.4   * 134* 126*     INR  Recent Labs   Lab Test 04/17/14   INR 1.0     Recent Labs   Lab Test 04/04/19  0902 03/06/18  1100  12/08/14  1426 04/11/14  1010   CHOL 108 104   < > 135 151   HDL 30* 33*   < > 34* 41   LDL 60 49   < > 54 92   TRIG 91 111   < > 237* 90   CHOLHDLRATIO  --   --   --  4.0 3.7    < > = values in this interval not displayed.       Most recent EKG: personally reviewed and discussed with the patient   9/5/19: sinus bradycardia, normal intervals, otherwise normal    48 hour holter monitor: personally reviewed and discussed with the patient.  9/5/19: minimum HR of 44. No concerning arrhythmias.      Please do not hesitate to contact me if you have any questions/concerns.     Sincerely,     Chidi Liang MD

## 2019-09-13 NOTE — PATIENT INSTRUCTIONS
"You were seen today in the Cardiovascular Clinic at the [facility].     Cardiology Providers you saw during your visit: Dr. Selma Liang     Diagnosis:   Encounter Diagnosis   Name Primary?     Bradycardia         Results: Discussed with you today EKG and cardiac monitor.      Orders:   No orders of the defined types were placed in this encounter.      Current Medication List  Current Outpatient Medications   Medication Sig Dispense Refill     ASPIRIN 81 MG OR TABS 1 tab po QD (Once per day) 100 3     atorvastatin (LIPITOR) 20 MG tablet Take 1 tablet (20 mg) by mouth daily 90 tablet 3     cholecalciferol (VITAMIN  -D) 1000 UNITS capsule Take 1 capsule by mouth daily       lisinopril (PRINIVIL/ZESTRIL) 5 MG tablet TAKE 1 TABLET(5 MG) BY MOUTH DAILY 90 tablet 1     metFORMIN (GLUCOPHAGE) 1000 MG tablet TAKE 1 TABLET(1000 MG) BY MOUTH TWICE DAILY WITH MEALS 180 tablet 1     ONETOUCH ULTRA test strip TEST DAILY 100 strip 0     order for DME Lancets.  Daily 100 each 4     order for DME Glucometer, brand as covered by insurance. 1 each 3     topiramate (TOPAMAX) 100 MG tablet Take 1 tablet (100 mg) by mouth 2 times daily 60 tablet 3       Recommendations:   1. No change in medications.  2. Continue your lifestyle modification program through the weight loss clinic.  3. Return to cardiology clinic as needed.       Please feel free to call me with any questions or concerns.       Erin Heard LPN     Questions: [phone].   First press #1 for the [place] and then press #3 for \"Medical Questions\" to reach us Cardiology Nurses.     [phone].   First press #1 for the [place] and then press #1     On Call Cardiologist for after hours or on weekends: [phone]   option #4 and ask to speak to the on-call Cardiologist.          If you need a medication refill please contact your pharmacy.  Please allow 3 business days for your refill to be " completed.  ________________________________________________________________________________________________________________________________

## 2019-10-03 ENCOUNTER — OFFICE VISIT (OUTPATIENT)
Dept: OPHTHALMOLOGY | Facility: CLINIC | Age: 54
End: 2019-10-03
Payer: COMMERCIAL

## 2019-10-03 ENCOUNTER — HEALTH MAINTENANCE LETTER (OUTPATIENT)
Age: 54
End: 2019-10-03

## 2019-10-03 DIAGNOSIS — H40.003 GLAUCOMA SUSPECT, BILATERAL: Primary | ICD-10-CM

## 2019-10-03 PROCEDURE — 92133 CPTRZD OPH DX IMG PST SGM ON: CPT | Mod: RT | Performed by: OPHTHALMOLOGY

## 2019-10-03 PROCEDURE — 92012 INTRM OPH EXAM EST PATIENT: CPT | Performed by: OPHTHALMOLOGY

## 2019-10-03 PROCEDURE — 92083 EXTENDED VISUAL FIELD XM: CPT | Performed by: OPHTHALMOLOGY

## 2019-10-03 ASSESSMENT — TONOMETRY
OS_IOP_MMHG: 14
OD_IOP_MMHG: 13
IOP_METHOD: APPLANATION

## 2019-10-03 ASSESSMENT — SLIT LAMP EXAM - LIDS
COMMENTS: NORMAL
COMMENTS: NORMAL

## 2019-10-03 ASSESSMENT — VISUAL ACUITY
CORRECTION_TYPE: GLASSES
OD_CC: 20/20
OS_CC: 20/20
METHOD: SNELLEN - LINEAR

## 2019-10-03 ASSESSMENT — EXTERNAL EXAM - LEFT EYE: OS_EXAM: NORMAL

## 2019-10-03 ASSESSMENT — EXTERNAL EXAM - RIGHT EYE: OD_EXAM: NORMAL

## 2019-10-03 NOTE — PATIENT INSTRUCTIONS
Continue observation with regard to glaucoma suspect status.  Return visit in 6 months for complete exam.     Harry Hodgson M.D.  [phone]

## 2019-10-03 NOTE — LETTER
10/3/2019         RE: William Montes  [address]  [facility] [address]        Dear Colleague,    Thank you for referring your patient, William Montes, to the [facility]. Please see a copy of my visit note below.     Current Eye Medications:  none     Subjective:  6 month follow up for intraocular pressure check, glaucoma OCT and Bhandari Visual Field. Vision is doing well with glasses both eyes. No eye pain or discomfort in either eye.     Not excited about getting treatment     Objective:  See Ophthalmology Exam.       Assessment:  Mostly stable glaucoma OCT and Bhandari Visual Field both eyes in patient who is a glaucoma suspect. Intraocular pressure remains low both eyes.      Plan:  Continue observation with regard to glaucoma suspect status.  Return visit in 6 months for complete exam.     Harry Hodgson M.D.  [phone]           Again, thank you for allowing me to participate in the care of your patient.        Sincerely,        Harry Hodgson MD

## 2019-10-03 NOTE — PROGRESS NOTES
Current Eye Medications:  none     Subjective:  6 month follow up for intraocular pressure check, glaucoma OCT and Bhandari Visual Field. Vision is doing well with glasses both eyes. No eye pain or discomfort in either eye.     Not excited about getting treatment     Objective:  See Ophthalmology Exam.       Assessment:  Mostly stable glaucoma OCT and Bhandari Visual Field both eyes in patient who is a glaucoma suspect. Intraocular pressure remains low both eyes.      Plan:  Continue observation with regard to glaucoma suspect status.  Return visit in 6 months for complete exam.     Harry Hodgson M.D.  [phone]

## 2019-10-06 ASSESSMENT — PACHYMETRY
OS_CT(UM): 471
OD_CT(UM): 472

## 2019-10-29 DIAGNOSIS — E78.5 HYPERLIPIDEMIA LDL GOAL <100: ICD-10-CM

## 2019-10-29 NOTE — TELEPHONE ENCOUNTER
"Requested Prescriptions   Pending Prescriptions Disp Refills     ONETOUCH ULTRA test strip [Pharmacy Med Name: ONE TOUCH ULTRA BLUE TEST ST(NEW)50]  Last Written Prescription Date:  8/6/2019  Last Fill Quantity: 100 strips,  # refills: 0   Last office visit: 9/5/2019 with prescribing provider:  Gerri   Future Office Visit:   100 strip 0     Sig: TEST DAILY       Diabetic Supplies Protocol Passed - 10/29/2019  5:13 AM        Passed - Medication is active on med list        Passed - Patient is 18 years of age or older        Passed - Recent (6 mo) or future (30 days) visit within the authorizing provider's specialty     Patient had office visit in the last 6 months or has a visit in the next 30 days with authorizing provider.  See \"Patient Info\" tab in inbasket, or \"Choose Columns\" in Meds & Orders section of the refill encounter.             "

## 2019-10-30 NOTE — TELEPHONE ENCOUNTER
Prescription approved per [facility] Refill Protocol.    Jamaica Walker, RN, BSN, PHN  [facility]: [place]

## 2019-10-31 ENCOUNTER — HEALTH MAINTENANCE LETTER (OUTPATIENT)
Age: 54
End: 2019-10-31

## 2019-11-11 ENCOUNTER — OFFICE VISIT (OUTPATIENT)
Dept: ENDOCRINOLOGY | Facility: CLINIC | Age: 54
End: 2019-11-11
Payer: COMMERCIAL

## 2019-11-11 VITALS
HEART RATE: 57 BPM | DIASTOLIC BLOOD PRESSURE: 72 MMHG | OXYGEN SATURATION: 99 % | SYSTOLIC BLOOD PRESSURE: 121 MMHG | BODY MASS INDEX: 29.31 KG/M2 | HEIGHT: 72 IN | WEIGHT: 216.4 LBS | TEMPERATURE: 98.1 F

## 2019-11-11 DIAGNOSIS — E66.811 CLASS 1 OBESITY WITH BODY MASS INDEX (BMI) OF 33.0 TO 33.9 IN ADULT, UNSPECIFIED OBESITY TYPE, UNSPECIFIED WHETHER SERIOUS COMORBIDITY PRESENT: ICD-10-CM

## 2019-11-11 DIAGNOSIS — E66.811 CLASS 1 OBESITY WITH SERIOUS COMORBIDITY AND BODY MASS INDEX (BMI) OF 30.0 TO 30.9 IN ADULT, UNSPECIFIED OBESITY TYPE: ICD-10-CM

## 2019-11-11 DIAGNOSIS — E11.8 TYPE 2 DIABETES MELLITUS WITH COMPLICATION, WITHOUT LONG-TERM CURRENT USE OF INSULIN (H): ICD-10-CM

## 2019-11-11 DIAGNOSIS — E66.811 CLASS 1 OBESITY WITH SERIOUS COMORBIDITY AND BODY MASS INDEX (BMI) OF 30.0 TO 30.9 IN ADULT, UNSPECIFIED OBESITY TYPE: Primary | ICD-10-CM

## 2019-11-11 LAB
ANION GAP SERPL CALCULATED.3IONS-SCNC: 6 MMOL/L (ref 3–14)
BUN SERPL-MCNC: 15 MG/DL (ref 7–30)
CALCIUM SERPL-MCNC: 8.8 MG/DL (ref 8.5–10.1)
CHLORIDE SERPL-SCNC: 110 MMOL/L (ref 94–109)
CO2 SERPL-SCNC: 23 MMOL/L (ref 20–32)
CREAT SERPL-MCNC: 1.39 MG/DL (ref 0.66–1.25)
GFR SERPL CREATININE-BSD FRML MDRD: 56 ML/MIN/{1.73_M2}
GLUCOSE SERPL-MCNC: 109 MG/DL (ref 70–99)
HBA1C MFR BLD: 6.5 % (ref 0–5.6)
POTASSIUM SERPL-SCNC: 4.2 MMOL/L (ref 3.4–5.3)
SODIUM SERPL-SCNC: 139 MMOL/L (ref 133–144)

## 2019-11-11 RX ORDER — TOPIRAMATE 100 MG/1
100 TABLET, FILM COATED ORAL 2 TIMES DAILY
Qty: 60 TABLET | Refills: 5 | Status: SHIPPED | OUTPATIENT
Start: 2019-11-11 | End: 2020-03-09

## 2019-11-11 ASSESSMENT — PAIN SCALES - GENERAL: PAINLEVEL: NO PAIN (0)

## 2019-11-11 ASSESSMENT — MIFFLIN-ST. JEOR: SCORE: 1849.58

## 2019-11-11 NOTE — PATIENT INSTRUCTIONS
Continue topiramate 100mg   Restart ozempic 0.25 x 4 weeks and then increase to 0.5mg after that  A1C check today  Follow up in 1 month with [facility] pharmacist by phone to follow up Ozempic restart  Follow up in 3 months with Marine Flores return [facility] visit    MEDICATION STARTED AT THIS APPOINTMENT    We are starting a GLP-1 (Glucagon-like Peptide-1) medication. Examples of this medication include Byetta, Bydureon, or Victoza, etc. The medication chosen will depend on insurance coverage, and can be changed to the medication that is covered under your plan. These medications work the same, in that they can help you lose weight and control your blood sugar. One of the ways it works is by slowing down the rate that food leaves your stomach. You feel carrera and will eat less.  It also helps regulate hormones that can help improve your blood sugars.    Usually short acting insulins or oral agents are stopped or decreased by the doctor at the appointment. Low blood sugars are rare but can happen if patients are on insulin or other oral agents. If you notice consistent low sugars or high sugars, your medication may need to be adjusted after your appointment. If this is the case, please call RN and provide her your blood sugar record from the last 3-4 days. The RN will get in touch with the doctor and call you back/[employer] message with recommendations. We tolerate high sugars for a bit, so if sugars are running 180-200, this is ok. As weight starts dropping the blood sugars should too. If readings are consistently over 200 for 1-2 weeks, then you should call the doctor/nurse.    Types of GLP-1 medications include:    Ozempic: Starting dose is 0.25 mg once weekly for 4 weeks, and then increase to 0.5 mg weekly. If after 4 weeks of being on 0.5 mg weekly dosing and still wanting additional weight loss and/or blood sugar benefits, check with your doctor to see if they want to increase the dose to 1 mg weekly. Pen needles come in  the Ozempic pen box.    Side effects of GLP- Medications include: The most common side effects are all GI related and consist of: nausea, constipation, diarrhea, burping, or gassiness. Patients are advised to eat slowly and less, and nausea typically passes if people can stick it out.     The risk of pancreatitis (inflammation of the pancreas) has been associated with this type of medication, but is very rare.  If you have had pancreatitis in the past, this medication may not be for you. Please let us know about any past history of pancreas problems.      Symptoms of pancreatitis include: Pain in your upper stomach area which  may travel to your back and be worse after eating. Your stomach area may be tender to the touch.  You may have vomiting or nausea and/or have a fever. If you should develop any of these symptoms, stop the medication and contact your primary care doctor. They will do a blood test to check for pancreatitis.         There is a small chance you may have some low blood sugar after taking the medication.   The signs of low blood sugar are:  o Weakness  o Shaky   o Hungry  o Sweating  o Confusion      See below for ways to treat low blood sugar without adding in lots of extra calories.      Treating Low Blood Sugar    If you have symptoms of low blood sugar (sweating, shaking, dizzy, confused) eat 15 grams of carbs and wait 15 minutes:      Glucose Tabs are best for sugars under 70 -  Dex4 or BD Glucose tablets are good, you will need to take 3-4 of these to equal 15 grams.       One small box of raisins    4 oz fruit juice box or   cup fruit juice    1 small apple    1 small banana      cup canned fruit in water      English muffin or a slice of bread with jelly     1 low fat frozen waffle with sugar-free syrup      cup cottage cheese with   cup frozen or fresh blueberries    1 cup skim or low-fat milk      cup whole grain cereal    4-6 crackers such as Triscuits      This medication is usually  covered by insurance for patients with a diagnosis of Type 2 Diabetes. Depending on insurance coverage, the medication may be changed to a different formulary, but they all work in the same way. Sometimes a prior authorization is required, which may take up to 1-2 weeks for an insurance company to make a decision if they will cover the medication. Please be patient, you will be notified either way.     Call the nurse at [phone] if you have any questions or concerns. (Do not stop taking it if you don't think it's working. For some people it works without them knowing it.)     In order to get refills of this or any medication we prescribe you must be seen in the medical weight mgmt clinic every 2-4 months. Please have your pharmacy fax a refill request to [phone].

## 2019-11-11 NOTE — NURSING NOTE
Chief Complaint   Patient presents with     RECHECK     Follow up weight mamangement.       Vitals:    11/11/19 0900   BP: 121/72   BP Location: Left arm   Patient Position: Sitting   Cuff Size: Adult Large   Pulse: 57   Temp: 98.1  F (36.7  C)   TempSrc: Oral   SpO2: 99%   Weight: 98.2 kg (216 lb 6.4 oz)   Height: 1.829 m (6')       Body mass index is 29.35 kg/m .                            TRACY WATTS EMT

## 2019-11-11 NOTE — PROGRESS NOTES
"Return Medical Weight Management Note     William Montes  MRN:  [id number]  :  1965  NAKUL:  2019    Dear Sirena Talley,    I had the pleasure of seeing your patient William Montes.  He is a 54 year old male who I am continuing to see for treatment of obesity related to:       10/26/2018   I have the following co-morbidities associated with obesity: Type II Diabetes, GERD (Reflux), Fatty Liver   What was your most recent hemoglobin a1c  6.7   How many years have you had diabetes? 18   Are you taking daily medication for heartburn, acid reflux, or GERD (acid reflux disease)? No        \"53 y.o. Male here for [facility] follow up.  Seen in Oct for New Bariatric surgery consult but not a candidate due to BMI 33. Discussed possible EDWARDS study and sent info to  to review his history.  Started on topiramate in Nov  Taking victoza, stopped glimepiride in Nov.   Follow up call by [facility] in Dec he was tolerating topiramate and blood sugars under good control with victoza and off glimiperide.   He has lost from 249 lbs to 225 lbs, 24 lbs total.\"    INTERVAL HISTORY:  [facility] follow up.  Last visit 19.  Last visit we increased topiramate to 100mg BID    Stopped Ozempic in April because A1C improved to 5.6  Last A1C 6.3 19. Up from 5.6 19  Checking blood sugars and they are increasing since stopping Ozempic  Has been having a feeling of \"something in his throat\" for the last few months and getting worse.  Doesn't feel like reflux or heartburn.  Hasn't seen PCP regarding this.     CURRENT WEIGHT:   216 lbs 6.4 oz    Wt Readings from Last 4 Encounters:   19 98.2 kg (216 lb 6.4 oz)   19 98 kg (216 lb)   19 98 kg (216 lb)   19 98.5 kg (217 lb 1.6 oz)       Height:  6' 0\"  Body Mass Index:  Body mass index is 29.35 kg/m .  Vitals:  /72 (BP Location: Left arm, Patient Position: Sitting, Cuff Size: Adult Large)   Pulse 57   Temp 98.1  F (36.7  C) (Oral)   Ht 1.829 m (6')   Wt " 98.2 kg (216 lb 6.4 oz)   SpO2 99%   BMI 29.35 kg/m      Initial consult weight was 249.  Weight change since last seen on Visit date not found is down 1 pounds.   Total loss is 33 pounds.    Diet and Activity Changes Since Last Visit Reviewed With Patient 11/11/2019   I have made the following changes to my diet since my last visit: none   With regards to my diet, I am still struggling with: none   I have made the following changes to my activity/exercise since my last visit: about the same   With regards to my activity/exercise, I am still struggling with: finding the time       MEDICATIONS:   Current Outpatient Medications   Medication     ASPIRIN 81 MG OR TABS     atorvastatin (LIPITOR) 20 MG tablet     cholecalciferol (VITAMIN  -D) 1000 UNITS capsule     lisinopril (PRINIVIL/ZESTRIL) 5 MG tablet     metFORMIN (GLUCOPHAGE) 1000 MG tablet     ONETOUCH ULTRA test strip     order for DME     order for DME     topiramate (TOPAMAX) 100 MG tablet     No current facility-administered medications for this visit.        Weight Loss Medication History Reviewed With Patient 11/11/2019   Which weight loss medications are you currently taking on a regular basis?  Topamax (topiramate)   Are you having any side effects from the weight loss medication that we have prescribed you? Yes   If you are having side effects please describe: mild tingling in my left foot       ASSESSMENT:   [facility] follow up.  He has lost 33 in total.  Weight stable since last visit.  He wants to restart Ozempic since A1C increasing from 5.6 to 6.3 since stopping it.  He is tolerating the topiramate 100mg twice daily    PLAN:   Start taking omeprazole again to see if this helps with sensation in throat.  See PCP if still feeling this and possibly will need ENT referral.  Nothing obvious on throat exam today in clinic.    Continue topiramate 100mg   Restart ozempic 0.25 x 4 weeks and then increase to 0.5mg after that  A1C check today and BMP  Follow up in  1 month with [facility] pharmacist by phone to follow up ozempic restart  Follow up in 3 months with Marine Flores return [facility] visit        Time: 15 min spent on evaluation, management, counseling, education, & motivational interviewing with greater than 50 % of the total time was spent on counseling and coordinating care      Sincerely,    Marine Flores, PA-C

## 2019-11-11 NOTE — LETTER
"2019       RE: William Montes  [address]  [facility] [address]     Dear Colleague,    Thank you for referring your patient, William Montes, to the [facility] MEDICAL WEIGHT MANAGEMENT at [facility]. Please see a copy of my visit note below.    Return Medical Weight Management Note     William Montes  MRN:  [id number]  :  1965  NAKUL:  2019    Dear Sirena Talley,    I had the pleasure of seeing your patient William Montes.  He is a 54 year old male who I am continuing to see for treatment of obesity related to:       10/26/2018   I have the following co-morbidities associated with obesity: Type II Diabetes, GERD (Reflux), Fatty Liver   What was your most recent hemoglobin a1c  6.7   How many years have you had diabetes? 18   Are you taking daily medication for heartburn, acid reflux, or GERD (acid reflux disease)? No        \"53 y.o. Male here for [facility] follow up.  Seen in Oct for New Bariatric surgery consult but not a candidate due to BMI 33. Discussed possible EDWARDS study and sent info to  to review his history.  Started on topiramate in Nov  Taking victoza, stopped glimepiride in Nov.   Follow up call by MTM in Dec he was tolerating topiramate and blood sugars under good control with victoza and off glimiperide.   He has lost from 249 lbs to 225 lbs, 24 lbs total.\"    INTERVAL HISTORY:  [facility] follow up.  Last visit 19.  Last visit we increased topiramate to 100mg BID    Stopped Ozempic in April because A1C improved to 5.6  Last A1C 6.3 19. Up from 5.6 19  Checking blood sugars and they are increasing since stopping Ozempic  Has been having a feeling of \"something in his throat\" for the last few months and getting worse.  Doesn't feel like reflux or heartburn.  Hasn't seen PCP regarding this.     CURRENT WEIGHT:   216 lbs 6.4 oz    Wt Readings from Last 4 Encounters:   19 98.2 kg (216 lb 6.4 oz)   19 98 kg (216 lb) " "  09/05/19 98 kg (216 lb)   08/01/19 98.5 kg (217 lb 1.6 oz)       Height:  6' 0\"  Body Mass Index:  Body mass index is 29.35 kg/m .  Vitals:  /72 (BP Location: Left arm, Patient Position: Sitting, Cuff Size: Adult Large)   Pulse 57   Temp 98.1  F (36.7  C) (Oral)   Ht 1.829 m (6')   Wt 98.2 kg (216 lb 6.4 oz)   SpO2 99%   BMI 29.35 kg/m       Initial consult weight was 249.  Weight change since last seen on Visit date not found is down 1 pounds.   Total loss is 33 pounds.    Diet and Activity Changes Since Last Visit Reviewed With Patient 11/11/2019   I have made the following changes to my diet since my last visit: none   With regards to my diet, I am still struggling with: none   I have made the following changes to my activity/exercise since my last visit: about the same   With regards to my activity/exercise, I am still struggling with: finding the time       MEDICATIONS:   Current Outpatient Medications   Medication     ASPIRIN 81 MG OR TABS     atorvastatin (LIPITOR) 20 MG tablet     cholecalciferol (VITAMIN  -D) 1000 UNITS capsule     lisinopril (PRINIVIL/ZESTRIL) 5 MG tablet     metFORMIN (GLUCOPHAGE) 1000 MG tablet     ONETOUCH ULTRA test strip     order for DME     order for DME     topiramate (TOPAMAX) 100 MG tablet     No current facility-administered medications for this visit.        Weight Loss Medication History Reviewed With Patient 11/11/2019   Which weight loss medications are you currently taking on a regular basis?  Topamax (topiramate)   Are you having any side effects from the weight loss medication that we have prescribed you? Yes   If you are having side effects please describe: mild tingling in my left foot       ASSESSMENT:   [facility] follow up.  He has lost 33 in total.  Weight stable since last visit.  He wants to restart Ozempic since A1C increasing from 5.6 to 6.3 since stopping it.  He is tolerating the topiramate 100mg twice daily    PLAN:   Start taking omeprazole again to " see if this helps with sensation in throat.  See PCP if still feeling this and possibly will need ENT referral.  Nothing obvious on throat exam today in clinic.    Continue topiramate 100mg   Restart ozempic 0.25 x 4 weeks and then increase to 0.5mg after that  A1C check today and BMP  Follow up in 1 month with [facility] pharmacist by phone to follow up ozempic restart  Follow up in 3 months with Marine Flores return MWM visit        Time: 15 min spent on evaluation, management, counseling, education, & motivational interviewing with greater than 50 % of the total time was spent on counseling and coordinating care      Sincerely,    Marine Flores PA-C

## 2019-12-05 ENCOUNTER — TELEPHONE (OUTPATIENT)
Dept: FAMILY MEDICINE | Facility: CLINIC | Age: 54
End: 2019-12-05

## 2019-12-05 DIAGNOSIS — E11.65 TYPE 2 DIABETES MELLITUS WITH HYPERGLYCEMIA, WITHOUT LONG-TERM CURRENT USE OF INSULIN (H): ICD-10-CM

## 2019-12-05 DIAGNOSIS — R09.A2 GLOBUS SENSATION: Primary | ICD-10-CM

## 2019-12-05 DIAGNOSIS — E66.9 OBESITY: ICD-10-CM

## 2019-12-05 DIAGNOSIS — E66.01 MORBID OBESITY DUE TO EXCESS CALORIES (H): ICD-10-CM

## 2019-12-05 RX ORDER — GLIMEPIRIDE 4 MG/1
TABLET ORAL
Qty: 90 TABLET | Refills: 0 | OUTPATIENT
Start: 2019-12-05

## 2019-12-05 NOTE — TELEPHONE ENCOUNTER
"See plan at last PCP visit 9/5/19:    Instructions         Return in about 6 months (around 3/5/2020) for BP Recheck.     Appears glimepiride was taken off active med list 11/14/18 and reported as \"not taking 2/14/19\".       I called and spoke to patient, he is not on glimepiride and his injectable med comes with it's own needle.    \"Refusal\" routed back to pharmacy with note.    \"as long as I have you on the line\"..he states he has had a feeling of something in his throat for a long time.   Denies trouble breathing or swallowing, is talking easily.   He thinks he has discussed this with Dr. Talley in the past and now wishes to have a referral to ENT.   No openings with provider next week, no availability until 12/30/19.    Patient really prefers to see Dr. Talley if he needs to come in.       Routed to Dr. Talley to address issue and possible referral.    Marion Cota RN  [facility]            "

## 2019-12-05 NOTE — TELEPHONE ENCOUNTER
"Requested Prescriptions   Pending Prescriptions Disp Refills     insulin pen needle (B-D U/F) 31G X 8 MM miscellaneous [Pharmacy Med Name: B-D PEN NDL SHRT [id number](5/16) MONTRELL]  DISCONTINUED        Last Written Prescription Date:  8/15/2018  Last Fill Quantity: 100 each,   # refills: 3  Last Office Visit: 9/5/2019  wVINCENT Renteria    Future Office visit:    Next 5 appointments (look out 90 days)    Dec 11, 2019 12:00 PM CST  (Arrive by 11:45 AM)  SHORT with Lauren Turner Bloch, RPH  [facility] [address] [place] [id number] [phone]   Mar 04, 2020  9:00 AM CST  Return Visit with Harry Hodgson MD  [facility] ([address] [address]  [phone]           Routing refill request to provider for review/approval because:  Drug not active on patient's medication list 100 each 0     Sig: USE 1 PEN DAILY OR AS DIRECTED       Diabetic Supplies Protocol Failed - 12/5/2019  3:02 PM        Failed - Medication is active on med list        Passed - Patient is 18 years of age or older        Passed - Recent (6 mo) or future (30 days) visit within the authorizing provider's specialty     Patient had office visit in the last 6 months or has a visit in the next 30 days with authorizing provider.  See \"Patient Info\" tab in inbasket, or \"Choose Columns\" in Meds & Orders section of the refill encounter.                    glimepiride (AMARYL) 4 MG tablet [Pharmacy Med Name: GLIMEPIRIDE 4MG TABLETS]  DISCONTINUED        Last Written Prescription Date:  10/8/2018  Last Fill Quantity: 90 tablet,   # refills: 1  Last Office Visit: 9/5/2019  VINCENT Mckeon      Future Office visit:    Next 5 appointments (look out 90 days)    Dec 11, 2019 12:00 PM CST  (Arrive by 11:45 AM)  SHORT with Lauren Turner Bloch, RPH  [facility] [address] [place] [address] " "[id number] [phone]   Mar 04, 2020  9:00 AM CST  Return Visit with Harry Hodgson MD  [facility] [address] [facility]  [place] [id number] [phone]           Routing refill request to provider for review/approval because:  Drug not active on patient's medication list   90 tablet 0     Sig: TAKE 1 TABLET(4 MG) BY MOUTH DAILY       Sulfonylurea Agents Failed - 12/5/2019  3:02 PM        Failed - Medication is active on med list        Failed - Patient has a recent creatinine (normal) within the past 12 mos.     Recent Labs   Lab Test 11/11/19  1010   CR 1.39*             Passed - Blood pressure less than 140/90 in past 6 months     BP Readings from Last 3 Encounters:   11/11/19 121/72   09/13/19 138/88   09/05/19 116/72                 Passed - Patient has documented LDL within the past 12 mos.     Recent Labs   Lab Test 04/04/19  0902   LDL 60             Passed - Patient has had a Microalbumin in the past 15 mos.     Recent Labs   Lab Test 04/04/19  0906   MICROL 8   UMALCR 4.32             Passed - Patient has documented A1c within the specified period of time.     If HgbA1C is 8 or greater, it needs to be on file within the past 3 months.  If less than 8, must be on file within the past 6 months.     Recent Labs   Lab Test 11/11/19  1010   A1C 6.5*             Passed - Patient is age 18 or older        Passed - Recent (6 mo) or future (30 days) visit within the authorizing provider's specialty     Patient had office visit in the last 6 months or has a visit in the next 30 days with authorizing provider or within the authorizing provider's specialty.  See \"Patient Info\" tab in inbasket, or \"Choose Columns\" in Meds & Orders section of the refill encounter.            "

## 2019-12-06 ENCOUNTER — TELEPHONE (OUTPATIENT)
Dept: FAMILY MEDICINE | Facility: CLINIC | Age: 54
End: 2019-12-06

## 2019-12-06 DIAGNOSIS — Z12.11 SPECIAL SCREENING FOR MALIGNANT NEOPLASMS, COLON: ICD-10-CM

## 2019-12-06 DIAGNOSIS — E66.01 MORBID OBESITY DUE TO EXCESS CALORIES (H): ICD-10-CM

## 2019-12-06 RX ORDER — LIRAGLUTIDE 6 MG/ML
INJECTION SUBCUTANEOUS
Qty: 21 ML | Refills: 0 | OUTPATIENT
Start: 2019-12-06

## 2019-12-06 RX ORDER — TOPIRAMATE 25 MG/1
TABLET, FILM COATED ORAL
Qty: 90 TABLET | Refills: 0 | OUTPATIENT
Start: 2019-12-06

## 2019-12-06 RX ORDER — POLYETHYLENE GLYCOL-3350 AND ELECTROLYTES 236; 6.74; 5.86; 2.97; 22.74 G/274.31G; G/274.31G; G/274.31G; G/274.31G; G/274.31G
POWDER, FOR SOLUTION ORAL
Qty: 4000 ML | Refills: 0 | OUTPATIENT
Start: 2019-12-06

## 2019-12-06 NOTE — TELEPHONE ENCOUNTER
"I see the med requested by pharmacy, liraglutide/victoza was taken off Epic med list 2/14/19 as \"alternate therapy\" and I now see semaglutide (ozempic) pens sent 11/11/19 to [facility], this request for victoza is from [facility].    See endocrinology visit notes on 11/11/19:    PLAN:   Start taking omeprazole again to see if this helps with sensation in throat.  See PCP if still feeling this and possibly will need ENT referral.  Nothing obvious on throat exam today in clinic.     Continue topiramate 100mg   Restart ozempic 0.25 x 4 weeks and then increase to 0.5mg after that  A1C check today and BMP  Follow up in 1 month with [facility] pharmacist by phone to follow up ozempic restart  Follow up in 3 months with Marine Flores return [facility] visit      I see he has MTM phone visit scheduled for 12/11/19.    Is he out of ozempic?   Appears he probably is or will be soon.    Attempted to call patient at home/mobile number, left message on [employer]; patient was instructed to return call to [facility] clinic RN directly on the RN call back line at [phone].  Did he request victoza?   Is he out of the ozempic?    Also called the [facility] who is requesting the victoza to see if they can clarify any issues.  They do see the new Rx ozempic sent and patient got this on 11/11/19.   This was auto refill request.     \"Refusal\" routed back to pharmacy with note.    Will route to team for call back from patient, will he run out of ozempic before 12/11/19 phone visit with MTM?    Marion Cota, RN  [facility]            "

## 2019-12-06 NOTE — TELEPHONE ENCOUNTER
Requested Prescriptions   Pending Prescriptions Disp Refills     VICTOZA PEN 18 MG/3ML soln [Pharmacy Med Name: VICTOZA 18MG/3ML INJ PEN 3ML]  Last Written Prescription Date:  10/8/2018  Last Fill Quantity: 21 mL,  # refills: 1   Last office visit: 9/5/2019 with prescribing provider:  VINCENT Talley   Future Office Visit:   Next 5 appointments (look out 90 days)    Dec 11, 2019 12:00 PM CST  (Arrive by 11:45 AM)  SHORT with Lauren Turner Bloch, RPH  [facility] ([facility]) [address] [place] [id number]  [phone]   Mar 04, 2020  9:00 AM CST  Return Visit with Harry Hodgson MD  [facility] ([address] [address]  [phone]        21 mL 0     Sig: ADMINISTER 1.8 MG UNDER THE SKIN DAILY       GLP-1 Agonists Protocol Failed - 12/6/2019  9:46 AM        Failed - Medication is active on med list        Failed - Normal serum creatinine on file in past 12 months     Recent Labs   Lab Test 11/11/19  1010   CR 1.39*             Passed - Blood pressure less than 140/90 in past 6 months     BP Readings from Last 3 Encounters:   11/11/19 121/72   09/13/19 138/88   09/05/19 116/72             Passed - LDL on file in past 12 months     Recent Labs   Lab Test 04/04/19  0902   LDL 60             Passed - Microalbumin on file in past 12 months     Recent Labs   Lab Test 04/04/19  0906   MICROL 8   UMALCR 4.32             Passed - HgbA1C in past 3 or 6 months     If HgbA1C is 8 or greater, it needs to be on file within the past 3 months.  If less than 8, must be on file within the past 6 months.     Recent Labs   Lab Test 11/11/19  1010   A1C 6.5*             Passed - Patient is age 18 or older        Passed - Recent (6 mo) or future (30 days) visit within the authorizing provider's specialty     Patient had office visit in the last 6 months or has a visit in the next 30 days with authorizing provider.  See  "\"Patient Info\" tab in inbasket, or \"Choose Columns\" in Meds & Orders section of the refill encounter.            "

## 2019-12-06 NOTE — TELEPHONE ENCOUNTER
Patient returns call to RN line, states he hasn't taken Victoza for a long time, is doing fine with his Ozempic and does not need a refill at this time, will f/u with MTM as scheduled.  Closing encounter.    Ale Cole RN

## 2019-12-23 DIAGNOSIS — K75.81 NASH (NONALCOHOLIC STEATOHEPATITIS): Primary | ICD-10-CM

## 2019-12-25 DIAGNOSIS — E66.811 CLASS 1 OBESITY WITH SERIOUS COMORBIDITY AND BODY MASS INDEX (BMI) OF 30.0 TO 30.9 IN ADULT, UNSPECIFIED OBESITY TYPE: ICD-10-CM

## 2019-12-30 RX ORDER — TOPIRAMATE 100 MG/1
TABLET, FILM COATED ORAL
Qty: 60 TABLET | Refills: 5 | OUTPATIENT
Start: 2019-12-30

## 2020-01-07 ENCOUNTER — TELEPHONE (OUTPATIENT)
Dept: GASTROENTEROLOGY | Facility: CLINIC | Age: 55
End: 2020-01-07

## 2020-01-07 NOTE — TELEPHONE ENCOUNTER
Patient contacted and reminded of upcoming appointment.  Patient confirmed they will be attending.  Patient instructed to bring updated medications list to appointment.    Berenice Harris on 1/7/2020 at 11:16 AM

## 2020-01-08 ENCOUNTER — OFFICE VISIT (OUTPATIENT)
Dept: OTOLARYNGOLOGY | Facility: CLINIC | Age: 55
End: 2020-01-08
Payer: COMMERCIAL

## 2020-01-08 VITALS
SYSTOLIC BLOOD PRESSURE: 149 MMHG | HEART RATE: 63 BPM | BODY MASS INDEX: 29.26 KG/M2 | HEIGHT: 72 IN | RESPIRATION RATE: 12 BRPM | OXYGEN SATURATION: 100 % | WEIGHT: 216 LBS | DIASTOLIC BLOOD PRESSURE: 84 MMHG

## 2020-01-08 DIAGNOSIS — R09.A2 GLOBUS SENSATION: Primary | ICD-10-CM

## 2020-01-08 PROCEDURE — 99203 OFFICE O/P NEW LOW 30 MIN: CPT | Mod: 25 | Performed by: OTOLARYNGOLOGY

## 2020-01-08 PROCEDURE — 31575 DIAGNOSTIC LARYNGOSCOPY: CPT | Performed by: OTOLARYNGOLOGY

## 2020-01-08 ASSESSMENT — MIFFLIN-ST. JEOR: SCORE: 1847.77

## 2020-01-08 NOTE — PATIENT INSTRUCTIONS
General Scheduling Information  To schedule your CT/MRI scan, please contact [place] [facility] at [phone] [address] [place] [address]    To schedule your Surgery, please contact our Specialty Schedulers at [phone]    ENT Clinic Locations Clinic Hours Telephone Number     Aylin Arevalo  [address] [place] [address]   Tuesday:       8:00am -- 4:00pm    Wednesday:  8:00am - 4:00pm   To schedule an appointment with   Dr. Ovalles,   please contact our   Specialty Scheduling Department at:     [phone]       Aylin Arias  [address] [place] [address]   Friday:          8:00am - 4:00pm         Urgent Care Locations Clinic Hours Telephone Numbers     Aylin Burch  [address] [place] [address]     Monday-Friday:     11:00pm - 9:00pm    Saturday-Sunday:  9:00am - 5:00pm   [phone]     Aylin Arias  [address] [place] [address]     Monday-Friday:      5:00pm - 9:00pm     Saturday-Sunday:  9:00am - 5:00pm   [phone]

## 2020-01-08 NOTE — LETTER
1/8/2020         RE: William Montes  [address]  [facility] [address]        Dear Colleague,    Thank you for referring your patient, William Montes, to the [facility]. Please see a copy of my visit note below.    I am seeing this patient in consultation for globus sensation at the request of the provider Dr. Sirena Talley.    Chief Complaint - foreign body sensation in throat    History of Present Illness - William Montes is a 54 year old male who presents with a history of feeling like something is in the back of the throat since the last 9 months. No pain. No real throat clearing. Voicing has seemed worse, more hoarse. Deeper voice. They note a history of relux. The patient tried omeprazole, and this helped. No cough, hemoptysis, odynaphagia, or dysphagia with solids. He takes lisinopril. Never smoker. It has gotten a little better. He has lost weight. He started caffeine via coffee one year ago.     Past Medical History -   Patient Active Problem List   Diagnosis     Chronic gingivitis     Esophageal reflux     Obesity     Hyperlipidemia LDL goal <100     Vitamin D deficiency     Elevated LFTs     Other chronic nonalcoholic liver disease     Glaucoma suspect, bilateral     Type 2 diabetes mellitus with hyperglycemia, without long-term current use of insulin (H)     Background diabetic retinopathy, mild, ou       Current Medications -   Current Outpatient Medications:      ASPIRIN 81 MG OR TABS, 1 tab po QD (Once per day), Disp: 100, Rfl: 3     atorvastatin (LIPITOR) 20 MG tablet, Take 1 tablet (20 mg) by mouth daily, Disp: 90 tablet, Rfl: 3     cholecalciferol (VITAMIN  -D) 1000 UNITS capsule, Take 1 capsule by mouth daily, Disp: , Rfl:      lisinopril (PRINIVIL/ZESTRIL) 5 MG tablet, TAKE 1 TABLET(5 MG) BY MOUTH DAILY, Disp: 90 tablet, Rfl: 1     metFORMIN (GLUCOPHAGE) 1000 MG tablet, TAKE 1 TABLET(1000 MG) BY MOUTH TWICE DAILY WITH MEALS, Disp: 180 tablet, Rfl: 1     ONETOUCH ULTRA test  strip, TEST DAILY, Disp: 100 strip, Rfl: 0     order for DME, Lancets.  Daily, Disp: 100 each, Rfl: 4     order for DME, Glucometer, brand as covered by insurance., Disp: 1 each, Rfl: 3     Semaglutide,0.25 or 0.5MG/DOS, 2 MG/1.5ML SOPN, Inject 0.25 mg subcutaneously once weekly for 4 weeks then 0.5 mg once weekly., Disp: 1 pen, Rfl: 3     topiramate (TOPAMAX) 100 MG tablet, Take 1 tablet (100 mg) by mouth 2 times daily, Disp: 60 tablet, Rfl: 5    Allergies - No Known Allergies    Social History -   Social History     Socioeconomic History     Marital status:      Spouse name: Luis     Number of children: 4     Years of education: Not on file     Highest education level: Not on file   Occupational History     Not on file   Social Needs     Financial resource strain: Not on file     Food insecurity:     Worry: Not on file     Inability: Not on file     Transportation needs:     Medical: Not on file     Non-medical: Not on file   Tobacco Use     Smoking status: Never Smoker     Smokeless tobacco: Never Used   Substance and Sexual Activity     Alcohol use: No     Comment: none for 11/2011     Drug use: No     Sexual activity: Yes     Partners: Female     Birth control/protection: None   Lifestyle     Physical activity:     Days per week: Not on file     Minutes per session: Not on file     Stress: Not on file   Relationships     Social connections:     Talks on phone: Not on file     Gets together: Not on file     Attends Mandaeism service: Not on file     Active member of club or organization: Not on file     Attends meetings of clubs or organizations: Not on file     Relationship status: Not on file     Intimate partner violence:     Fear of current or ex partner: Not on file     Emotionally abused: Not on file     Physically abused: Not on file     Forced sexual activity: Not on file   Other Topics Concern     Parent/sibling w/ CABG, MI or angioplasty before 65F 55M? No   Social History Narrative    Dairy/d  0 servings/d. Caffeine 1 per weekExercise 2 x weekSunscreen used - NoSeatbelts used - YesWorking smoke/CO detectors in the home - YesGuns stored in the home - NoSelf Breast Exams - NoSelf Testicular Exam - YesEye Exam up to date - YesDental Exam up to date - YesPap Smear up to date - NAMammogram up to date - NAPSA up to date - NoDexa Scan up to date - NoFlex Sig / Colonoscopy up to date - NoImmunizations up to date - YesAbuse: Current or Past(Physical, Sexual or Emotional)- NoDo you feel safe in your environment - YesRobert HeffernCMA5/12/06        No tattoos or piercings, blood transfusions, no illicit IV or intranasal drug use.        Family History -   Family History   Problem Relation Age of Onset     Hypertension Mother      Diabetes Sister      Diabetes Cousin      Hypertension Other      C.A.D. No family hx of      Cancer - colorectal No family hx of      Glaucoma No family hx of      Macular Degeneration No family hx of      Cancer No family hx of      Cerebrovascular Disease No family hx of      Breast Cancer No family hx of      Prostate Cancer No family hx of      Thyroid Disease No family hx of        Review of Systems - As per HPI and PMHx, otherwise 10+ comprehensive system review is negative.    Physical Exam  BP (!) 149/84   Pulse 63   Resp 12   Ht 1.829 m (6')   Wt 98 kg (216 lb)   SpO2 100%   BMI 29.29 kg/m     General - The patient is in no distress. Alert and oriented to person and place, answers questions and cooperates with examination appropriately.   Voice and Breathing - The patient was breathing comfortably without the use of accessory muscles. There was no wheezing, stridor, or stertor.  The patients voice was clear and strong.  Eyes - Extraocular movements intact.  Sclera were not icteric or injected, conjunctiva were pink and moist.  Mouth - Examination of the oral cavity showed pink, healthy oral mucosa. No lesions or ulcerations noted.  The tongue was mobile and midline.  Throat  - The walls of the oropharynx were smooth, symmetric, and had no lesions or ulcerations.  The tonsillar pillars and soft palate were symmetric.  The uvula was midline on elevation. Tonsils 1+, no masses or erythema.   Nose - External contour is symmetric, no gross deflection or scars.  Nasal mucosa is pink and moist with no abnormal mucus.  The septum was midline and non-obstructive, turbinates of normal size and position.  No polyps, masses, or purulence noted on examination.  Neck -  Palpation of the occipital, submental, submandibular, internal jugular chain, and supraclavicular nodes did not demonstrate any abnormal lymph nodes or masses. No parotid masses. Palpation of the thyroid was soft and smooth, with no nodules or goiter appreciated.  The trachea was mobile and midline.  Neurologic - CN II-XII are grossly intact, no focal neurologic deficits.   Cardiovascular - carotid pulses are 2+ bilaterally, regular rhythm    Procedure: Flexible Endoscopy  Indication: Globus Sensation    To best visualize the upper airway anatomy and due to the chief complaint and HPI, I proceeded with a fiberoptic examination. Color photographs were taken for the medical record. First I sprayed the right side of the nose with a mixture of lidocaine and neosynephrine.  I then passed the scope through the nasal cavity.  The nasal cavity was unremarkable.  The nasopharynx was mucosally covered and symmetric.  The Eustachian tube openings were unobstructed.  Going further down I had a clear view of the base of tongue which had normal appearing lingual tonsillar tissue.  The base of tongue was free of lesions, masses, and the vallecula was open.  The epiglottis was smooth and mucosally covered.  The supraglottic larynx was then clearly visualized. It was normal. The vocal cords moved smoothly and symmetrically, they were pearly white and no lesions were seen.  The pyriform sinuses were open, and the view of the postcricoid region did not  show any lesions.      A/P - William Montes is a 54 year old male with globus sensation but no evidence of infection, inflammation, or neoplasm on exam to explain the symptoms. I think the most likely etiology is laryngopharyngeal reflux.  He also never used to drink coffee but has been doing so the last year when this problem occurred.  There may be a little bit of anxiety, dehydration, and increased reflux all contributing.  I advised him to stop coffee.  I have provided counseling on lifestyle changes including avoidance of certain foods, sleeping on an incline, and avoiding lying down within 3-4 hours after eating.  Lastly if this fails I want him to try a longer course of omeprazole, approximately 4 to 6 weeks of 20 mg/day.    Adult lifestyle changes to prevent LPR reviewed      Avoid eating and drinking within two to three hours prior to bedtime    Do not drink alcohol    Eat small meals and slowly    Limit problem foods:    o Caffeine  o Carbonated drinks  o Chocolate  o Peppermint  o Tomato  o Citrus fruits  o Fatty and fried foods      Lose weight    Quit smoking    Wear loose clothing      Dr. Michael Ovalles  Otolaryngology  [facility]      Again, thank you for allowing me to participate in the care of your patient.        Sincerely,        Michael Ovalles MD

## 2020-01-08 NOTE — PROGRESS NOTES
I am seeing this patient in consultation for globus sensation at the request of the provider Dr. Sirena Talley.    Chief Complaint - foreign body sensation in throat    History of Present Illness - William Montes is a 54 year old male who presents with a history of feeling like something is in the back of the throat since the last 9 months. No pain. No real throat clearing. Voicing has seemed worse, more hoarse. Deeper voice. They note a history of relux. The patient tried omeprazole, and this helped. No cough, hemoptysis, odynaphagia, or dysphagia with solids. He takes lisinopril. Never smoker. It has gotten a little better. He has lost weight. He started caffeine via coffee one year ago.     Past Medical History -   Patient Active Problem List   Diagnosis     Chronic gingivitis     Esophageal reflux     Obesity     Hyperlipidemia LDL goal <100     Vitamin D deficiency     Elevated LFTs     Other chronic nonalcoholic liver disease     Glaucoma suspect, bilateral     Type 2 diabetes mellitus with hyperglycemia, without long-term current use of insulin (H)     Background diabetic retinopathy, mild, ou       Current Medications -   Current Outpatient Medications:      ASPIRIN 81 MG OR TABS, 1 tab po QD (Once per day), Disp: 100, Rfl: 3     atorvastatin (LIPITOR) 20 MG tablet, Take 1 tablet (20 mg) by mouth daily, Disp: 90 tablet, Rfl: 3     cholecalciferol (VITAMIN  -D) 1000 UNITS capsule, Take 1 capsule by mouth daily, Disp: , Rfl:      lisinopril (PRINIVIL/ZESTRIL) 5 MG tablet, TAKE 1 TABLET(5 MG) BY MOUTH DAILY, Disp: 90 tablet, Rfl: 1     metFORMIN (GLUCOPHAGE) 1000 MG tablet, TAKE 1 TABLET(1000 MG) BY MOUTH TWICE DAILY WITH MEALS, Disp: 180 tablet, Rfl: 1     ONETOUCH ULTRA test strip, TEST DAILY, Disp: 100 strip, Rfl: 0     order for DME, Lancets.  Daily, Disp: 100 each, Rfl: 4     order for DME, Glucometer, brand as covered by insurance., Disp: 1 each, Rfl: 3     Semaglutide,0.25 or 0.5MG/DOS, 2 MG/1.5ML SOPN,  Inject 0.25 mg subcutaneously once weekly for 4 weeks then 0.5 mg once weekly., Disp: 1 pen, Rfl: 3     topiramate (TOPAMAX) 100 MG tablet, Take 1 tablet (100 mg) by mouth 2 times daily, Disp: 60 tablet, Rfl: 5    Allergies - No Known Allergies    Social History -   Social History     Socioeconomic History     Marital status:      Spouse name: Luis     Number of children: 4     Years of education: Not on file     Highest education level: Not on file   Occupational History     Not on file   Social Needs     Financial resource strain: Not on file     Food insecurity:     Worry: Not on file     Inability: Not on file     Transportation needs:     Medical: Not on file     Non-medical: Not on file   Tobacco Use     Smoking status: Never Smoker     Smokeless tobacco: Never Used   Substance and Sexual Activity     Alcohol use: No     Comment: none for 11/2011     Drug use: No     Sexual activity: Yes     Partners: Female     Birth control/protection: None   Lifestyle     Physical activity:     Days per week: Not on file     Minutes per session: Not on file     Stress: Not on file   Relationships     Social connections:     Talks on phone: Not on file     Gets together: Not on file     Attends L[employer] service: Not on file     Active member of club or organization: Not on file     Attends meetings of clubs or organizations: Not on file     Relationship status: Not on file     Intimate partner violence:     Fear of current or ex partner: Not on file     Emotionally abused: Not on file     Physically abused: Not on file     Forced sexual activity: Not on file   Other Topics Concern     Parent/sibling w/ CABG, MI or angioplasty before 65F 55M? No   Social History Narrative    Dairy/d 0 servings/d. Caffeine 1 per weekExercise 2 x weekSunscreen used - NoSeatbelts used - YesWorking smoke/CO detectors in the home - YesGuns stored in the home - NoSelf Breast Exams - NoSelf Testicular Exam - YesEye Exam up to date -  YesDental Exam up to date - YesPap Smear up to date - NAMammogram up to date - NAPSA up to date - NoDexa Scan up to date - NoFlex Sig / Colonoscopy up to date - NoImmunizations up to date - YesAbuse: Current or Past(Physical, Sexual or Emotional)- NoDo you feel safe in your environment - YesRobert HeffernCMA5/12/06        No tattoos or piercings, blood transfusions, no illicit IV or intranasal drug use.        Family History -   Family History   Problem Relation Age of Onset     Hypertension Mother      Diabetes Sister      Diabetes Cousin      Hypertension Other      C.A.D. No family hx of      Cancer - colorectal No family hx of      Glaucoma No family hx of      Macular Degeneration No family hx of      Cancer No family hx of      Cerebrovascular Disease No family hx of      Breast Cancer No family hx of      Prostate Cancer No family hx of      Thyroid Disease No family hx of        Review of Systems - As per HPI and PMHx, otherwise 10+ comprehensive system review is negative.    Physical Exam  BP (!) 149/84   Pulse 63   Resp 12   Ht 1.829 m (6')   Wt 98 kg (216 lb)   SpO2 100%   BMI 29.29 kg/m    General - The patient is in no distress. Alert and oriented to person and place, answers questions and cooperates with examination appropriately.   Voice and Breathing - The patient was breathing comfortably without the use of accessory muscles. There was no wheezing, stridor, or stertor.  The patients voice was clear and strong.  Eyes - Extraocular movements intact.  Sclera were not icteric or injected, conjunctiva were pink and moist.  Mouth - Examination of the oral cavity showed pink, healthy oral mucosa. No lesions or ulcerations noted.  The tongue was mobile and midline.  Throat - The walls of the oropharynx were smooth, symmetric, and had no lesions or ulcerations.  The tonsillar pillars and soft palate were symmetric.  The uvula was midline on elevation. Tonsils 1+, no masses or erythema.   Nose -  External contour is symmetric, no gross deflection or scars.  Nasal mucosa is pink and moist with no abnormal mucus.  The septum was midline and non-obstructive, turbinates of normal size and position.  No polyps, masses, or purulence noted on examination.  Neck -  Palpation of the occipital, submental, submandibular, internal jugular chain, and supraclavicular nodes did not demonstrate any abnormal lymph nodes or masses. No parotid masses. Palpation of the thyroid was soft and smooth, with no nodules or goiter appreciated.  The trachea was mobile and midline.  Neurologic - CN II-XII are grossly intact, no focal neurologic deficits.   Cardiovascular - carotid pulses are 2+ bilaterally, regular rhythm    Procedure: Flexible Endoscopy  Indication: Globus Sensation    To best visualize the upper airway anatomy and due to the chief complaint and HPI, I proceeded with a fiberoptic examination. Color photographs were taken for the medical record. First I sprayed the right side of the nose with a mixture of lidocaine and neosynephrine.  I then passed the scope through the nasal cavity.  The nasal cavity was unremarkable.  The nasopharynx was mucosally covered and symmetric.  The Eustachian tube openings were unobstructed.  Going further down I had a clear view of the base of tongue which had normal appearing lingual tonsillar tissue.  The base of tongue was free of lesions, masses, and the vallecula was open.  The epiglottis was smooth and mucosally covered.  The supraglottic larynx was then clearly visualized. It was normal. The vocal cords moved smoothly and symmetrically, they were pearly white and no lesions were seen.  The pyriform sinuses were open, and the view of the postcricoid region did not show any lesions.      A/P - William Montes is a 54 year old male with globus sensation but no evidence of infection, inflammation, or neoplasm on exam to explain the symptoms. I think the most likely etiology is  laryngopharyngeal reflux.  He also never used to drink coffee but has been doing so the last year when this problem occurred.  There may be a little bit of anxiety, dehydration, and increased reflux all contributing.  I advised him to stop coffee.  I have provided counseling on lifestyle changes including avoidance of certain foods, sleeping on an incline, and avoiding lying down within 3-4 hours after eating.  Lastly if this fails I want him to try a longer course of omeprazole, approximately 4 to 6 weeks of 20 mg/day.    Adult lifestyle changes to prevent LPR reviewed      Avoid eating and drinking within two to three hours prior to bedtime    Do not drink alcohol    Eat small meals and slowly    Limit problem foods:    o Caffeine  o Carbonated drinks  o Chocolate  o Peppermint  o Tomato  o Citrus fruits  o Fatty and fried foods      Lose weight    Quit smoking    Wear loose clothing      Dr. Michael Ovalles  Otolaryngology  [facility]

## 2020-01-09 ENCOUNTER — OFFICE VISIT (OUTPATIENT)
Dept: GASTROENTEROLOGY | Facility: CLINIC | Age: 55
End: 2020-01-09
Attending: INTERNAL MEDICINE
Payer: COMMERCIAL

## 2020-01-09 VITALS
TEMPERATURE: 97.6 F | WEIGHT: 210 LBS | OXYGEN SATURATION: 99 % | DIASTOLIC BLOOD PRESSURE: 81 MMHG | BODY MASS INDEX: 28.48 KG/M2 | SYSTOLIC BLOOD PRESSURE: 122 MMHG | HEART RATE: 60 BPM

## 2020-01-09 DIAGNOSIS — E66.811 CLASS 1 OBESITY DUE TO EXCESS CALORIES WITH SERIOUS COMORBIDITY IN ADULT, UNSPECIFIED BMI: ICD-10-CM

## 2020-01-09 DIAGNOSIS — K75.81 NASH (NONALCOHOLIC STEATOHEPATITIS): ICD-10-CM

## 2020-01-09 DIAGNOSIS — E66.09 CLASS 1 OBESITY DUE TO EXCESS CALORIES WITH SERIOUS COMORBIDITY IN ADULT, UNSPECIFIED BMI: ICD-10-CM

## 2020-01-09 DIAGNOSIS — K76.0 NAFLD (NONALCOHOLIC FATTY LIVER DISEASE): Primary | ICD-10-CM

## 2020-01-09 DIAGNOSIS — R79.89 ELEVATED LFTS: ICD-10-CM

## 2020-01-09 LAB
AFP SERPL-MCNC: 1.9 UG/L (ref 0–8)
ALBUMIN SERPL-MCNC: 4.1 G/DL (ref 3.4–5)
ALP SERPL-CCNC: 75 U/L (ref 40–150)
ALT SERPL W P-5'-P-CCNC: 27 U/L (ref 0–70)
ANION GAP SERPL CALCULATED.3IONS-SCNC: 6 MMOL/L (ref 3–14)
AST SERPL W P-5'-P-CCNC: 16 U/L (ref 0–45)
BILIRUB DIRECT SERPL-MCNC: <0.1 MG/DL (ref 0–0.2)
BILIRUB SERPL-MCNC: 0.3 MG/DL (ref 0.2–1.3)
BUN SERPL-MCNC: 13 MG/DL (ref 7–30)
CALCIUM SERPL-MCNC: 9.4 MG/DL (ref 8.5–10.1)
CHLORIDE SERPL-SCNC: 112 MMOL/L (ref 94–109)
CO2 SERPL-SCNC: 23 MMOL/L (ref 20–32)
CREAT SERPL-MCNC: 1.43 MG/DL (ref 0.66–1.25)
ERYTHROCYTE [DISTWIDTH] IN BLOOD BY AUTOMATED COUNT: 12.1 % (ref 10–15)
GFR SERPL CREATININE-BSD FRML MDRD: 54 ML/MIN/{1.73_M2}
GLUCOSE SERPL-MCNC: 92 MG/DL (ref 70–99)
HCT VFR BLD AUTO: 46.6 % (ref 40–53)
HGB BLD-MCNC: 14.8 G/DL (ref 13.3–17.7)
INR PPP: 1.02 (ref 0.86–1.14)
MCH RBC QN AUTO: 29 PG (ref 26.5–33)
MCHC RBC AUTO-ENTMCNC: 31.8 G/DL (ref 31.5–36.5)
MCV RBC AUTO: 91 FL (ref 78–100)
PLATELET # BLD AUTO: 142 10E9/L (ref 150–450)
POTASSIUM SERPL-SCNC: 3.8 MMOL/L (ref 3.4–5.3)
PROT SERPL-MCNC: 7.3 G/DL (ref 6.8–8.8)
RBC # BLD AUTO: 5.11 10E12/L (ref 4.4–5.9)
SODIUM SERPL-SCNC: 141 MMOL/L (ref 133–144)
WBC # BLD AUTO: 3.3 10E9/L (ref 4–11)

## 2020-01-09 PROCEDURE — 80076 HEPATIC FUNCTION PANEL: CPT | Performed by: INTERNAL MEDICINE

## 2020-01-09 PROCEDURE — 80048 BASIC METABOLIC PNL TOTAL CA: CPT | Performed by: INTERNAL MEDICINE

## 2020-01-09 PROCEDURE — G0463 HOSPITAL OUTPT CLINIC VISIT: HCPCS | Mod: ZF

## 2020-01-09 PROCEDURE — 85610 PROTHROMBIN TIME: CPT | Performed by: INTERNAL MEDICINE

## 2020-01-09 PROCEDURE — 85027 COMPLETE CBC AUTOMATED: CPT | Performed by: INTERNAL MEDICINE

## 2020-01-09 PROCEDURE — 82105 ALPHA-FETOPROTEIN SERUM: CPT | Performed by: INTERNAL MEDICINE

## 2020-01-09 PROCEDURE — 36415 COLL VENOUS BLD VENIPUNCTURE: CPT | Performed by: INTERNAL MEDICINE

## 2020-01-09 ASSESSMENT — PAIN SCALES - GENERAL: PAINLEVEL: NO PAIN (0)

## 2020-01-09 NOTE — PROGRESS NOTES
Date of Service:January 9, 2020     Subjective:            William Montes is a 54 year old male presenting for follow up of abnormal liver tests    History of Present Illness   William Montes is a 54 year old male with past medical history of obesity, diabetes mellitus, hypercholesterolemia, and known fatty liver disease who presents in follow up with concerns of fatty liver disease and abnormal liver tests.    During her last clinic visit he had a noninvasive fibrosis scan performed which suggested stage II/III of 4 hepatic fibrosis.  On that last clinic visit was noted weight 217 pounds.  Since last seen he is continued on a diet and exercise regimen and presents today weighing 210 pounds.  He continues to work closely with his primary provider in regards to management of his diabetes and dyslipidemia.  Denies issues with it with impaired memory, fluid retention, evidence of GI bleeding.    History of Liver Disease  He denies significant have history of alcohol use and denies any history of alcohol misuse.  Denies a history of IV or inhaled drugs of abuse.      In regards to social history he is originally from [facility], Moved to [place] between 1996 and 2012.  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  Has been back in [place] since 2017.    Past Medical History:  Past Medical History:   Diagnosis Date     ABNORMAL LIVER FUNCTION STUDY 1/4/2008    increased ast, alt Normal since 2008     Chronic gingivitis      Esophageal reflux      Glaucoma      History of blood transfusion     My son has aplastic anemia and has had bld transfusions     Nonspecific abnormal results of liver function study      OBESITY NOS 12/13/2006     Pure hypercholesterolemia      Type II or unspecified type diabetes mellitus without mention of complication, not stated as  uncontrolled      Uncomplicated asthma     My mother has asthma       Surgical History:  Past Surgical History:   Procedure Laterality Date     BIOPSY      My son had bone marrow biopsy     COLONOSCOPY N/A 5/22/2019    Procedure: COLONOSCOPY;  Surgeon: Leventhal, Thomas Michael, MD;  Location: [facility] OR     ENT SURGERY      My son has had tympanoplasty     NO HISTORY OF SURGERY       ORTHOPEDIC SURGERY      My mother had left hip replacement surgery       Social History:  Social History     Tobacco Use     Smoking status: Never Smoker     Smokeless tobacco: Never Used   Substance Use Topics     Alcohol use: No     Comment: none for 11/2011     Drug use: No        Family History:  Family History   Problem Relation Age of Onset     Hypertension Mother      Diabetes Sister      Diabetes Cousin      Hypertension Other      C.A.D. No family hx of      Cancer - colorectal No family hx of      Glaucoma No family hx of      Macular Degeneration No family hx of      Cancer No family hx of      Cerebrovascular Disease No family hx of      Breast Cancer No family hx of      Prostate Cancer No family hx of      Thyroid Disease No family hx of        Medications:  Current Outpatient Medications   Medication     ASPIRIN 81 MG OR TABS     atorvastatin (LIPITOR) 20 MG tablet     cholecalciferol (VITAMIN  -D) 1000 UNITS capsule     lisinopril (PRINIVIL/ZESTRIL) 5 MG tablet     metFORMIN (GLUCOPHAGE) 1000 MG tablet     ONETOUCH ULTRA test strip     order for DME     order for DME     Semaglutide,0.25 or 0.5MG/DOS, 2 MG/1.5ML SOPN     topiramate (TOPAMAX) 100 MG tablet     No current facility-administered medications for this visit.        Review of Systems  A complete 10 point review of systems was asked and answered in the negative unless specifically commented upon in the HPI    Objective:         Vitals:    01/09/20 0942   BP: 122/81   Pulse: 60   Temp: 97.6  F (36.4  C)   SpO2: 99%   Weight: 95.3 kg (210 lb)     Body mass index is  28.48 kg/m .     Physical Exam    Vitals reviewed.   Constitutional: Well-developed, well-nourished, in no apparent distress.    HEENT: Normocephalic. No scleral icterus. Moist oral mucosa. Dentition normal  Neck/Lymph: Normal ROM, supple.   Cardiac:  Regular rate and rhythm.  Respiratory: normal respiratory excursion  GI:  Abdomen soft, non-distended, obese, non-tender. BS present.  Skin:  Skin is warm and dry. No rash noted.    Peripheral Vascular: no lower extremity edema.   Musculoskeletal:  ROM intact, normal muscle bulk    Psychiatric: Normal mood and affect.  Neuro:  no asterixis, no tremor    Labs and Diagnostic tests:  Results for RAYO PINA (MRN [id number]) as of 2020 13:17   Ref. Range 2018 09:39 2019 09:02 2019 09:44 2020 09:16   AST Latest Ref Range: 0 - 45 U/L 75 (H) 21 20 16   ALT Latest Ref Range: 0 - 70 U/L 96 (H) 35 30 27   Alkaline Phosphatase Latest Ref Range: 40 - 150 U/L 68 66 67 75   Bilirubin Total Latest Ref Range: 0.2 - 1.3 mg/dL 0.4 0.5 0.5 0.3     Imagin/10/2019  FINDINGS: There is diffuse fatty infiltration of the liver. No focal  hepatic lesions are identified. The gallbladder is unremarkable, with  no evidence for shadowing gallstones or gallbladder wall thickening.  Negative sonographic Fernando sign. No intra or extrahepatic bile duct  dilatation. The common duct could not be visualized in its entirety.  Limited evaluation of the right kidney is unremarkable. Pancreas is  partially obscured by overlying bowel gas, but appears normal where  seen.    Procedures:  Fibrosis Scan:  Median Fibrosis: 9.2kPa  Consistent with F2-F3 fibrosis    Assessment and Plan:    Non-Alcoholic Fatty Liver Disease  -Based on history, history of obesity and other risk factors, and improvement in labs over time with significant weight loss and lifestyle modification: This does fit with a diagnosis of nonalcoholic fatty liver disease with possible nonalcoholic  "steatohepatitis  -The patient has embraced significant lifestyle modification has had a dramatic improvement in his overall liver tests    -Felt prudent for the patient to continue with lifestyle modification and strongly recommend he target a baseline weight of less than 200 pounds  -Discussed the natural history of fatty liver disease and that if inflammation is stopped that fibrosis may reverse  -We will plan on repeat fibrosis scan with next clinic visit    Management of Fatty Liver Disease  - I had a long discussion with the patient about nonalcoholic fatty liver disease (NAFLD).  We discussed how fat deposits in the liver, how this leads to inflammation, and how chronic inflammation (EDWARDS) can ultimately lead to scarring and cirrhosis.  The long-term complications of fatty liver disease were discussed with the patient, including the increased risk of cardiovascular disease complications,the risk of developing diabetes (if not already), and variable progression to cirrhosis and end stage liver disease  - I did discuss with the patient about an appropriate diet, exercise and weight loss plan.    - The patient should exercise regularly 4+ times per week, with an average of about 45 minutes per day.   - The patient should be on low-carbohydrate, low-calorie diet (4230-1364 calories per day). The weight loss plan is to lose 7-10% of body weight in the next three to six months' time.  It has shown that patients who exercise regularly can have improvement of insulin resistance and resolution of fatty liver disease, even if they are not able to lose weight.   - Diabetes management is crucial with ideal goal Hgb A1c goal of =/< 7%. If possible addition of insulin sensitizing agents like metformin of liraglutide will be helpful.    - Management of elevated cholesterol is crucial in this population.  The use of \"statins\" (HMG-CoA reductase medications) are an effective means of therapy and are not contra-indicated in " those with abnormal liver tests OR those with cirrhosis.  The value of these medications in this population far outweigh the minor risks of abnormal liver tests.  Goal LDL in those with EDWARDS are < 100 mg/dL    Follow Up:  12 months     Thank you very much for the opportunity to participate in the care of this patient.  If you have any further questions, please don't hesitate to contact our office.    Thomas M. Leventhal, M.D.   of Medicine  Advanced & Transplant Hepatology  [facility]

## 2020-01-09 NOTE — NURSING NOTE
Chief Complaint   Patient presents with     RECHECK     Follow up Hyperlipidemia     Vital signs:  Temp: 97.6  F (36.4  C)   BP: 122/81 Pulse: 60     SpO2: 99 %       Weight: 95.3 kg (210 lb)  Estimated body mass index is 28.48 kg/m  as calculated from the following:    Height as of 1/8/20: 1.829 m (6').    Weight as of this encounter: 95.3 kg (210 lb).        Francesca Young, CMA

## 2020-01-09 NOTE — RESULT ENCOUNTER NOTE
Greetings,      Wanted to make sure you have a copy of your most recent labs for your records.  Based on these labs, we are not making any new changes to your clinical plan.    It has been a pleasure to participate in your care.  Please call our clinic if you have any questions or concerns.    Thomas M. Leventhal, M.D.   of Medicine  Advanced & Transplant Hepatology  [facility]

## 2020-01-09 NOTE — LETTER
1/9/2020      RE: William Montes  [address]  [facility] [address]       Date of Service:January 9, 2020     Subjective:            William Montes is a 54 year old male presenting for follow up of abnormal liver tests    History of Present Illness   William Montes is a 54 year old male with past medical history of obesity, diabetes mellitus, hypercholesterolemia, and known fatty liver disease who presents in follow up with concerns of fatty liver disease and abnormal liver tests.    During her last clinic visit he had a noninvasive fibrosis scan performed which suggested stage II/III of 4 hepatic fibrosis.  On that last clinic visit was noted weight 217 pounds.  Since last seen he is continued on a diet and exercise regimen and presents today weighing 210 pounds.  He continues to work closely with his primary provider in regards to management of his diabetes and dyslipidemia.  Denies issues with it with impaired memory, fluid retention, evidence of GI bleeding.    History of Liver Disease  He denies significant have history of alcohol use and denies any history of alcohol misuse.  Denies a history of IV or inhaled drugs of abuse.      In regards to social history he is originally from [facility], Moved to [place] between 1996 and 2012.  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  He works as a , previously with [employer] now with a private group.  Has been back in [place] since 2017.    Past Medical History:  Past Medical History:   Diagnosis Date     ABNORMAL LIVER FUNCTION STUDY 1/4/2008    increased ast, alt Normal since 2008     Chronic gingivitis      Esophageal reflux      Glaucoma      History of blood transfusion     My son has aplastic anemia and has had bld transfusions     Nonspecific abnormal results of liver function study      OBESITY NOS 12/13/2006     Pure hypercholesterolemia      Type II or unspecified  type diabetes mellitus without mention of complication, not stated as uncontrolled      Uncomplicated asthma     My mother has asthma       Surgical History:  Past Surgical History:   Procedure Laterality Date     BIOPSY      My son had bone marrow biopsy     COLONOSCOPY N/A 5/22/2019    Procedure: COLONOSCOPY;  Surgeon: Leventhal, Thomas Michael, MD;  Location: [facility] OR     ENT SURGERY      My son has had tympanoplasty     NO HISTORY OF SURGERY       ORTHOPEDIC SURGERY      My mother had left hip replacement surgery       Social History:  Social History     Tobacco Use     Smoking status: Never Smoker     Smokeless tobacco: Never Used   Substance Use Topics     Alcohol use: No     Comment: none for 11/2011     Drug use: No        Family History:  Family History   Problem Relation Age of Onset     Hypertension Mother      Diabetes Sister      Diabetes Cousin      Hypertension Other      C.A.D. No family hx of      Cancer - colorectal No family hx of      Glaucoma No family hx of      Macular Degeneration No family hx of      Cancer No family hx of      Cerebrovascular Disease No family hx of      Breast Cancer No family hx of      Prostate Cancer No family hx of      Thyroid Disease No family hx of        Medications:  Current Outpatient Medications   Medication     ASPIRIN 81 MG OR TABS     atorvastatin (LIPITOR) 20 MG tablet     cholecalciferol (VITAMIN  -D) 1000 UNITS capsule     lisinopril (PRINIVIL/ZESTRIL) 5 MG tablet     metFORMIN (GLUCOPHAGE) 1000 MG tablet     ONETOUCH ULTRA test strip     order for DME     order for DME     Semaglutide,0.25 or 0.5MG/DOS, 2 MG/1.5ML SOPN     topiramate (TOPAMAX) 100 MG tablet     No current facility-administered medications for this visit.        Review of Systems  A complete 10 point review of systems was asked and answered in the negative unless specifically commented upon in the HPI    Objective:         Vitals:    01/09/20 0942   BP: 122/81   Pulse: 60   Temp: 97.6  F  (36.4  C)   SpO2: 99%   Weight: 95.3 kg (210 lb)     Body mass index is 28.48 kg/m .     Physical Exam    Vitals reviewed.   Constitutional: Well-developed, well-nourished, in no apparent distress.    HEENT: Normocephalic. No scleral icterus. Moist oral mucosa. Dentition normal  Neck/Lymph: Normal ROM, supple.   Cardiac:  Regular rate and rhythm.  Respiratory: normal respiratory excursion  GI:  Abdomen soft, non-distended, obese, non-tender. BS present.  Skin:  Skin is warm and dry. No rash noted.    Peripheral Vascular: no lower extremity edema.   Musculoskeletal:  ROM intact, normal muscle bulk    Psychiatric: Normal mood and affect.  Neuro:  no asterixis, no tremor    Labs and Diagnostic tests:  Results for RAYO PINA (MRN [id number]) as of 2020 13:17   Ref. Range 2018 09:39 2019 09:02 2019 09:44 2020 09:16   AST Latest Ref Range: 0 - 45 U/L 75 (H) 21 20 16   ALT Latest Ref Range: 0 - 70 U/L 96 (H) 35 30 27   Alkaline Phosphatase Latest Ref Range: 40 - 150 U/L 68 66 67 75   Bilirubin Total Latest Ref Range: 0.2 - 1.3 mg/dL 0.4 0.5 0.5 0.3     Imagin/10/2019  FINDINGS: There is diffuse fatty infiltration of the liver. No focal  hepatic lesions are identified. The gallbladder is unremarkable, with  no evidence for shadowing gallstones or gallbladder wall thickening.  Negative sonographic Fernando sign. No intra or extrahepatic bile duct  dilatation. The common duct could not be visualized in its entirety.  Limited evaluation of the right kidney is unremarkable. Pancreas is  partially obscured by overlying bowel gas, but appears normal where  seen.    Procedures:  Fibrosis Scan:  Median Fibrosis: 9.2kPa  Consistent with F2-F3 fibrosis    Assessment and Plan:    Non-Alcoholic Fatty Liver Disease  -Based on history, history of obesity and other risk factors, and improvement in labs over time with significant weight loss and lifestyle modification: This does fit with a diagnosis of  "nonalcoholic fatty liver disease with possible nonalcoholic steatohepatitis  -The patient has embraced significant lifestyle modification has had a dramatic improvement in his overall liver tests    -Felt prudent for the patient to continue with lifestyle modification and strongly recommend he target a baseline weight of less than 200 pounds  -Discussed the natural history of fatty liver disease and that if inflammation is stopped that fibrosis may reverse  -We will plan on repeat fibrosis scan with next clinic visit    Management of Fatty Liver Disease  - I had a long discussion with the patient about nonalcoholic fatty liver disease (NAFLD).  We discussed how fat deposits in the liver, how this leads to inflammation, and how chronic inflammation (EDWARDS) can ultimately lead to scarring and cirrhosis.  The long-term complications of fatty liver disease were discussed with the patient, including the increased risk of cardiovascular disease complications,the risk of developing diabetes (if not already), and variable progression to cirrhosis and end stage liver disease  - I did discuss with the patient about an appropriate diet, exercise and weight loss plan.    - The patient should exercise regularly 4+ times per week, with an average of about 45 minutes per day.   - The patient should be on low-carbohydrate, low-calorie diet (4672-3367 calories per day). The weight loss plan is to lose 7-10% of body weight in the next three to six months' time.  It has shown that patients who exercise regularly can have improvement of insulin resistance and resolution of fatty liver disease, even if they are not able to lose weight.   - Diabetes management is crucial with ideal goal Hgb A1c goal of =/< 7%. If possible addition of insulin sensitizing agents like metformin of liraglutide will be helpful.    - Management of elevated cholesterol is crucial in this population.  The use of \"statins\" (HMG-CoA reductase medications) are an " effective means of therapy and are not contra-indicated in those with abnormal liver tests OR those with cirrhosis.  The value of these medications in this population far outweigh the minor risks of abnormal liver tests.  Goal LDL in those with EDWARDS are < 100 mg/dL    Follow Up:  12 months     Thank you very much for the opportunity to participate in the care of this patient.  If you have any further questions, please don't hesitate to contact our office.    Thomas M. Leventhal, M.D.   of Medicine  Advanced & Transplant Hepatology  [facility]

## 2020-02-17 ENCOUNTER — OFFICE VISIT (OUTPATIENT)
Dept: FAMILY MEDICINE | Facility: CLINIC | Age: 55
End: 2020-02-17
Payer: COMMERCIAL

## 2020-02-17 VITALS
BODY MASS INDEX: 27.9 KG/M2 | HEART RATE: 77 BPM | HEIGHT: 72 IN | SYSTOLIC BLOOD PRESSURE: 118 MMHG | DIASTOLIC BLOOD PRESSURE: 66 MMHG | WEIGHT: 206 LBS | OXYGEN SATURATION: 98 % | TEMPERATURE: 98.6 F

## 2020-02-17 DIAGNOSIS — J10.1 INFLUENZA A: Primary | ICD-10-CM

## 2020-02-17 DIAGNOSIS — R05.9 COUGH: ICD-10-CM

## 2020-02-17 DIAGNOSIS — R50.9 FEVER, UNSPECIFIED FEVER CAUSE: ICD-10-CM

## 2020-02-17 LAB
FLUAV+FLUBV AG SPEC QL: NEGATIVE
FLUAV+FLUBV AG SPEC QL: POSITIVE
SPECIMEN SOURCE: ABNORMAL

## 2020-02-17 PROCEDURE — 87804 INFLUENZA ASSAY W/OPTIC: CPT | Performed by: PHYSICIAN ASSISTANT

## 2020-02-17 PROCEDURE — 99213 OFFICE O/P EST LOW 20 MIN: CPT | Performed by: PHYSICIAN ASSISTANT

## 2020-02-17 ASSESSMENT — MIFFLIN-ST. JEOR: SCORE: 1802.41

## 2020-02-17 NOTE — LETTER
[facility]  [address] [place] [id number] [phone]          February 17, 2020    RE:  William Montes                                                                                                                                                       [address] [place] [address]            To whom it may concern:    Please excuse Wliliam from work today, Tuesday and Wednesday. He might need off Thursday of this week if still unwell.     Sincerely,        Shane Phipps

## 2020-02-17 NOTE — PROGRESS NOTES
Subjective     William Montes is a 54 year old male who presents to clinic today for the following health issues:    HPI   Acute Illness   Acute illness concerns: Cough, fever  Onset: 3 days ago    Fever: YES, patient did not take temp, just felt hot.     Chills/Sweats: YES    Headache (location?): YES    Sinus Pressure:no    Conjunctivitis:  no    Ear Pain: no    Rhinorrhea: no     Congestion: no     Sore Throat: no      Cough: YES, dark brown septum     Wheeze: no     Decreased Appetite: YES    Nausea: no     Vomiting: no     Diarrhea:  no     Dysuria/Freq.: no     Fatigue/Achiness: no     Sick/Strep Exposure: no      Therapies Tried and outcome: Theraflu and robitussin     Patient is having chest pain when he coughs.               Reviewed and updated as needed this visit by Provider  Tobacco  Allergies  Meds  Problems  Med Hx  Surg Hx  Fam Hx         Review of Systems   ROS COMP: Constitutional, HEENT, cardiovascular, pulmonary, gi and gu systems are negative, except as otherwise noted.      Objective    /66 (BP Location: Right arm, Patient Position: Chair, Cuff Size: Adult Large)   Pulse 77   Temp 98.6  F (37  C) (Oral)   Ht 1.829 m (6')   Wt 93.4 kg (206 lb)   SpO2 98%   BMI 27.94 kg/m    Body mass index is 27.94 kg/m .  Physical Exam   GENERAL: healthy, alert and no distress  HENT: ear canals and TM's normal, nose and mouth without ulcers or lesions  NECK: no adenopathy, no asymmetry, masses, or scars and thyroid normal to palpation  RESP: lungs clear to auscultation - no rales, rhonchi or wheezes  CV: regular rate and rhythm, normal S1 S2, no S3 or S4, no murmur, click or rub, no peripheral edema and peripheral pulses strong  SKIN: no suspicious lesions or rashes  NEURO: Normal strength and tone, mentation intact and speech normal    Diagnostic Test Results:  Positive Influenza A         Assessment & Plan     (J10.1) Influenza A  (primary encounter diagnosis)  Comment:   Plan: Positive      (R05) Cough  Comment:   Plan: Influenza A/B antigen        Lungs clear     (R50.9) Fever, unspecified fever cause  Comment:   Plan: Influenza A/B antigen        None today - untrated.    He is day 4. His exam is reassuring. I reviewed current Tamiflu guidelines and he doesn't fit them due to time of illness and other associated diagnoses / risk factors not being fully present. For now, no signs of secondary bacterial illness. Advised usual self cares.     Return in about 3 months (around 5/17/2020) for Medication Recheck - Clinic.    JUVENAL PRIETO PA-C  [facility]

## 2020-02-20 ENCOUNTER — MYC MEDICAL ADVICE (OUTPATIENT)
Dept: FAMILY MEDICINE | Facility: CLINIC | Age: 55
End: 2020-02-20

## 2020-02-26 DIAGNOSIS — I10 BENIGN ESSENTIAL HYPERTENSION: ICD-10-CM

## 2020-02-26 NOTE — TELEPHONE ENCOUNTER
"Requested Prescriptions   Pending Prescriptions Disp Refills     lisinopril (ZESTRIL) 5 MG tablet [Pharmacy Med Name: LISINOPRIL 5MG TABLETS]  Last Written Prescription Date:  9/5/2019  Last Fill Quantity: 90 tabs,  # refills: 1   Last office visit: 2/17/2020 with prescribing provider:  Gerri   Future Office Visit:   Next 5 appointments (look out 90 days)    Mar 04, 2020  9:00 AM CST  Return Visit with Harry Hodgson MD  [facility] ([address] [id number] [phone]        90 tablet 1     Sig: TAKE 1 TABLET(5 MG) BY MOUTH DAILY       ACE Inhibitors (Including Combos) Protocol Failed - 2/26/2020  5:45 AM        Failed - Normal serum creatinine on file in past 12 months     Recent Labs   Lab Test 01/09/20  0916   CR 1.43*             Passed - Blood pressure under 140/90 in past 12 months     BP Readings from Last 3 Encounters:   02/17/20 118/66   01/09/20 122/81   01/08/20 (!) 149/84                 Passed - Recent (12 mo) or future (30 days) visit within the authorizing provider's specialty     Patient has had an office visit with the authorizing provider or a provider within the authorizing providers department within the previous 12 mos or has a future within next 30 days. See \"Patient Info\" tab in inbasket, or \"Choose Columns\" in Meds & Orders section of the refill encounter.              Passed - Medication is active on med list        Passed - Patient is age 18 or older        Passed - Normal serum potassium on file in past 12 months     Recent Labs   Lab Test 01/09/20  0916   POTASSIUM 3.8              "

## 2020-02-26 NOTE — TELEPHONE ENCOUNTER
Routing refill request to provider for review/approval because:  Failed - Normal serum creatinine on file in past 12 months             Recent Labs   Lab Test 01/09/20  0916   CR 1.43*            Jamaica Walker, RN, BSN, PHN  [facility]: [place]

## 2020-02-27 RX ORDER — LISINOPRIL 5 MG/1
TABLET ORAL
Qty: 90 TABLET | Refills: 1 | Status: SHIPPED | OUTPATIENT
Start: 2020-02-27 | End: 2020-08-18

## 2020-03-09 ENCOUNTER — OFFICE VISIT (OUTPATIENT)
Dept: SURGERY | Facility: CLINIC | Age: 55
End: 2020-03-09
Payer: COMMERCIAL

## 2020-03-09 VITALS
DIASTOLIC BLOOD PRESSURE: 80 MMHG | HEIGHT: 72 IN | HEART RATE: 64 BPM | TEMPERATURE: 98.4 F | SYSTOLIC BLOOD PRESSURE: 136 MMHG | WEIGHT: 202.9 LBS | OXYGEN SATURATION: 98 % | BODY MASS INDEX: 27.48 KG/M2

## 2020-03-09 DIAGNOSIS — E66.811 CLASS 1 OBESITY WITH SERIOUS COMORBIDITY AND BODY MASS INDEX (BMI) OF 30.0 TO 30.9 IN ADULT, UNSPECIFIED OBESITY TYPE: ICD-10-CM

## 2020-03-09 DIAGNOSIS — E11.8 TYPE 2 DIABETES MELLITUS WITH COMPLICATION, WITHOUT LONG-TERM CURRENT USE OF INSULIN (H): ICD-10-CM

## 2020-03-09 DIAGNOSIS — E66.811 CLASS 1 OBESITY WITH SERIOUS COMORBIDITY AND BODY MASS INDEX (BMI) OF 30.0 TO 30.9 IN ADULT, UNSPECIFIED OBESITY TYPE: Primary | ICD-10-CM

## 2020-03-09 LAB
ANION GAP SERPL CALCULATED.3IONS-SCNC: 4 MMOL/L (ref 3–14)
BUN SERPL-MCNC: 11 MG/DL (ref 7–30)
CALCIUM SERPL-MCNC: 9.2 MG/DL (ref 8.5–10.1)
CHLORIDE SERPL-SCNC: 112 MMOL/L (ref 94–109)
CO2 SERPL-SCNC: 24 MMOL/L (ref 20–32)
CREAT SERPL-MCNC: 1.42 MG/DL (ref 0.66–1.25)
GFR SERPL CREATININE-BSD FRML MDRD: 55 ML/MIN/{1.73_M2}
GLUCOSE SERPL-MCNC: 84 MG/DL (ref 70–99)
HBA1C MFR BLD: 6.1 % (ref 0–5.6)
POTASSIUM SERPL-SCNC: 4.1 MMOL/L (ref 3.4–5.3)
SODIUM SERPL-SCNC: 140 MMOL/L (ref 133–144)

## 2020-03-09 RX ORDER — TOPIRAMATE 25 MG/1
50 TABLET, FILM COATED ORAL 2 TIMES DAILY
Qty: 120 TABLET | Refills: 3 | Status: SHIPPED | OUTPATIENT
Start: 2020-03-09 | End: 2020-07-08

## 2020-03-09 ASSESSMENT — MIFFLIN-ST. JEOR: SCORE: 1788.35

## 2020-03-09 ASSESSMENT — PAIN SCALES - GENERAL: PAINLEVEL: MILD PAIN (2)

## 2020-03-09 NOTE — PROGRESS NOTES
"    Return Medical Weight Management Note     William Montes  MRN:  [id number]  :  1965  NAKUL:  3/9/2020    Dear Sirena Talley MD,    I had the pleasure of seeing your patient William Montes. He is a 54 year old male who I am continuing to see for treatment of obesity related to:       10/26/2018   I have the following health issues associated with obesity: Type II Diabetes, GERD (Reflux), Fatty Liver   I have the following symptoms associated with obesity: -     \"53 y.o. Male here for [facility] follow up.  Seen in Oct for New Bariatric surgery consult but not a candidate due to BMI 33. Discussed possible EDWARDS study and sent info to  to review his history.  Started on topiramate in Nov  Taking victoza, stopped glimepiride in Nov.   Follow up call by [facility] in Dec he was tolerating topiramate and blood sugars under good control with victoza and off glimiperide.   He has lost from 249 lbs to 225 lbs, 24 lbs total.\"    ASSESSMENT:   [facility] follow up.  Doing well on topiramate and ozempic.  Creatinine has been elevated since starting topiramate, will plan to decrease dose and increase ozempic to 0.5mg weekly.    PLAN:   Topiramate decrease to 75mg twice a day for one week and then 50mg twice daily for the second week and recheck BMP in 2-4 weeks  Increase ozempic to 0.5mg weekly  Continue metformin, has been on since     INTERVAL HISTORY:  [facility] follow up.  Last visit 19.  He has lost 14 lbs since last visit.    Last visit we continued topiramate 100 mg BID and restarted ozempic.    CURRENT WEIGHT:   202 lbs 14.4 oz    Initial Weight (lbs): 251.2 lbs  Last Visits Weight: 98.2 kg (216 lb 6.4 oz)  Cumulative weight loss (lbs): 48.3  Weight Loss Percentage: 19.23%  Waist Circumference (cm): 100 cm    MEDICATIONS:   Current Outpatient Medications   Medication Sig Dispense Refill     ASPIRIN 81 MG OR TABS 1 tab po QD (Once per day) 100 3     atorvastatin (LIPITOR) 20 MG tablet Take 1 tablet (20 mg) by mouth " daily 90 tablet 3     cholecalciferol (VITAMIN  -D) 1000 UNITS capsule Take 1 capsule by mouth daily       lisinopril (ZESTRIL) 5 MG tablet TAKE 1 TABLET(5 MG) BY MOUTH DAILY 90 tablet 1     metFORMIN (GLUCOPHAGE) 1000 MG tablet TAKE 1 TABLET(1000 MG) BY MOUTH TWICE DAILY WITH MEALS 180 tablet 1     ONETOUCH ULTRA test strip TEST DAILY 100 strip 0     order for DME Lancets.  Daily 100 each 4     order for DME Glucometer, brand as covered by insurance. 1 each 3     topiramate (TOPAMAX) 100 MG tablet Take 1 tablet (100 mg) by mouth 2 times daily 60 tablet 5     Semaglutide,0.25 or 0.5MG/DOS, 2 MG/1.5ML SOPN Inject 0.25 mg subcutaneously once weekly for 4 weeks then 0.5 mg once weekly. (Patient not taking: Reported on 1/9/2020) 1 pen 3       Weight Loss Medication History Reviewed With Patient 3/9/2020   Which weight loss medications are you currently taking on a regular basis?  Ozempic, Topamax (topiramate)   Are you having any side effects from the weight loss medication that we have prescribed you? No   If you are having side effects please describe: -        VITALS:  /80 (BP Location: Left arm, Patient Position: Sitting, Cuff Size: Adult Large)   Pulse 64   Temp 98.4  F (36.9  C) (Oral)   Ht 1.829 m (6')   Wt 92 kg (202 lb 14.4 oz)   SpO2 98%   BMI 27.52 kg/m      FOLLOW-UP:    2 months Marine Flores PA-C    Time: 15 min spent on evaluation, management, counseling, education, & motivational interviewing with greater than 50 % of the total time was spent on counseling and coordinating care    Sincerely,    Marine Flores PA-C

## 2020-03-09 NOTE — PATIENT INSTRUCTIONS
Topiramate decrease to 75mg twice a day for one week and then 50mg twice daily for the second week and recheck BMP in 2-4 weeks    Increase ozempic to 0.5mg weekly      For any questions or concerns call Berenice Nair LPN at [phone]

## 2020-03-09 NOTE — LETTER
"3/9/2020       RE: William Montes  [address]  [facility] [address]     Dear Colleague,    Thank you for referring your patient, William Montes, to the [facility] SURGICAL WEIGHT MANAGEMENT at [facility]. Please see a copy of my visit note below.    Return Medical Weight Management Note     William Montes  MRN:  [id number]  :  1965  NAKUL:  3/9/2020    Dear Sirena Talley MD,    I had the pleasure of seeing your patient William Montes. He is a 54 year old male who I am continuing to see for treatment of obesity related to:       10/26/2018   I have the following health issues associated with obesity: Type II Diabetes, GERD (Reflux), Fatty Liver   I have the following symptoms associated with obesity: -     \"53 y.o. Male here for [facility] follow up.  Seen in Oct for New Bariatric surgery consult but not a candidate due to BMI 33. Discussed possible EDWARDS study and sent info to  to review his history.  Started on topiramate in Nov  Taking victoza, stopped glimepiride in Nov.   Follow up call by [facility] in Dec he was tolerating topiramate and blood sugars under good control with victoza and off glimiperide.   He has lost from 249 lbs to 225 lbs, 24 lbs total.\"    ASSESSMENT:   [facility] follow up.  Doing well on topiramate and ozempic.  Creatinine has been elevated since starting topiramate, will plan to decrease dose and increase ozempic to 0.5mg weekly.    PLAN:   Topiramate decrease to 75mg twice a day for one week and then 50mg twice daily for the second week and recheck BMP in 2-4 weeks  Increase ozempic to 0.5mg weekly  Continue metformin, has been on since     INTERVAL HISTORY:  [facility] follow up.  Last visit 19.  He has lost 14 lbs since last visit.    Last visit we continued topiramate 100 mg BID and restarted ozempic.    CURRENT WEIGHT:   202 lbs 14.4 oz    Initial Weight (lbs): 251.2 lbs  Last Visits Weight: 98.2 kg (216 lb 6.4 oz)  Cumulative weight loss " (lbs): 48.3  Weight Loss Percentage: 19.23%  Waist Circumference (cm): 100 cm    MEDICATIONS:   Current Outpatient Medications   Medication Sig Dispense Refill     ASPIRIN 81 MG OR TABS 1 tab po QD (Once per day) 100 3     atorvastatin (LIPITOR) 20 MG tablet Take 1 tablet (20 mg) by mouth daily 90 tablet 3     cholecalciferol (VITAMIN  -D) 1000 UNITS capsule Take 1 capsule by mouth daily       lisinopril (ZESTRIL) 5 MG tablet TAKE 1 TABLET(5 MG) BY MOUTH DAILY 90 tablet 1     metFORMIN (GLUCOPHAGE) 1000 MG tablet TAKE 1 TABLET(1000 MG) BY MOUTH TWICE DAILY WITH MEALS 180 tablet 1     ONETOUCH ULTRA test strip TEST DAILY 100 strip 0     order for DME Lancets.  Daily 100 each 4     order for DME Glucometer, brand as covered by insurance. 1 each 3     topiramate (TOPAMAX) 100 MG tablet Take 1 tablet (100 mg) by mouth 2 times daily 60 tablet 5     Semaglutide,0.25 or 0.5MG/DOS, 2 MG/1.5ML SOPN Inject 0.25 mg subcutaneously once weekly for 4 weeks then 0.5 mg once weekly. (Patient not taking: Reported on 1/9/2020) 1 pen 3       Weight Loss Medication History Reviewed With Patient 3/9/2020   Which weight loss medications are you currently taking on a regular basis?  Ozempic, Topamax (topiramate)   Are you having any side effects from the weight loss medication that we have prescribed you? No   If you are having side effects please describe: -        VITALS:  /80 (BP Location: Left arm, Patient Position: Sitting, Cuff Size: Adult Large)   Pulse 64   Temp 98.4  F (36.9  C) (Oral)   Ht 1.829 m (6')   Wt 92 kg (202 lb 14.4 oz)   SpO2 98%   BMI 27.52 kg/m      FOLLOW-UP:    2 months Marine Flores PA-C    Time: 15 min spent on evaluation, management, counseling, education, & motivational interviewing with greater than 50 % of the total time was spent on counseling and coordinating care    Sincerely,    Marine Flores PA-C

## 2020-03-09 NOTE — NURSING NOTE
(   Chief Complaint   Patient presents with     RECHECK     4 month weight management follow up.    )    ( Weight: 92 kg (202 lb 14.4 oz) )  ( Height: 182.9 cm (6') )  ( BMI (Calculated): 27.52 )  (   )  ( Cumulative weight loss (lbs): 48.3 )  ( Last Visits Weight: 98.2 kg (216 lb 6.4 oz) )  ( Wt change since last visit (lbs): -13.5 )  ( Waist Circumference (cm): 100 cm )  (   )    ( BP: 136/80 )  (   )  ( Temp: 98.4  F (36.9  C) )  ( Temp src: Oral )  ( Pulse: 64 )  (   )  ( SpO2: 98 % )    (   Patient Active Problem List   Diagnosis     Chronic gingivitis     Esophageal reflux     Obesity     Hyperlipidemia LDL goal <100     Vitamin D deficiency     Elevated LFTs     Other chronic nonalcoholic liver disease     Glaucoma suspect, bilateral     Type 2 diabetes mellitus with hyperglycemia, without long-term current use of insulin (H)     Background diabetic retinopathy, mild, ou    )  (   Current Outpatient Medications   Medication Sig Dispense Refill     ASPIRIN 81 MG OR TABS 1 tab po QD (Once per day) 100 3     atorvastatin (LIPITOR) 20 MG tablet Take 1 tablet (20 mg) by mouth daily 90 tablet 3     cholecalciferol (VITAMIN  -D) 1000 UNITS capsule Take 1 capsule by mouth daily       lisinopril (ZESTRIL) 5 MG tablet TAKE 1 TABLET(5 MG) BY MOUTH DAILY 90 tablet 1     metFORMIN (GLUCOPHAGE) 1000 MG tablet TAKE 1 TABLET(1000 MG) BY MOUTH TWICE DAILY WITH MEALS 180 tablet 1     ONETOUCH ULTRA test strip TEST DAILY 100 strip 0     order for DME Lancets.  Daily 100 each 4     order for DME Glucometer, brand as covered by insurance. 1 each 3     topiramate (TOPAMAX) 100 MG tablet Take 1 tablet (100 mg) by mouth 2 times daily 60 tablet 5     Semaglutide,0.25 or 0.5MG/DOS, 2 MG/1.5ML SOPN Inject 0.25 mg subcutaneously once weekly for 4 weeks then 0.5 mg once weekly. (Patient not taking: Reported on 1/9/2020) 1 pen 3    )  ( Diabetes Eval:    )    ( Pain Eval:  Mild Pain (2) )    ( Wound Eval:       )    (   History   Smoking  Status     Never Smoker   Smokeless Tobacco     Never Used    )    ( Signed By:  Talia Cary CMA; March 9, 2020; 8:26 AM )

## 2020-03-30 DIAGNOSIS — E66.811 CLASS 1 OBESITY WITH SERIOUS COMORBIDITY AND BODY MASS INDEX (BMI) OF 30.0 TO 30.9 IN ADULT, UNSPECIFIED OBESITY TYPE: ICD-10-CM

## 2020-03-30 DIAGNOSIS — E11.8 TYPE 2 DIABETES MELLITUS WITH COMPLICATION, WITHOUT LONG-TERM CURRENT USE OF INSULIN (H): ICD-10-CM

## 2020-03-31 RX ORDER — SEMAGLUTIDE 1.34 MG/ML
INJECTION, SOLUTION SUBCUTANEOUS
Qty: 1.5 ML | OUTPATIENT
Start: 2020-03-31

## 2020-04-26 DIAGNOSIS — E11.65 TYPE 2 DIABETES MELLITUS WITH HYPERGLYCEMIA, WITHOUT LONG-TERM CURRENT USE OF INSULIN (H): ICD-10-CM

## 2020-04-26 DIAGNOSIS — E78.5 HYPERLIPIDEMIA LDL GOAL <100: ICD-10-CM

## 2020-04-28 NOTE — TELEPHONE ENCOUNTER
"Requested Prescriptions   Pending Prescriptions Disp Refills     metFORMIN (GLUCOPHAGE) 1000 MG tablet [Pharmacy Med Name: METFORMIN 1000MG TABLETS]  Last Written Prescription Date:  9/5/2019  Last Fill Quantity: 180 tablet,  # refills: 1   Last office visit: 9/5/2019 with prescribing provider:  VINCENT Talley   Future Office Visit:   180 tablet 1     Sig: TAKE 1 TABLET(1000 MG) BY MOUTH TWICE DAILY WITH MEALS       Biguanide Agents Passed - 4/26/2020  3:40 PM        Passed - Patient is age 10 or older        Passed - Patient has documented A1c within the specified period of time.     If HgbA1C is 8 or greater, it needs to be on file within the past 3 months.  If less than 8, must be on file within the past 6 months.     Recent Labs   Lab Test 03/09/20 0922   A1C 6.1*             Passed - Patient's CR is NOT>1.4 OR Patient's EGFR is NOT<45 within past 12 mos.     Recent Labs   Lab Test 03/09/20 0922   GFRESTIMATED 55*   GFRESTBLACK 63       Recent Labs   Lab Test 03/09/20 0922   CR 1.42*             Passed - Patient does NOT have a diagnosis of CHF.        Passed - Medication is active on med list        Passed - Recent (6 mo) or future (30 days) visit within the authorizing provider's specialty     Patient had office visit in the last 6 months or has a visit in the next 30 days with authorizing provider or within the authorizing provider's specialty.  See \"Patient Info\" tab in inbasket, or \"Choose Columns\" in Meds & Orders section of the refill encounter.                       ONETOUCH ULTRA test strip [Pharmacy Med Name: ONE TOUCH ULTRA BLUE TEST ST(NEW)50]  Last Written Prescription Date:  10/30/2019  Last Fill Quantity: 100 strip ,  # refills: 0   Last office visit: 9/5/2019 with prescribing provider:  VINCENT Talley   Future Office Visit:   100 strip 0     Sig: TEST DAILY       Diabetic Supplies Protocol Passed - 4/26/2020  3:40 PM        Passed - Medication is active on med list        Passed - Patient is 18 years of " "age or older        Passed - Recent (6 mo) or future (30 days) visit within the authorizing provider's specialty     Patient had office visit in the last 6 months or has a visit in the next 30 days with authorizing provider.  See \"Patient Info\" tab in inbasket, or \"Choose Columns\" in Meds & Orders section of the refill encounter.               "

## 2020-05-01 ENCOUNTER — TELEPHONE (OUTPATIENT)
Dept: FAMILY MEDICINE | Facility: CLINIC | Age: 55
End: 2020-05-01

## 2020-05-01 ENCOUNTER — TELEPHONE (OUTPATIENT)
Dept: SURGERY | Facility: CLINIC | Age: 55
End: 2020-05-01

## 2020-05-01 DIAGNOSIS — E78.5 HYPERLIPIDEMIA LDL GOAL <100: ICD-10-CM

## 2020-05-01 DIAGNOSIS — E11.65 TYPE 2 DIABETES MELLITUS WITH HYPERGLYCEMIA, WITHOUT LONG-TERM CURRENT USE OF INSULIN (H): Primary | ICD-10-CM

## 2020-05-01 NOTE — TELEPHONE ENCOUNTER
[facility] faxed a DRUG CHANGE REQUEST re: ONETOUCH ULTRA test strip     INS covers ACCU-CHECK TESTING SUPPLIES.   Please send new scripts for testing.

## 2020-05-04 ENCOUNTER — TELEPHONE (OUTPATIENT)
Dept: FAMILY MEDICINE | Facility: CLINIC | Age: 55
End: 2020-05-04

## 2020-05-04 DIAGNOSIS — E11.65 TYPE 2 DIABETES MELLITUS WITH HYPERGLYCEMIA, WITHOUT LONG-TERM CURRENT USE OF INSULIN (H): Primary | ICD-10-CM

## 2020-05-04 RX ORDER — BLOOD-GLUCOSE METER
EACH MISCELLANEOUS
Qty: 1 KIT | Refills: 1 | Status: SHIPPED | OUTPATIENT
Start: 2020-05-04 | End: 2020-11-12

## 2020-05-04 NOTE — TELEPHONE ENCOUNTER
Reason for Call:  Other call back    Detailed comments: Pharmacy wants an accucheck guide meter, pharmacy needs script for meter and lancettes that match that meter.     Phone Number Patient can be reached at: Other phone number:  [phone]    Best Time: anytime    Can we leave a detailed message on this number? YES    Call taken on 5/4/2020 at 3:20 PM by Herberth Muller

## 2020-05-05 RX ORDER — BLOOD-GLUCOSE METER
1 EACH MISCELLANEOUS DAILY
Qty: 1 KIT | Refills: 0 | Status: SHIPPED | OUTPATIENT
Start: 2020-05-05 | End: 2024-02-23

## 2020-05-05 NOTE — TELEPHONE ENCOUNTER
Routing request for new diabetic testing supplies to providers in PCP absence. Orders pended for approval.    Ale Cole RN

## 2020-07-03 DIAGNOSIS — E66.811 CLASS 1 OBESITY WITH SERIOUS COMORBIDITY AND BODY MASS INDEX (BMI) OF 30.0 TO 30.9 IN ADULT, UNSPECIFIED OBESITY TYPE: ICD-10-CM

## 2020-07-07 DIAGNOSIS — E66.811 CLASS 1 OBESITY WITH SERIOUS COMORBIDITY AND BODY MASS INDEX (BMI) OF 30.0 TO 30.9 IN ADULT, UNSPECIFIED OBESITY TYPE: ICD-10-CM

## 2020-07-07 RX ORDER — TOPIRAMATE 25 MG/1
TABLET, FILM COATED ORAL
Qty: 120 TABLET | Refills: 3 | OUTPATIENT
Start: 2020-07-07

## 2020-07-07 NOTE — TELEPHONE ENCOUNTER
Recieved refill request for topiramate (TOPAMAX) 25 MG. Patient needs appointment scheduled prior to any refills. Clinic Coordinator notified and will follow up with the patient as appropriate. The pharmacy has been notified that the medication will not be refilled prior to an appointment being scheduled.

## 2020-07-08 ENCOUNTER — MYC REFILL (OUTPATIENT)
Dept: SURGERY | Facility: CLINIC | Age: 55
End: 2020-07-08

## 2020-07-08 DIAGNOSIS — E66.811 CLASS 1 OBESITY WITH SERIOUS COMORBIDITY AND BODY MASS INDEX (BMI) OF 30.0 TO 30.9 IN ADULT, UNSPECIFIED OBESITY TYPE: ICD-10-CM

## 2020-07-08 RX ORDER — TOPIRAMATE 25 MG/1
50 TABLET, FILM COATED ORAL 2 TIMES DAILY
Qty: 120 TABLET | Refills: 0 | Status: SHIPPED | OUTPATIENT
Start: 2020-07-08 | End: 2020-07-22

## 2020-07-10 RX ORDER — TOPIRAMATE 25 MG/1
TABLET, FILM COATED ORAL
Qty: 120 TABLET | Refills: 3 | OUTPATIENT
Start: 2020-07-10

## 2020-07-10 NOTE — TELEPHONE ENCOUNTER
TOPIRAMATE 25MG TABLETS   topiramate (TOPAMAX) 25 MG tablet  120 tablet  0  7/8/2020   No    Sig - Route: Take 2 tablets (50 mg) by mouth 2 times daily - Oral    Sent to pharmacy as: Topiramate 25 MG Oral Tablet (TOPAMAX)    Class: E-Prescribe    Order: [id number]    E-Prescribing Status: Receipt confirmed by pharmacy (7/8/2020 11:41 AM CDT)      Shelby Monk RN  Central Triage Red Flags/Med Refills

## 2020-07-22 ENCOUNTER — VIRTUAL VISIT (OUTPATIENT)
Dept: SURGERY | Facility: CLINIC | Age: 55
End: 2020-07-22
Payer: COMMERCIAL

## 2020-07-22 VITALS
SYSTOLIC BLOOD PRESSURE: 103 MMHG | DIASTOLIC BLOOD PRESSURE: 66 MMHG | HEIGHT: 72 IN | WEIGHT: 207 LBS | BODY MASS INDEX: 28.04 KG/M2

## 2020-07-22 DIAGNOSIS — E66.811 CLASS 1 OBESITY WITH SERIOUS COMORBIDITY AND BODY MASS INDEX (BMI) OF 30.0 TO 30.9 IN ADULT, UNSPECIFIED OBESITY TYPE: ICD-10-CM

## 2020-07-22 DIAGNOSIS — E11.8 TYPE 2 DIABETES MELLITUS WITH COMPLICATION, WITHOUT LONG-TERM CURRENT USE OF INSULIN (H): ICD-10-CM

## 2020-07-22 DIAGNOSIS — K21.9 GASTROESOPHAGEAL REFLUX DISEASE, ESOPHAGITIS PRESENCE NOT SPECIFIED: Primary | ICD-10-CM

## 2020-07-22 RX ORDER — TOPIRAMATE 25 MG/1
50 TABLET, FILM COATED ORAL 2 TIMES DAILY
Qty: 120 TABLET | Refills: 3 | Status: SHIPPED | OUTPATIENT
Start: 2020-07-22 | End: 2020-09-24

## 2020-07-22 RX ORDER — LANOLIN ALCOHOL/MO/W.PET/CERES
6 CREAM (GRAM) TOPICAL
COMMUNITY

## 2020-07-22 ASSESSMENT — MIFFLIN-ST. JEOR: SCORE: 1801.95

## 2020-07-22 ASSESSMENT — PAIN SCALES - GENERAL: PAINLEVEL: NO PAIN (0)

## 2020-07-22 NOTE — NURSING NOTE
(   Chief Complaint   Patient presents with     RECHECK     4 month weight management follow up.    )    ( Weight: 93.9 kg (207 lb)(Pt reported) )  ( Height: 182.9 cm (6')(Pt reported) )  ( BMI (Calculated): 28.07 )  (   )  ( Cumulative weight loss (lbs): 44.2 )  ( Last Visits Weight: 92 kg (202 lb 14.4 oz) )  ( Wt change since last visit (lbs): 4.1 )  (   )  (   )    ( BP: 103/66(Pt reported) )  (   )  (   )  (   )  (   )  (   )  (   )    (   Patient Active Problem List   Diagnosis     Chronic gingivitis     Esophageal reflux     Obesity     Hyperlipidemia LDL goal <100     Vitamin D deficiency     Elevated LFTs     Other chronic nonalcoholic liver disease     Glaucoma suspect, bilateral     Type 2 diabetes mellitus with hyperglycemia, without long-term current use of insulin (H)     Background diabetic retinopathy, mild, ou    )  (   Current Outpatient Medications   Medication Sig Dispense Refill     ASPIRIN 81 MG OR TABS 1 tab po QD (Once per day) 100 3     atorvastatin (LIPITOR) 20 MG tablet Take 1 tablet (20 mg) by mouth daily 90 tablet 3     blood glucose (NO BRAND SPECIFIED) lancets standard Use to test blood sugar once daily or as directed. 100 each 1     blood glucose (NO BRAND SPECIFIED) test strip Use to test blood sugar ONE times daily or as directed. 100 strip 0     blood glucose monitoring (ACCU-CHEK CYNDI PLUS) meter device kit Use to test blood sugar daily times daily or as directed. 1 kit 1     Blood Glucose Monitoring Suppl (ACCU-CHEK GUIDE) w/Device KIT 1 kit daily Use to check blood sugar once daily and as needed 1 kit 0     cholecalciferol (VITAMIN  -D) 1000 UNITS capsule Take 1 capsule by mouth daily       lisinopril (ZESTRIL) 5 MG tablet TAKE 1 TABLET(5 MG) BY MOUTH DAILY 90 tablet 1     melatonin 3 MG tablet Take 6 mg by mouth nightly as needed for sleep       metFORMIN (GLUCOPHAGE) 1000 MG tablet TAKE 1 TABLET(1000 MG) BY MOUTH TWICE DAILY WITH MEALS 180 tablet 0     order for DME Lancets.   Daily 100 each 4     order for DME Glucometer, brand as covered by insurance. 1 each 3     Semaglutide,0.25 or 0.5MG/DOS, 2 MG/1.5ML SOPN Inject 0.5 mg Subcutaneous once a week Inject 0.5 mg once weekly. 2 pen 3     topiramate (TOPAMAX) 25 MG tablet Take 2 tablets (50 mg) by mouth 2 times daily 120 tablet 0    )  ( Diabetes Eval:    )    ( Pain Eval:  No Pain (0) )    ( Wound Eval:       )    (   History   Smoking Status     Never Smoker   Smokeless Tobacco     Never Used    )    ( Signed By:  Talia Cary [facility]; July 22, 2020; 9:21 AM )

## 2020-07-22 NOTE — PROGRESS NOTES
"William Montes is a 55 year old male who is being evaluated via a billable video visit.      The patient has been notified of following:     \"This video visit will be conducted via a call between you and your physician/provider. We have found that certain health care needs can be provided without the need for an in-person physical exam.  This service lets us provide the care you need with a video conversation.  If a prescription is necessary we can send it directly to your pharmacy.  If lab work is needed we can place an order for that and you can then stop by our lab to have the test done at a later time.    Video visits are billed at different rates depending on your insurance coverage.  Please reach out to your insurance provider with any questions.    If during the course of the call the physician/provider feels a video visit is not appropriate, you will not be charged for this service.\"    Patient has given verbal consent for Video visit? Yes  How would you like to obtain your AVS? MyChart  If you are dropped from the video visit, the video invite should be resent to: Text to cell phone: [phone]  Will anyone else be joining your video visit? No      Return Medical Weight Management Note     William Montes  MRN:  [id number]  :  1965  NAKUL:  2020    Dear Sirena Talley DO,    I had the pleasure of seeing your patient William Montes. He is a 55 year old male who I am continuing to see for treatment of obesity related to:       10/26/2018   I have the following health issues associated with obesity: Type II Diabetes, GERD (Reflux), Fatty Liver   I have the following symptoms associated with obesity: -       INTERVAL HISTORY:  MW follow up.  Last visit 3/9/20  Gained 5 lbs since last visit  GERD, saw ENT taking omeprazole. Has stopped drinking coffee.     CURRENT WEIGHT:   207 lbs 0 oz    Initial Weight (lbs): 251.2 lbs  Last Visits Weight: 92 kg (202 lb 14.4 oz)  Cumulative weight loss (lbs): " 44.2  Weight Loss Percentage: 17.6%    Changes and Difficulties 7/22/2020   I have made the following changes to my diet since my last visit: None   With regards to my diet, I am still struggling with: Because of Covid 19 and stay at home order, I seem to be eating more.   I have made the following changes to my activity/exercise since my last visit: gym is closed but I go for walks.   With regards to my activity/exercise, I am still struggling with: I go for walks 3 times a week.       VITALS:  /66   Ht 1.829 m (6')   Wt 93.9 kg (207 lb)   BMI 28.07 kg/m      MEDICATIONS:   Current Outpatient Medications   Medication Sig Dispense Refill     ASPIRIN 81 MG OR TABS 1 tab po QD (Once per day) 100 3     atorvastatin (LIPITOR) 20 MG tablet Take 1 tablet (20 mg) by mouth daily 90 tablet 3     blood glucose (NO BRAND SPECIFIED) lancets standard Use to test blood sugar once daily or as directed. 100 each 1     blood glucose (NO BRAND SPECIFIED) test strip Use to test blood sugar ONE times daily or as directed. 100 strip 0     blood glucose monitoring (ACCU-CHEK CYNDI PLUS) meter device kit Use to test blood sugar daily times daily or as directed. 1 kit 1     Blood Glucose Monitoring Suppl (ACCU-CHEK GUIDE) w/Device KIT 1 kit daily Use to check blood sugar once daily and as needed 1 kit 0     cholecalciferol (VITAMIN  -D) 1000 UNITS capsule Take 1 capsule by mouth daily       lisinopril (ZESTRIL) 5 MG tablet TAKE 1 TABLET(5 MG) BY MOUTH DAILY 90 tablet 1     melatonin 3 MG tablet Take 6 mg by mouth nightly as needed for sleep       metFORMIN (GLUCOPHAGE) 1000 MG tablet TAKE 1 TABLET(1000 MG) BY MOUTH TWICE DAILY WITH MEALS 180 tablet 0     order for DME Lancets.  Daily 100 each 4     order for DME Glucometer, brand as covered by insurance. 1 each 3     Semaglutide,0.25 or 0.5MG/DOS, 2 MG/1.5ML SOPN Inject 0.5 mg Subcutaneous once a week Inject 0.5 mg once weekly. 2 pen 3     topiramate (TOPAMAX) 25 MG tablet Take 2  tablets (50 mg) by mouth 2 times daily 120 tablet 0       Weight Loss Medication History Reviewed With Patient 7/22/2020   Which weight loss medications are you currently taking on a regular basis?  Ozempic, Topamax (topiramate)   Are you having any side effects from the weight loss medication that we have prescribed you? Yes   If you are having side effects please describe: -       ASSESSMENT/PLAN:     MWM follow up.  44 lbs (17% TWB) since consult.  5 lbs weight gain since last visit over the covid pandemic.    Continue topiramate 50 mg twice daily  Continue Ozempic 0.5 mg weekly, GERD didn't improve with stopping ozempic for 3 months.  BMP at local clinic, ordered today  GI referral for GERD, possible EGD?    FOLLOW-UP:    12 weeks.      Sincerely,    Marine Flores PA-C    Video-Visit Details     Type of service:  Video Visit    Video Start Time: 945  Video End Time: 957    Originating Location (pt. Location): Home    Distant Location (provider location):  [facility] SURGICAL WEIGHT MANAGEMENT     Platform used for Video Visit: Ronna Flores PA-C

## 2020-07-22 NOTE — LETTER
2020       RE: William Montes  [address]  [place] [address]     Dear Colleague,    Thank you for referring your patient, William Montes, to the [facility] SURGICAL WEIGHT MANAGEMENT at [facility]. Please see a copy of my visit note below.    William Montes is a 55 year old male who is being evaluated via a billable video visit.        Return Medical Weight Management Note     William Montes  MRN:  [id number]  :  1965  NAKUL:  2020    Dear Sirena Talley DO,    I had the pleasure of seeing your patient William Montes. He is a 55 year old male who I am continuing to see for treatment of obesity related to:       10/26/2018   I have the following health issues associated with obesity: Type II Diabetes, GERD (Reflux), Fatty Liver   I have the following symptoms associated with obesity: -       INTERVAL HISTORY:  [facility] follow up.  Last visit 3/9/20  Gained 5 lbs since last visit  GERD, saw ENT taking omeprazole. Has stopped drinking coffee.     CURRENT WEIGHT:   207 lbs 0 oz    Initial Weight (lbs): 251.2 lbs  Last Visits Weight: 92 kg (202 lb 14.4 oz)  Cumulative weight loss (lbs): 44.2  Weight Loss Percentage: 17.6%    Changes and Difficulties 2020   I have made the following changes to my diet since my last visit: None   With regards to my diet, I am still struggling with: Because of Covid 19 and stay at home order, I seem to be eating more.   I have made the following changes to my activity/exercise since my last visit: gym is closed but I go for walks.   With regards to my activity/exercise, I am still struggling with: I go for walks 3 times a week.       VITALS:  /66   Ht 1.829 m (6')   Wt 93.9 kg (207 lb)   BMI 28.07 kg/m      MEDICATIONS:   Current Outpatient Medications   Medication Sig Dispense Refill     ASPIRIN 81 MG OR TABS 1 tab po QD (Once per day) 100 3     atorvastatin (LIPITOR) 20 MG tablet Take 1 tablet (20 mg) by mouth daily 90  tablet 3     blood glucose (NO BRAND SPECIFIED) lancets standard Use to test blood sugar once daily or as directed. 100 each 1     blood glucose (NO BRAND SPECIFIED) test strip Use to test blood sugar ONE times daily or as directed. 100 strip 0     blood glucose monitoring (ACCU-CHEK CYNDI PLUS) meter device kit Use to test blood sugar daily times daily or as directed. 1 kit 1     Blood Glucose Monitoring Suppl (ACCU-CHEK GUIDE) w/Device KIT 1 kit daily Use to check blood sugar once daily and as needed 1 kit 0     cholecalciferol (VITAMIN  -D) 1000 UNITS capsule Take 1 capsule by mouth daily       lisinopril (ZESTRIL) 5 MG tablet TAKE 1 TABLET(5 MG) BY MOUTH DAILY 90 tablet 1     melatonin 3 MG tablet Take 6 mg by mouth nightly as needed for sleep       metFORMIN (GLUCOPHAGE) 1000 MG tablet TAKE 1 TABLET(1000 MG) BY MOUTH TWICE DAILY WITH MEALS 180 tablet 0     order for DME Lancets.  Daily 100 each 4     order for DME Glucometer, brand as covered by insurance. 1 each 3     Semaglutide,0.25 or 0.5MG/DOS, 2 MG/1.5ML SOPN Inject 0.5 mg Subcutaneous once a week Inject 0.5 mg once weekly. 2 pen 3     topiramate (TOPAMAX) 25 MG tablet Take 2 tablets (50 mg) by mouth 2 times daily 120 tablet 0       Weight Loss Medication History Reviewed With Patient 7/22/2020   Which weight loss medications are you currently taking on a regular basis?  Ozempic, Topamax (topiramate)   Are you having any side effects from the weight loss medication that we have prescribed you? Yes   If you are having side effects please describe: -       ASSESSMENT/PLAN:     MWM follow up.  44 lbs (17% TWB) since consult.  5 lbs weight gain since last visit over the covid pandemic.    Continue topiramate 50 mg twice daily  Continue Ozempic 0.5 mg weekly, GERD didn't improve with stopping ozempic for 3 months.  BMP at local clinic, ordered today  GI referral for GERD, possible EGD?    FOLLOW-UP:    12 weeks.      Sincerely,    Marine Flores,  SETH    Video-Visit Details     Type of service:  Video Visit    Video Start Time: 945  Video End Time: 957    Originating Location (pt. Location): Home    Distant Location (provider location):  [facility] SURGICAL WEIGHT MANAGEMENT     Platform used for Video Visit: [employer]

## 2020-07-23 DIAGNOSIS — E11.8 TYPE 2 DIABETES MELLITUS WITH COMPLICATION, WITHOUT LONG-TERM CURRENT USE OF INSULIN (H): ICD-10-CM

## 2020-07-23 DIAGNOSIS — E66.811 CLASS 1 OBESITY WITH SERIOUS COMORBIDITY AND BODY MASS INDEX (BMI) OF 30.0 TO 30.9 IN ADULT, UNSPECIFIED OBESITY TYPE: ICD-10-CM

## 2020-07-23 LAB
ANION GAP SERPL CALCULATED.3IONS-SCNC: 6 MMOL/L (ref 3–14)
BUN SERPL-MCNC: 16 MG/DL (ref 7–30)
CALCIUM SERPL-MCNC: 9.4 MG/DL (ref 8.5–10.1)
CHLORIDE SERPL-SCNC: 108 MMOL/L (ref 94–109)
CO2 SERPL-SCNC: 24 MMOL/L (ref 20–32)
CREAT SERPL-MCNC: 1.52 MG/DL (ref 0.66–1.25)
GFR SERPL CREATININE-BSD FRML MDRD: 50 ML/MIN/{1.73_M2}
GLUCOSE SERPL-MCNC: 90 MG/DL (ref 70–99)
HBA1C MFR BLD: 5.6 % (ref 0–5.6)
POTASSIUM SERPL-SCNC: 3.9 MMOL/L (ref 3.4–5.3)
SODIUM SERPL-SCNC: 138 MMOL/L (ref 133–144)

## 2020-07-23 PROCEDURE — 80048 BASIC METABOLIC PNL TOTAL CA: CPT | Performed by: PHYSICIAN ASSISTANT

## 2020-07-23 PROCEDURE — 36415 COLL VENOUS BLD VENIPUNCTURE: CPT | Performed by: PHYSICIAN ASSISTANT

## 2020-07-23 PROCEDURE — 83036 HEMOGLOBIN GLYCOSYLATED A1C: CPT | Performed by: PHYSICIAN ASSISTANT

## 2020-07-24 NOTE — TELEPHONE ENCOUNTER
RECORDS RECEIVED FROM: [facility]- Marine Flores PA-C   DATE RECEIVED: 7/31/2020   NOTES STATUS DETAILS   OFFICE NOTE from referring provider Internal 7/22/2020 Office visit with Marine Flores PA-C   OFFICE NOTE from other specialist Internal 1/8/2020 Office visit with Dr. Michael Ovalles ([facility])    DISCHARGE SUMMARY from hospital N/A    OPERATIVE REPORT Internal    MEDICATION LIST Internal         ENDOSCOPY  N/A    COLONOSCOPY Internal 5/22/19   ERCP N/A    EUS N/A    STOOL TESTING N/A    PERTINENT LABS Internal    PATHOLOGY REPORTS (RELATED) N/A    IMAGING (CT, MRI, EGD) N/A      REFERRAL INFORMATION    Date referral was placed: 7/31/2020   Date all records received: N/A   Date records were scanned into Epic: N/A   Date records were sent to Provider to review: N/A   Date and recommendation received from provider:  LETTER SENT  SCHEDULE APPOINTMENT   Date patient was contacted to schedule: 7/23/2020

## 2020-07-27 ENCOUNTER — MYC MEDICAL ADVICE (OUTPATIENT)
Dept: PHARMACY | Facility: CLINIC | Age: 55
End: 2020-07-27

## 2020-07-27 DIAGNOSIS — E66.811 CLASS 1 OBESITY WITH SERIOUS COMORBIDITY AND BODY MASS INDEX (BMI) OF 30.0 TO 30.9 IN ADULT, UNSPECIFIED OBESITY TYPE: Primary | ICD-10-CM

## 2020-07-30 NOTE — PROGRESS NOTES
"GI CLINIC VISIT    CC/REFERRING MD:  Marine Flores  REASON FOR CONSULTATION: GERD    ASSESSMENT/PLAN:  1. GERD - meets criteria for BE screening    disease, age >50 years, male sex, elevated body mass index with an abdominal pattern of fat distribution      Fatty liver disease  -Follow-up with Dr. Leventhal in 1/2021  -Continue to work on dietary changes and weight loss    *** Colorectal cancer screening: Normal colonoscopy in 2019, plan to repeat in 2029    [facility] {[facility]:[id number]::\"***\"}    Thank you for this consultation.  It was a pleasure to participate in the care of this patient; please contact us with any further questions.       Polly Mckeon PA-C  Division of Gastroenterology, Hepatology & Nutrition  [facility]        HPI  William Montes is a 55 year old male with a past medical history of obesity, diabetes, hypercholesterolemia, fatty liver disease referred from Marine Flores PA-C for evaluation of GERD.  He is new to [facility] GI clinic and this is my first encounter with the patient.     Patient has previously been seen by our hepatology group for elevated liver enzymes with stage IV hepatic fibrosis.    ROS:    No fevers or chills  No weight loss  No blurry vision, double vision or change in vision  No sore throat  No lymphadenopathy  No headache, paraesthesias, or weakness in a limb  No shortness of breath or wheezing  No chest pain or pressure  No arthralgias or myalgias  No rashes or skin changes  No odynophagia or dysphagia  No BRBPR, hematochezia, melena  No dysuria, frequency or urgency  No hot/cold intolerance or polyria  No anxiety or depression    PROBLEM LIST  Patient Active Problem List    Diagnosis Date Noted     Background diabetic retinopathy, mild, ou 04/01/2018     Priority: Medium     Type 2 diabetes mellitus with hyperglycemia, without long-term current use of insulin (H) 03/20/2017     Priority: Medium     Glaucoma suspect, bilateral 07/23/2016     Priority: " Medium     Other chronic nonalcoholic liver disease 04/11/2014     Priority: Medium     Elevated LFTs 08/20/2012     Priority: Medium     Vitamin D deficiency 02/02/2012     Priority: Medium     Hyperlipidemia LDL goal <100 05/09/2010     Priority: Medium     Obesity 12/13/2006     Priority: Medium     Problem list name updated by automated process. Provider to review       Chronic gingivitis 07/16/2004     Priority: Medium     Problem list name updated by automated process. Provider to review and confirm  Imo Update utility       Esophageal reflux 07/16/2004     Priority: Medium       PERTINENT PAST MEDICAL HISTORY:  ***  Past Medical History:   Diagnosis Date     ABNORMAL LIVER FUNCTION STUDY 1/4/2008    increased ast, alt Normal since 2008     Chronic gingivitis      Esophageal reflux      Glaucoma      History of blood transfusion     My son has aplastic anemia and has had bld transfusions     Nonspecific abnormal results of liver function study      OBESITY NOS 12/13/2006     Pure hypercholesterolemia      Type II or unspecified type diabetes mellitus without mention of complication, not stated as uncontrolled      Uncomplicated asthma     My mother has asthma       PREVIOUS SURGERIES:  ***  Past Surgical History:   Procedure Laterality Date     BIOPSY      My son had bone marrow biopsy     COLONOSCOPY N/A 5/22/2019    Procedure: COLONOSCOPY;  Surgeon: Leventhal, Thomas Michael, MD;  Location: [facility] OR     ENT SURGERY      My son has had tympanoplasty     NO HISTORY OF SURGERY       ORTHOPEDIC SURGERY      My mother had left hip replacement surgery       PREVIOUS ENDOSCOPY:  ***    ALLERGIES:   No Known Allergies    PERTINENT MEDICATIONS:    Current Outpatient Medications:      ACCU-CHEK GUIDE test strip, USE TO TEST DAILY, Disp: 100 strip, Rfl: 0     ASPIRIN 81 MG OR TABS, 1 tab po QD (Once per day), Disp: 100, Rfl: 3     atorvastatin (LIPITOR) 20 MG tablet, Take 1 tablet (20 mg) by mouth daily, Disp: 90 tablet,  Rfl: 3     blood glucose (NO BRAND SPECIFIED) lancets standard, Use to test blood sugar once daily or as directed., Disp: 100 each, Rfl: 1     blood glucose monitoring (ACCU-CHEK CYNDI PLUS) meter device kit, Use to test blood sugar daily times daily or as directed., Disp: 1 kit, Rfl: 1     Blood Glucose Monitoring Suppl (ACCU-CHEK GUIDE) w/Device KIT, 1 kit daily Use to check blood sugar once daily and as needed, Disp: 1 kit, Rfl: 0     cholecalciferol (VITAMIN  -D) 1000 UNITS capsule, Take 1 capsule by mouth daily, Disp: , Rfl:      lisinopril (ZESTRIL) 5 MG tablet, TAKE 1 TABLET(5 MG) BY MOUTH DAILY, Disp: 90 tablet, Rfl: 1     melatonin 3 MG tablet, Take 6 mg by mouth nightly as needed for sleep, Disp: , Rfl:      metFORMIN (GLUCOPHAGE) 1000 MG tablet, TAKE 1 TABLET(1000 MG) BY MOUTH TWICE DAILY WITH MEALS, Disp: 180 tablet, Rfl: 0     order for DME, Lancets.  Daily, Disp: 100 each, Rfl: 4     order for DME, Glucometer, brand as covered by insurance., Disp: 1 each, Rfl: 3     Semaglutide,0.25 or 0.5MG/DOS, 2 MG/1.5ML SOPN, Inject 0.5 mg Subcutaneous once a week Inject 0.5 mg once weekly., Disp: 2 pen, Rfl: 3     topiramate (TOPAMAX) 25 MG tablet, Take 2 tablets (50 mg) by mouth 2 times daily, Disp: 120 tablet, Rfl: 3    SOCIAL HISTORY:  ***  Social History     Socioeconomic History     Marital status:      Spouse name: Luis     Number of children: 4     Years of education: Not on file     Highest education level: Not on file   Occupational History     Not on file   Social Needs     Financial resource strain: Not on file     Food insecurity     Worry: Not on file     Inability: Not on file     Transportation needs     Medical: Not on file     Non-medical: Not on file   Tobacco Use     Smoking status: Never Smoker     Smokeless tobacco: Never Used   Substance and Sexual Activity     Alcohol use: No     Comment: none for 11/2011     Drug use: No     Sexual activity: Yes     Partners: Female     Birth  control/protection: None   Lifestyle     Physical activity     Days per week: Not on file     Minutes per session: Not on file     Stress: Not on file   Relationships     Social connections     Talks on phone: Not on file     Gets together: Not on file     Attends Congregation service: Not on file     Active member of club or organization: Not on file     Attends meetings of clubs or organizations: Not on file     Relationship status: Not on file     Intimate partner violence     Fear of current or ex partner: Not on file     Emotionally abused: Not on file     Physically abused: Not on file     Forced sexual activity: Not on file   Other Topics Concern     Parent/sibling w/ CABG, MI or angioplasty before 65F 55M? No   Social History Narrative    Dairy/d 0 servings/d. Caffeine 1 per weekExercise 2 x weekSunscreen used - NoSeatbelts used - YesWorking smoke/CO detectors in the home - YesGuns stored in the home - NoSelf Breast Exams - NoSelf Testicular Exam - YesEye Exam up to date - YesDental Exam up to date - YesPap Smear up to date - NAMammogram up to date - NAPSA up to date - NoDexa Scan up to date - NoFlex Sig / Colonoscopy up to date - NoImmunizations up to date - YesAbuse: Current or Past(Physical, Sexual or Emotional)- NoDo you feel safe in your environment - YesRobkourtney Durham5/12/06        No tattoos or piercings, blood transfusions, no illicit IV or intranasal drug use.        FAMILY HISTORY:  FH of CRC: ***  FH of IBD: ***  Family History   Problem Relation Age of Onset     Hypertension Mother      Diabetes Sister      Diabetes Cousin      Hypertension Other      C.A.D. No family hx of      Cancer - colorectal No family hx of      Glaucoma No family hx of      Macular Degeneration No family hx of      Cancer No family hx of      Cerebrovascular Disease No family hx of      Breast Cancer No family hx of      Prostate Cancer No family hx of      Thyroid Disease No family hx of        Past/family/social history  reviewed and no changes    PHYSICAL EXAMINATION:  Constitutional: aaox3, cooperative, pleasant, not dyspneic/diaphoretic, no acute distress  Vitals reviewed: There were no vitals taken for this visit.  Wt:   Wt Readings from Last 2 Encounters:   07/22/20 93.9 kg (207 lb)   03/09/20 92 kg (202 lb 14.4 oz)      Eyes: Sclera anicteric/injected  Ears/nose/mouth/throat: Normal oropharynx without ulcers or exudate, mucus membranes moist, hearing intact  Neck: supple, thyroid normal size  CV: No edema  Respiratory: Unlabored breathing  Lymph: No axillary, submandibular, supraclavicular or inguinal lymphadenopathy  Abd: *** Nondistended, +bs, no hepatosplenomegaly, nontender, no peritoneal signs  Skin: warm, perfused, no jaundice  Psych: Normal affect  MSK: Normal gait      PERTINENT STUDIES:    Orders Only on 07/23/2020   Component Date Value Ref Range Status     Hemoglobin A1C 07/23/2020 5.6  0 - 5.6 % Final     Sodium 07/23/2020 138  133 - 144 mmol/L Final     Potassium 07/23/2020 3.9  3.4 - 5.3 mmol/L Final     Chloride 07/23/2020 108  94 - 109 mmol/L Final     Carbon Dioxide 07/23/2020 24  20 - 32 mmol/L Final     Anion Gap 07/23/2020 6  3 - 14 mmol/L Final     Glucose 07/23/2020 90  70 - 99 mg/dL Final     Urea Nitrogen 07/23/2020 16  7 - 30 mg/dL Final     Creatinine 07/23/2020 1.52* 0.66 - 1.25 mg/dL Final     GFR Estimate 07/23/2020 50* >60 mL/min/[1.73_m2] Final     GFR Estimate If Black 07/23/2020 58* >60 mL/min/[1.73_m2] Final     Calcium 07/23/2020 9.4  8.5 - 10.1 mg/dL Final

## 2020-07-31 ENCOUNTER — VIRTUAL VISIT (OUTPATIENT)
Dept: GASTROENTEROLOGY | Facility: CLINIC | Age: 55
End: 2020-07-31
Attending: PHYSICIAN ASSISTANT
Payer: COMMERCIAL

## 2020-07-31 ENCOUNTER — PRE VISIT (OUTPATIENT)
Dept: GASTROENTEROLOGY | Facility: CLINIC | Age: 55
End: 2020-07-31

## 2020-07-31 VITALS — HEIGHT: 72 IN | BODY MASS INDEX: 28.04 KG/M2 | WEIGHT: 207 LBS

## 2020-07-31 DIAGNOSIS — K21.9 GASTROESOPHAGEAL REFLUX DISEASE, ESOPHAGITIS PRESENCE NOT SPECIFIED: ICD-10-CM

## 2020-07-31 DIAGNOSIS — R09.A2 GLOBUS SENSATION: Primary | ICD-10-CM

## 2020-07-31 ASSESSMENT — MIFFLIN-ST. JEOR: SCORE: 1801.95

## 2020-07-31 ASSESSMENT — PAIN SCALES - GENERAL: PAINLEVEL: NO PAIN (0)

## 2020-07-31 NOTE — PATIENT INSTRUCTIONS
It was a pleasure taking care of you today.  I've included a brief summary of our discussion and care plan from today's visit below.  Please review this information with your primary care provider.  _______________________________________________________________________    My recommendations are summarized as follows:  -- We will plan on doing the esophagram at your convenience. You should hear from a , but in the event you do not hear from us, the number for scheduling is: [phone]  -- We will contact you with the result of your esophagram and will then proceed with the endoscopy.    Return to GI Clinic in 2-3 months to review your progress, following completion of testing.    _______________________________________________________________________    Who do I call with any questions after my visit?  Please be in touch if there are any further questions that arise following today's visit.  There are multiple ways to contact your gastroenterology care team.        During business hours, you may reach a Gastroenterology nurse at [phone] and choose option 3.         To schedule or reschedule an appointment, please call [phone].       You can always send a secure message through [employer].  [employer] messages are answered by your nurse or doctor typically within 24 hours.  Please allow extra time on weekends and holidays.        For urgent/emergent questions after business hours, you may reach the on-call GI Fellow by contacting the [facility] at [phone].     How will I get the results of any tests ordered?    You will receive all of your results.  If you have signed up for [employer], any tests ordered at your visit will be available to you after your physician reviews them.  Typically this takes 1-2 weeks.  If there are urgent results that require a change in your care plan, your physician or nurse will call you to discuss the next steps.      What is MyChart?  MyChart is a  secure way for you to access all of your healthcare records from the [facility].  It is a web based computer program, so you can sign on to it from any location.  It also allows you to send secure messages to your care team.  I recommend signing up for [employer] access if you have not already done so and are comfortable with using a computer.      How to I schedule a follow-up visit?  If you did not schedule a follow-up visit today, please call [phone] to schedule a follow-up office visit.        Sincerely,    Jamaica Ball PA-C    [facility]  Division of Gastroenterology

## 2020-07-31 NOTE — PROGRESS NOTES
GI CLINIC VISIT    CC/REFERRING MD:  Marine Flores  REASON FOR CONSULTATION: Globus sensation    ASSESSMENT/PLAN:  1.Globus sensation: Ongoing for one year without odynophagia, dysphagia, unintentional weight loss, in context of longstanding GERD, which currently is minimally symptomatic despite not being on PPI for several months. Previous flex laryngoscopy with ENT 1/2020 was unremarkable and at that time, symptoms were felt to be related to GERD. Differential includes cricopharyngeal bar, GERD, GIP, functional globus, possible EGJOO related to medications (semaglutide) though he hasn't had overt dysphagia. TSH in the last year was normal (9/2019). He does meet screening criteria for Daniel's (chronic GERD >5 years, age >50, male, presence of central obesity).    -- Plan for esophagram, followed by EGD with possible empiric dilation. Will discuss with Dr. Christie.  -- If above work-up is negative, would likely proceed with manometry with pH impedence.  -- Can hold off resuming PPI for now given he didn't find it overly helpful.    2. Fatty liver disease  -Follow-up with Dr. Leventhal in 1/2021  -Continue to work on dietary changes and weight loss    3. Colorectal cancer screening: Normal colonoscopy in 2019, plan to repeat in 2029    RTC 2-3 months    Thank you for this consultation.  It was a pleasure to participate in the care of this patient; please contact us with any further questions.     Scribe Disclosure:   I, Jamaica Ball PA-C, am serving as a scribe; to document services personally performed by Kaylee Smith MD- -based on data collection and the provider's statements to me.      Provider Disclosure:  I agree with above History, Review of Systems, Physical exam and Plan.  I have reviewed the content of the documentation and have edited it as needed. I have personally performed the services documented here and the documentation accurately represents those services and the decisions I have  made.      Polly Mckeon PA-C  Division of Gastroenterology, Hepatology & Nutrition  [facility]      HPI  William Montes is a 55 year old male with a past medical history of obesity, diabetes, hypercholesterolemia, fatty liver disease referred from Marine Florse PA-C for evaluation of GERD.  He is new to [facility] clinic and this is my first encounter with the patient.     Over a year, has felt something in his throat, feels like a lump. Location is below the Ryan's apple, and above the sternal notch. Occurs with saliva, food, or drink; however feels like swallowing is fine without slowed passage of any solid or liquid contents.Does not feel like food or drink is getting stuck at all. Occurs in between eating and drinking. He has a long history of heartburn and has been on omeprazole on and off for years. Has acid reflux rarely, however dentist told him he has loss of enamel. Heartburn with burning sensation once per week. No regurgitation. No nausea, vomiting. No nocturnal awakenings due to these symptoms. No abdominal pain. Currently intentionally trying to lose weight. No new medications at time of symptom onset. No pain with swallowing.   He has tried omitting coffee without any change. Symptoms have not worsened in the last year. Not currently on omeprazole right now, but previously on 20 mg daily, last taken 1-2 months ago.    Daily BM with formed consistency, sometimes soft.    He did see Dr. Ovalles in ENT 1/2020, flex laryngoscopy was normal, symptoms felt to be GERD related.    Patient has previously been seen by our hepatology group for elevated liver enzymes with stage IV hepatic fibrosis.    ROS:    No fevers or chills  No blurry vision, double vision or change in vision  No sore throat  No lymphadenopathy  No headache, paraesthesias, or weakness in a limb  No shortness of breath or wheezing  No chest pain or pressure  No arthralgias or myalgias  No rashes or skin changes  No  odynophagia or dysphagia  No BRBPR, hematochezia, melena  No dysuria, frequency or urgency  No hot/cold intolerance or polyria  No anxiety or depression    PROBLEM LIST  Patient Active Problem List    Diagnosis Date Noted     Background diabetic retinopathy, mild, ou 04/01/2018     Priority: Medium     Type 2 diabetes mellitus with hyperglycemia, without long-term current use of insulin (H) 03/20/2017     Priority: Medium     Glaucoma suspect, bilateral 07/23/2016     Priority: Medium     Other chronic nonalcoholic liver disease 04/11/2014     Priority: Medium     Elevated LFTs 08/20/2012     Priority: Medium     Vitamin D deficiency 02/02/2012     Priority: Medium     Hyperlipidemia LDL goal <100 05/09/2010     Priority: Medium     Obesity 12/13/2006     Priority: Medium     Problem list name updated by automated process. Provider to review       Chronic gingivitis 07/16/2004     Priority: Medium     Problem list name updated by automated process. Provider to review and confirm  Im[employer] Update utility       Esophageal reflux 07/16/2004     Priority: Medium       PERTINENT PAST MEDICAL HISTORY:  Past Medical History:   Diagnosis Date     ABNORMAL LIVER FUNCTION STUDY 1/4/2008    increased ast, alt Normal since 2008     Chronic gingivitis      Esophageal reflux      Glaucoma      History of blood transfusion     My son has aplastic anemia and has had bld transfusions     Nonspecific abnormal results of liver function study      OBESITY NOS 12/13/2006     Pure hypercholesterolemia      Type II or unspecified type diabetes mellitus without mention of complication, not stated as uncontrolled      Uncomplicated asthma     My mother has asthma       PREVIOUS SURGERIES:    Past Surgical History:   Procedure Laterality Date     BIOPSY      My son had bone marrow biopsy     COLONOSCOPY N/A 5/22/2019    Procedure: COLONOSCOPY;  Surgeon: Leventhal, Thomas Michael, MD;  Location: [facility] OR     ENT SURGERY      My son has had  tympanoplasty     NO HISTORY OF SURGERY       ORTHOPEDIC SURGERY      My mother had left hip replacement surgery       PREVIOUS ENDOSCOPY:  Colonoscopy 5/22/2019 screening - repeat in 10 years  Reports EGD many years ago, estimated 2005, reports this was normal    ALLERGIES:   No Known Allergies    PERTINENT MEDICATIONS:    Current Outpatient Medications:      ACCU-CHEK GUIDE test strip, USE TO TEST DAILY, Disp: 100 strip, Rfl: 0     ASPIRIN 81 MG OR TABS, 1 tab po QD (Once per day), Disp: 100, Rfl: 3     atorvastatin (LIPITOR) 20 MG tablet, Take 1 tablet (20 mg) by mouth daily, Disp: 90 tablet, Rfl: 3     blood glucose (NO BRAND SPECIFIED) lancets standard, Use to test blood sugar once daily or as directed., Disp: 100 each, Rfl: 1     blood glucose monitoring (ACCU-CHEK CYNDI PLUS) meter device kit, Use to test blood sugar daily times daily or as directed., Disp: 1 kit, Rfl: 1     Blood Glucose Monitoring Suppl (ACCU-CHEK GUIDE) w/Device KIT, 1 kit daily Use to check blood sugar once daily and as needed, Disp: 1 kit, Rfl: 0     cholecalciferol (VITAMIN  -D) 1000 UNITS capsule, Take 1 capsule by mouth daily, Disp: , Rfl:      lisinopril (ZESTRIL) 5 MG tablet, TAKE 1 TABLET(5 MG) BY MOUTH DAILY, Disp: 90 tablet, Rfl: 1     melatonin 3 MG tablet, Take 6 mg by mouth nightly as needed for sleep, Disp: , Rfl:      metFORMIN (GLUCOPHAGE) 1000 MG tablet, TAKE 1 TABLET(1000 MG) BY MOUTH TWICE DAILY WITH MEALS, Disp: 180 tablet, Rfl: 0     order for DME, Lancets.  Daily, Disp: 100 each, Rfl: 4     order for DME, Glucometer, brand as covered by insurance., Disp: 1 each, Rfl: 3     Semaglutide,0.25 or 0.5MG/DOS, 2 MG/1.5ML SOPN, Inject 0.5 mg Subcutaneous once a week Inject 0.5 mg once weekly., Disp: 2 pen, Rfl: 3     topiramate (TOPAMAX) 25 MG tablet, Take 2 tablets (50 mg) by mouth 2 times daily, Disp: 120 tablet, Rfl: 3    SOCIAL HISTORY:  No alcohol  No tobacco history  Social History     Socioeconomic History     Marital  status:      Spouse name: Luis     Number of children: 4     Years of education: Not on file     Highest education level: Not on file   Occupational History     Not on file   Social Needs     Financial resource strain: Not on file     Food insecurity     Worry: Not on file     Inability: Not on file     Transportation needs     Medical: Not on file     Non-medical: Not on file   Tobacco Use     Smoking status: Never Smoker     Smokeless tobacco: Never Used   Substance and Sexual Activity     Alcohol use: No     Comment: none for 11/2011     Drug use: No     Sexual activity: Yes     Partners: Female     Birth control/protection: None   Lifestyle     Physical activity     Days per week: Not on file     Minutes per session: Not on file     Stress: Not on file   Relationships     Social connections     Talks on phone: Not on file     Gets together: Not on file     Attends [employer] service: Not on file     Active member of club or organization: Not on file     Attends meetings of clubs or organizations: Not on file     Relationship status: Not on file     Intimate partner violence     Fear of current or ex partner: Not on file     Emotionally abused: Not on file     Physically abused: Not on file     Forced sexual activity: Not on file   Other Topics Concern     Parent/sibling w/ CABG, MI or angioplasty before 65F 55M? No   Social History Narrative    Dairy/d 0 servings/d. Caffeine 1 per weekExercise 2 x weekSunscreen used - NoSeatbelts used - YesWorking smoke/CO detectors in the home - YesGuns stored in the home - NoSelf Breast Exams - NoSelf Testicular Exam - YesEye Exam up to date - YesDental Exam up to date - YesPap Smear up to date - NAMammogram up to date - NAPSA up to date - NoDexa Scan up to date - NoFlex Sig / Colonoscopy up to date - NoImmunizations up to date - YesAbuse: Current or Past(Physical, Sexual or Emotional)- NoDo you feel safe in your environment - YesRobkourtney Durham5/12/06        No  tattoos or piercings, blood transfusions, no illicit IV or intranasal drug use.        FAMILY HISTORY:    Family History   Problem Relation Age of Onset     Hypertension Mother      Diabetes Sister      Diabetes Cousin      Hypertension Other      C.A.D. No family hx of      Cancer - colorectal No family hx of      Glaucoma No family hx of      Macular Degeneration No family hx of      Cancer No family hx of      Cerebrovascular Disease No family hx of      Breast Cancer No family hx of      Prostate Cancer No family hx of      Thyroid Disease No family hx of        Past/family/social history reviewed and no changes    PHYSICAL EXAMINATION:  Constitutional: aaox3, cooperative, pleasant, not dyspneic/diaphoretic, no acute distress  Vitals reviewed: There were no vitals taken for this visit.  Wt:   Wt Readings from Last 2 Encounters:   07/22/20 93.9 kg (207 lb)   03/09/20 92 kg (202 lb 14.4 oz)      Telephone visit        PERTINENT STUDIES:    Orders Only on 07/23/2020   Component Date Value Ref Range Status     Hemoglobin A1C 07/23/2020 5.6  0 - 5.6 % Final     Sodium 07/23/2020 138  133 - 144 mmol/L Final     Potassium 07/23/2020 3.9  3.4 - 5.3 mmol/L Final     Chloride 07/23/2020 108  94 - 109 mmol/L Final     Carbon Dioxide 07/23/2020 24  20 - 32 mmol/L Final     Anion Gap 07/23/2020 6  3 - 14 mmol/L Final     Glucose 07/23/2020 90  70 - 99 mg/dL Final     Urea Nitrogen 07/23/2020 16  7 - 30 mg/dL Final     Creatinine 07/23/2020 1.52* 0.66 - 1.25 mg/dL Final     GFR Estimate 07/23/2020 50* >60 mL/min/[1.73_m2] Final     GFR Estimate If Black 07/23/2020 58* >60 mL/min/[1.73_m2] Final     Calcium 07/23/2020 9.4  8.5 - 10.1 mg/dL Final

## 2020-07-31 NOTE — NURSING NOTE
Chief Complaint   Patient presents with     Consult     New consult reflux       Vitals:    07/31/20 0839   Weight: 93.9 kg (207 lb)   Height: 1.829 m (6')       Body mass index is 28.07 kg/m .    Gerri Nazario CMA

## 2020-07-31 NOTE — PROGRESS NOTES
"William Montes is a 55 year old male who is being evaluated via a billable telephone visit.      The patient has been notified of following:     \"This telephone visit will be conducted via a call between you and your physician/provider. We have found that certain health care needs can be provided without the need for a physical exam.  This service lets us provide the care you need with a short phone conversation.  If a prescription is necessary we can send it directly to your pharmacy.  If lab work is needed we can place an order for that and you can then stop by our lab to have the test done at a later time.    Telephone visits are billed at different rates depending on your insurance coverage. During this emergency period, for some insurers they may be billed the same as an in-person visit.  Please reach out to your insurance provider with any questions.    If during the course of the call the physician/provider feels a telephone visit is not appropriate, you will not be charged for this service.\"    Patient has given verbal consent for Telephone visit?  Yes    What phone number would you like to be contacted at? [phone]    How [facility]    Phone call duration: 28 minutes    Jamaica Ball PA-C      "

## 2020-07-31 NOTE — LETTER
7/31/2020         RE: William Montes  [address]  [place] [address]        Dear Colleague,    Thank you for referring your patient, William Montes, to the [facility] GASTROENTEROLOG[facility] AND IBD CLINIC. Please see a copy of my visit note below.    William Montes is a 55 year old male who is being evaluated via a billable telephone visit.        Phone call duration: 28 minutes    Jamaica Ball PA-C        GI CLINIC VISIT    CC/REFERRING MD:  Marine Flores  REASON FOR CONSULTATION: Globus sensation    ASSESSMENT/PLAN:  1.Globus sensation: Ongoing for one year without odynophagia, dysphagia, unintentional weight loss, in context of longstanding GERD, which currently is minimally symptomatic despite not being on PPI for several months. Previous flex laryngoscopy with ENT 1/2020 was unremarkable and at that time, symptoms were felt to be related to GERD. Differential includes cricopharyngeal bar, GERD, GIP, functional globus, possible EGJOO related to medications (semaglutide) though he hasn't had overt dysphagia. TSH in the last year was normal (9/2019). He does meet screening criteria for Daniel's (chronic GERD >5 years, age >50, male, presence of central obesity).    -- Plan for esophagram, followed by EGD with possible empiric dilation. Will discuss with Dr. Christie.  -- If above work-up is negative, would likely proceed with manometry with pH impedence.  -- Can hold off resuming PPI for now given he didn't find it overly helpful.    2. Fatty liver disease  -Follow-up with Dr. Leventhal in 1/2021  -Continue to work on dietary changes and weight loss    3. Colorectal cancer screening: Normal colonoscopy in 2019, plan to repeat in 2029    RTC 2-3 months    Thank you for this consultation.  It was a pleasure to participate in the care of this patient; please contact us with any further questions.     Scribe Disclosure:   I, Jamaica Ball PA-C, am serving as a scribe; to document services  personally performed by Kaylee Smith MD- -based on data collection and the provider's statements to me.      Provider Disclosure:  I agree with above History, Review of Systems, Physical exam and Plan.  I have reviewed the content of the documentation and have edited it as needed. I have personally performed the services documented here and the documentation accurately represents those services and the decisions I have made.      Polly Mckeon PA-C  Division of Gastroenterology, Hepatology & Nutrition  [facility]  William Montes is a 55 year old male with a past medical history of obesity, diabetes, hypercholesterolemia, fatty liver disease referred from Marine Flores PA-C for evaluation of GERD.  He is new to [facility] GI clinic and this is my first encounter with the patient.     Over a year, has felt something in his throat, feels like a lump. Location is below the Ryan's apple, and above the sternal notch. Occurs with saliva, food, or drink; however feels like swallowing is fine without slowed passage of any solid or liquid contents.Does not feel like food or drink is getting stuck at all. Occurs in between eating and drinking. He has a long history of heartburn and has been on omeprazole on and off for years. Has acid reflux rarely, however dentist told him he has loss of enamel. Heartburn with burning sensation once per week. No regurgitation. No nausea, vomiting. No nocturnal awakenings due to these symptoms. No abdominal pain. Currently intentionally trying to lose weight. No new medications at time of symptom onset. No pain with swallowing.   He has tried omitting coffee without any change. Symptoms have not worsened in the last year. Not currently on omeprazole right now, but previously on 20 mg daily, last taken 1-2 months ago.    Daily BM with formed consistency, sometimes soft.    He did see Dr. Ovalles in ENT 1/2020, flex laryngoscopy was normal, symptoms felt to be GERD  related.    Patient has previously been seen by our hepatology group for elevated liver enzymes with stage IV hepatic fibrosis.    ROS:    No fevers or chills  No blurry vision, double vision or change in vision  No sore throat  No lymphadenopathy  No headache, paraesthesias, or weakness in a limb  No shortness of breath or wheezing  No chest pain or pressure  No arthralgias or myalgias  No rashes or skin changes  No odynophagia or dysphagia  No BRBPR, hematochezia, melena  No dysuria, frequency or urgency  No hot/cold intolerance or polyria  No anxiety or depression    PROBLEM LIST  Patient Active Problem List    Diagnosis Date Noted     Background diabetic retinopathy, mild, ou 04/01/2018     Priority: Medium     Type 2 diabetes mellitus with hyperglycemia, without long-term current use of insulin (H) 03/20/2017     Priority: Medium     Glaucoma suspect, bilateral 07/23/2016     Priority: Medium     Other chronic nonalcoholic liver disease 04/11/2014     Priority: Medium     Elevated LFTs 08/20/2012     Priority: Medium     Vitamin D deficiency 02/02/2012     Priority: Medium     Hyperlipidemia LDL goal <100 05/09/2010     Priority: Medium     Obesity 12/13/2006     Priority: Medium     Problem list name updated by automated process. Provider to review       Chronic gingivitis 07/16/2004     Priority: Medium     Problem list name updated by automated process. Provider to review and confirm  [employer] Update utility       Esophageal reflux 07/16/2004     Priority: Medium       PERTINENT PAST MEDICAL HISTORY:  Past Medical History:   Diagnosis Date     ABNORMAL LIVER FUNCTION STUDY 1/4/2008    increased ast, alt Normal since 2008     Chronic gingivitis      Esophageal reflux      Glaucoma      History of blood transfusion     My son has aplastic anemia and has had bld transfusions     Nonspecific abnormal results of liver function study      OBESITY NOS 12/13/2006     Pure hypercholesterolemia      Type II or unspecified  type diabetes mellitus without mention of complication, not stated as uncontrolled      Uncomplicated asthma     My mother has asthma       PREVIOUS SURGERIES:    Past Surgical History:   Procedure Laterality Date     BIOPSY      My son had bone marrow biopsy     COLONOSCOPY N/A 5/22/2019    Procedure: COLONOSCOPY;  Surgeon: Leventhal, Thomas Michael, MD;  Location: [facility] OR     ENT SURGERY      My son has had tympanoplasty     NO HISTORY OF SURGERY       ORTHOPEDIC SURGERY      My mother had left hip replacement surgery       PREVIOUS ENDOSCOPY:  Colonoscopy 5/22/2019 screening - repeat in 10 years  Reports EGD many years ago, estimated 2005, reports this was normal    ALLERGIES:   No Known Allergies    PERTINENT MEDICATIONS:    Current Outpatient Medications:      ACCU-CHEK GUIDE test strip, USE TO TEST DAILY, Disp: 100 strip, Rfl: 0     ASPIRIN 81 MG OR TABS, 1 tab po QD (Once per day), Disp: 100, Rfl: 3     atorvastatin (LIPITOR) 20 MG tablet, Take 1 tablet (20 mg) by mouth daily, Disp: 90 tablet, Rfl: 3     blood glucose (NO BRAND SPECIFIED) lancets standard, Use to test blood sugar once daily or as directed., Disp: 100 each, Rfl: 1     blood glucose monitoring (ACCU-CHEK CYNDI PLUS) meter device kit, Use to test blood sugar daily times daily or as directed., Disp: 1 kit, Rfl: 1     Blood Glucose Monitoring Suppl (ACCU-CHEK GUIDE) w/Device KIT, 1 kit daily Use to check blood sugar once daily and as needed, Disp: 1 kit, Rfl: 0     cholecalciferol (VITAMIN  -D) 1000 UNITS capsule, Take 1 capsule by mouth daily, Disp: , Rfl:      lisinopril (ZESTRIL) 5 MG tablet, TAKE 1 TABLET(5 MG) BY MOUTH DAILY, Disp: 90 tablet, Rfl: 1     melatonin 3 MG tablet, Take 6 mg by mouth nightly as needed for sleep, Disp: , Rfl:      metFORMIN (GLUCOPHAGE) 1000 MG tablet, TAKE 1 TABLET(1000 MG) BY MOUTH TWICE DAILY WITH MEALS, Disp: 180 tablet, Rfl: 0     order for DME, Lancets.  Daily, Disp: 100 each, Rfl: 4     order for DME,  Glucometer, brand as covered by insurance., Disp: 1 each, Rfl: 3     Semaglutide,0.25 or 0.5MG/DOS, 2 MG/1.5ML SOPN, Inject 0.5 mg Subcutaneous once a week Inject 0.5 mg once weekly., Disp: 2 pen, Rfl: 3     topiramate (TOPAMAX) 25 MG tablet, Take 2 tablets (50 mg) by mouth 2 times daily, Disp: 120 tablet, Rfl: 3    SOCIAL HISTORY:  No alcohol  No tobacco history  Social History     Socioeconomic History     Marital status:      Spouse name: Luis     Number of children: 4     Years of education: Not on file     Highest education level: Not on file   Occupational History     Not on file   Social Needs     Financial resource strain: Not on file     Food insecurity     Worry: Not on file     Inability: Not on file     Transportation needs     Medical: Not on file     Non-medical: Not on file   Tobacco Use     Smoking status: Never Smoker     Smokeless tobacco: Never Used   Substance and Sexual Activity     Alcohol use: No     Comment: none for 11/2011     Drug use: No     Sexual activity: Yes     Partners: Female     Birth control/protection: None   Lifestyle     Physical activity     Days per week: Not on file     Minutes per session: Not on file     Stress: Not on file   Relationships     Social connections     Talks on phone: Not on file     Gets together: Not on file     Attends Orthodoxy service: Not on file     Active member of club or organization: Not on file     Attends meetings of clubs or organizations: Not on file     Relationship status: Not on file     Intimate partner violence     Fear of current or ex partner: Not on file     Emotionally abused: Not on file     Physically abused: Not on file     Forced sexual activity: Not on file   Other Topics Concern     Parent/sibling w/ CABG, MI or angioplasty before 65F 55M? No   Social History Narrative    Dairy/d 0 servings/d. Caffeine 1 per weekExercise 2 x weekSunscreen used - NoSeatbelts used - YesWorking smoke/CO detectors in the home - YesGuns  stored in the home - NoSelf Breast Exams - NoSelf Testicular Exam - YesEye Exam up to date - YesDental Exam up to date - YesPap Smear up to date - NAMammogram up to date - NAPSA up to date - NoDexa Scan up to date - NoFlex Sig / Colonoscopy up to date - NoImmunizations up to date - YesAbuse: Current or Past(Physical, Sexual or Emotional)- NoDo you feel safe in your environment - YesRobert HeffernCMA5/12/06        No tattoos or piercings, blood transfusions, no illicit IV or intranasal drug use.        FAMILY HISTORY:    Family History   Problem Relation Age of Onset     Hypertension Mother      Diabetes Sister      Diabetes Cousin      Hypertension Other      C.A.D. No family hx of      Cancer - colorectal No family hx of      Glaucoma No family hx of      Macular Degeneration No family hx of      Cancer No family hx of      Cerebrovascular Disease No family hx of      Breast Cancer No family hx of      Prostate Cancer No family hx of      Thyroid Disease No family hx of        Past/family/social history reviewed and no changes    PHYSICAL EXAMINATION:  Constitutional: aaox3, cooperative, pleasant, not dyspneic/diaphoretic, no acute distress  Vitals reviewed: There were no vitals taken for this visit.  Wt:   Wt Readings from Last 2 Encounters:   07/22/20 93.9 kg (207 lb)   03/09/20 92 kg (202 lb 14.4 oz)      Telephone visit        PERTINENT STUDIES:    Orders Only on 07/23/2020   Component Date Value Ref Range Status     Hemoglobin A1C 07/23/2020 5.6  0 - 5.6 % Final     Sodium 07/23/2020 138  133 - 144 mmol/L Final     Potassium 07/23/2020 3.9  3.4 - 5.3 mmol/L Final     Chloride 07/23/2020 108  94 - 109 mmol/L Final     Carbon Dioxide 07/23/2020 24  20 - 32 mmol/L Final     Anion Gap 07/23/2020 6  3 - 14 mmol/L Final     Glucose 07/23/2020 90  70 - 99 mg/dL Final     Urea Nitrogen 07/23/2020 16  7 - 30 mg/dL Final     Creatinine 07/23/2020 1.52* 0.66 - 1.25 mg/dL Final     GFR Estimate 07/23/2020 50* >60  mL/min/[1.73_m2] Final     GFR Estimate If Black 07/23/2020 58* >60 mL/min/[1.73_m2] Final     Calcium 07/23/2020 9.4  8.5 - 10.1 mg/dL Final           Again, thank you for allowing me to participate in the care of your patient.        Sincerely,        Polly Mckeon PA-C

## 2020-08-18 DIAGNOSIS — I10 BENIGN ESSENTIAL HYPERTENSION: ICD-10-CM

## 2020-08-18 RX ORDER — LISINOPRIL 5 MG/1
TABLET ORAL
Qty: 90 TABLET | Refills: 0 | Status: SHIPPED | OUTPATIENT
Start: 2020-08-18 | End: 2020-10-15

## 2020-08-18 NOTE — TELEPHONE ENCOUNTER
Routing refill request to provider for review/approval because:  Labs out of range:    Creatinine   Date Value Ref Range Status   07/23/2020 1.52 (H) 0.66 - 1.25 mg/dL Final

## 2020-08-27 DIAGNOSIS — E66.811 CLASS 1 OBESITY WITH SERIOUS COMORBIDITY AND BODY MASS INDEX (BMI) OF 30.0 TO 30.9 IN ADULT, UNSPECIFIED OBESITY TYPE: ICD-10-CM

## 2020-08-27 PROCEDURE — 36415 COLL VENOUS BLD VENIPUNCTURE: CPT | Performed by: PHYSICIAN ASSISTANT

## 2020-08-27 PROCEDURE — 80048 BASIC METABOLIC PNL TOTAL CA: CPT | Performed by: PHYSICIAN ASSISTANT

## 2020-08-28 LAB
ANION GAP SERPL CALCULATED.3IONS-SCNC: 6 MMOL/L (ref 3–14)
BUN SERPL-MCNC: 15 MG/DL (ref 7–30)
CALCIUM SERPL-MCNC: 8.8 MG/DL (ref 8.5–10.1)
CHLORIDE SERPL-SCNC: 112 MMOL/L (ref 94–109)
CO2 SERPL-SCNC: 20 MMOL/L (ref 20–32)
CREAT SERPL-MCNC: 1.33 MG/DL (ref 0.66–1.25)
GFR SERPL CREATININE-BSD FRML MDRD: 59 ML/MIN/{1.73_M2}
GLUCOSE SERPL-MCNC: 82 MG/DL (ref 70–99)
POTASSIUM SERPL-SCNC: 4.3 MMOL/L (ref 3.4–5.3)
SODIUM SERPL-SCNC: 138 MMOL/L (ref 133–144)

## 2020-08-31 ENCOUNTER — MYC MEDICAL ADVICE (OUTPATIENT)
Dept: SURGERY | Facility: CLINIC | Age: 55
End: 2020-08-31

## 2020-08-31 DIAGNOSIS — E11.8 TYPE 2 DIABETES MELLITUS WITH COMPLICATION, WITHOUT LONG-TERM CURRENT USE OF INSULIN (H): Primary | ICD-10-CM

## 2020-09-03 ENCOUNTER — ANCILLARY PROCEDURE (OUTPATIENT)
Dept: GENERAL RADIOLOGY | Facility: CLINIC | Age: 55
End: 2020-09-03
Attending: PHYSICIAN ASSISTANT
Payer: COMMERCIAL

## 2020-09-03 DIAGNOSIS — R09.A2 GLOBUS SENSATION: ICD-10-CM

## 2020-09-04 DIAGNOSIS — R09.A2 GLOBUS SENSATION: Primary | ICD-10-CM

## 2020-09-11 DIAGNOSIS — E78.5 HYPERLIPIDEMIA LDL GOAL <100: ICD-10-CM

## 2020-09-14 RX ORDER — ATORVASTATIN CALCIUM 20 MG/1
TABLET, FILM COATED ORAL
Qty: 90 TABLET | Refills: 3 | Status: SHIPPED | OUTPATIENT
Start: 2020-09-14 | End: 2021-09-08

## 2020-09-14 NOTE — TELEPHONE ENCOUNTER
Routing refill request to provider for review/approval because:  Labs not current:    LDL Cholesterol Calculated   Date Value Ref Range Status   04/04/2019 60 <100 mg/dL Final     Comment:     Desirable:       <100 mg/dl       Jamaica Walker RN, BSN, PHN  [facility]: [place]

## 2020-09-21 ENCOUNTER — OFFICE VISIT (OUTPATIENT)
Dept: OPHTHALMOLOGY | Facility: CLINIC | Age: 55
End: 2020-09-21
Payer: COMMERCIAL

## 2020-09-21 DIAGNOSIS — E11.65 TYPE 2 DIABETES MELLITUS WITH HYPERGLYCEMIA, WITHOUT LONG-TERM CURRENT USE OF INSULIN (H): ICD-10-CM

## 2020-09-21 DIAGNOSIS — E11.3299 BACKGROUND DIABETIC RETINOPATHY (H): ICD-10-CM

## 2020-09-21 DIAGNOSIS — H40.003 GLAUCOMA SUSPECT, BILATERAL: ICD-10-CM

## 2020-09-21 DIAGNOSIS — H52.4 PRESBYOPIA: ICD-10-CM

## 2020-09-21 DIAGNOSIS — E66.811 CLASS 1 OBESITY WITH SERIOUS COMORBIDITY AND BODY MASS INDEX (BMI) OF 30.0 TO 30.9 IN ADULT, UNSPECIFIED OBESITY TYPE: ICD-10-CM

## 2020-09-21 DIAGNOSIS — Z01.01 ENCOUNTER FOR EXAMINATION OF EYES AND VISION WITH ABNORMAL FINDINGS: Primary | ICD-10-CM

## 2020-09-21 PROCEDURE — 92015 DETERMINE REFRACTIVE STATE: CPT | Performed by: OPHTHALMOLOGY

## 2020-09-21 PROCEDURE — 92014 COMPRE OPH EXAM EST PT 1/>: CPT | Performed by: OPHTHALMOLOGY

## 2020-09-21 ASSESSMENT — VISUAL ACUITY
OS_CC: 20/20
OD_CC: 20/20
OD_CC: J1+
METHOD: SNELLEN - LINEAR
OS_CC: J1+
CORRECTION_TYPE: GLASSES

## 2020-09-21 ASSESSMENT — REFRACTION_WEARINGRX
OD_CYLINDER: SPHERE
SPECS_TYPE: PAL
OD_ADD: +2.00
OD_SPHERE: +1.25
OS_AXIS: 102
OS_CYLINDER: +0.25
OS_SPHERE: +1.00
OS_ADD: +2.00

## 2020-09-21 ASSESSMENT — TONOMETRY
OS_IOP_MMHG: 14
IOP_METHOD: APPLANATION
OD_IOP_MMHG: 11

## 2020-09-21 ASSESSMENT — CUP TO DISC RATIO
OS_RATIO: 0.7
OD_RATIO: 0.7

## 2020-09-21 ASSESSMENT — CONF VISUAL FIELD
OS_NORMAL: 1
OD_NORMAL: 1

## 2020-09-21 ASSESSMENT — REFRACTION_MANIFEST
OD_ADD: +3.00
OS_SPHERE: +1.75
OD_CYLINDER: +0.25
OD_AXIS: 180
OS_ADD: +3.00
OS_CYLINDER: SPHERE
OD_SPHERE: +1.75

## 2020-09-21 ASSESSMENT — EXTERNAL EXAM - RIGHT EYE: OD_EXAM: NORMAL

## 2020-09-21 ASSESSMENT — SLIT LAMP EXAM - LIDS
COMMENTS: NORMAL
COMMENTS: NORMAL

## 2020-09-21 ASSESSMENT — EXTERNAL EXAM - LEFT EYE: OS_EXAM: NORMAL

## 2020-09-21 NOTE — LETTER
9/21/2020         RE: William Montes  [address]  [facility] [address]        Dear Colleague,    Thank you for referring your patient, William Montes, to the [facility]. Please see a copy of my visit note below.     Current Eye Medications:  None     Subjective:  Complete eye exam    Diabetic exam  No visual complaints.     Lab Results   Component Value Date    A1C 5.6 07/23/2020    A1C 6.1 03/09/2020    A1C 6.5 11/11/2019    A1C 6.3 08/01/2019    A1C 5.6 04/04/2019        Objective:  See Ophthalmology Exam.       Assessment:  Stable intraocular pressure and discs in patient who is a glaucoma suspect.  Stable mild background diabetic retinopathy both eyes.      ICD-10-CM    1. Encounter for examination of eyes and vision with abnormal findings  Z01.01    2. Presbyopia  H52.4 REFRACTIVE STATUS     EYE EXAM (SIMPLE-NONBILLABLE)   3. Type 2 diabetes mellitus with hyperglycemia, without long-term current use of insulin (H)  E11.65    4. Background diabetic retinopathy, mild, ou  E11.3299    5. Glaucoma suspect, bilateral  H40.003         Plan:  Glasses Rx given - optional  Continue observation with regard to glaucoma suspect status.  Call in March 2021 for an appointment in September 2021 for Complete Exam    Dr. Hodgson [phone]           Again, thank you for allowing me to participate in the care of your patient.        Sincerely,        Harry Hodgson MD

## 2020-09-21 NOTE — PROGRESS NOTES
Current Eye Medications:  None     Subjective:  Complete eye exam    Diabetic exam  No visual complaints.     Lab Results   Component Value Date    A1C 5.6 07/23/2020    A1C 6.1 03/09/2020    A1C 6.5 11/11/2019    A1C 6.3 08/01/2019    A1C 5.6 04/04/2019        Objective:  See Ophthalmology Exam.       Assessment:  Stable intraocular pressure and discs in patient who is a glaucoma suspect.  Stable mild background diabetic retinopathy both eyes.      ICD-10-CM    1. Encounter for examination of eyes and vision with abnormal findings  Z01.01    2. Presbyopia  H52.4 REFRACTIVE STATUS     EYE EXAM (SIMPLE-NONBILLABLE)   3. Type 2 diabetes mellitus with hyperglycemia, without long-term current use of insulin (H)  E11.65    4. Background diabetic retinopathy, mild, ou  E11.3299    5. Glaucoma suspect, bilateral  H40.003         Plan:  Glasses Rx given - optional  Continue observation with regard to glaucoma suspect status.  Call in March 2021 for an appointment in September 2021 for Complete Exam    Dr. Hodgson [phone]

## 2020-09-21 NOTE — PATIENT INSTRUCTIONS
Glasses Rx given - optional  Continue observation with regard to glaucoma suspect status.  Call in March 2021 for an appointment in September 2021 for Complete Exam    Dr. Hodgson [phone]

## 2020-09-24 RX ORDER — TOPIRAMATE 25 MG/1
50 TABLET, FILM COATED ORAL 2 TIMES DAILY
Qty: 360 TABLET | Refills: 0 | Status: SHIPPED | OUTPATIENT
Start: 2020-09-24 | End: 2021-02-18

## 2020-09-24 NOTE — TELEPHONE ENCOUNTER
TOPIRAMATE 25MG TABLETS     Last Written Prescription Date: 7/22/2020  Last Fill Quantity: 120,   # refills: 3  Last Office Visit :7/22/2020  Future Office visit:  None  Routing refill request to provider for review/approval because:  Continue same dose?   Follow up with Pt?  Refer to Provider for review      Shelby Monk RN  Central Triage Red Flags/Med Refills

## 2020-09-29 DIAGNOSIS — Z11.59 ENCOUNTER FOR SCREENING FOR OTHER VIRAL DISEASES: Primary | ICD-10-CM

## 2020-09-30 ENCOUNTER — TELEPHONE (OUTPATIENT)
Dept: GASTROENTEROLOGY | Facility: OUTPATIENT CENTER | Age: 55
End: 2020-09-30

## 2020-09-30 NOTE — TELEPHONE ENCOUNTER
Patient taking any blood thinners ? No      Heart disease ? Denies       Lung disease ? Denies      Sleep apnea ? Denies      Diabetic ? Type 2 Advised patient to consult his provider about his diabetes medicine day of  exam     Kidney disease ?denies       Electronic implanted medical devices ?      Are you taking any narcotic pain medication ?       PTSD ?      Prep instructions reviewed with patient ?         Pharmacy :      Indication for procedure :      Referring provider :       Arrival Time :

## 2020-10-03 DIAGNOSIS — Z11.59 ENCOUNTER FOR SCREENING FOR OTHER VIRAL DISEASES: ICD-10-CM

## 2020-10-03 PROCEDURE — U0003 INFECTIOUS AGENT DETECTION BY NUCLEIC ACID (DNA OR RNA); SEVERE ACUTE RESPIRATORY SYNDROME CORONAVIRUS 2 (SARS-COV-2) (CORONAVIRUS DISEASE [COVID-19]), AMPLIFIED PROBE TECHNIQUE, MAKING USE OF HIGH THROUGHPUT TECHNOLOGIES AS DESCRIBED BY CMS-2020-01-R: HCPCS | Performed by: INTERNAL MEDICINE

## 2020-10-04 LAB
SARS-COV-2 RNA SPEC QL NAA+PROBE: NOT DETECTED
SPECIMEN SOURCE: NORMAL

## 2020-10-07 ENCOUNTER — TELEPHONE (OUTPATIENT)
Dept: GASTROENTEROLOGY | Facility: CLINIC | Age: 55
End: 2020-10-07

## 2020-10-07 ENCOUNTER — DOCUMENTATION ONLY (OUTPATIENT)
Dept: GASTROENTEROLOGY | Facility: OUTPATIENT CENTER | Age: 55
End: 2020-10-07
Payer: COMMERCIAL

## 2020-10-07 NOTE — TELEPHONE ENCOUNTER
Patient is scheduled for EGD with Dr. Lopez    Spoke with: patient    Date of Procedure: 10-26-20    Location: [facility]    Sedation Type MAC    Pre-op for Unit J MAC not needed    Informed patient they will need an adult  yes    Informed Patient of COVID Test Requirement yes    Preferred Pharmacy for Pre Prescription chart    Confirmed Nurse will call to complete assessment yes    Additional comments: needed to reschedule from 10-7-20

## 2020-10-08 DIAGNOSIS — Z11.59 ENCOUNTER FOR SCREENING FOR OTHER VIRAL DISEASES: Primary | ICD-10-CM

## 2020-10-10 DIAGNOSIS — E11.65 TYPE 2 DIABETES MELLITUS WITH HYPERGLYCEMIA, WITHOUT LONG-TERM CURRENT USE OF INSULIN (H): ICD-10-CM

## 2020-10-12 DIAGNOSIS — I10 BENIGN ESSENTIAL HYPERTENSION: ICD-10-CM

## 2020-10-12 RX ORDER — BLOOD SUGAR DIAGNOSTIC
STRIP MISCELLANEOUS
Qty: 100 STRIP | Refills: 0 | Status: SHIPPED | OUTPATIENT
Start: 2020-10-12 | End: 2021-04-13

## 2020-10-13 NOTE — TELEPHONE ENCOUNTER
Instabeat message sent.    Thank you!    Evelia BRYATN  [facility] Referral Rep

## 2020-10-15 RX ORDER — LISINOPRIL 5 MG/1
TABLET ORAL
Qty: 30 TABLET | Refills: 0 | Status: SHIPPED | OUTPATIENT
Start: 2020-10-15 | End: 2020-11-12

## 2020-10-15 NOTE — TELEPHONE ENCOUNTER
Routing refill request to provider for review/approval because:  Creatinine is out of range, due for a physical-mychart message sent to patient to schedule appt    Senait Corea RN

## 2020-10-20 ENCOUNTER — TELEPHONE (OUTPATIENT)
Dept: GASTROENTEROLOGY | Facility: OUTPATIENT CENTER | Age: 55
End: 2020-10-20

## 2020-10-20 NOTE — TELEPHONE ENCOUNTER
Patient taking any blood thinners ? No      Heart disease ? Denies      Lung disease ? Denies      Sleep apnea ? Denies      Diabetic ? Type 2     Kidney disease ? Denies      Electronic implanted medical devices ? Denies      PTSD ? N/a      Prep instructions reviewed with patient ? Instructions, policy,MAC sedation plan reviewed. Advised patient to have someone stay with them post exam.     Yes    Pharmacy : N/a     Indication for procedure : Globus sensation     Referring provider : Jamaica Ball PA-C      Arrival Time : 10 AM

## 2020-10-24 DIAGNOSIS — Z11.59 ENCOUNTER FOR SCREENING FOR OTHER VIRAL DISEASES: ICD-10-CM

## 2020-10-24 PROCEDURE — U0003 INFECTIOUS AGENT DETECTION BY NUCLEIC ACID (DNA OR RNA); SEVERE ACUTE RESPIRATORY SYNDROME CORONAVIRUS 2 (SARS-COV-2) (CORONAVIRUS DISEASE [COVID-19]), AMPLIFIED PROBE TECHNIQUE, MAKING USE OF HIGH THROUGHPUT TECHNOLOGIES AS DESCRIBED BY CMS-2020-01-R: HCPCS | Performed by: INTERNAL MEDICINE

## 2020-10-25 LAB
SARS-COV-2 RNA SPEC QL NAA+PROBE: NOT DETECTED
SPECIMEN SOURCE: NORMAL

## 2020-10-26 ENCOUNTER — TRANSFERRED RECORDS (OUTPATIENT)
Dept: HEALTH INFORMATION MANAGEMENT | Facility: CLINIC | Age: 55
End: 2020-10-26

## 2020-10-26 ENCOUNTER — DOCUMENTATION ONLY (OUTPATIENT)
Dept: GASTROENTEROLOGY | Facility: OUTPATIENT CENTER | Age: 55
End: 2020-10-26
Payer: COMMERCIAL

## 2020-10-29 LAB — COPATH REPORT: NORMAL

## 2020-11-12 ENCOUNTER — MYC MEDICAL ADVICE (OUTPATIENT)
Dept: FAMILY MEDICINE | Facility: CLINIC | Age: 55
End: 2020-11-12

## 2020-11-12 ENCOUNTER — OFFICE VISIT (OUTPATIENT)
Dept: FAMILY MEDICINE | Facility: CLINIC | Age: 55
End: 2020-11-12
Payer: COMMERCIAL

## 2020-11-12 VITALS
OXYGEN SATURATION: 100 % | TEMPERATURE: 98.5 F | BODY MASS INDEX: 28.85 KG/M2 | WEIGHT: 213 LBS | DIASTOLIC BLOOD PRESSURE: 80 MMHG | HEART RATE: 60 BPM | HEIGHT: 72 IN | SYSTOLIC BLOOD PRESSURE: 129 MMHG

## 2020-11-12 DIAGNOSIS — Z00.00 ROUTINE GENERAL MEDICAL EXAMINATION AT A HEALTH CARE FACILITY: Primary | ICD-10-CM

## 2020-11-12 DIAGNOSIS — N18.30 STAGE 3 CHRONIC KIDNEY DISEASE, UNSPECIFIED WHETHER STAGE 3A OR 3B CKD (H): ICD-10-CM

## 2020-11-12 DIAGNOSIS — E66.811 CLASS 1 OBESITY WITH SERIOUS COMORBIDITY AND BODY MASS INDEX (BMI) OF 30.0 TO 30.9 IN ADULT, UNSPECIFIED OBESITY TYPE: ICD-10-CM

## 2020-11-12 DIAGNOSIS — L60.0 INGROWN TOENAIL OF RIGHT FOOT: ICD-10-CM

## 2020-11-12 DIAGNOSIS — E11.65 TYPE 2 DIABETES MELLITUS WITH HYPERGLYCEMIA, WITHOUT LONG-TERM CURRENT USE OF INSULIN (H): ICD-10-CM

## 2020-11-12 DIAGNOSIS — E78.5 HYPERLIPIDEMIA LDL GOAL <100: ICD-10-CM

## 2020-11-12 DIAGNOSIS — M75.41 IMPINGEMENT SYNDROME, SHOULDER, RIGHT: ICD-10-CM

## 2020-11-12 DIAGNOSIS — I10 BENIGN ESSENTIAL HYPERTENSION: ICD-10-CM

## 2020-11-12 DIAGNOSIS — E55.9 VITAMIN D DEFICIENCY: ICD-10-CM

## 2020-11-12 DIAGNOSIS — R79.89 ELEVATED LFTS: ICD-10-CM

## 2020-11-12 LAB — HBA1C MFR BLD: 5.8 % (ref 0–5.6)

## 2020-11-12 PROCEDURE — 80048 BASIC METABOLIC PNL TOTAL CA: CPT | Performed by: FAMILY MEDICINE

## 2020-11-12 PROCEDURE — 99214 OFFICE O/P EST MOD 30 MIN: CPT | Mod: 25 | Performed by: FAMILY MEDICINE

## 2020-11-12 PROCEDURE — 99396 PREV VISIT EST AGE 40-64: CPT | Mod: 25 | Performed by: FAMILY MEDICINE

## 2020-11-12 PROCEDURE — 90471 IMMUNIZATION ADMIN: CPT | Performed by: FAMILY MEDICINE

## 2020-11-12 PROCEDURE — 80061 LIPID PANEL: CPT | Performed by: FAMILY MEDICINE

## 2020-11-12 PROCEDURE — 82043 UR ALBUMIN QUANTITATIVE: CPT | Performed by: FAMILY MEDICINE

## 2020-11-12 PROCEDURE — 90750 HZV VACC RECOMBINANT IM: CPT | Performed by: FAMILY MEDICINE

## 2020-11-12 PROCEDURE — 83036 HEMOGLOBIN GLYCOSYLATED A1C: CPT | Performed by: FAMILY MEDICINE

## 2020-11-12 PROCEDURE — 82306 VITAMIN D 25 HYDROXY: CPT | Performed by: FAMILY MEDICINE

## 2020-11-12 PROCEDURE — 36415 COLL VENOUS BLD VENIPUNCTURE: CPT | Performed by: FAMILY MEDICINE

## 2020-11-12 RX ORDER — LISINOPRIL 5 MG/1
TABLET ORAL
Qty: 90 TABLET | Refills: 1 | Status: SHIPPED | OUTPATIENT
Start: 2020-11-12 | End: 2021-05-13

## 2020-11-12 ASSESSMENT — ENCOUNTER SYMPTOMS: ARTHRALGIAS: 1

## 2020-11-12 ASSESSMENT — MIFFLIN-ST. JEOR: SCORE: 1828.03

## 2020-11-12 NOTE — PROGRESS NOTES
SUBJECTIVE:   CC: William Montes is an 55 year old male who presents for preventative health visit.       Patient has been advised of split billing requirements and indicates understanding: Yes  Healthy Habits:     Getting at least 3 servings of Calcium per day:  Yes    Bi-annual eye exam:  Yes    Dental care twice a year:  Yes    Sleep apnea or symptoms of sleep apnea:  None    Diet:  Diabetic    Frequency of exercise:  2-3 days/week    Duration of exercise:  15-30 minutes    Taking medications regularly:  Yes    Barriers to taking medications:  Not applicable    Medication side effects:  Muscle aches    PHQ-2 Total Score: 0    Additional concerns today:  Yes    He has bilateral shoulder pain and the right is worse.  This has been on and off but has been more bother some lately.  It improves with home PT.  It is hard to sleep on that side.  He did formal PT which helped some.  He has not had a steroid injection.  He denies trauma.      He is having a burning and tingling pain at the tip of this right big toe.  This has been for a long time.  He feels like if he keeps the dead skin filed down it is better.      He has low vit d and takes 1000 mg daily.     Diabetes Follow-up    How often are you checking your blood sugar? One time daily  What time of day are you checking your blood sugars (select all that apply)?  Before meals  Have you had any blood sugars above 200?  No  Have you had any blood sugars below 70?  No    What symptoms do you notice when your blood sugar is low?  None    What concerns do you have today about your diabetes? None     Do you have any of these symptoms? (Select all that apply)  Burning in feet   Lab Results   Component Value Date    A1C 5.6 07/23/2020    A1C 6.1 03/09/2020    A1C 6.5 11/11/2019    A1C 6.3 08/01/2019    A1C 5.6 04/04/2019       Metformin 1000 twice a day and semaglutide 0.5 mg weekly        Hyperlipidemia Follow-Up      Are you regularly taking any medication or supplement  to lower your cholesterol?   Yes- Lipitor 20 mg daily    Are you having muscle aches or other side effects that you think could be caused by your cholesterol lowering medication?  No    Hypertension Follow-up      Do you check your blood pressure regularly outside of the clinic? Yes     Are you following a low salt diet? No    Are your blood pressures ever more than 140 on the top number (systolic) OR more   than 90 on the bottom number (diastolic), for example 140/90? Yes   Lisinopril 5 mg daily    BP Readings from Last 2 Encounters:   11/12/20 129/80   07/22/20 103/66     Hemoglobin A1C (%)   Date Value   07/23/2020 5.6   03/09/2020 6.1 (H)     LDL Cholesterol Calculated (mg/dL)   Date Value   04/04/2019 60   03/06/2018 49         Today's PHQ-2 Score:   PHQ-2 ( 1999 [employer]) 11/12/2020   Q1: Little interest or pleasure in doing things 0   Q2: Feeling down, depressed or hopeless 0   PHQ-2 Score 0   Q1: Little interest or pleasure in doing things Not at all   Q2: Feeling down, depressed or hopeless Not at all   PHQ-2 Score 0       Abuse: Current or Past(Physical, Sexual or Emotional)- No  Do you feel safe in your environment? Yes    Have you ever done Advance Care Planning? (For example, a Health Directive, POLST, or a discussion with a medical provider or your loved ones about your wishes): No, advance care planning information given to patient to review.  Advanced care planning was discussed at today's visit.    Social History     Tobacco Use     Smoking status: Never Smoker     Smokeless tobacco: Never Used   Substance Use Topics     Alcohol use: No     Comment: none for 11/2011     If you drink alcohol do you typically have >3 drinks per day or >7 drinks per week? Not applicable    Alcohol Use 11/12/2020   Prescreen: >3 drinks/day or >7 drinks/week? Not Applicable   Prescreen: >3 drinks/day or >7 drinks/week? -   No flowsheet data found.    Last PSA: No results found for: PSA    Reviewed orders with patient.  "Reviewed health maintenance and updated orders accordingly - Yes  BP Readings from Last 3 Encounters:   11/12/20 129/80   07/22/20 103/66   03/09/20 136/80    Wt Readings from Last 3 Encounters:   11/12/20 96.6 kg (213 lb)   07/31/20 93.9 kg (207 lb)   07/22/20 93.9 kg (207 lb)            Reviewed and updated as needed this visit by clinical staff  Tobacco  Allergies  Meds   Med Hx  Surg Hx  Fam Hx  Soc Hx        Reviewed and updated as needed this visit by Provider                    Review of Systems   Musculoskeletal: Positive for arthralgias.     CONSTITUTIONAL: NEGATIVE for fever, chills, change in weight  INTEGUMENTARY/SKIN: NEGATIVE for worrisome rashes, moles or lesions  EYES: NEGATIVE for vision changes or irritation  ENT: NEGATIVE for ear, mouth and throat problems  RESP: NEGATIVE for significant cough or SOB  CV: NEGATIVE for chest pain, palpitations or peripheral edema  GI: NEGATIVE for nausea, abdominal pain, heartburn, or change in bowel habits   male: negative for dysuria, hematuria, decreased urinary stream, erectile dysfunction, urethral discharge  MUSCULOSKELETAL: NEGATIVE for significant arthralgias or myalgia  NEURO: NEGATIVE for weakness, dizziness or paresthesias  PSYCHIATRIC: NEGATIVE for changes in mood or affect    OBJECTIVE:   /80 (BP Location: Right arm, Patient Position: Chair, Cuff Size: Adult Regular)   Pulse 60   Temp 98.5  F (36.9  C) (Oral)   Ht 1.827 m (5' 11.93\")   Wt 96.6 kg (213 lb)   SpO2 100%   BMI 28.95 kg/m      Physical Exam  GENERAL: healthy, alert and no distress  EYES: Eyes grossly normal to inspection, PERRL and conjunctivae and sclerae normal  HENT: ear canals and TM's normal, nose and mouth without ulcers or lesions  NECK: no adenopathy, no asymmetry, masses, or scars and thyroid normal to palpation  RESP: lungs clear to auscultation - no rales, rhonchi or wheezes  CV: regular rate and rhythm, normal S1 S2, no S3 or S4, no murmur, click or rub, no " peripheral edema and peripheral pulses strong  ABDOMEN: soft, nontender, no hepatosplenomegaly, no masses and bowel sounds normal  MS: no gross musculoskeletal defects noted, no edema, right shoulder anteriorly tender, with full ROM, positive Hawkin's sign, normal strength   SKIN: no suspicious lesions or rashes and ingrown mood toenail on the right great toe  NEURO: Normal strength and tone, mentation intact and speech normal  PSYCH: mentation appears normal, affect normal/bright    Diagnostic Test Results:  Labs reviewed in Epic  No results found for this or any previous visit (from the past 24 hour(s)).    ASSESSMENT/PLAN:   (Z00.00) Routine general medical examination at a health care facility  (primary encounter diagnosis)  Comment:   Plan:     (E11.65) Type 2 diabetes mellitus with hyperglycemia, without long-term current use of insulin (H)  Comment: The current medical regimen is effective;  continue present plan and medications.  Plan: Albumin Random Urine Quantitative with Creat         Ratio, Hemoglobin A1c, metFORMIN (GLUCOPHAGE)         1000 MG tablet            (E78.5) Hyperlipidemia LDL goal <100  Comment: The current medical regimen is effective;  continue present plan and medications.  Plan: Lipid panel reflex to direct LDL Fasting            (I10) Benign essential hypertension  Comment: The current medical regimen is effective;  continue present plan and medications  Plan: lisinopril (ZESTRIL) 5 MG tablet            (N18.30) Stage 3 chronic kidney disease, unspecified whether stage 3a or 3b CKD  Comment:   Plan: Basic metabolic panel  (Ca, Cl, CO2, Creat,         Gluc, K, Na, BUN)            (E55.9) Vitamin D deficiency  Comment:   Plan: Vitamin D Deficiency            (R79.89) Elevated LFTs  Comment:   Plan:     (E66.9,  Z68.30) Class 1 obesity with serious comorbidity and body mass index (BMI) of 30.0 to 30.9 in adult, unspecified obesity type  Comment:   Plan: Semaglutide,0.25 or 0.5MG/DOS, 2  "MG/1.5ML SOPN            (L60.0) Ingrown toenail of right foot  Comment:   Plan: A piece of tissue paper was placed under the ingrown toenail.  He was shown how to do this for himself.  He was instructed to do this until the toenail has grown out in that area    (M75.41) Impingement syndrome, shoulder, right  Comment:   Plan: Handout with exercises given.  We discussed he could return for steroid injection if he would like      Patient has been advised of split billing requirements and indicates understanding: Yes  COUNSELING:   Reviewed preventive health counseling, as reflected in patient instructions       Regular exercise       Healthy diet/nutrition    Estimated body mass index is 28.95 kg/m  as calculated from the following:    Height as of this encounter: 1.827 m (5' 11.93\").    Weight as of this encounter: 96.6 kg (213 lb).     Weight management plan: Discussed healthy diet and exercise guidelines    He reports that he has never smoked. He has never used smokeless tobacco.      Counseling Resources:  ATP IV Guidelines  Pooled Cohorts Equation Calculator  FRAX Risk Assessment  ICSI Preventive Guidelines  Dietary Guidelines for Americans, 2010  USDA's MyPlate  ASA Prophylaxis  Lung CA Screening    Sirena Talley DO  [facility]  "

## 2020-11-13 LAB
ANION GAP SERPL CALCULATED.3IONS-SCNC: 7 MMOL/L (ref 3–14)
BUN SERPL-MCNC: 14 MG/DL (ref 7–30)
CALCIUM SERPL-MCNC: 8.6 MG/DL (ref 8.5–10.1)
CHLORIDE SERPL-SCNC: 110 MMOL/L (ref 94–109)
CHOLEST SERPL-MCNC: 129 MG/DL
CO2 SERPL-SCNC: 21 MMOL/L (ref 20–32)
CREAT SERPL-MCNC: 1.4 MG/DL (ref 0.66–1.25)
CREAT UR-MCNC: 163 MG/DL
DEPRECATED CALCIDIOL+CALCIFEROL SERPL-MC: 34 UG/L (ref 20–75)
GFR SERPL CREATININE-BSD FRML MDRD: 55 ML/MIN/{1.73_M2}
GLUCOSE SERPL-MCNC: 82 MG/DL (ref 70–99)
HDLC SERPL-MCNC: 38 MG/DL
LDLC SERPL CALC-MCNC: 66 MG/DL
MICROALBUMIN UR-MCNC: 6 MG/L
MICROALBUMIN/CREAT UR: 3.43 MG/G CR (ref 0–17)
NONHDLC SERPL-MCNC: 91 MG/DL
POTASSIUM SERPL-SCNC: 4 MMOL/L (ref 3.4–5.3)
SODIUM SERPL-SCNC: 138 MMOL/L (ref 133–144)
TRIGL SERPL-MCNC: 127 MG/DL

## 2020-11-14 DIAGNOSIS — E11.65 TYPE 2 DIABETES MELLITUS WITH HYPERGLYCEMIA, WITHOUT LONG-TERM CURRENT USE OF INSULIN (H): ICD-10-CM

## 2020-11-14 RX ORDER — BLOOD SUGAR DIAGNOSTIC
STRIP MISCELLANEOUS
Qty: 0.1 STRIP | Refills: 0 | OUTPATIENT
Start: 2020-11-14

## 2020-11-15 NOTE — TELEPHONE ENCOUNTER
"Requested Prescriptions   Pending Prescriptions Disp Refills     ACCU-CHEK GUIDE test strip [Pharmacy Med Name: ACCU-CHEK GUIDE TEST STRIPS 100S] 100 strip 0     Sig: USE TO TEST BLOOD SUGAR DAILY       Diabetic Supplies Protocol Passed - 11/14/2020 11:04 AM        Passed - Medication is active on med list        Passed - Patient is 18 years of age or older        Passed - Recent (6 mo) or future (30 days) visit within the authorizing provider's specialty     Patient had office visit in the last 6 months or has a visit in the next 30 days with authorizing provider.  See \"Patient Info\" tab in [employer], or \"Choose Columns\" in Meds & Orders section of the refill encounter.               3 months sent 10/12 - may request when less than 30 days - if using differently or out contact clinic directly by phone    Refused Prescriptions:                       Disp   Refills    ACCU-CHEK GUIDE test strip [Pharmacy Med N*0.1 st*0        Sig: USE TO TEST BLOOD SUGAR DAILY  Refused By: KESHIA ELMORE  Reason for Refusal: Duplicate      "

## 2020-11-16 ENCOUNTER — MYC MEDICAL ADVICE (OUTPATIENT)
Dept: FAMILY MEDICINE | Facility: CLINIC | Age: 55
End: 2020-11-16

## 2020-11-16 DIAGNOSIS — N18.30 STAGE 3 CHRONIC KIDNEY DISEASE, UNSPECIFIED WHETHER STAGE 3A OR 3B CKD (H): Primary | ICD-10-CM

## 2020-11-16 NOTE — TELEPHONE ENCOUNTER
Routing MyChart message to PCP.    Patient would like a referral to see a kidney specialist.      Elvira Love RN

## 2020-11-30 ENCOUNTER — VIRTUAL VISIT (OUTPATIENT)
Dept: NEPHROLOGY | Facility: CLINIC | Age: 55
End: 2020-11-30
Attending: FAMILY MEDICINE
Payer: COMMERCIAL

## 2020-11-30 DIAGNOSIS — E66.01 MORBID OBESITY DUE TO EXCESS CALORIES (H): ICD-10-CM

## 2020-11-30 DIAGNOSIS — K76.0 FATTY LIVER: ICD-10-CM

## 2020-11-30 DIAGNOSIS — E11.65 TYPE 2 DIABETES MELLITUS WITH HYPERGLYCEMIA, WITHOUT LONG-TERM CURRENT USE OF INSULIN (H): ICD-10-CM

## 2020-11-30 DIAGNOSIS — R79.89 ELEVATED SERUM CREATININE: Primary | ICD-10-CM

## 2020-11-30 PROCEDURE — 99203 OFFICE O/P NEW LOW 30 MIN: CPT | Mod: 95 | Performed by: INTERNAL MEDICINE

## 2020-11-30 RX ORDER — LANCETS
EACH MISCELLANEOUS
COMMUNITY
Start: 2020-07-29 | End: 2024-02-23

## 2020-11-30 NOTE — LETTER
"11/30/2020       RE: William Montes  [address]  [place] [address]     Dear Colleague,    Thank you for referring your patient, William Montes, to the [facility] at [facility]. Please see a copy of my visit note below.    William Montes is a 55 year old male who is being evaluated via a billable video visit.      The patient has been notified of following:     \"This video visit will be conducted via a call between you and your physician/provider. We have found that certain health care needs can be provided without the need for an in-person physical exam.  This service lets us provide the care you need with a video conversation.  If a prescription is necessary we can send it directly to your pharmacy.  If lab work is needed we can place an order for that and you can then stop by our lab to have the test done at a later time.    Video visits are billed at different rates depending on your insurance coverage.  Please reach out to your insurance provider with any questions.    If during the course of the call the physician/provider feels a video visit is not appropriate, you will not be charged for this service.\"    Patient has given verbal consent for Video visit? Yes  How would you like to obtain your AVS? MyChart  If you are dropped from the video visit, the video invite should be resent to: Text to cell phone: [phone]  Will anyone else be joining your video visit? No      Video-Visit Details    Type of service:  Video Visit    Video Start Time: 331 pm  Video End Time: 3:56 PM    Originating Location (pt. Location): Home    Distant Location (provider location):  [facility]     Platform used for Video Visit: Ronna Fragoso MD    Assessment and Plan:  55 year old male with history of diabetes mellitus since 1999, mild/ borderline hypertension, and mild retinopathy, fatty liver, overweight, 40 lbs weight loss " since November 2018 who presents for evaluation of elevated creatinine  #Elevated creatinine/ CKD - his Scr going back many years was 1.1-1.2, but is up to 1.3-1.4 range in recent years in setting of being on lisinopril. He has 20+ year history of diabetes which is well controlled, no retinopathy and no hypertension.  - lisinopril may be elevated creatinine to some extent, but do suspect his GFR is lower than normal   - will check cystatin C to compare.  - discussed overall health goals, diet and exercise, blood pressure and diabetes control.  - will check UA, albumin/cr and protein random urine   - repeat visit in 6 months  - can consider holding lisinopril but benefits outweigh risks at this time.  - will check US kidney  - topamax- discussed small risk of kidney stones, does not have symptoms, check UA, monitor.  # Hypertension: mild / borderline hypertension, controlled, on lisinopril 5mg at this time   - continue same    # Electrolytes:   - Potassium; level: Normal   - low bicarbonate- suspect metabolic acidosis due to carbonic anhydrase inhibition by topamax- monitor.    # Mineral Bone Disorder:   - Calcium; level is:  Normal     Assessment and plan was discussed with patient and he voiced his understanding and agreement.    Consult:  William Montes was seen in consultation at the request of Dr. Talley for elevated creatinine.    Reason for Visit:  William Montes is a 55 year old male with diabetes, who presents for evaluation of elevated creatinine.    HPI:  He is a very pleasant male (originally from [place]), who presents for evaluation of elevated creatinine/ CKD.  He has had diabetes since 1999 which has been well controlled. He states he feels well but was surprised and concerned when he noted CKD diagnosis recently. His Scr is up to 1.5 with eGFR <60 thus CKD stage 3 was diagnosed.  Review of labs show creatinine of 1-1.2 for many years, but is up to 1.3-1.4s in the last couple of years. His albuminuria  has always been normal except for one incidence of mild elevation.  He does not have high blood pressure but was started on lisinopril a few years back for renal protection.  He denies any skin or joint issues. He does not take NSAIDs or other OTC.s  He has fatty liver and has been seen in hepatology for this. He lost 40 lbs once he was diagnosed with this but has gained back a few pounds since covid.  He is otherwise fairly healthy and denies issues.  We discussed normal kidney function, creatinine measurement and indication of kidney function, muscle mass impact, and the factors that help preserve kidney function and avoid DERRICK.  He denies family history of renal failure.     Renal History:   DM    ROS:   A comprehensive review of systems was obtained and negative, except as noted in the HPI or PMH.    Active Medical Problems:  Patient Active Problem List   Diagnosis     Chronic gingivitis     Esophageal reflux     Obesity     Hyperlipidemia LDL goal <100     Vitamin D deficiency     Elevated LFTs     Other chronic nonalcoholic liver disease     Glaucoma suspect, bilateral     Type 2 diabetes mellitus with hyperglycemia, without long-term current use of insulin (H)     Background diabetic retinopathy, mild, ou     Chronic kidney disease, stage 3     PMH:   Medical record was reviewed and PMH was discussed with patient and noted below.  Past Medical History:   Diagnosis Date     ABNORMAL LIVER FUNCTION STUDY 1/4/2008    increased ast, alt Normal since 2008     Chronic gingivitis      Esophageal reflux      Glaucoma      History of blood transfusion     My son has aplastic anemia and has had bld transfusions     Nonspecific abnormal results of liver function study      OBESITY NOS 12/13/2006     Pure hypercholesterolemia      Type II or unspecified type diabetes mellitus without mention of complication, not stated as uncontrolled      Uncomplicated asthma     My mother has asthma     PSH:   Past Surgical History:    Procedure Laterality Date     BIOPSY      My son had bone marrow biopsy     COLONOSCOPY N/A 5/22/2019    Procedure: COLONOSCOPY;  Surgeon: Leventhal, Thomas Michael, MD;  Location: [facility] OR     ENT SURGERY      My son has had tympanoplasty     NO HISTORY OF SURGERY       ORTHOPEDIC SURGERY      My mother had left hip replacement surgery       Family Hx:   Family History   Problem Relation Age of Onset     Hypertension Mother      Diabetes Sister      Diabetes Cousin      Hypertension Other      C.A.D. No family hx of      Cancer - colorectal No family hx of      Glaucoma No family hx of      Macular Degeneration No family hx of      Cancer No family hx of      Cerebrovascular Disease No family hx of      Breast Cancer No family hx of      Prostate Cancer No family hx of      Thyroid Disease No family hx of      Personal Hx:   Social History     Tobacco Use     Smoking status: Never Smoker     Smokeless tobacco: Never Used   Substance Use Topics     Alcohol use: No     Comment: none for 11/2011       Allergies:  No Known Allergies    Medications:  Current Outpatient Medications   Medication Sig     ACCU-CHEK GUIDE test strip USE TO TEST ONCE DAILY     ASPIRIN 81 MG OR TABS 1 tab po QD (Once per day)     atorvastatin (LIPITOR) 20 MG tablet TAKE 1 TABLET(20 MG) BY MOUTH DAILY     Blood Glucose Monitoring Suppl (ACCU-CHEK GUIDE) w/Device KIT 1 kit daily Use to check blood sugar once daily and as needed     cholecalciferol (VITAMIN  -D) 1000 UNITS capsule Take 1 capsule by mouth daily     lisinopril (ZESTRIL) 5 MG tablet TAKE 1 TABLET(5 MG) BY MOUTH DAILY.  Please follow up for yearly preventative Physical in September 2020     melatonin 3 MG tablet Take 6 mg by mouth nightly as needed for sleep     metFORMIN (GLUCOPHAGE) 1000 MG tablet Take 1 tablet (1,000 mg) by mouth 2 times daily (with meals)     Semaglutide,0.25 or 0.5MG/DOS, 2 MG/1.5ML SOPN Inject 0.5 mg Subcutaneous once a week Inject 0.5 mg once weekly.      topiramate (TOPAMAX) 25 MG tablet Take 2 tablets (50 mg) by mouth 2 times daily     blood glucose monitoring (ACCU-CHEK FASTCLIX) lancets      No current facility-administered medications for this visit.       Vitals:  There were no vitals taken for this visit.    GENERAL: Healthy, alert and no distress  EYES: Eyes grossly normal to inspection.  No discharge or erythema, or obvious scleral/conjunctival abnormalities.  RESP: No audible wheeze, cough, or visible cyanosis.  No visible retractions or increased work of breathing.    SKIN: Visible skin clear. No significant rash, abnormal pigmentation or lesions.  NEURO: Cranial nerves grossly intact.  Mentation and speech appropriate for age.  PSYCH: Mentation appears normal, affect normal/bright, judgement and insight intact, normal speech and appearance well-groomed.    LABS:   CMP  Recent Labs   Lab Test 11/12/20  1158 08/27/20  1515 07/23/20  1043 03/09/20  0922    138 138 140   POTASSIUM 4.0 4.3 3.9 4.1   CHLORIDE 110* 112* 108 112*   CO2 21 20 24 24   ANIONGAP 7 6 6 4   GLC 82 82 90 84   BUN 14 15 16 11   CR 1.40* 1.33* 1.52* 1.42*   GFRESTIMATED 55* 59* 50* 55*   GFRESTBLACK 64 68 58* 63   SHANA 8.6 8.8 9.4 9.2     Recent Labs   Lab Test 01/09/20  0916 08/01/19  0944 04/04/19  0902 11/07/18  0939   BILITOTAL 0.3 0.5 0.5 0.4   ALKPHOS 75 67 66 68   ALT 27 30 35 96*   AST 16 20 21 75*     CBC  Recent Labs   Lab Test 01/09/20  0916 09/05/19  1255 11/07/18  0939 12/08/14  1426   HGB 14.8 14.3 14.5 13.8   WBC 3.3* 3.4* 3.5* 4.1   RBC 5.11 4.90 5.03 4.78   HCT 46.6 43.4 43.9 42.2   MCV 91 89 87 88   MCH 29.0 29.2 28.8 28.9   MCHC 31.8 32.9 33.0 32.7   RDW 12.1 13.0 12.5 13.4   * 134* 134* 126*     URINE STUDIES  No lab results found.  No lab results found.  PTH  Recent Labs   Lab Test 11/07/18  0939   PTHI 59     IRON STUDIES  Recent Labs   Lab Test 12/08/14  1426 12/04/14  0820   LISET 411* 499*         Monique Valdo Fragoso MD        Again, thank you for  allowing me to participate in the care of your patient.      Sincerely,    Monique Fragoso MD

## 2020-11-30 NOTE — PROGRESS NOTES
"William Montes is a 55 year old male who is being evaluated via a billable video visit.      The patient has been notified of following:     \"This video visit will be conducted via a call between you and your physician/provider. We have found that certain health care needs can be provided without the need for an in-person physical exam.  This service lets us provide the care you need with a video conversation.  If a prescription is necessary we can send it directly to your pharmacy.  If lab work is needed we can place an order for that and you can then stop by our lab to have the test done at a later time.    Video visits are billed at different rates depending on your insurance coverage.  Please reach out to your insurance provider with any questions.    If during the course of the call the physician/provider feels a video visit is not appropriate, you will not be charged for this service.\"    Patient has given verbal consent for Video visit? Yes  How would you like to obtain your AVS? MyChart  If you are dropped from the video visit, the video invite should be resent to: Text to cell phone: [phone]  Will anyone else be joining your video visit? No      Video-Visit Details    Type of service:  Video Visit    Video Start Time: 331 pm  Video End Time: 3:56 PM    Originating Location (pt. Location): Home    Distant Location (provider location):  [facility]     Platform used for Video Visit: [facility]    Monique Fragoso MD    Assessment and Plan:  55 year old male with history of diabetes mellitus since 1999, mild/ borderline hypertension, and mild retinopathy, fatty liver, overweight, 40 lbs weight loss since November 2018 who presents for evaluation of elevated creatinine  #Elevated creatinine/ CKD - his Scr going back many years was 1.1-1.2, but is up to 1.3-1.4 range in recent years in setting of being on lisinopril. He has 20+ year history of diabetes which is well controlled, no " retinopathy and no hypertension.  - lisinopril may be elevated creatinine to some extent, but do suspect his GFR is lower than normal   - will check cystatin C to compare.  - discussed overall health goals, diet and exercise, blood pressure and diabetes control.  - will check UA, albumin/cr and protein random urine   - repeat visit in 6 months  - can consider holding lisinopril but benefits outweigh risks at this time.  - will check US kidney  - topamax- discussed small risk of kidney stones, does not have symptoms, check UA, monitor.  # Hypertension: mild / borderline hypertension, controlled, on lisinopril 5mg at this time   - continue same    # Electrolytes:   - Potassium; level: Normal   - low bicarbonate- suspect metabolic acidosis due to carbonic anhydrase inhibition by topamax- monitor.    # Mineral Bone Disorder:   - Calcium; level is:  Normal     Assessment and plan was discussed with patient and he voiced his understanding and agreement.    Consult:  William Montes was seen in consultation at the request of Dr. Talley for elevated creatinine.    Reason for Visit:  William Montes is a 55 year old male with diabetes, who presents for evaluation of elevated creatinine.    HPI:  He is a very pleasant male (originally from [facility]), who presents for evaluation of elevated creatinine/ CKD.  He has had diabetes since 1999 which has been well controlled. He states he feels well but was surprised and concerned when he noted CKD diagnosis recently. His Scr is up to 1.5 with eGFR <60 thus CKD stage 3 was diagnosed.  Review of labs show creatinine of 1-1.2 for many years, but is up to 1.3-1.4s in the last couple of years. His albuminuria has always been normal except for one incidence of mild elevation.  He does not have high blood pressure but was started on lisinopril a few years back for renal protection.  He denies any skin or joint issues. He does not take NSAIDs or other OTC.s  He has fatty liver and has been  seen in hepatology for this. He lost 40 lbs once he was diagnosed with this but has gained back a few pounds since covid.  He is otherwise fairly healthy and denies issues.  We discussed normal kidney function, creatinine measurement and indication of kidney function, muscle mass impact, and the factors that help preserve kidney function and avoid DERRICK.  He denies family history of renal failure.     Renal History:   DM    ROS:   A comprehensive review of systems was obtained and negative, except as noted in the HPI or PMH.    Active Medical Problems:  Patient Active Problem List   Diagnosis     Chronic gingivitis     Esophageal reflux     Obesity     Hyperlipidemia LDL goal <100     Vitamin D deficiency     Elevated LFTs     Other chronic nonalcoholic liver disease     Glaucoma suspect, bilateral     Type 2 diabetes mellitus with hyperglycemia, without long-term current use of insulin (H)     Background diabetic retinopathy, mild, ou     Chronic kidney disease, stage 3     PMH:   Medical record was reviewed and PMH was discussed with patient and noted below.  Past Medical History:   Diagnosis Date     ABNORMAL LIVER FUNCTION STUDY 1/4/2008    increased ast, alt Normal since 2008     Chronic gingivitis      Esophageal reflux      Glaucoma      History of blood transfusion     My son has aplastic anemia and has had bld transfusions     Nonspecific abnormal results of liver function study      OBESITY NOS 12/13/2006     Pure hypercholesterolemia      Type II or unspecified type diabetes mellitus without mention of complication, not stated as uncontrolled      Uncomplicated asthma     My mother has asthma     PSH:   Past Surgical History:   Procedure Laterality Date     BIOPSY      My son had bone marrow biopsy     COLONOSCOPY N/A 5/22/2019    Procedure: COLONOSCOPY;  Surgeon: Leventhal, Thomas Michael, MD;  Location: [facility] OR     ENT SURGERY      My son has had tympanoplasty     NO HISTORY OF SURGERY       ORTHOPEDIC  SURGERY      My mother had left hip replacement surgery       Family Hx:   Family History   Problem Relation Age of Onset     Hypertension Mother      Diabetes Sister      Diabetes Cousin      Hypertension Other      C.A.D. No family hx of      Cancer - colorectal No family hx of      Glaucoma No family hx of      Macular Degeneration No family hx of      Cancer No family hx of      Cerebrovascular Disease No family hx of      Breast Cancer No family hx of      Prostate Cancer No family hx of      Thyroid Disease No family hx of      Personal Hx:   Social History     Tobacco Use     Smoking status: Never Smoker     Smokeless tobacco: Never Used   Substance Use Topics     Alcohol use: No     Comment: none for 11/2011       Allergies:  No Known Allergies    Medications:  Current Outpatient Medications   Medication Sig     ACCU-CHEK GUIDE test strip USE TO TEST ONCE DAILY     ASPIRIN 81 MG OR TABS 1 tab po QD (Once per day)     atorvastatin (LIPITOR) 20 MG tablet TAKE 1 TABLET(20 MG) BY MOUTH DAILY     Blood Glucose Monitoring Suppl (ACCU-CHEK GUIDE) w/Device KIT 1 kit daily Use to check blood sugar once daily and as needed     cholecalciferol (VITAMIN  -D) 1000 UNITS capsule Take 1 capsule by mouth daily     lisinopril (ZESTRIL) 5 MG tablet TAKE 1 TABLET(5 MG) BY MOUTH DAILY.  Please follow up for yearly preventative Physical in September 2020     melatonin 3 MG tablet Take 6 mg by mouth nightly as needed for sleep     metFORMIN (GLUCOPHAGE) 1000 MG tablet Take 1 tablet (1,000 mg) by mouth 2 times daily (with meals)     Semaglutide,0.25 or 0.5MG/DOS, 2 MG/1.5ML SOPN Inject 0.5 mg Subcutaneous once a week Inject 0.5 mg once weekly.     topiramate (TOPAMAX) 25 MG tablet Take 2 tablets (50 mg) by mouth 2 times daily     blood glucose monitoring (ACCU-CHEK FASTCLIX) lancets      No current facility-administered medications for this visit.       Vitals:  There were no vitals taken for this visit.    GENERAL: Healthy, alert  and no distress  EYES: Eyes grossly normal to inspection.  No discharge or erythema, or obvious scleral/conjunctival abnormalities.  RESP: No audible wheeze, cough, or visible cyanosis.  No visible retractions or increased work of breathing.    SKIN: Visible skin clear. No significant rash, abnormal pigmentation or lesions.  NEURO: Cranial nerves grossly intact.  Mentation and speech appropriate for age.  PSYCH: Mentation appears normal, affect normal/bright, judgement and insight intact, normal speech and appearance well-groomed.    LABS:   CMP  Recent Labs   Lab Test 11/12/20  1158 08/27/20  1515 07/23/20  1043 03/09/20  0922    138 138 140   POTASSIUM 4.0 4.3 3.9 4.1   CHLORIDE 110* 112* 108 112*   CO2 21 20 24 24   ANIONGAP 7 6 6 4   GLC 82 82 90 84   BUN 14 15 16 11   CR 1.40* 1.33* 1.52* 1.42*   GFRESTIMATED 55* 59* 50* 55*   GFRESTBLACK 64 68 58* 63   SHANA 8.6 8.8 9.4 9.2     Recent Labs   Lab Test 01/09/20  0916 08/01/19  0944 04/04/19  0902 11/07/18  0939   BILITOTAL 0.3 0.5 0.5 0.4   ALKPHOS 75 67 66 68   ALT 27 30 35 96*   AST 16 20 21 75*     CBC  Recent Labs   Lab Test 01/09/20  0916 09/05/19  1255 11/07/18  0939 12/08/14  1426   HGB 14.8 14.3 14.5 13.8   WBC 3.3* 3.4* 3.5* 4.1   RBC 5.11 4.90 5.03 4.78   HCT 46.6 43.4 43.9 42.2   MCV 91 89 87 88   MCH 29.0 29.2 28.8 28.9   MCHC 31.8 32.9 33.0 32.7   RDW 12.1 13.0 12.5 13.4   * 134* 134* 126*     URINE STUDIES  No lab results found.  No lab results found.  PTH  Recent Labs   Lab Test 11/07/18  0939   PTHI 59     IRON STUDIES  Recent Labs   Lab Test 12/08/14  1426 12/04/14  0820   LISET 411* 499*         Monique Valdo Fragoso MD

## 2020-12-13 ENCOUNTER — MYC MEDICAL ADVICE (OUTPATIENT)
Dept: FAMILY MEDICINE | Facility: CLINIC | Age: 55
End: 2020-12-13

## 2020-12-28 DIAGNOSIS — K76.0 NAFLD (NONALCOHOLIC FATTY LIVER DISEASE): Primary | ICD-10-CM

## 2021-01-04 DIAGNOSIS — K76.0 NAFLD (NONALCOHOLIC FATTY LIVER DISEASE): ICD-10-CM

## 2021-01-04 LAB
ERYTHROCYTE [DISTWIDTH] IN BLOOD BY AUTOMATED COUNT: 13 % (ref 10–15)
HCT VFR BLD AUTO: 47.8 % (ref 40–53)
HGB BLD-MCNC: 15.8 G/DL (ref 13.3–17.7)
INR PPP: 1.06 (ref 0.86–1.14)
MCH RBC QN AUTO: 29.3 PG (ref 26.5–33)
MCHC RBC AUTO-ENTMCNC: 33.1 G/DL (ref 31.5–36.5)
MCV RBC AUTO: 89 FL (ref 78–100)
PLATELET # BLD AUTO: 130 10E9/L (ref 150–450)
RBC # BLD AUTO: 5.4 10E12/L (ref 4.4–5.9)
WBC # BLD AUTO: 2.9 10E9/L (ref 4–11)

## 2021-01-04 PROCEDURE — 85610 PROTHROMBIN TIME: CPT | Performed by: INTERNAL MEDICINE

## 2021-01-04 PROCEDURE — 80048 BASIC METABOLIC PNL TOTAL CA: CPT | Performed by: INTERNAL MEDICINE

## 2021-01-04 PROCEDURE — 80076 HEPATIC FUNCTION PANEL: CPT | Performed by: INTERNAL MEDICINE

## 2021-01-04 PROCEDURE — 85027 COMPLETE CBC AUTOMATED: CPT | Performed by: INTERNAL MEDICINE

## 2021-01-04 PROCEDURE — 36415 COLL VENOUS BLD VENIPUNCTURE: CPT | Performed by: INTERNAL MEDICINE

## 2021-01-05 LAB
ALBUMIN SERPL-MCNC: 4.2 G/DL (ref 3.4–5)
ALP SERPL-CCNC: 78 U/L (ref 40–150)
ALT SERPL W P-5'-P-CCNC: 29 U/L (ref 0–70)
ANION GAP SERPL CALCULATED.3IONS-SCNC: 6 MMOL/L (ref 3–14)
AST SERPL W P-5'-P-CCNC: 20 U/L (ref 0–45)
BILIRUB DIRECT SERPL-MCNC: <0.1 MG/DL (ref 0–0.2)
BILIRUB SERPL-MCNC: 0.4 MG/DL (ref 0.2–1.3)
BUN SERPL-MCNC: 17 MG/DL (ref 7–30)
CALCIUM SERPL-MCNC: 8.8 MG/DL (ref 8.5–10.1)
CHLORIDE SERPL-SCNC: 110 MMOL/L (ref 94–109)
CO2 SERPL-SCNC: 22 MMOL/L (ref 20–32)
CREAT SERPL-MCNC: 1.6 MG/DL (ref 0.66–1.25)
GFR SERPL CREATININE-BSD FRML MDRD: 47 ML/MIN/{1.73_M2}
GLUCOSE SERPL-MCNC: 91 MG/DL (ref 70–99)
POTASSIUM SERPL-SCNC: 4.2 MMOL/L (ref 3.4–5.3)
PROT SERPL-MCNC: 7.5 G/DL (ref 6.8–8.8)
SODIUM SERPL-SCNC: 138 MMOL/L (ref 133–144)

## 2021-01-07 ENCOUNTER — VIRTUAL VISIT (OUTPATIENT)
Dept: GASTROENTEROLOGY | Facility: CLINIC | Age: 56
End: 2021-01-07
Attending: INTERNAL MEDICINE
Payer: COMMERCIAL

## 2021-01-07 VITALS — WEIGHT: 209 LBS | BODY MASS INDEX: 28.4 KG/M2

## 2021-01-07 DIAGNOSIS — E66.01 MORBID OBESITY DUE TO EXCESS CALORIES (H): ICD-10-CM

## 2021-01-07 DIAGNOSIS — N18.30 STAGE 3 CHRONIC KIDNEY DISEASE, UNSPECIFIED WHETHER STAGE 3A OR 3B CKD (H): ICD-10-CM

## 2021-01-07 DIAGNOSIS — K75.81 NASH (NONALCOHOLIC STEATOHEPATITIS): Primary | ICD-10-CM

## 2021-01-07 PROCEDURE — 99213 OFFICE O/P EST LOW 20 MIN: CPT | Mod: TEL | Performed by: INTERNAL MEDICINE

## 2021-01-07 ASSESSMENT — PAIN SCALES - GENERAL: PAINLEVEL: SEVERE PAIN (6)

## 2021-01-07 NOTE — LETTER
1/7/2021         RE: William Montes  [address]  [facility] [address]        Dear Colleague,    Thank you for referring your patient, William Montes, to the [facility] [place]. Please see a copy of my visit note below.    William Montes is a 55 year old male who is being evaluated via a billable telephone visit.      How would you like to obtain your AVS? [employer]    Phone Call Duration: 14 minutes      Date of Service: January 7, 2021     Subjective:            William Montes is a 55 year old male presenting for follow up of abnormal liver tests    History of Present Illness   William Montes is a 55 year old male with past medical history of obesity, diabetes mellitus, hypercholesterolemia, and known fatty liver disease with advanced fibrosis who presents in follow up with concerns of fatty liver disease      Since last seen, he reports doing fairly well.  He had lost approximately 40 pounds, but in the setting of the pandemic and decreased activity he notes that he is put 10 pounds back and he currently weighs around 210 to 215 pounds.  Otherwise he reports that he is feeling very well, he continues to work with his doctors in the management of his chronic kidney disease and diabetes.  Notes concerns with him being on Topamax in the setting of his chronic kidney disease so the dose was reduced, and does feel that this may play a factor in his ability to lose weight.  He was having issues with globus sensation and was seen by our gastroenterology group.He underwent an EGD in October 2020 which demonstrated a ringed esophagus in his distal esophagus, and no overt evidence of esophageal varices.  There was mild gastritis noted, and biopsy suggested a reactive gastropathy    It liver biopsy performed in April 2014 demonstrated steatohepatitis with 40% steatosis, grade 1-2 out of 3 activity and stage III of 4 scarring.    History of Liver Disease  He denies significant have history  of alcohol use and denies any history of alcohol misuse.  Denies a history of IV or inhaled drugs of abuse.      In regards to social history he is originally from [place], Moved to [place] between 1996 and [place].  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     Past Medical History:  Past Medical History:   Diagnosis Date     ABNORMAL LIVER FUNCTION STUDY 1/4/2008    increased ast, alt Normal since 2008     Chronic gingivitis      Esophageal reflux      Glaucoma      History of blood transfusion     My son has aplastic anemia and has had bld transfusions     Nonspecific abnormal results of liver function study      OBESITY NOS 12/13/2006     Pure hypercholesterolemia      Type II or unspecified type diabetes mellitus without mention of complication, not stated as uncontrolled      Uncomplicated asthma     My mother has asthma       Surgical History:  Past Surgical History:   Procedure Laterality Date     BIOPSY      My son had bone marrow biopsy     COLONOSCOPY N/A 5/22/2019    Procedure: COLONOSCOPY;  Surgeon: Leventhal, Thomas Michael, MD;  Location: [facility] OR     ENT SURGERY      My son has had tympanoplasty     NO HISTORY OF SURGERY       ORTHOPEDIC SURGERY      My mother had left hip replacement surgery       Social History:  Social History     Tobacco Use     Smoking status: Never Smoker     Smokeless tobacco: Never Used   Substance Use Topics     Alcohol use: No     Comment: none for 11/2011     Drug use: No        Family History:  Family History   Problem Relation Age of Onset     Hypertension Mother      Diabetes Sister      Diabetes Cousin      Hypertension Other      C.A.D. No family hx of      Cancer - colorectal No family hx of      Glaucoma No family hx of      Macular Degeneration No family hx of      Cancer No family hx of      Cerebrovascular  Disease No family hx of      Breast Cancer No family hx of      Prostate Cancer No family hx of      Thyroid Disease No family hx of        Medications:  Current Outpatient Medications   Medication     ACCU-CHEK GUIDE test strip     ASPIRIN 81 MG OR TABS     atorvastatin (LIPITOR) 20 MG tablet     blood glucose monitoring (ACCU-CHEK FASTCLIX) lancets     Blood Glucose Monitoring Suppl (ACCU-CHEK GUIDE) w/Device KIT     cholecalciferol (VITAMIN  -D) 1000 UNITS capsule     lisinopril (ZESTRIL) 5 MG tablet     melatonin 3 MG tablet     metFORMIN (GLUCOPHAGE) 1000 MG tablet     Semaglutide,0.25 or 0.5MG/DOS, 2 MG/1.5ML SOPN     topiramate (TOPAMAX) 25 MG tablet     No current facility-administered medications for this visit.        Review of Systems  A complete 10 point review of systems was asked and answered in the negative unless specifically commented upon in the HPI    Objective:         Vitals:    21 0816   Weight: 94.8 kg (209 lb)     Body mass index is 28.4 kg/m .     Physical Exam      Labs and Diagnostic tests:  Reviewed    Imagin/10/2019  FINDINGS: There is diffuse fatty infiltration of the liver. No focal  hepatic lesions are identified. The gallbladder is unremarkable, with  no evidence for shadowing gallstones or gallbladder wall thickening.  Negative sonographic Fernando sign. No intra or extrahepatic bile duct  dilatation. The common duct could not be visualized in its entirety.  Limited evaluation of the right kidney is unremarkable. Pancreas is  partially obscured by overlying bowel gas, but appears normal where  seen.    Procedures:  Fibrosis Scan:  Median Fibrosis: 9.2kPa  Consistent with F2-F3 fibrosis    Assessment and Plan:    Non-Alcoholic Fatty Liver Disease    -Felt prudent for the patient to continue with lifestyle modification and strongly recommend he target a baseline weight of less than 200 pounds  -Discussed the natural history of fatty liver disease and that if inflammation is  "stopped that fibrosis may reverse  -We will plan on repeat and abdominal US with elastography to further risk stratify    Management of Fatty Liver Disease  - I had a long discussion with the patient about nonalcoholic fatty liver disease (NAFLD).  We discussed how fat deposits in the liver, how this leads to inflammation, and how chronic inflammation (EDWARDS) can ultimately lead to scarring and cirrhosis.  The long-term complications of fatty liver disease were discussed with the patient, including the increased risk of cardiovascular disease complications,the risk of developing diabetes (if not already), and variable progression to cirrhosis and end stage liver disease  - I did discuss with the patient about an appropriate diet, exercise and weight loss plan.    - The patient should exercise regularly 4+ times per week, with an average of about 45 minutes per day.   - The patient should be on low-carbohydrate, low-calorie diet (0676-1443 calories per day). The weight loss plan is to lose 7-10% of body weight in the next three to six months' time.  It has shown that patients who exercise regularly can have improvement of insulin resistance and resolution of fatty liver disease, even if they are not able to lose weight.   - Diabetes management is crucial with ideal goal Hgb A1c goal of =/< 7%. If possible addition of insulin sensitizing agents like metformin of liraglutide will be helpful.    - Management of elevated cholesterol is crucial in this population.  The use of \"statins\" (HMG-CoA reductase medications) are an effective means of therapy and are not contra-indicated in those with abnormal liver tests OR those with cirrhosis.  The value of these medications in this population far outweigh the minor risks of abnormal liver tests.  Goal LDL in those with EDWARDS are < 100 mg/dL    Follow Up:  12 months     Thank you very much for the opportunity to participate in the care of this patient.  If you have any further " questions, please don't hesitate to contact our office.    Thomas M. Leventhal, M.D.   of Medicine  Advanced & Transplant Hepatology  [facility]

## 2021-01-07 NOTE — PROGRESS NOTES
William Montes is a 55 year old male who is being evaluated via a billable telephone visit.      How would you like to obtain your AVS? [employer]Bytes    Phone Call Duration: 14 minutes      Date of Service: January 7, 2021     Subjective:            William Montes is a 55 year old male presenting for follow up of abnormal liver tests    History of Present Illness   William Montes is a 55 year old male with past medical history of obesity, diabetes mellitus, hypercholesterolemia, and known fatty liver disease with advanced fibrosis who presents in follow up with concerns of fatty liver disease      Since last seen, he reports doing fairly well.  He had lost approximately 40 pounds, but in the setting of the pandemic and decreased activity he notes that he is put 10 pounds back and he currently weighs around 210 to 215 pounds.  Otherwise he reports that he is feeling very well, he continues to work with his doctors in the management of his chronic kidney disease and diabetes.  Notes concerns with him being on Topamax in the setting of his chronic kidney disease so the dose was reduced, and does feel that this may play a factor in his ability to lose weight.  He was having issues with globus sensation and was seen by our gastroenterology group.He underwent an EGD in October 2020 which demonstrated a ringed esophagus in his distal esophagus, and no overt evidence of esophageal varices.  There was mild gastritis noted, and biopsy suggested a reactive gastropathy    It liver biopsy performed in April 2014 demonstrated steatohepatitis with 40% steatosis, grade 1-2 out of 3 activity and stage III of 4 scarring.    History of Liver Disease  He denies significant have history of alcohol use and denies any history of alcohol misuse.  Denies a history of IV or inhaled drugs of abuse.      In regards to social history he is originally from [facility], Moved to [place] between 1996 and 2012.  Prior to this he had completed medical  school, but urgently had to leave the country secondary to Civil War.  He works as a , previously with [employer] now with a private group.     Past Medical History:  Past Medical History:   Diagnosis Date     ABNORMAL LIVER FUNCTION STUDY 1/4/2008    increased ast, alt Normal since 2008     Chronic gingivitis      Esophageal reflux      Glaucoma      History of blood transfusion     My son has aplastic anemia and has had bld transfusions     Nonspecific abnormal results of liver function study      OBESITY NOS 12/13/2006     Pure hypercholesterolemia      Type II or unspecified type diabetes mellitus without mention of complication, not stated as uncontrolled      Uncomplicated asthma     My mother has asthma       Surgical History:  Past Surgical History:   Procedure Laterality Date     BIOPSY      My son had bone marrow biopsy     COLONOSCOPY N/A 5/22/2019    Procedure: COLONOSCOPY;  Surgeon: Leventhal, Thomas Michael, MD;  Location: [facility]     ENT SURGERY      My son has had tympanoplasty     NO HISTORY OF SURGERY       ORTHOPEDIC SURGERY      My mother had left hip replacement surgery       Social History:  Social History     Tobacco Use     Smoking status: Never Smoker     Smokeless tobacco: Never Used   Substance Use Topics     Alcohol use: No     Comment: none for 11/2011     Drug use: No        Family History:  Family History   Problem Relation Age of Onset     Hypertension Mother      Diabetes Sister      Diabetes Cousin      Hypertension Other      C.A.D. No family hx of      Cancer - colorectal No family hx of      Glaucoma No family hx of      Macular Degeneration No family hx of      Cancer No family hx of      Cerebrovascular Disease No family hx of      Breast Cancer No family hx of      Prostate Cancer No family hx of      Thyroid Disease No family hx of        Medications:  Current Outpatient Medications   Medication     ACCU-CHEK GUIDE test strip     ASPIRIN 81 MG OR TABS      atorvastatin (LIPITOR) 20 MG tablet     blood glucose monitoring (ACCU-CHEK FASTCLIX) lancets     Blood Glucose Monitoring Suppl (ACCU-CHEK GUIDE) w/Device KIT     cholecalciferol (VITAMIN  -D) 1000 UNITS capsule     lisinopril (ZESTRIL) 5 MG tablet     melatonin 3 MG tablet     metFORMIN (GLUCOPHAGE) 1000 MG tablet     Semaglutide,0.25 or 0.5MG/DOS, 2 MG/1.5ML SOPN     topiramate (TOPAMAX) 25 MG tablet     No current facility-administered medications for this visit.        Review of Systems  A complete 10 point review of systems was asked and answered in the negative unless specifically commented upon in the HPI    Objective:         Vitals:    21 0816   Weight: 94.8 kg (209 lb)     Body mass index is 28.4 kg/m .     Physical Exam      Labs and Diagnostic tests:  Reviewed    Imagin/10/2019  FINDINGS: There is diffuse fatty infiltration of the liver. No focal  hepatic lesions are identified. The gallbladder is unremarkable, with  no evidence for shadowing gallstones or gallbladder wall thickening.  Negative sonographic Fernando sign. No intra or extrahepatic bile duct  dilatation. The common duct could not be visualized in its entirety.  Limited evaluation of the right kidney is unremarkable. Pancreas is  partially obscured by overlying bowel gas, but appears normal where  seen.    Procedures:  Fibrosis Scan:  Median Fibrosis: 9.2kPa  Consistent with F2-F3 fibrosis    Assessment and Plan:    Non-Alcoholic Fatty Liver Disease    -Felt prudent for the patient to continue with lifestyle modification and strongly recommend he target a baseline weight of less than 200 pounds  -Discussed the natural history of fatty liver disease and that if inflammation is stopped that fibrosis may reverse  -We will plan on repeat and abdominal US with elastography to further risk stratify    Management of Fatty Liver Disease  - I had a long discussion with the patient about nonalcoholic fatty liver disease (NAFLD).  We  "discussed how fat deposits in the liver, how this leads to inflammation, and how chronic inflammation (EDWARDS) can ultimately lead to scarring and cirrhosis.  The long-term complications of fatty liver disease were discussed with the patient, including the increased risk of cardiovascular disease complications,the risk of developing diabetes (if not already), and variable progression to cirrhosis and end stage liver disease  - I did discuss with the patient about an appropriate diet, exercise and weight loss plan.    - The patient should exercise regularly 4+ times per week, with an average of about 45 minutes per day.   - The patient should be on low-carbohydrate, low-calorie diet (9052-4616 calories per day). The weight loss plan is to lose 7-10% of body weight in the next three to six months' time.  It has shown that patients who exercise regularly can have improvement of insulin resistance and resolution of fatty liver disease, even if they are not able to lose weight.   - Diabetes management is crucial with ideal goal Hgb A1c goal of =/< 7%. If possible addition of insulin sensitizing agents like metformin of liraglutide will be helpful.    - Management of elevated cholesterol is crucial in this population.  The use of \"statins\" (HMG-CoA reductase medications) are an effective means of therapy and are not contra-indicated in those with abnormal liver tests OR those with cirrhosis.  The value of these medications in this population far outweigh the minor risks of abnormal liver tests.  Goal LDL in those with EDWARDS are < 100 mg/dL    Follow Up:  12 months     Thank you very much for the opportunity to participate in the care of this patient.  If you have any further questions, please don't hesitate to contact our office.    Thomas M. Leventhal, M.D.   of Medicine  Advanced & Transplant Hepatology  [facility]    "

## 2021-02-05 ENCOUNTER — ANCILLARY PROCEDURE (OUTPATIENT)
Dept: ULTRASOUND IMAGING | Facility: CLINIC | Age: 56
End: 2021-02-05
Attending: INTERNAL MEDICINE
Payer: COMMERCIAL

## 2021-02-05 DIAGNOSIS — E66.01 MORBID OBESITY DUE TO EXCESS CALORIES (H): ICD-10-CM

## 2021-02-05 DIAGNOSIS — E11.65 TYPE 2 DIABETES MELLITUS WITH HYPERGLYCEMIA, WITHOUT LONG-TERM CURRENT USE OF INSULIN (H): ICD-10-CM

## 2021-02-05 DIAGNOSIS — K76.0 FATTY LIVER: ICD-10-CM

## 2021-02-05 DIAGNOSIS — R79.89 ELEVATED SERUM CREATININE: ICD-10-CM

## 2021-02-05 PROCEDURE — 76770 US EXAM ABDO BACK WALL COMP: CPT | Performed by: RADIOLOGY

## 2021-02-09 DIAGNOSIS — R79.89 ELEVATED SERUM CREATININE: ICD-10-CM

## 2021-02-09 DIAGNOSIS — E66.01 MORBID OBESITY DUE TO EXCESS CALORIES (H): ICD-10-CM

## 2021-02-09 DIAGNOSIS — E11.65 TYPE 2 DIABETES MELLITUS WITH HYPERGLYCEMIA, WITHOUT LONG-TERM CURRENT USE OF INSULIN (H): ICD-10-CM

## 2021-02-09 DIAGNOSIS — K76.0 FATTY LIVER: ICD-10-CM

## 2021-02-09 LAB
ALBUMIN UR-MCNC: NEGATIVE MG/DL
APPEARANCE UR: CLEAR
BACTERIA #/AREA URNS HPF: ABNORMAL /HPF
BILIRUB UR QL STRIP: NEGATIVE
COLOR UR AUTO: YELLOW
GLUCOSE UR STRIP-MCNC: NEGATIVE MG/DL
HGB UR QL STRIP: NEGATIVE
KETONES UR STRIP-MCNC: NEGATIVE MG/DL
LEUKOCYTE ESTERASE UR QL STRIP: NEGATIVE
NITRATE UR QL: NEGATIVE
NON-SQ EPI CELLS #/AREA URNS LPF: ABNORMAL /LPF
PH UR STRIP: 7 PH (ref 5–7)
RBC #/AREA URNS AUTO: ABNORMAL /HPF
SOURCE: ABNORMAL
SP GR UR STRIP: 1.02 (ref 1–1.03)
UROBILINOGEN UR STRIP-ACNC: 0.2 EU/DL (ref 0.2–1)
WBC #/AREA URNS AUTO: ABNORMAL /HPF

## 2021-02-09 PROCEDURE — 81001 URINALYSIS AUTO W/SCOPE: CPT | Performed by: INTERNAL MEDICINE

## 2021-02-09 PROCEDURE — 36415 COLL VENOUS BLD VENIPUNCTURE: CPT | Performed by: INTERNAL MEDICINE

## 2021-02-09 PROCEDURE — 80069 RENAL FUNCTION PANEL: CPT | Performed by: INTERNAL MEDICINE

## 2021-02-09 NOTE — TELEPHONE ENCOUNTER
Refill request from pharmacy for Topiramate. Refill denied at this time. Patient needs appointment. [employer] message sent to patient to schedule virtual visit with Marine Flores PA-C or Sunshine Romero CNP for refill.

## 2021-02-10 LAB
ALBUMIN SERPL-MCNC: 4.1 G/DL (ref 3.4–5)
ANION GAP SERPL CALCULATED.3IONS-SCNC: 4 MMOL/L (ref 3–14)
BUN SERPL-MCNC: 14 MG/DL (ref 7–30)
CALCIUM SERPL-MCNC: 9 MG/DL (ref 8.5–10.1)
CHLORIDE SERPL-SCNC: 108 MMOL/L (ref 94–109)
CO2 SERPL-SCNC: 23 MMOL/L (ref 20–32)
CREAT SERPL-MCNC: 1.4 MG/DL (ref 0.66–1.25)
GFR SERPL CREATININE-BSD FRML MDRD: 55 ML/MIN/{1.73_M2}
GLUCOSE SERPL-MCNC: 71 MG/DL (ref 70–99)
PHOSPHATE SERPL-MCNC: 2.9 MG/DL (ref 2.5–4.5)
POTASSIUM SERPL-SCNC: 4.1 MMOL/L (ref 3.4–5.3)
SODIUM SERPL-SCNC: 135 MMOL/L (ref 133–144)

## 2021-02-12 ENCOUNTER — TELEPHONE (OUTPATIENT)
Dept: SURGERY | Facility: CLINIC | Age: 56
End: 2021-02-12

## 2021-02-12 ENCOUNTER — TELEPHONE (OUTPATIENT)
Dept: ENDOCRINOLOGY | Facility: CLINIC | Age: 56
End: 2021-02-12

## 2021-02-12 NOTE — TELEPHONE ENCOUNTER
Reason for call:  Medication   If this is a refill request, has the caller requested the refill from the pharmacy already? Yes  Will the patient be using a [facility]? No  Name of the pharmacy and phone number for the current request: [facility] in [place] [phone]    Name of the medication requested: topirimate    Other request: none    Phone number to reach patient:  Home number on file [phone] (home)    Best Time:  anytime    Can we leave a detailed message on this number?  YES    Travel screening: Not Applicable

## 2021-02-12 NOTE — TELEPHONE ENCOUNTER
Second request from pharmacy for refill of Topiramate. Patient needs appointment. Message sent to clinic coordinators to schedule patient with Marine Flores PA-C.

## 2021-02-12 NOTE — TELEPHONE ENCOUNTER
LVM for patient with call center number.  Schedule a virtual return visit with Marine Flores for a follow-up medication refill.  Per shon Hawthrone to schedule with Sunshine Romero if need to be seen sooner.

## 2021-02-17 ENCOUNTER — VIRTUAL VISIT (OUTPATIENT)
Dept: URGENT CARE | Facility: CLINIC | Age: 56
End: 2021-02-17
Payer: COMMERCIAL

## 2021-02-17 DIAGNOSIS — Z20.822 EXPOSURE TO COVID-19 VIRUS: Primary | ICD-10-CM

## 2021-02-17 PROCEDURE — 99212 OFFICE O/P EST SF 10 MIN: CPT | Mod: TEL | Performed by: NURSE PRACTITIONER

## 2021-02-17 NOTE — PROGRESS NOTES
"  William Montes is a 55 year old male who is being evaluated via a billable telephone visit.      The patient has been notified of following:     \"This telephone visit will be conducted via a call between you and your physician/provider. We have found that certain health care needs can be provided without the need for a physical exam.  This service lets us provide the care you need with a short phone conversation.  If a prescription is necessary we can send it directly to your pharmacy.  If lab work is needed we can place an order for that and you can then stop by our lab to have the test done at a later time.    If during the course of the call the physician/provider feels a telephone visit is not appropriate, you will not be charged for this service.\"     Patient has given verbal consent for Telephone visit?  Yes    Chief Complaint:    Chief Complaint   Patient presents with     Covid 19 Testing       HPI: William Montes is an 55 year old male who calls with concerns that include exposure to COVID 19 3-4 days ago.    ROS:      A 10 point ROS is negative except as expressed in HPI.    Past Medical History  Past Medical History:   Diagnosis Date     ABNORMAL LIVER FUNCTION STUDY 1/4/2008    increased ast, alt Normal since 2008     Chronic gingivitis      Esophageal reflux      Glaucoma      History of blood transfusion     My son has aplastic anemia and has had bld transfusions     Nonspecific abnormal results of liver function study      OBESITY NOS 12/13/2006     Pure hypercholesterolemia      Type II or unspecified type diabetes mellitus without mention of complication, not stated as uncontrolled      Uncomplicated asthma     My mother has asthma        Family History   Family History   Problem Relation Age of Onset     Hypertension Mother      Diabetes Sister      Diabetes Cousin      Hypertension Other      C.A.D. No family hx of      Cancer - colorectal No family hx of      Glaucoma No family hx of      " Macular Degeneration No family hx of      Cancer No family hx of      Cerebrovascular Disease No family hx of      Breast Cancer No family hx of      Prostate Cancer No family hx of      Thyroid Disease No family hx of        Social History  Social History     Socioeconomic History     Marital status:      Spouse name: Luis     Number of children: 4     Years of education: Not on file     Highest education level: Not on file   Occupational History     Not on file   Social Needs     Financial resource strain: Not on file     Food insecurity     Worry: Not on file     Inability: Not on file     Transportation needs     Medical: Not on file     Non-medical: Not on file   Tobacco Use     Smoking status: Never Smoker     Smokeless tobacco: Never Used   Substance and Sexual Activity     Alcohol use: No     Comment: none for 11/2011     Drug use: No     Sexual activity: Yes     Partners: Female     Birth control/protection: None   Lifestyle     Physical activity     Days per week: Not on file     Minutes per session: Not on file     Stress: Not on file   Relationships     Social connections     Talks on phone: Not on file     Gets together: Not on file     Attends Cheondoism service: Not on file     Active member of club or organization: Not on file     Attends meetings of clubs or organizations: Not on file     Relationship status: Not on file     Intimate partner violence     Fear of current or ex partner: Not on file     Emotionally abused: Not on file     Physically abused: Not on file     Forced sexual activity: Not on file   Other Topics Concern     Parent/sibling w/ CABG, MI or angioplasty before 65F 55M? No   Social History Narrative    Dairy/d 0 servings/d. Caffeine 1 per weekExercise 2 x weekSunscreen used - NoSeatbelts used - YesWorking smoke/CO detectors in the home - YesGuns stored in the home - NoSelf Breast Exams - NoSelf Testicular Exam - YesEye Exam up to date - YesDental Exam up to date - YesPap  Smear up to date - NAMammogram up to date - NAPSA up to date - NoDexa Scan up to date - NoFlex Sig / Colonoscopy up to date - NoImmunizations up to date - YesAbuse: Current or Past(Physical, Sexual or Emotional)- NoDo you feel safe in your environment - YesDougkourtney Durham5/12/06        No tattoos or piercings, blood transfusions, no illicit IV or intranasal drug use.         Surgical History:  Past Surgical History:   Procedure Laterality Date     BIOPSY      My son had bone marrow biopsy     COLONOSCOPY N/A 5/22/2019    Procedure: COLONOSCOPY;  Surgeon: Leventhal, Thomas Michael, MD;  [facility]: [facility]     ENT SURGERY      My son has had tympanoplasty     NO HISTORY OF SURGERY       ORTHOPEDIC SURGERY      My mother had left hip replacement surgery          Allergies:  No Known Allergies     Current Meds:    Current Outpatient Medications:      ACCU-CHEK GUIDE test strip, USE TO TEST ONCE DAILY, Disp: 100 strip, Rfl: 0     ASPIRIN 81 MG OR TABS, 1 tab po QD (Once per day), Disp: 100, Rfl: 3     atorvastatin (LIPITOR) 20 MG tablet, TAKE 1 TABLET(20 MG) BY MOUTH DAILY, Disp: 90 tablet, Rfl: 3     blood glucose monitoring (ACCU-CHEK FASTCLIX) lancets, , Disp: , Rfl:      Blood Glucose Monitoring Suppl (ACCU-CHEK GUIDE) w/Device KIT, 1 kit daily Use to check blood sugar once daily and as needed, Disp: 1 kit, Rfl: 0     cholecalciferol (VITAMIN  -D) 1000 UNITS capsule, Take 1 capsule by mouth daily, Disp: , Rfl:      lisinopril (ZESTRIL) 5 MG tablet, TAKE 1 TABLET(5 MG) BY MOUTH DAILY.  Please follow up for yearly preventative Physical in September 2020, Disp: 90 tablet, Rfl: 1     melatonin 3 MG tablet, Take 6 mg by mouth nightly as needed for sleep, Disp: , Rfl:      metFORMIN (GLUCOPHAGE) 1000 MG tablet, Take 1 tablet (1,000 mg) by mouth 2 times daily (with meals), Disp: 180 tablet, Rfl: 1     Semaglutide,0.25 or 0.5MG/DOS, 2 MG/1.5ML SOPN, Inject 0.5 mg Subcutaneous once a week Inject 0.5 mg once weekly., Disp: 5  pen, Rfl: 3     topiramate (TOPAMAX) 25 MG tablet, Take 2 tablets (50 mg) by mouth 2 times daily, Disp: 360 tablet, Rfl: 0     PHYSICAL EXAM:     Patient is alert and oriented. Able to speak in full sentences. No wheezing or cough heard during telephone conversation. Mood is appropriate.        ASSESSMENT:       ICD-10-CM    1. Exposure to COVID-19 virus  Z20.822 Asymptomatic COVID-19 Virus (Coronavirus) by PCR         Worrisome symptoms discussed with instructions to go to the ED.  Patient verbalized understanding and agreed with this plan.     Lucy Garza CNP  2/17/2021, 11:27 AM      Phone call duration:  14 minutes

## 2021-02-18 ENCOUNTER — CARE COORDINATION (OUTPATIENT)
Dept: ENDOCRINOLOGY | Facility: CLINIC | Age: 56
End: 2021-02-18

## 2021-02-18 DIAGNOSIS — E66.811 CLASS 1 OBESITY WITH SERIOUS COMORBIDITY AND BODY MASS INDEX (BMI) OF 30.0 TO 30.9 IN ADULT, UNSPECIFIED OBESITY TYPE: ICD-10-CM

## 2021-02-18 RX ORDER — TOPIRAMATE 25 MG/1
50 TABLET, FILM COATED ORAL 2 TIMES DAILY
Qty: 360 TABLET | Refills: 0 | Status: SHIPPED | OUTPATIENT
Start: 2021-02-18 | End: 2021-04-01

## 2021-02-19 ENCOUNTER — OFFICE VISIT (OUTPATIENT)
Dept: LAB | Facility: CLINIC | Age: 56
End: 2021-02-19
Attending: NURSE PRACTITIONER
Payer: COMMERCIAL

## 2021-02-19 DIAGNOSIS — Z20.822 EXPOSURE TO COVID-19 VIRUS: ICD-10-CM

## 2021-02-19 LAB
SARS-COV-2 RNA RESP QL NAA+PROBE: NORMAL
SPECIMEN SOURCE: NORMAL

## 2021-02-19 PROCEDURE — 87635 SARS-COV-2 COVID-19 AMP PRB: CPT | Performed by: NURSE PRACTITIONER

## 2021-02-20 LAB
LABORATORY COMMENT REPORT: NORMAL
SARS-COV-2 RNA RESP QL NAA+PROBE: NEGATIVE
SPECIMEN SOURCE: NORMAL

## 2021-04-01 ENCOUNTER — VIRTUAL VISIT (OUTPATIENT)
Dept: ENDOCRINOLOGY | Facility: CLINIC | Age: 56
End: 2021-04-01
Payer: COMMERCIAL

## 2021-04-01 VITALS — BODY MASS INDEX: 28.58 KG/M2 | WEIGHT: 211 LBS | HEIGHT: 72 IN

## 2021-04-01 DIAGNOSIS — M25.511 CHRONIC RIGHT SHOULDER PAIN: ICD-10-CM

## 2021-04-01 DIAGNOSIS — Z86.39 HX OF OBESITY: ICD-10-CM

## 2021-04-01 DIAGNOSIS — E11.8 TYPE 2 DIABETES MELLITUS WITH COMPLICATION, WITHOUT LONG-TERM CURRENT USE OF INSULIN (H): Primary | ICD-10-CM

## 2021-04-01 DIAGNOSIS — G89.29 CHRONIC RIGHT SHOULDER PAIN: ICD-10-CM

## 2021-04-01 DIAGNOSIS — E66.3 OVERWEIGHT (BMI 25.0-29.9): ICD-10-CM

## 2021-04-01 PROCEDURE — 99203 OFFICE O/P NEW LOW 30 MIN: CPT | Mod: 95 | Performed by: PHYSICIAN ASSISTANT

## 2021-04-01 RX ORDER — TOPIRAMATE 25 MG/1
75 TABLET, FILM COATED ORAL DAILY
Qty: 360 TABLET | Refills: 0 | COMMUNITY
Start: 2021-04-01 | End: 2021-05-20

## 2021-04-01 ASSESSMENT — MIFFLIN-ST. JEOR: SCORE: 1818.98

## 2021-04-01 ASSESSMENT — PAIN SCALES - GENERAL: PAINLEVEL: SEVERE PAIN (7)

## 2021-04-01 NOTE — NURSING NOTE
"Chief Complaint   Patient presents with     RECHECK     Follow up mwm.       Vitals:    04/01/21 0809   Weight: 95.7 kg (211 lb)   Height: 1.827 m (5' 11.93\")       Body mass index is 28.67 kg/m .                            TRACY WATTS, EMT    "

## 2021-04-01 NOTE — Clinical Note
Follow up MTM in 1 month Lauren Bloch to follow up ozempic increase  Follow up Marine in 3 months    Thanks!

## 2021-04-01 NOTE — LETTER
2021       RE: William Montes  [address]  [facility] [address]     Dear Colleague,    Thank you for referring your patient, William Montes, to the [facility] [place] at [facility]. Please see a copy of my visit note below.    William is a 55 year old who is being evaluated via a billable video visit.      How would you like to obtain your AVS? MyChart  If the video visit is dropped, the invitation should be resent by: Text to cell phone: [phone]  Will anyone else be joining your video visit? No     Video Start Time: 900     Video-Visit Details    Type of service:  Video Visit    Video End Time:917    Originating Location (pt. Location): Home    Distant Location (provider location):  [facility] [place]     Platform used for Video Visit: [employer] [facility] Weight Management Note     William Montes  MRN:  [id number]  :  1965  NAKUL:  2021    Dear Sirena Talley DO,    I had the pleasure of seeing your patient William Montes. He is a 55 year old male who I am continuing to see for treatment of obesity related to:       10/26/2018   I have the following health issues associated with obesity: Type II Diabetes, GERD (Reflux), Fatty Liver   I have the following symptoms associated with obesity: -       Assessment & Plan   Problem List Items Addressed This Visit        Endocrine Diagnoses    Type 2 diabetes mellitus with complication, without long-term current use of insulin (H) - Primary    Relevant Medications    semaglutide (OZEMPIC) 2 MG/1.5ML pen       Other    Overweight (BMI 25.0-29.9)     INTERVAL HISTORY:  [facility] follow up. Has lost from 251 to 211 today (40 lbs and 16% TBW)  Starting regaining some weight to 219 and was taking topiramate 50mg daily  Increased to 75mg 2021 and that has been helping  Taking ozempic 0.5mg daily  Metformin 2000 daily prescribed by PCP  A1C  "5.8 down from 6.1    PLAN:  Refer to ortho for right shoulder pain  [facility] referral shoulder pain  Increase ozempic to 1mg weekly  Continue topiramate 75mg daily  Follow up BO in 1 month Lauren Bloch to follow up ozempic increase  Follow up Marine in 3 months  BMP and A1C 2-3 months To find a lab location near you, please call [phone].           Relevant Medications    topiramate (TOPAMAX) 25 MG tablet    semaglutide (OZEMPIC) 2 MG/1.5ML pen    Other Relevant Orders    Basic metabolic panel    Hemoglobin A1c    Hx of obesity      Other Visit Diagnoses     Chronic right shoulder pain        Relevant Orders    Orthopedic & Spine  Referral    [facility]         30 minutes spent on the date of the encounter doing chart review, history and exam, documentation and further activities per the note  0956}    CURRENT WEIGHT:   211 lbs 0 oz    Initial Weight (lbs): 251.2 lbs  Last Visits Weight: 93.9 kg (207 lb)  Cumulative weight loss (lbs): 40.2  Weight Loss Percentage: 16%    Changes and Difficulties 4/1/2021   I have made the following changes to my diet since my last visit: none   With regards to my diet, I am still struggling with: none   I have made the following changes to my activity/exercise since my last visit: I get a little bit of exercise   With regards to my activity/exercise, I am still struggling with: none       VITALS:  Ht 1.827 m (5' 11.93\")   Wt 95.7 kg (211 lb)   BMI 28.67 kg/m      MEDICATIONS:   Current Outpatient Medications   Medication Sig Dispense Refill     ACCU-CHEK GUIDE test strip USE TO TEST ONCE DAILY 100 strip 0     ASPIRIN 81 MG OR TABS 1 tab po QD (Once per day) 100 3     atorvastatin (LIPITOR) 20 MG tablet TAKE 1 TABLET(20 MG) BY MOUTH DAILY 90 tablet 3     blood glucose monitoring (ACCU-CHEK FASTCLIX) lancets        Blood Glucose Monitoring Suppl (ACCU-CHEK GUIDE) w/Device KIT 1 kit daily Use to check blood sugar once daily and as needed 1 kit 0     " "cholecalciferol (VITAMIN  -D) 1000 UNITS capsule Take 1 capsule by mouth daily       lisinopril (ZESTRIL) 5 MG tablet TAKE 1 TABLET(5 MG) BY MOUTH DAILY.  Please follow up for yearly preventative Physical in September 2020 90 tablet 1     melatonin 3 MG tablet Take 6 mg by mouth nightly as needed for sleep       metFORMIN (GLUCOPHAGE) 1000 MG tablet Take 1 tablet (1,000 mg) by mouth 2 times daily (with meals) 180 tablet 1     semaglutide (OZEMPIC) 2 MG/1.5ML pen Inject 1 mg subcutaneously once weekly. 3 mL 1     topiramate (TOPAMAX) 25 MG tablet Take 3 tablets (75 mg) by mouth daily 360 tablet 0       Weight Loss Medication History Reviewed With Patient 4/1/2021   Which weight loss medications are you currently taking on a regular basis?  Ozempic, Topamax (topiramate)   Are you having any side effects from the weight loss medication that we have prescribed you? No   If you are having side effects please describe: -       PHYSICAL EXAM:  Objective    Ht 1.827 m (5' 11.93\")   Wt 95.7 kg (211 lb)   BMI 28.67 kg/m           Physical Exam   GENERAL: Healthy, alert and no distress  EYES: Eyes grossly normal to inspection.  No discharge or erythema, or obvious scleral/conjunctival abnormalities.  RESP: No audible wheeze, cough, or visible cyanosis.  No visible retractions or increased work of breathing.    SKIN: Visible skin clear. No significant rash, abnormal pigmentation or lesions.  NEURO: Cranial nerves grossly intact.  Mentation and speech appropriate for age.  PSYCH: Mentation appears normal, affect normal/bright, judgement and insight intact, normal speech and appearance well-groomed.    Sincerely,    Marine Flores PA-C  "

## 2021-04-01 NOTE — PROGRESS NOTES
William is a 55 year old who is being evaluated via a billable video visit.      How would you like to obtain your AVS? MyChart  If the video visit is dropped, the invitation should be resent by: Text to cell phone: [phone]  Will anyone else be joining your video visit? No     Video Start Time: 900     Video-Visit Details    Type of service:  Video Visit    Video End Time:917    Originating Location (pt. Location): Home    Distant Location (provider location):  [facility] [place]     Platform used for Video Visit: [employer]     [facility] Medical Weight Management Note     William Montes  MRN:  [id number]  :  1965  NAKUL:  2021    Dear Sirena Talley, ,    I had the pleasure of seeing your patient William Montes. He is a 55 year old male who I am continuing to see for treatment of obesity related to:       10/26/2018   I have the following health issues associated with obesity: Type II Diabetes, GERD (Reflux), Fatty Liver   I have the following symptoms associated with obesity: -       Assessment & Plan   Problem List Items Addressed This Visit        Endocrine Diagnoses    Type 2 diabetes mellitus with complication, without long-term current use of insulin (H) - Primary    Relevant Medications    semaglutide (OZEMPIC) 2 MG/1.5ML pen       Other    Overweight (BMI 25.0-29.9)     INTERVAL HISTORY:  MWM follow up. Has lost from 251 to 211 today (40 lbs and 16% TBW)  Starting regaining some weight to 219 and was taking topiramate 50mg daily  Increased to 75mg 2021 and that has been helping  Taking ozempic 0.5mg daily  Metformin 2000 daily prescribed by PCP  A1C 5.8 down from 6.1    PLAN:  Refer to ortho for right shoulder pain  [facility] referral shoulder pain  Increase ozempic to 1mg weekly  Continue topiramate 75mg daily  Follow up MTM in 1 month Lauren Bloch to follow up ozempic increase  Follow up Marine in 3 months  BMP and A1C 2-3 months To find a lab location near you,  "please call [phone].           Relevant Medications    topiramate (TOPAMAX) 25 MG tablet    semaglutide (OZEMPIC) 2 MG/1.5ML pen    Other Relevant Orders    Basic metabolic panel    Hemoglobin A1c    Hx of obesity      Other Visit Diagnoses     Chronic right shoulder pain        Relevant Orders    Orthopedic & Spine  Referral    [facility]         30 minutes spent on the date of the encounter doing chart review, history and exam, documentation and further activities per the note  [id number]}    CURRENT WEIGHT:   211 lbs 0 oz    Initial Weight (lbs): 251.2 lbs  Last Visits Weight: 93.9 kg (207 lb)  Cumulative weight loss (lbs): 40.2  Weight Loss Percentage: 16%    Changes and Difficulties 4/1/2021   I have made the following changes to my diet since my last visit: none   With regards to my diet, I am still struggling with: none   I have made the following changes to my activity/exercise since my last visit: I get a little bit of exercise   With regards to my activity/exercise, I am still struggling with: none       VITALS:  Ht 1.827 m (5' 11.93\")   Wt 95.7 kg (211 lb)   BMI 28.67 kg/m      MEDICATIONS:   Current Outpatient Medications   Medication Sig Dispense Refill     ACCU-CHEK GUIDE test strip USE TO TEST ONCE DAILY 100 strip 0     ASPIRIN 81 MG OR TABS 1 tab po QD (Once per day) 100 3     atorvastatin (LIPITOR) 20 MG tablet TAKE 1 TABLET(20 MG) BY MOUTH DAILY 90 tablet 3     blood glucose monitoring (ACCU-CHEK FASTCLIX) lancets        Blood Glucose Monitoring Suppl (ACCU-CHEK GUIDE) w/Device KIT 1 kit daily Use to check blood sugar once daily and as needed 1 kit 0     cholecalciferol (VITAMIN  -D) 1000 UNITS capsule Take 1 capsule by mouth daily       lisinopril (ZESTRIL) 5 MG tablet TAKE 1 TABLET(5 MG) BY MOUTH DAILY.  Please follow up for yearly preventative Physical in September 2020 90 tablet 1     melatonin 3 MG tablet Take 6 mg by mouth nightly as needed for sleep       metFORMIN " "(GLUCOPHAGE) 1000 MG tablet Take 1 tablet (1,000 mg) by mouth 2 times daily (with meals) 180 tablet 1     semaglutide (OZEMPIC) 2 MG/1.5ML pen Inject 1 mg subcutaneously once weekly. 3 mL 1     topiramate (TOPAMAX) 25 MG tablet Take 3 tablets (75 mg) by mouth daily 360 tablet 0       Weight Loss Medication History Reviewed With Patient 4/1/2021   Which weight loss medications are you currently taking on a regular basis?  Ozempic, Topamax (topiramate)   Are you having any side effects from the weight loss medication that we have prescribed you? No   If you are having side effects please describe: -       PHYSICAL EXAM:  Objective    Ht 1.827 m (5' 11.93\")   Wt 95.7 kg (211 lb)   BMI 28.67 kg/m           Physical Exam   GENERAL: Healthy, alert and no distress  EYES: Eyes grossly normal to inspection.  No discharge or erythema, or obvious scleral/conjunctival abnormalities.  RESP: No audible wheeze, cough, or visible cyanosis.  No visible retractions or increased work of breathing.    SKIN: Visible skin clear. No significant rash, abnormal pigmentation or lesions.  NEURO: Cranial nerves grossly intact.  Mentation and speech appropriate for age.  PSYCH: Mentation appears normal, affect normal/bright, judgement and insight intact, normal speech and appearance well-groomed.      Sincerely,    Marine Flores PA-C      "

## 2021-04-02 PROBLEM — E11.8 TYPE 2 DIABETES MELLITUS WITH COMPLICATION, WITHOUT LONG-TERM CURRENT USE OF INSULIN (H): Status: ACTIVE | Noted: 2017-03-20

## 2021-04-02 PROBLEM — E66.3 OVERWEIGHT (BMI 25.0-29.9): Status: ACTIVE | Noted: 2021-04-02

## 2021-04-02 PROBLEM — Z86.39 HX OF OBESITY: Status: ACTIVE | Noted: 2021-04-02

## 2021-04-02 NOTE — ASSESSMENT & PLAN NOTE
INTERVAL HISTORY:  MWM follow up. Has lost from 251 to 211 today (40 lbs and 16% TBW)  Starting regaining some weight to 219 and was taking topiramate 50mg daily  Increased to 75mg Feb 2021 and that has been helping  Taking ozempic 0.5mg daily  Metformin 2000 daily prescribed by PCP  A1C 5.8 down from 6.1    PLAN:  Refer to ortho for right shoulder pain  [facility] referral shoulder pain  Increase ozempic to 1mg weekly  Continue topiramate 75mg daily  Follow up MTM in 1 month Lauren Bloch to follow up ozempic increase  Follow up Marine in 3 months  BMP and A1C 2-3 months To find a lab location near you, please call [phone].

## 2021-04-05 NOTE — TELEPHONE ENCOUNTER
DIAGNOSIS: Chronic right shoulder pain / no imaging/Marine Flores   APPOINTMENT DATE: 4.20.21   NOTES STATUS DETAILS   OFFICE NOTE from referring provider N/A    OFFICE NOTE from other specialist Internal 4.1.21 JERICHO Cabral Endo  11.12.20 JERICHO Simmons    DISCHARGE SUMMARY from hospital N/A    DISCHARGE REPORT from the ER N/A    OPERATIVE REPORT N/A    EMG report N/A    MEDICATION LIST Internal    MRI N/A    DEXA (osteoporosis/bone health) N/A    CT SCAN N/A    XRAYS (IMAGES & REPORTS) N/A

## 2021-04-13 DIAGNOSIS — E11.65 TYPE 2 DIABETES MELLITUS WITH HYPERGLYCEMIA, WITHOUT LONG-TERM CURRENT USE OF INSULIN (H): ICD-10-CM

## 2021-04-13 RX ORDER — BLOOD SUGAR DIAGNOSTIC
STRIP MISCELLANEOUS
Qty: 100 STRIP | Refills: 0 | Status: SHIPPED | OUTPATIENT
Start: 2021-04-13 | End: 2021-07-14

## 2021-04-19 DIAGNOSIS — M25.511 BILATERAL SHOULDER PAIN: Primary | ICD-10-CM

## 2021-04-19 DIAGNOSIS — M25.512 BILATERAL SHOULDER PAIN: Primary | ICD-10-CM

## 2021-04-20 ENCOUNTER — OFFICE VISIT (OUTPATIENT)
Dept: ORTHOPEDICS | Facility: CLINIC | Age: 56
End: 2021-04-20
Attending: PHYSICIAN ASSISTANT
Payer: COMMERCIAL

## 2021-04-20 ENCOUNTER — ANCILLARY PROCEDURE (OUTPATIENT)
Dept: GENERAL RADIOLOGY | Facility: CLINIC | Age: 56
End: 2021-04-20
Payer: COMMERCIAL

## 2021-04-20 ENCOUNTER — PRE VISIT (OUTPATIENT)
Dept: ORTHOPEDICS | Facility: CLINIC | Age: 56
End: 2021-04-20

## 2021-04-20 VITALS — BODY MASS INDEX: 28.58 KG/M2 | WEIGHT: 211 LBS | HEIGHT: 72 IN

## 2021-04-20 DIAGNOSIS — M25.511 BILATERAL SHOULDER PAIN: ICD-10-CM

## 2021-04-20 DIAGNOSIS — M25.512 BILATERAL SHOULDER PAIN: ICD-10-CM

## 2021-04-20 DIAGNOSIS — M19.011 OSTEOARTHRITIS OF BOTH GLENOHUMERAL JOINTS: Primary | ICD-10-CM

## 2021-04-20 DIAGNOSIS — G89.29 CHRONIC RIGHT SHOULDER PAIN: ICD-10-CM

## 2021-04-20 DIAGNOSIS — M19.012 OSTEOARTHRITIS OF BOTH GLENOHUMERAL JOINTS: Primary | ICD-10-CM

## 2021-04-20 DIAGNOSIS — M25.511 CHRONIC RIGHT SHOULDER PAIN: ICD-10-CM

## 2021-04-20 PROCEDURE — 73030 X-RAY EXAM OF SHOULDER: CPT | Mod: RT | Performed by: RADIOLOGY

## 2021-04-20 PROCEDURE — 99204 OFFICE O/P NEW MOD 45 MIN: CPT | Performed by: FAMILY MEDICINE

## 2021-04-20 ASSESSMENT — MIFFLIN-ST. JEOR: SCORE: 1818.98

## 2021-04-20 NOTE — PROGRESS NOTES
[facility] CLINICS AND SURGERY CENTER  SPORTS & ORTHOPEDIC CLINIC VISIT     Apr 20, 2021        ASSESSMENT & PLAN    55-year-old with bilateral shoulder pain.  Osteoarthritis on the left.  Acutely with adhesive capsulitis on the right    Reviewed imaging and assessment with patient in detail  He was given referral to physical therapy.  He was given a prescription for topical diclofenac as he is to avoid NSAIDs due to CKD.  We could consider ultrasound-guided glenohumeral injection.  We could try an injection of anesthetic only if he is worried about elevations in blood sugar due to steroid.  He will contact us if he would like to pursue this option.    Fernandez Tran MD  [facility] SPORTS MEDICINE CLINIC [place]    -----  Chief Complaint   Patient presents with     Consult     Bilateral shoulder        SUBJECTIVE  William Montes is a/an 55 year old male who is seen in consultation at the request of  Marine Flores PA-C for evaluation of  Bilateral shoulder pain. Patient reports that right shoulder is worse than left shoulder.     The patient is seen by themselves.  The patient is Right handed    Onset: 6 month(s) ago. Reports insidious onset without acute precipitating event.  Location of Pain: bilateral shoulder, pain reported throughout the joint.   Worsened by: sleeping on right side and with sudden quick motions. Difficulty finding position to sleep without pain.  Better with: Biofreeze or Icy hot  Treatments tried: rest/activity avoidance, Tylenol, ibuprofen (has had to stop taking due to liver function and diabetes) and topical creams. Tried pt a number of years ago.   Associated symptoms: no tingling or numbness. No radiating pain.     Orthopedic/Surgical history: NO  Social History/Occupation:       REVIEW OF SYSTEMS:    Do you have fever, chills, weight loss? Yes, weight loss on own to help diabetes.    Do you have any vision problems? No    Do you have any chest pain or  "edema? Yes, ganglion cyst on left wrist.    Do you have any shortness of breath or wheezing?  No    Do you have stomach problems? No    Do you have any numbness or focal weakness? No    Do you have diabetes? Yes, type 2.    Do you have problems with bleeding or clotting? No    Do you have an rashes or other skin lesions? No    OBJECTIVE:  Ht 1.827 m (5' 11.93\")   BMI 28.67 kg/m       EXAM:  Alert, pleasant and conversational    Neck with full AROM, non-tender to midline cervical and upper thoracic spine palpation, negative Spurling's.  Elbow with FROM and non-tender to palpation      bilateral shoulder:   Skin intact. No skin changes, deformity or atrophy    Left  PROM:   Forward Flexion: 150    Abduction: 180    External rotation: ~70    Internal rotation: T7.     Right:   PROM:   Forward Flexion: 150  some stiffness in terminal flexion     External rotation: ~45    Internal rotation: L2.       Strength testing:   Abduction: 5/5,   External rotation: 5/5   Internal rotation 5/5     Deltoids 5/5, Biceps 5/5, Triceps 5/5,  5/5.    Palpation: negative TTP of the Acromioclavicular joint  negative TTP of Sternoclavicular joint  negative TTP of posterior glenoid  negative TTP of scapular borders  negative TTP of the bicipital tendon.     Neurovascularly intact bilateral upper extremities.      RADIOLOGY:    4 view xrays of right shoulder performed and reviewed independently demonstrating mild glenohumeral and acromioclavicular OA.  Enthesophyte at greater tuberosity.. See EMR for formal radiology report.     4 view x-rays of the left shoulder performed and reviewed independently demonstrating mild glenohumeral OA.  See EMR for formal radiology report.             "

## 2021-04-20 NOTE — PATIENT INSTRUCTIONS
Follow-up with physical therapy.  To schedule a Physical Therapy appointment with the [facility], please call [phone].   Use diclofenac gel on the shoulder up to 4 times daily for pain.  If not covered by insurance it is available for purchase over-the-counter  Contact us if you would like to try a shoulder injection for pain control    Fernandez Tran MD  [facility] & Orthopa[facility]  [facility]  [address] [place] [id number]    Phone: [phone]  Fax: [phone]

## 2021-04-20 NOTE — LETTER
4/20/2021      RE: William Montes  [address]  [facility] [address]         [facility] CLINICS AND SURGERY CENTER  SPORTS & ORTHOPEDIC CLINIC VISIT     Apr 20, 2021        ASSESSMENT & PLAN    55-year-old with bilateral shoulder pain.  Osteoarthritis on the left.  Acutely with adhesive capsulitis on the right    Reviewed imaging and assessment with patient in detail  He was given referral to physical therapy.  He was given a prescription for topical diclofenac as he is to avoid NSAIDs due to CKD.  We could consider ultrasound-guided glenohumeral injection.  We could try an injection of anesthetic only if he is worried about elevations in blood sugar due to steroid.  He will contact us if he would like to pursue this option.    Fernandez Tran MD  [facility] SPORTS MEDICINE CLINIC [place]    -----  Chief Complaint   Patient presents with     Consult     Bilateral shoulder        SUBJECTIVE  William Montes is a/an 55 year old male who is seen in consultation at the request of  Marine Flores PA-C for evaluation of  Bilateral shoulder pain. Patient reports that right shoulder is worse than left shoulder.     The patient is seen by themselves.  The patient is Right handed    Onset: 6 month(s) ago. Reports insidious onset without acute precipitating event.  Location of Pain: bilateral shoulder, pain reported throughout the joint.   Worsened by: sleeping on right side and with sudden quick motions. Difficulty finding position to sleep without pain.  Better with: Biofreeze or Icy hot  Treatments tried: rest/activity avoidance, Tylenol, ibuprofen (has had to stop taking due to liver function and diabetes) and topical creams. Tried pt a number of years ago.   Associated symptoms: no tingling or numbness. No radiating pain.     Orthopedic/Surgical history: NO  Social History/Occupation:       REVIEW OF SYSTEMS:    Do you have fever, chills, weight loss? Yes, weight loss on own to help  "diabetes.    Do you have any vision problems? No    Do you have any chest pain or edema? Yes, ganglion cyst on left wrist.    Do you have any shortness of breath or wheezing?  No    Do you have stomach problems? No    Do you have any numbness or focal weakness? No    Do you have diabetes? Yes, type 2.    Do you have problems with bleeding or clotting? No    Do you have an rashes or other skin lesions? No    OBJECTIVE:  Ht 1.827 m (5' 11.93\")   BMI 28.67 kg/m       EXAM:  Alert, pleasant and conversational    Neck with full AROM, non-tender to midline cervical and upper thoracic spine palpation, negative Spurling's.  Elbow with FROM and non-tender to palpation      bilateral shoulder:   Skin intact. No skin changes, deformity or atrophy    Left  PROM:   Forward Flexion: 150    Abduction: 180    External rotation: ~70    Internal rotation: T7.     Right:   PROM:   Forward Flexion: 150  some stiffness in terminal flexion     External rotation: ~45    Internal rotation: L2.       Strength testing:   Abduction: 5/5,   External rotation: 5/5   Internal rotation 5/5     Deltoids 5/5, Biceps 5/5, Triceps 5/5,  5/5.    Palpation: negative TTP of the Acromioclavicular joint  negative TTP of Sternoclavicular joint  negative TTP of posterior glenoid  negative TTP of scapular borders  negative TTP of the bicipital tendon.     Neurovascularly intact bilateral upper extremities.      RADIOLOGY:    4 view xrays of right shoulder performed and reviewed independently demonstrating mild glenohumeral and acromioclavicular OA.  Enthesophyte at greater tuberosity.. See EMR for formal radiology report.     4 view x-rays of the left shoulder performed and reviewed independently demonstrating mild glenohumeral OA.  See EMR for formal radiology report.        Fernandez Tran MD    "

## 2021-04-21 NOTE — PROGRESS NOTES
[facility] Initial Evaluation    Subjective:  William Montes is a 55 year old male with a bilateral shoulder (adhesive capsulitis on right per MD) condition. Symptoms commenced as a result of: chronic bilateral shoulder pain for the past 6 months for unknown reasons. Condition occurred in the following environment: NA. Onset of symptoms: PT order date: April 2021. Location of symptoms: lateral, anterior, upper arms. Pain level on number scale: 5-9/10. Quality of pain: cramping. Associated symptoms: stiffness. Pain frequency (constant/intermittent): constant. Symptoms are exacerbated by: reaching, lying on sides, pushing, washing, dressing, carrying. Symptoms are relieved by: icy hot. Progression of symptoms since onset (same/better/worse): worse. Special tests (x-ray, MRI, CT scan, EMG, bone scan): x-ray. Previous treatment: None. Improvement with previous treatment: NA. General health as reported by patient is good. Pertinent medical history includes: See Epic. Medical allergies: see Epic. Other pertinent surgeries: see Epic. Current medications: See Epic. Occupation: . Patient is (working in normal job without restrictions/working in normal job with restrictions/working in an alternate job/not working due to present treatment problem): working in normal job from home. Primary job tasks: computer work, driving, prolonged sitting. Barriers at home/work: None reported by patient. Red flags: None reported by patient.    Objective  Posture    Forward Head +   Rounded Shoulders +   Scapular Winging      Shoulder AROM (* = pain) Right Left   * 122*   ABD 77* 95*   ER (neutral) 23* 45*   IR T12* T10*     MMT deferred to later date    Cervical AROM min to mod loss throughout - improved shoulder and cervical AROM after performing repeated cervical RET    Assessment/Plan:    Patient is a 55 year old male with both sides shoulder complaints.    Patient has the following significant  findings with corresponding treatment plan.                Diagnosis 1:  Bilateral shoulder pain - possible cervical derangement  Pain -  manual therapy, self management, education, directional preference exercise and home program  Decreased ROM/flexibility - manual therapy and therapeutic exercise  Decreased joint mobility - manual therapy and therapeutic exercise  Decreased strength - therapeutic exercise and therapeutic activities  Impaired muscle performance - neuro re-education  Decreased function - therapeutic activities  Impaired posture - neuro re-education    Previous and current functional limitations:  (See Goal Flow Sheet for this information)    Short term and Long term goals: (See Goal Flow Sheet for this information)     Communication ability:  Patient appears to be able to clearly communicate and understand verbal and written communication and follow directions correctly.  Treatment Explanation - The following has been discussed with the patient:   RX ordered/plan of care  Anticipated outcomes  Possible risks and side effects  This patient would benefit from PT intervention to resume normal activities.   Rehab potential is good.    Frequency:  1 X week, once daily  Duration:  for 4 weeks tapering to 2 X a month over 1 month  Discharge Plan:  Achieve all LTG.  Independent in home treatment program.  Reach maximal therapeutic benefit.    Please refer to the daily flowsheet for treatment today, total treatment time and time spent performing 1:1 timed codes.

## 2021-04-22 ENCOUNTER — THERAPY VISIT (OUTPATIENT)
Dept: PHYSICAL THERAPY | Facility: CLINIC | Age: 56
End: 2021-04-22
Attending: FAMILY MEDICINE
Payer: COMMERCIAL

## 2021-04-22 DIAGNOSIS — M25.511 CHRONIC RIGHT SHOULDER PAIN: ICD-10-CM

## 2021-04-22 DIAGNOSIS — M25.511 CHRONIC PAIN OF BOTH SHOULDERS: ICD-10-CM

## 2021-04-22 DIAGNOSIS — G89.29 CHRONIC PAIN OF BOTH SHOULDERS: ICD-10-CM

## 2021-04-22 DIAGNOSIS — M25.512 CHRONIC PAIN OF BOTH SHOULDERS: ICD-10-CM

## 2021-04-22 DIAGNOSIS — G89.29 CHRONIC RIGHT SHOULDER PAIN: ICD-10-CM

## 2021-04-22 PROCEDURE — 97110 THERAPEUTIC EXERCISES: CPT | Mod: GP | Performed by: PHYSICAL THERAPIST

## 2021-04-22 PROCEDURE — 97161 PT EVAL LOW COMPLEX 20 MIN: CPT | Mod: GP | Performed by: PHYSICAL THERAPIST

## 2021-04-22 PROCEDURE — 97530 THERAPEUTIC ACTIVITIES: CPT | Mod: GP | Performed by: PHYSICAL THERAPIST

## 2021-04-29 ENCOUNTER — THERAPY VISIT (OUTPATIENT)
Dept: PHYSICAL THERAPY | Facility: CLINIC | Age: 56
End: 2021-04-29
Payer: COMMERCIAL

## 2021-04-29 DIAGNOSIS — G89.29 CHRONIC PAIN OF BOTH SHOULDERS: ICD-10-CM

## 2021-04-29 DIAGNOSIS — M25.511 CHRONIC PAIN OF BOTH SHOULDERS: ICD-10-CM

## 2021-04-29 DIAGNOSIS — M25.512 CHRONIC PAIN OF BOTH SHOULDERS: ICD-10-CM

## 2021-04-29 PROCEDURE — 97530 THERAPEUTIC ACTIVITIES: CPT | Mod: GP | Performed by: PHYSICAL THERAPIST

## 2021-04-29 PROCEDURE — 97110 THERAPEUTIC EXERCISES: CPT | Mod: GP | Performed by: PHYSICAL THERAPIST

## 2021-05-03 ENCOUNTER — VIRTUAL VISIT (OUTPATIENT)
Dept: PHARMACY | Facility: CLINIC | Age: 56
End: 2021-05-03
Payer: COMMERCIAL

## 2021-05-03 DIAGNOSIS — E11.8 TYPE 2 DIABETES MELLITUS WITH COMPLICATION, WITHOUT LONG-TERM CURRENT USE OF INSULIN (H): Primary | ICD-10-CM

## 2021-05-03 DIAGNOSIS — G89.29 CHRONIC PAIN OF BOTH SHOULDERS: ICD-10-CM

## 2021-05-03 DIAGNOSIS — M25.511 CHRONIC PAIN OF BOTH SHOULDERS: ICD-10-CM

## 2021-05-03 DIAGNOSIS — E66.3 OVERWEIGHT (BMI 25.0-29.9): ICD-10-CM

## 2021-05-03 DIAGNOSIS — Z78.9 TAKES DIETARY SUPPLEMENTS: ICD-10-CM

## 2021-05-03 DIAGNOSIS — G47.00 INSOMNIA, UNSPECIFIED TYPE: ICD-10-CM

## 2021-05-03 DIAGNOSIS — E78.5 HYPERLIPIDEMIA LDL GOAL <100: ICD-10-CM

## 2021-05-03 DIAGNOSIS — K21.9 GASTROESOPHAGEAL REFLUX DISEASE WITHOUT ESOPHAGITIS: ICD-10-CM

## 2021-05-03 DIAGNOSIS — M25.512 CHRONIC PAIN OF BOTH SHOULDERS: ICD-10-CM

## 2021-05-03 PROCEDURE — 99607 MTMS BY PHARM ADDL 15 MIN: CPT | Performed by: PHARMACIST

## 2021-05-03 PROCEDURE — 99605 MTMS BY PHARM NP 15 MIN: CPT | Performed by: PHARMACIST

## 2021-05-03 NOTE — Clinical Note
Tolerating increasing ozempic and topiramate. Told him to get labs repeated in 1 month due to his current kidney function. I wonder how topiramte is having affect on this, he is staying hydrated. No more NSAID use from a while ago. Elida SORIANO

## 2021-05-03 NOTE — PATIENT INSTRUCTIONS
Recommendations from today's MTM visit:                                                    MTM (medication therapy management) is a service provided by a clinical pharmacist designed to help you get the most of out of your medicines.      1. Get ordered lab completed in a month as recommended     2. Continue current regimen for now.     3. Follow up with Provider regarding steroid injection in shoulder(s) if interested.       Follow-up: Return in about 3 months (around 8/3/2021) for Medication Therapy Management Pharmacist Visit, via phone, Call [phone] to schedule.    It was great to speak with you today.  I value your experience and would be very thankful for your time with providing feedback on our clinic survey. You may receive a survey via email or text message in the next few days.   .     My Clinical Pharmacist's contact information:                                                      Please feel free to contact me with any questions or concerns you have.      Lauren Bloch, PharmD  Medication Therapy Management Pharmacist   [facility] Weight Management [place]

## 2021-05-03 NOTE — PROGRESS NOTES
Medication Therapy Management (MTM) Encounter    ASSESSMENT:                            Medication Adherence/Access: No issues identified    Obesity: Subjectively progressing and getting adequate response from dose changes, could benefit from continuing for now. Discussed repeat labs in 1 month to reassess kidney function.    Type 2 Diabetes:  Stable. A1c at goal <7%.     Hyperlipidemia: LDL <100.     GERD: Stable.     Shoulder Pain: Unimproved. Could consider following up to consider steroid injections in shoulder.     Supplements: Vitamin D at goal >30.     Insomnia: Stable.     PLAN:                            1. Get ordered lab completed in a month as recommended     2. Continue current regimen for now.     3. Follow up with Provider regarding steroid injection in shoulder(s) if interested.     Follow-up: 3-4 months     SUBJECTIVE/OBJECTIVE:                          William Montes is a 55 year old male called for a follow-up visit. He was referred to me from Marine Flores PA-C.  Today's visit is a follow-up MTM visit from 3/21/2019. It has been >1 year since last visit.      Reason for visit: Ozempic increase to 1 mg weekly 4 weeks ago.    Allergies/ADRs: Reviewed in chart  Tobacco: He reports that he has never smoked. He has never used smokeless tobacco.  Alcohol: not currently using  Caffeine: no caffeine  Activity: see below  Past Medical History: Reviewed in chart. Was able to get COVID vaccine a couple months ago.     Medication Adherence/Access: no issues reported    Obesity:   Topiramate 75 mg once daily.   Ozempic 1 mg weekly     Followed by Marine Flores PA-C, seen 4/1/21 for [facility] Weight [facility]. Patient is experiencing the follow side effects: nausea, constipation- mild. He previously was on higher topiramate dosing but was lowered to 50 mg daily due to kidney issues. Then when lowered he became hungrier and snacking more. Therefore, prescription was increased to 75 mg daily last visit.  "He does find that the increased dose is helpful for less snacking. Getting more fullness with Ozempic increased dose. Eating 3 meals per day. Drinking 64+ oz water daily.  Patient reports working with [facility], doing PT. Was having severe shoulder pain 2020, and PT helping to gain mobility.     Wt Readings from Last 4 Encounters:   21 211 lb (95.7 kg)   21 211 lb (95.7 kg)   21 209 lb (94.8 kg)   20 213 lb (96.6 kg)     Estimated body mass index is 28.67 kg/m  as calculated from the following:    Height as of 21: 5' 11.93\" (1.827 m).    Weight as of 21: 211 lb (95.7 kg).    Type 2 Diabetes:    Metformin 1000 mg once daily - prescribed 1000 mg twice daily   Ozempic 1 mg weekly, increased 1 month ago    Patient is experiencing the following side effects: nausea, constipation - \"mild\". He does feel that it is adequately helping with fullness.   Blood sugar monitorin time daily, fastins  Symptoms of low blood sugar? none  Symptoms of high blood sugar? none  Eye exam: up to date - last got checked 2020.   Foot exam: due  Diet/Exercise: see above  Aspirin: Taking 81mg daily and denies side effects  Statin: Yes: atorvastatin 20 mg daily    ACEi/ARB: Yes: lisinopril 5 mg daily.   Urine Albumin:   Lab Results   Component Value Date    UMALCR 3.43 2020      Lab Results   Component Value Date    A1C 5.8 2020    A1C 5.6 2020    A1C 6.1 2020    A1C 6.5 2019    A1C 6.3 2019     Hyperlipidemia: Current therapy includes atorvastatin 20mg daily.      Patient reports no significant myalgias or other side effects.    Recent Labs   Lab Test 20  1158 19  0902 14  1426 14  1426 14  1010   CHOL 129 108   < > 135 151   HDL 38* 30*   < > 34* 41   LDL 66 60   < > 54 92   TRIG 127 91   < > 237* 90   CHOLHDLRATIO  --   --   --  4.0 3.7    < > = values in this interval not displayed. " "    GERD:  Prilosec (omeprazole) 20mg once daily.     Pt reports no current symptoms.  Patient feels that current regimen is effective.    Shoulder Pain:   Diclofenac 1% gel as needed    She he uses the icy hot or diclofenac, is helpful. Was taking ibuprofen and was helpful, but now with elevation in creatinine, was told to stop taking. He was offered steroid injection into joint, but he wanted to hold off. Shoulders are still very painful despite working with PT. APAP not helpful. Pain in both shoulders, but greater in right. Was told he has \"frozen shoulder\". Pain previously has gone away when this has occurred, but this last time has been going on for several months.     Creatinine   Date Value Ref Range Status   02/09/2021 1.40 (H) 0.66 - 1.25 mg/dL Final     GFR Estimate   Date Value Ref Range Status   02/09/2021 55 (L) >60 mL/min/[1.73_m2] Final     Comment:     Non  GFR Calc  Starting 12/18/2018, serum creatinine based estimated GFR (eGFR) will be   calculated using the Chronic Kidney Disease Epidemiology Collaboration   (CKD-EPI) equation.       GFR Estimate If Black   Date Value Ref Range Status   02/09/2021 64 >60 mL/min/[1.73_m2] Final     Comment:      GFR Calc  Starting 12/18/2018, serum creatinine based estimated GFR (eGFR) will be   calculated using the Chronic Kidney Disease Epidemiology Collaboration   (CKD-EPI) equation.       Lab Results   Component Value Date    ALT 29 01/04/2021     Supplements:   Vitamin D 1000 international unit(s) daily    Lab Results   Component Value Date    VITDT 34 11/12/2020     Insomnia:   Melatonin 3-6 mg nightly as needed    Finds helpful for sleep. Shoulder pain more at night, so finds helpful to fall sleep.   ----------------      I spent 24 minutes with this patient today. A copy of the visit note was provided to the patient's referring provider.    The patient was sent via [employer] a summary of these recommendations.     Elida " Bloch, PharmD  Medication Therapy Management Pharmacist   [facility]    Telemedicine Visit Details  Type of service:  Telephone visit  Start Time: 10:01 AM  End Time: 10:25 AM  Originating Location (patient location): Home  Distant Location (provider location):  [facility]TM      Medication Therapy Recommendations  No medication therapy recommendations to display

## 2021-05-06 ENCOUNTER — THERAPY VISIT (OUTPATIENT)
Dept: PHYSICAL THERAPY | Facility: CLINIC | Age: 56
End: 2021-05-06
Payer: COMMERCIAL

## 2021-05-06 DIAGNOSIS — G89.29 CHRONIC PAIN OF BOTH SHOULDERS: ICD-10-CM

## 2021-05-06 DIAGNOSIS — M25.511 CHRONIC PAIN OF BOTH SHOULDERS: ICD-10-CM

## 2021-05-06 DIAGNOSIS — M25.512 CHRONIC PAIN OF BOTH SHOULDERS: ICD-10-CM

## 2021-05-06 PROCEDURE — 97112 NEUROMUSCULAR REEDUCATION: CPT | Mod: GP | Performed by: PHYSICAL THERAPIST

## 2021-05-06 PROCEDURE — 97110 THERAPEUTIC EXERCISES: CPT | Mod: GP | Performed by: PHYSICAL THERAPIST

## 2021-05-06 PROCEDURE — 97530 THERAPEUTIC ACTIVITIES: CPT | Mod: GP | Performed by: PHYSICAL THERAPIST

## 2021-05-13 ENCOUNTER — THERAPY VISIT (OUTPATIENT)
Dept: PHYSICAL THERAPY | Facility: CLINIC | Age: 56
End: 2021-05-13
Payer: COMMERCIAL

## 2021-05-13 DIAGNOSIS — G89.29 CHRONIC PAIN OF BOTH SHOULDERS: ICD-10-CM

## 2021-05-13 DIAGNOSIS — M25.511 CHRONIC PAIN OF BOTH SHOULDERS: ICD-10-CM

## 2021-05-13 DIAGNOSIS — M25.512 CHRONIC PAIN OF BOTH SHOULDERS: ICD-10-CM

## 2021-05-13 DIAGNOSIS — I10 BENIGN ESSENTIAL HYPERTENSION: ICD-10-CM

## 2021-05-13 PROCEDURE — 97112 NEUROMUSCULAR REEDUCATION: CPT | Mod: GP | Performed by: PHYSICAL THERAPIST

## 2021-05-13 PROCEDURE — 97530 THERAPEUTIC ACTIVITIES: CPT | Mod: GP | Performed by: PHYSICAL THERAPIST

## 2021-05-13 PROCEDURE — 97110 THERAPEUTIC EXERCISES: CPT | Mod: GP | Performed by: PHYSICAL THERAPIST

## 2021-05-13 RX ORDER — LISINOPRIL 5 MG/1
TABLET ORAL
Qty: 30 TABLET | Refills: 0 | Status: SHIPPED | OUTPATIENT
Start: 2021-05-13 | End: 2021-06-15

## 2021-05-13 NOTE — TELEPHONE ENCOUNTER
Routing refill request to provider for review/approval because:  Labs not current:  Serum creatinine       Pending Prescriptions:                       Disp   Refills    lisinopril (ZESTRIL) 5 MG tablet [Pharmacy*90 tab*1        Sig: TAKE 1 TABLET BY MOUTH DAILY        Elvira Love RN

## 2021-05-16 ENCOUNTER — HEALTH MAINTENANCE LETTER (OUTPATIENT)
Age: 56
End: 2021-05-16

## 2021-05-19 DIAGNOSIS — E66.3 OVERWEIGHT (BMI 25.0-29.9): ICD-10-CM

## 2021-05-19 DIAGNOSIS — E11.8 TYPE 2 DIABETES MELLITUS WITH COMPLICATION, WITHOUT LONG-TERM CURRENT USE OF INSULIN (H): ICD-10-CM

## 2021-05-20 RX ORDER — TOPIRAMATE 25 MG/1
75 TABLET, FILM COATED ORAL DAILY
Qty: 180 TABLET | Refills: 0 | Status: SHIPPED | OUTPATIENT
Start: 2021-05-20 | End: 2021-07-21

## 2021-05-20 NOTE — TELEPHONE ENCOUNTER
semaglutide (OZEMPIC) 2 MG/1.5ML pen  Inject 1 mg subcutaneously once weekly  Last Written Prescription Date:  4/1/2021  Last Fill Quantity: 3 ml ,   # refills: 1  topiramate (TOPAMAX) 25 MG tablet Take 3 tablets (75 mg) by mouth daily   Last Written Prescription Date:  4/1/2021  Last Fill Quantity: 360,   # refills: 0  Last Office Visit : 4/1/2021  Future Office visit:  7/1/2021   Bloch 5/3/21     Contacted pharmacy by phone- they did not receive the 4/1/2021 topiramate. Refilled to next appt per Weight management protocol  [id number] [place] [address] AT [place] [address]

## 2021-06-12 DIAGNOSIS — I10 BENIGN ESSENTIAL HYPERTENSION: ICD-10-CM

## 2021-06-14 NOTE — TELEPHONE ENCOUNTER
Routing refill request to provider for review/approval because:  Labs out of range:    Creatinine   Date Value Ref Range Status   02/09/2021 1.40 (H) 0.66 - 1.25 mg/dL Final     Mercy Montez RN

## 2021-06-15 RX ORDER — LISINOPRIL 5 MG/1
TABLET ORAL
Qty: 30 TABLET | Refills: 0 | Status: SHIPPED | OUTPATIENT
Start: 2021-06-15 | End: 2021-07-15

## 2021-06-20 DIAGNOSIS — E11.65 TYPE 2 DIABETES MELLITUS WITH HYPERGLYCEMIA, WITHOUT LONG-TERM CURRENT USE OF INSULIN (H): ICD-10-CM

## 2021-06-21 ENCOUNTER — DOCUMENTATION ONLY (OUTPATIENT)
Dept: NEPHROLOGY | Facility: CLINIC | Age: 56
End: 2021-06-21

## 2021-06-21 NOTE — TELEPHONE ENCOUNTER
Routing refill request to provider for review/approval because:  Labs not current:    Lab Results   Component Value Date    A1C 5.8 11/12/2020    A1C 5.6 07/23/2020    A1C 6.1 03/09/2020    A1C 6.5 11/11/2019    A1C 6.3 08/01/2019       Patient needs to be seen because: recent 6 month or future 30 day visit- schedule for A1c lab draw 6/22/21      Last visit- 11/12/2020

## 2021-06-22 DIAGNOSIS — E66.3 OVERWEIGHT (BMI 25.0-29.9): ICD-10-CM

## 2021-06-22 DIAGNOSIS — R79.89 ELEVATED SERUM CREATININE: ICD-10-CM

## 2021-06-22 DIAGNOSIS — R79.89 ELEVATED SERUM CREATININE: Primary | ICD-10-CM

## 2021-06-22 LAB
ANION GAP SERPL CALCULATED.3IONS-SCNC: 6 MMOL/L (ref 3–14)
BUN SERPL-MCNC: 13 MG/DL (ref 7–30)
CALCIUM SERPL-MCNC: 8.7 MG/DL (ref 8.5–10.1)
CHLORIDE SERPL-SCNC: 106 MMOL/L (ref 94–109)
CO2 SERPL-SCNC: 23 MMOL/L (ref 20–32)
CREAT SERPL-MCNC: 1.47 MG/DL (ref 0.66–1.25)
GFR SERPL CREATININE-BSD FRML MDRD: 52 ML/MIN/{1.73_M2}
GLUCOSE SERPL-MCNC: 91 MG/DL (ref 70–99)
HBA1C MFR BLD: 5.6 % (ref 0–5.6)
POTASSIUM SERPL-SCNC: 4.1 MMOL/L (ref 3.4–5.3)
SODIUM SERPL-SCNC: 135 MMOL/L (ref 133–144)

## 2021-06-22 PROCEDURE — 82306 VITAMIN D 25 HYDROXY: CPT | Performed by: INTERNAL MEDICINE

## 2021-06-22 PROCEDURE — 36415 COLL VENOUS BLD VENIPUNCTURE: CPT | Performed by: PHYSICIAN ASSISTANT

## 2021-06-22 PROCEDURE — 82610 CYSTATIN C: CPT

## 2021-06-22 PROCEDURE — 83970 ASSAY OF PARATHORMONE: CPT | Performed by: INTERNAL MEDICINE

## 2021-06-22 PROCEDURE — 80048 BASIC METABOLIC PNL TOTAL CA: CPT | Performed by: PHYSICIAN ASSISTANT

## 2021-06-22 PROCEDURE — 83036 HEMOGLOBIN GLYCOSYLATED A1C: CPT | Performed by: PHYSICIAN ASSISTANT

## 2021-06-23 ENCOUNTER — VIRTUAL VISIT (OUTPATIENT)
Dept: NEPHROLOGY | Facility: CLINIC | Age: 56
End: 2021-06-23
Attending: INTERNAL MEDICINE
Payer: COMMERCIAL

## 2021-06-23 DIAGNOSIS — R79.89 ELEVATED SERUM CREATININE: Primary | ICD-10-CM

## 2021-06-23 DIAGNOSIS — E11.8 TYPE 2 DIABETES MELLITUS WITH COMPLICATION, WITHOUT LONG-TERM CURRENT USE OF INSULIN (H): ICD-10-CM

## 2021-06-23 LAB
ALBUMIN SERPL-MCNC: 4 G/DL (ref 3.4–5)
PHOSPHATE SERPL-MCNC: 3.3 MG/DL (ref 2.5–4.5)
PTH-INTACT SERPL-MCNC: 30 PG/ML (ref 18–80)

## 2021-06-23 PROCEDURE — 99214 OFFICE O/P EST MOD 30 MIN: CPT | Mod: 95 | Performed by: INTERNAL MEDICINE

## 2021-06-23 PROCEDURE — 82040 ASSAY OF SERUM ALBUMIN: CPT | Performed by: INTERNAL MEDICINE

## 2021-06-23 PROCEDURE — 36415 COLL VENOUS BLD VENIPUNCTURE: CPT | Performed by: INTERNAL MEDICINE

## 2021-06-23 PROCEDURE — 84100 ASSAY OF PHOSPHORUS: CPT | Performed by: INTERNAL MEDICINE

## 2021-06-23 NOTE — PROGRESS NOTES
William is a 56 year old who is being evaluated via a billable video visit.      How would you like to obtain your AVS? [employer]  If the video visit is dropped, the invitation should be resent by: Send to e-mail at: [email or web address]  Will anyone else be joining your video visit? No    Video Start Time: 1242 pm  Video-Visit Details    Type of service:  Video Visit    Video End Time:1259 pm    Originating Location (pt. Location): Home    Distant Location (provider location):  [facility] [place]     Platform used for Video Visit: [employer]    Assessment and Plan:  56 year old male with history of diabetes mellitus since 1999, mild/ borderline hypertension, and mild retinopathy, fatty liver, overweight, 40 lbs weight loss since November 2018 who presents for followup of elevated creatinine  #Elevated creatinine/ CKD - his Scr going back many years was 1.1-1.2, but is up to 1.3-1.4 range in recent years in setting of being on lisinopril. He has 20+ year history of diabetes which is well controlled, no retinopathy and no hypertension.  - lisinopril may be elevated creatinine to some extent, but do suspect his GFR is lower than normal- will do short trial of being off lisinopril and see its effect, but will likely resume it given he is diabetic.   - repeat labs in 4-6 weeks and see effect off lisinopril.     - will check cystatin C to compare.  -US kidney- normal  - topamax- discussed small risk of kidney stones, does not have symptoms, discussed with patient    # Hypertension: mild / borderline hypertension, controlled, on lisinopril 5mg at this time  - hold for 4-6 weeks, repeat labs and likely resume.    # Electrolytes:   - Potassium; level: Normal   - low bicarbonate- suspect metabolic acidosis due to carbonic anhydrase inhibition by topamax- monitor.    # Mineral Bone Disorder:   - Calcium; level is:  Normal     Assessment and plan was discussed with patient and he voiced his understanding and  agreement.      HPI:  He is a very pleasant male (originally from [place]), who presents for followup of elevated creatinine/ CKD.  He has had diabetes since 1999 which has been well controlled. He states he feels well but was surprised and concerned when he noted CKD diagnosis recently. His Scr is up to 1.5 with eGFR <60 thus CKD stage 3 was diagnosed.  Review of labs show creatinine of 1-1.2 for many years, but is up to 1.3-1.4s in the last couple of years. His albuminuria has always been normal except for one incidence of mild elevation.  He does not have high blood pressure but was started on lisinopril a few years back for renal protection.  He denies any skin or joint issues. He does not take NSAIDs or other OTC.s  He has fatty liver and has been seen in hepatology for this. He lost 40 lbs once he was diagnosed with this but has gained back a few pounds since covid.  He is otherwise fairly healthy and denies issues.  We discussed normal kidney function, creatinine measurement and indication of kidney function, muscle mass impact, and the factors that help preserve kidney function and avoid DERRICK.  He denies family history of renal failure.     He continues to have right shoulder pain and has tried many things with some relief, he is likely going to proceed with injection.  He is interested in trying to stop lisinopril to see its effect on kidney function, so well have him hold for 4-6 weeks and repeat labs.     Renal History:   DM    ROS:   A comprehensive review of systems was obtained and negative, except as noted in the HPI or PMH.    Active Medical Problems:  Patient Active Problem List   Diagnosis     Chronic gingivitis     Esophageal reflux     Hyperlipidemia LDL goal <100     Vitamin D deficiency     Elevated LFTs     Other chronic nonalcoholic liver disease     Glaucoma suspect, bilateral     Type 2 diabetes mellitus with complication, without long-term current use of insulin (H)     Background diabetic  retinopathy, mild, ou     Chronic kidney disease, stage 3     Overweight (BMI 25.0-29.9)     Hx of obesity     Chronic pain of both shoulders     PMH:   Medical record was reviewed and PMH was discussed with patient and noted below.  Past Medical History:   Diagnosis Date     ABNORMAL LIVER FUNCTION STUDY 1/4/2008    increased ast, alt Normal since 2008     Chronic gingivitis      Esophageal reflux      Glaucoma      History of blood transfusion     My son has aplastic anemia and has had bld transfusions     Nonspecific abnormal results of liver function study      OBESITY NOS 12/13/2006     Pure hypercholesterolemia      Type II or unspecified type diabetes mellitus without mention of complication, not stated as uncontrolled      Uncomplicated asthma     My mother has asthma     PSH:   Past Surgical History:   Procedure Laterality Date     BIOPSY      My son had bone marrow biopsy     COLONOSCOPY N/A 5/22/2019    Procedure: COLONOSCOPY;  Surgeon: Leventhal, Thomas Michael, MD;  Location: [facility] OR     ENT SURGERY      My son has had tympanoplasty     NO HISTORY OF SURGERY       ORTHOPEDIC SURGERY      My mother had left hip replacement surgery       Family Hx:   Family History   Problem Relation Age of Onset     Hypertension Mother      Diabetes Sister      Diabetes Cousin      Hypertension Other      C.A.D. No family hx of      Cancer - colorectal No family hx of      Glaucoma No family hx of      Macular Degeneration No family hx of      Cancer No family hx of      Cerebrovascular Disease No family hx of      Breast Cancer No family hx of      Prostate Cancer No family hx of      Thyroid Disease No family hx of      Personal Hx:   Social History     Tobacco Use     Smoking status: Never Smoker     Smokeless tobacco: Never Used   Substance Use Topics     Alcohol use: No     Comment: none for 11/2011       Allergies:  No Known Allergies    Medications:  Current Outpatient Medications   Medication Sig     ACCU-CHEK  GUIDE test strip TEST EVERY DAY     ASPIRIN 81 MG OR TABS 1 tab po QD (Once per day)     atorvastatin (LIPITOR) 20 MG tablet TAKE 1 TABLET(20 MG) BY MOUTH DAILY     blood glucose monitoring (ACCU-CHEK FASTCLIX) lancets      Blood Glucose Monitoring Suppl (ACCU-CHEK GUIDE) w/Device KIT 1 kit daily Use to check blood sugar once daily and as needed     cholecalciferol (VITAMIN  -D) 1000 UNITS capsule Take 1 capsule by mouth daily     diclofenac (VOLTAREN) 1 % topical gel Apply 4 grams to shoulders four times daily using enclosed dosing card.     lisinopril (ZESTRIL) 5 MG tablet TAKE 1 TABLET BY MOUTH DAILY     melatonin 3 MG tablet Take 6 mg by mouth nightly as needed for sleep     metFORMIN (GLUCOPHAGE) 1000 MG tablet Take 1 tablet (1,000 mg) by mouth daily (with dinner) +++Related visit due+++     omeprazole (PRILOSEC) 20 MG DR capsule Take 20 mg by mouth daily     semaglutide (OZEMPIC) 2 MG/1.5ML pen Inject 1 mg subcutaneously once weekly.     topiramate (TOPAMAX) 25 MG tablet Take 3 tablets (75 mg) by mouth daily     No current facility-administered medications for this visit.       Vitals:  There were no vitals taken for this visit.    GENERAL: Healthy, alert and no distress  EYES: Eyes grossly normal to inspection.  No discharge or erythema, or obvious scleral/conjunctival abnormalities.  RESP: No audible wheeze, cough, or visible cyanosis.  No visible retractions or increased work of breathing.    SKIN: Visible skin clear. No significant rash, abnormal pigmentation or lesions.  NEURO: Cranial nerves grossly intact.  Mentation and speech appropriate for age.  PSYCH: Mentation appears normal, affect normal/bright, judgement and insight intact, normal speech and appearance well-groomed.    LABS:   CMP  Recent Labs   Lab Test 06/22/21  1420 02/09/21  1529 01/04/21  1235 11/12/20  1158    135 138 138   POTASSIUM 4.1 4.1 4.2 4.0   CHLORIDE 106 108 110* 110*   CO2 23 23 22 21   ANIONGAP 6 4 6 7   GLC 91 71 91 82    BUN 13 14 17 14   CR 1.47* 1.40* 1.60* 1.40*   GFRESTIMATED 52* 55* 47* 55*   GFRESTBLACK 60* 64 54* 64   SHANA 8.7 9.0 8.8 8.6     Recent Labs   Lab Test 01/04/21  1235 01/09/20  0916 08/01/19  0944 04/04/19  0902   BILITOTAL 0.4 0.3 0.5 0.5   ALKPHOS 78 75 67 66   ALT 29 27 30 35   AST 20 16 20 21     CBC  Recent Labs   Lab Test 01/04/21  1235 01/09/20  0916 09/05/19  1255 11/07/18  0939   HGB 15.8 14.8 14.3 14.5   WBC 2.9* 3.3* 3.4* 3.5*   RBC 5.40 5.11 4.90 5.03   HCT 47.8 46.6 43.4 43.9   MCV 89 91 89 87   MCH 29.3 29.0 29.2 28.8   MCHC 33.1 31.8 32.9 33.0   RDW 13.0 12.1 13.0 12.5   * 142* 134* 134*     URINE STUDIES  Recent Labs   Lab Test 02/09/21  1538   COLOR Yellow   APPEARANCE Clear   URINEGLC Negative   URINEBILI Negative   URINEKETONE Negative   SG 1.020   UBLD Negative   URINEPH 7.0   PROTEIN Negative   UROBILINOGEN 0.2   NITRITE Negative   LEUKEST Negative   RBCU O - 2   WBCU 0 - 5     No lab results found.  PTH  Recent Labs   Lab Test 11/07/18  0939   PTHI 59     IRON STUDIES  Recent Labs   Lab Test 12/08/14  1426 12/04/14  0820   LISET 411* 499*         Monique Valdo Fragoso MD

## 2021-06-23 NOTE — LETTER
June 25, 2021      William [place]  [address] [place] [address]        Dear ,    We are writing to inform you of your test results. Kidney function by cystatin is very good, normal range in fact- this is great- it means the creatinine lab underestimates your kidney function.     I would repeat labs as planned but this is reassuring and you can go back on lisinopril in a month for its benefits.         Resulted Orders   Cystatin C with GFR   Result Value Ref Range    Lab Scanned Result CYSTATIN C GFR-Scanned        If you have any questions or concerns, please call the clinic at the number listed above.       Sincerely,      Monique Valdo Fragoso MD

## 2021-06-23 NOTE — LETTER
6/23/2021       RE: William Montes  [address]  [facility] [address]     Dear Colleague,    Thank you for referring your patient, William Montes, to the [facility] [place] at [facility]. Please see a copy of my visit note below.    William is a 56 year old who is being evaluated via a billable video visit.      How would you like to obtain your AVS? MyChart  If the video visit is dropped, the invitation should be resent by: Send to e-mail at: [email or web address]  Will anyone else be joining your video visit? No    Video Start Time: 1242 pm  Video-Visit Details    Type of service:  Video Visit    Video End Time:1259 pm    Originating Location (pt. Location): Home    Distant Location (provider location):  [facility] [place]     Platform used for Video Visit: [employer]    Assessment and Plan:  56 year old male with history of diabetes mellitus since 1999, mild/ borderline hypertension, and mild retinopathy, fatty liver, overweight, 40 lbs weight loss since November 2018 who presents for followup of elevated creatinine  #Elevated creatinine/ CKD - his Scr going back many years was 1.1-1.2, but is up to 1.3-1.4 range in recent years in setting of being on lisinopril. He has 20+ year history of diabetes which is well controlled, no retinopathy and no hypertension.  - lisinopril may be elevated creatinine to some extent, but do suspect his GFR is lower than normal- will do short trial of being off lisinopril and see its effect, but will likely resume it given he is diabetic.   - repeat labs in 4-6 weeks and see effect off lisinopril.     - will check cystatin C to compare.  -US kidney- normal  - topamax- discussed small risk of kidney stones, does not have symptoms, discussed with patient    # Hypertension: mild / borderline hypertension, controlled, on lisinopril 5mg at this time  - hold for 4-6 weeks, repeat labs and  likely resume.    # Electrolytes:   - Potassium; level: Normal   - low bicarbonate- suspect metabolic acidosis due to carbonic anhydrase inhibition by topamax- monitor.    # Mineral Bone Disorder:   - Calcium; level is:  Normal     Assessment and plan was discussed with patient and he voiced his understanding and agreement.      HPI:  He is a very pleasant male (originally from [facility]), who presents for followup of elevated creatinine/ CKD.  He has had diabetes since 1999 which has been well controlled. He states he feels well but was surprised and concerned when he noted CKD diagnosis recently. His Scr is up to 1.5 with eGFR <60 thus CKD stage 3 was diagnosed.  Review of labs show creatinine of 1-1.2 for many years, but is up to 1.3-1.4s in the last couple of years. His albuminuria has always been normal except for one incidence of mild elevation.  He does not have high blood pressure but was started on lisinopril a few years back for renal protection.  He denies any skin or joint issues. He does not take NSAIDs or other OTC.s  He has fatty liver and has been seen in hepatology for this. He lost 40 lbs once he was diagnosed with this but has gained back a few pounds since covid.  He is otherwise fairly healthy and denies issues.  We discussed normal kidney function, creatinine measurement and indication of kidney function, muscle mass impact, and the factors that help preserve kidney function and avoid DERRICK.  He denies family history of renal failure.     He continues to have right shoulder pain and has tried many things with some relief, he is likely going to proceed with injection.  He is interested in trying to stop lisinopril to see its effect on kidney function, so well have him hold for 4-6 weeks and repeat labs.     Renal History:   DM    ROS:   A comprehensive review of systems was obtained and negative, except as noted in the HPI or PMH.    Active Medical Problems:  Patient Active Problem List   Diagnosis      Chronic gingivitis     Esophageal reflux     Hyperlipidemia LDL goal <100     Vitamin D deficiency     Elevated LFTs     Other chronic nonalcoholic liver disease     Glaucoma suspect, bilateral     Type 2 diabetes mellitus with complication, without long-term current use of insulin (H)     Background diabetic retinopathy, mild, ou     Chronic kidney disease, stage 3     Overweight (BMI 25.0-29.9)     Hx of obesity     Chronic pain of both shoulders     PMH:   Medical record was reviewed and PMH was discussed with patient and noted below.  Past Medical History:   Diagnosis Date     ABNORMAL LIVER FUNCTION STUDY 1/4/2008    increased ast, alt Normal since 2008     Chronic gingivitis      Esophageal reflux      Glaucoma      History of blood transfusion     My son has aplastic anemia and has had bld transfusions     Nonspecific abnormal results of liver function study      OBESITY NOS 12/13/2006     Pure hypercholesterolemia      Type II or unspecified type diabetes mellitus without mention of complication, not stated as uncontrolled      Uncomplicated asthma     My mother has asthma     PSH:   Past Surgical History:   Procedure Laterality Date     BIOPSY      My son had bone marrow biopsy     COLONOSCOPY N/A 5/22/2019    Procedure: COLONOSCOPY;  Surgeon: Leventhal, Thomas Michael, MD;  Location: [facility] OR     ENT SURGERY      My son has had tympanoplasty     NO HISTORY OF SURGERY       ORTHOPEDIC SURGERY      My mother had left hip replacement surgery       Family Hx:   Family History   Problem Relation Age of Onset     Hypertension Mother      Diabetes Sister      Diabetes Cousin      Hypertension Other      C.A.D. No family hx of      Cancer - colorectal No family hx of      Glaucoma No family hx of      Macular Degeneration No family hx of      Cancer No family hx of      Cerebrovascular Disease No family hx of      Breast Cancer No family hx of      Prostate Cancer No family hx of      Thyroid Disease No family hx  of      Personal Hx:   Social History     Tobacco Use     Smoking status: Never Smoker     Smokeless tobacco: Never Used   Substance Use Topics     Alcohol use: No     Comment: none for 11/2011       Allergies:  No Known Allergies    Medications:  Current Outpatient Medications   Medication Sig     ACCU-CHEK GUIDE test strip TEST EVERY DAY     ASPIRIN 81 MG OR TABS 1 tab po QD (Once per day)     atorvastatin (LIPITOR) 20 MG tablet TAKE 1 TABLET(20 MG) BY MOUTH DAILY     blood glucose monitoring (ACCU-CHEK FASTCLIX) lancets      Blood Glucose Monitoring Suppl (ACCU-CHEK GUIDE) w/Device KIT 1 kit daily Use to check blood sugar once daily and as needed     cholecalciferol (VITAMIN  -D) 1000 UNITS capsule Take 1 capsule by mouth daily     diclofenac (VOLTAREN) 1 % topical gel Apply 4 grams to shoulders four times daily using enclosed dosing card.     lisinopril (ZESTRIL) 5 MG tablet TAKE 1 TABLET BY MOUTH DAILY     melatonin 3 MG tablet Take 6 mg by mouth nightly as needed for sleep     metFORMIN (GLUCOPHAGE) 1000 MG tablet Take 1 tablet (1,000 mg) by mouth daily (with dinner) +++Related visit due+++     omeprazole (PRILOSEC) 20 MG DR capsule Take 20 mg by mouth daily     semaglutide (OZEMPIC) 2 MG/1.5ML pen Inject 1 mg subcutaneously once weekly.     topiramate (TOPAMAX) 25 MG tablet Take 3 tablets (75 mg) by mouth daily     No current facility-administered medications for this visit.       Vitals:  There were no vitals taken for this visit.    GENERAL: Healthy, alert and no distress  EYES: Eyes grossly normal to inspection.  No discharge or erythema, or obvious scleral/conjunctival abnormalities.  RESP: No audible wheeze, cough, or visible cyanosis.  No visible retractions or increased work of breathing.    SKIN: Visible skin clear. No significant rash, abnormal pigmentation or lesions.  NEURO: Cranial nerves grossly intact.  Mentation and speech appropriate for age.  PSYCH: Mentation appears normal, affect  normal/bright, judgement and insight intact, normal speech and appearance well-groomed.    LABS:   CMP  Recent Labs   Lab Test 06/22/21  1420 02/09/21  1529 01/04/21  1235 11/12/20  1158    135 138 138   POTASSIUM 4.1 4.1 4.2 4.0   CHLORIDE 106 108 110* 110*   CO2 23 23 22 21   ANIONGAP 6 4 6 7   GLC 91 71 91 82   BUN 13 14 17 14   CR 1.47* 1.40* 1.60* 1.40*   GFRESTIMATED 52* 55* 47* 55*   GFRESTBLACK 60* 64 54* 64   SHANA 8.7 9.0 8.8 8.6     Recent Labs   Lab Test 01/04/21  1235 01/09/20  0916 08/01/19  0944 04/04/19  0902   BILITOTAL 0.4 0.3 0.5 0.5   ALKPHOS 78 75 67 66   ALT 29 27 30 35   AST 20 16 20 21     CBC  Recent Labs   Lab Test 01/04/21  1235 01/09/20  0916 09/05/19  1255 11/07/18  0939   HGB 15.8 14.8 14.3 14.5   WBC 2.9* 3.3* 3.4* 3.5*   RBC 5.40 5.11 4.90 5.03   HCT 47.8 46.6 43.4 43.9   MCV 89 91 89 87   MCH 29.3 29.0 29.2 28.8   MCHC 33.1 31.8 32.9 33.0   RDW 13.0 12.1 13.0 12.5   * 142* 134* 134*     URINE STUDIES  Recent Labs   Lab Test 02/09/21  1538   COLOR Yellow   APPEARANCE Clear   URINEGLC Negative   URINEBILI Negative   URINEKETONE Negative   SG 1.020   UBLD Negative   URINEPH 7.0   PROTEIN Negative   UROBILINOGEN 0.2   NITRITE Negative   LEUKEST Negative   RBCU O - 2   WBCU 0 - 5     No lab results found.  PTH  Recent Labs   Lab Test 11/07/18  0939   PTHI 59     IRON STUDIES  Recent Labs   Lab Test 12/08/14  1426 12/04/14  0820   LISET 411* 499*         Monique Valdo Fragoso MD        Again, thank you for allowing me to participate in the care of your patient.      Sincerely,    Monique Fragoso MD

## 2021-06-24 LAB
DEPRECATED CALCIDIOL+CALCIFEROL SERPL-MC: 42 UG/L (ref 20–75)
LAB SCANNED RESULT: NORMAL

## 2021-06-30 PROBLEM — M25.512 CHRONIC PAIN OF BOTH SHOULDERS: Status: RESOLVED | Noted: 2021-04-22 | Resolved: 2021-06-30

## 2021-06-30 PROBLEM — M25.511 CHRONIC PAIN OF BOTH SHOULDERS: Status: RESOLVED | Noted: 2021-04-22 | Resolved: 2021-06-30

## 2021-06-30 PROBLEM — G89.29 CHRONIC PAIN OF BOTH SHOULDERS: Status: RESOLVED | Noted: 2021-04-22 | Resolved: 2021-06-30

## 2021-06-30 NOTE — PROGRESS NOTES
Discharge Note    Progress reporting period is from initial evaluation date (please see noted date below) to May 13, 2021.  Linked Episodes   Type: Episode: Status: Noted: Resolved: Last update: Updated by:   PHYSICAL THERAPY bilateral shoulder Active 4/22/2021 5/13/2021  8:11 AM Morgan Truong PT      Comments:       William failed to follow up and current status is unknown.  Please see information below for last relevant information on current status.  Patient seen for 4 visits.    SUBJECTIVE  Subjective changes noted by patient:  Patient reports improving shoulder pain. Patient states he is able to use both arms with a variety of activities with decreased pain. Still pain lying on sides at night but this is also better overall.   .  Current pain level is 0/10.     Previous pain level was  9/10.   Changes in function:  Yes (See Goal flowsheet attached for changes in current functional level)  Adverse reaction to treatment or activity: None    OBJECTIVE  Changes noted in objective findings: left shoulder AROM WNL and no pain, right shoulder AROM WNL except ABD, FL, ER = min loss - better after repeated movement in cervical spine and crossbody stretch     ASSESSMENT/PLAN  Diagnosis: bilateral shoulder pain - possible cervical derangement   Updated problem list and treatment plan:     STG/LTGs have been met or progress has been made towards goals:  Yes, please see goal flowsheet for most current information  Assessment of Progress: current status is unknown.    Last current status:     Self Management Plans:  HEP  I have re-evaluated this patient and find that the nature, scope, duration and intensity of the therapy is appropriate for the medical condition of the patient.  William continues to require the following intervention to meet STG and LTG's:  HEP.    Recommendations:  Discharge with current home program.  Patient to follow up with MD as needed.    Please refer to the daily flowsheet for treatment today,  total treatment time and time spent performing 1:1 timed codes.

## 2021-07-06 DIAGNOSIS — E66.3 OVERWEIGHT (BMI 25.0-29.9): ICD-10-CM

## 2021-07-06 DIAGNOSIS — E11.8 TYPE 2 DIABETES MELLITUS WITH COMPLICATION, WITHOUT LONG-TERM CURRENT USE OF INSULIN (H): ICD-10-CM

## 2021-07-07 RX ORDER — SEMAGLUTIDE 1.34 MG/ML
INJECTION, SOLUTION SUBCUTANEOUS
Qty: 3 ML | Refills: 3 | Status: SHIPPED | OUTPATIENT
Start: 2021-07-07 | End: 2021-11-15

## 2021-07-07 NOTE — TELEPHONE ENCOUNTER
Routing refill request to provider for review/approval because:  Labs not current:    Creatinine   Date Value Ref Range Status   06/22/2021 1.47 (H) 0.66 - 1.25 mg/dL Final     Mercy Montez RN

## 2021-07-14 DIAGNOSIS — I10 BENIGN ESSENTIAL HYPERTENSION: ICD-10-CM

## 2021-07-14 DIAGNOSIS — E11.65 TYPE 2 DIABETES MELLITUS WITH HYPERGLYCEMIA, WITHOUT LONG-TERM CURRENT USE OF INSULIN (H): ICD-10-CM

## 2021-07-14 RX ORDER — BLOOD SUGAR DIAGNOSTIC
STRIP MISCELLANEOUS
Qty: 100 STRIP | Refills: 0 | Status: SHIPPED | OUTPATIENT
Start: 2021-07-14 | End: 2021-09-27

## 2021-07-14 NOTE — TELEPHONE ENCOUNTER
Routing refill request to provider for review/approval because:  Labs out of range:  creatinine    Creatinine   Date Value Ref Range Status   06/22/2021 1.47 (H) 0.66 - 1.25 mg/dL Final              Pending Prescriptions:                       Disp   Refills    lisinopril (ZESTRIL) 5 MG tablet [Pharmacy*90 tab*         Sig: TAKE 1 TABLET BY MOUTH DAILY    Signed Prescriptions:                        Disp   Refills    ACCU-CHEK GUIDE test strip                 100 st*0        Sig: TEST DAILY  Authorizing Provider: CHAN RODAS  Ordering User: CECI CLAY RN

## 2021-07-15 RX ORDER — LISINOPRIL 5 MG/1
5 TABLET ORAL DAILY
Qty: 90 TABLET | Refills: 0 | Status: SHIPPED | OUTPATIENT
Start: 2021-07-15 | End: 2021-09-27

## 2021-07-21 DIAGNOSIS — E66.3 OVERWEIGHT (BMI 25.0-29.9): ICD-10-CM

## 2021-07-21 RX ORDER — TOPIRAMATE 25 MG/1
75 TABLET, FILM COATED ORAL DAILY
Qty: 90 TABLET | Refills: 0 | Status: SHIPPED | OUTPATIENT
Start: 2021-07-21 | End: 2021-11-15

## 2021-07-21 NOTE — TELEPHONE ENCOUNTER
Last Clinic Visit: 5/3/21 recommended 3 month follow up, no upcoming appointments scheduled.  Last CBC:  Lab Test 01/04/21  1235   WBC 2.9*   RBC 5.40   HGB 15.8   HCT 47.8   *     Lab note:  Greetings,       Wanted to make sure you have a copy of your most recent labs for your records.  Based on these labs, we are not making any new changes to your clinical plan.     It has been a pleasure to participate in your care.  Please call our clinic if you have any questions or concerns.     Thomas M. Leventhal, M.D.   of Medicine  Advanced & Transplant Hepatology  [facility]

## 2021-07-27 ENCOUNTER — LAB (OUTPATIENT)
Dept: LAB | Facility: CLINIC | Age: 56
End: 2021-07-27
Payer: COMMERCIAL

## 2021-07-27 DIAGNOSIS — R79.89 ELEVATED SERUM CREATININE: ICD-10-CM

## 2021-07-27 LAB
ALBUMIN SERPL-MCNC: 4.2 G/DL (ref 3.4–5)
ALBUMIN UR-MCNC: NEGATIVE MG/DL
ANION GAP SERPL CALCULATED.3IONS-SCNC: 8 MMOL/L (ref 3–14)
APPEARANCE UR: CLEAR
BILIRUB UR QL STRIP: NEGATIVE
BUN SERPL-MCNC: 12 MG/DL (ref 7–30)
CALCIUM SERPL-MCNC: 8.9 MG/DL (ref 8.5–10.1)
CHLORIDE BLD-SCNC: 113 MMOL/L (ref 94–109)
CO2 SERPL-SCNC: 18 MMOL/L (ref 20–32)
COLOR UR AUTO: YELLOW
CREAT SERPL-MCNC: 1.26 MG/DL (ref 0.66–1.25)
CREAT UR-MCNC: 226 MG/DL
CREAT UR-MCNC: 234 MG/DL
ERYTHROCYTE [DISTWIDTH] IN BLOOD BY AUTOMATED COUNT: 12.9 % (ref 10–15)
GFR SERPL CREATININE-BSD FRML MDRD: 62 ML/MIN/1.73M2
GLUCOSE BLD-MCNC: 90 MG/DL (ref 70–99)
GLUCOSE UR STRIP-MCNC: NEGATIVE MG/DL
HCT VFR BLD AUTO: 44.6 % (ref 40–53)
HGB BLD-MCNC: 14.9 G/DL (ref 13.3–17.7)
HGB UR QL STRIP: NEGATIVE
KETONES UR STRIP-MCNC: NEGATIVE MG/DL
LEUKOCYTE ESTERASE UR QL STRIP: NEGATIVE
MCH RBC QN AUTO: 29.3 PG (ref 26.5–33)
MCHC RBC AUTO-ENTMCNC: 33.4 G/DL (ref 31.5–36.5)
MCV RBC AUTO: 88 FL (ref 78–100)
MICROALBUMIN UR-MCNC: 9 MG/DL
MICROALBUMIN/CREAT UR: 3.85 MG/G CR (ref 0–17)
NITRATE UR QL: NEGATIVE
PH UR STRIP: 5 [PH] (ref 5–7)
PHOSPHATE SERPL-MCNC: 2.8 MG/DL (ref 2.5–4.5)
PLATELET # BLD AUTO: 159 10E3/UL (ref 150–450)
POTASSIUM BLD-SCNC: 4.2 MMOL/L (ref 3.4–5.3)
PROT UR-MCNC: 0.12 G/L
PROT/CREAT 24H UR: 0.05 G/G CR (ref 0–0.2)
RBC # BLD AUTO: 5.08 10E6/UL (ref 4.4–5.9)
RBC #/AREA URNS AUTO: NORMAL /HPF
SODIUM SERPL-SCNC: 139 MMOL/L (ref 133–144)
SP GR UR STRIP: 1.02 (ref 1–1.03)
UROBILINOGEN UR STRIP-ACNC: 0.2 E.U./DL
WBC # BLD AUTO: 3.4 10E3/UL (ref 4–11)
WBC #/AREA URNS AUTO: NORMAL /HPF

## 2021-07-27 PROCEDURE — 36415 COLL VENOUS BLD VENIPUNCTURE: CPT

## 2021-07-27 PROCEDURE — 81001 URINALYSIS AUTO W/SCOPE: CPT

## 2021-07-27 PROCEDURE — 84156 ASSAY OF PROTEIN URINE: CPT

## 2021-07-27 PROCEDURE — 85027 COMPLETE CBC AUTOMATED: CPT

## 2021-07-27 PROCEDURE — 82043 UR ALBUMIN QUANTITATIVE: CPT

## 2021-07-27 PROCEDURE — 80069 RENAL FUNCTION PANEL: CPT

## 2021-08-20 ENCOUNTER — OFFICE VISIT (OUTPATIENT)
Dept: FAMILY MEDICINE | Facility: CLINIC | Age: 56
End: 2021-08-20
Payer: COMMERCIAL

## 2021-08-20 VITALS
BODY MASS INDEX: 28.53 KG/M2 | HEIGHT: 72 IN | WEIGHT: 210.6 LBS | HEART RATE: 60 BPM | TEMPERATURE: 98.5 F | OXYGEN SATURATION: 99 % | RESPIRATION RATE: 16 BRPM | SYSTOLIC BLOOD PRESSURE: 124 MMHG | DIASTOLIC BLOOD PRESSURE: 74 MMHG

## 2021-08-20 DIAGNOSIS — N40.0 ENLARGED PROSTATE WITHOUT LOWER URINARY TRACT SYMPTOMS (LUTS): ICD-10-CM

## 2021-08-20 DIAGNOSIS — E11.22 TYPE 2 DIABETES MELLITUS WITH STAGE 3 CHRONIC KIDNEY DISEASE, WITHOUT LONG-TERM CURRENT USE OF INSULIN, UNSPECIFIED WHETHER STAGE 3A OR 3B CKD (H): Primary | ICD-10-CM

## 2021-08-20 DIAGNOSIS — Z12.5 SCREENING FOR PROSTATE CANCER: ICD-10-CM

## 2021-08-20 DIAGNOSIS — N18.30 STAGE 3 CHRONIC KIDNEY DISEASE, UNSPECIFIED WHETHER STAGE 3A OR 3B CKD (H): ICD-10-CM

## 2021-08-20 DIAGNOSIS — E78.5 HYPERLIPIDEMIA LDL GOAL <100: ICD-10-CM

## 2021-08-20 DIAGNOSIS — M70.61 TROCHANTERIC BURSITIS OF RIGHT HIP: ICD-10-CM

## 2021-08-20 DIAGNOSIS — Z71.84 TRAVEL ADVICE ENCOUNTER: ICD-10-CM

## 2021-08-20 DIAGNOSIS — K76.0 FATTY LIVER: ICD-10-CM

## 2021-08-20 DIAGNOSIS — L81.9 HYPERPIGMENTATION: ICD-10-CM

## 2021-08-20 DIAGNOSIS — Z11.4 SCREENING FOR HIV (HUMAN IMMUNODEFICIENCY VIRUS): ICD-10-CM

## 2021-08-20 DIAGNOSIS — R79.89 ELEVATED LFTS: ICD-10-CM

## 2021-08-20 DIAGNOSIS — N18.30 TYPE 2 DIABETES MELLITUS WITH STAGE 3 CHRONIC KIDNEY DISEASE, WITHOUT LONG-TERM CURRENT USE OF INSULIN, UNSPECIFIED WHETHER STAGE 3A OR 3B CKD (H): Primary | ICD-10-CM

## 2021-08-20 LAB — HBA1C MFR BLD: 5.7 % (ref 0–5.6)

## 2021-08-20 PROCEDURE — 99215 OFFICE O/P EST HI 40 MIN: CPT | Mod: 25 | Performed by: FAMILY MEDICINE

## 2021-08-20 PROCEDURE — G0103 PSA SCREENING: HCPCS | Performed by: FAMILY MEDICINE

## 2021-08-20 PROCEDURE — 90750 HZV VACC RECOMBINANT IM: CPT | Performed by: FAMILY MEDICINE

## 2021-08-20 PROCEDURE — 83036 HEMOGLOBIN GLYCOSYLATED A1C: CPT | Performed by: FAMILY MEDICINE

## 2021-08-20 PROCEDURE — 90471 IMMUNIZATION ADMIN: CPT | Performed by: FAMILY MEDICINE

## 2021-08-20 PROCEDURE — 36415 COLL VENOUS BLD VENIPUNCTURE: CPT | Performed by: FAMILY MEDICINE

## 2021-08-20 PROCEDURE — 80053 COMPREHEN METABOLIC PANEL: CPT | Performed by: FAMILY MEDICINE

## 2021-08-20 PROCEDURE — 87389 HIV-1 AG W/HIV-1&-2 AB AG IA: CPT | Performed by: FAMILY MEDICINE

## 2021-08-20 ASSESSMENT — MIFFLIN-ST. JEOR: SCORE: 1813.28

## 2021-08-20 ASSESSMENT — PAIN SCALES - GENERAL: PAINLEVEL: NO PAIN (0)

## 2021-08-20 NOTE — PROGRESS NOTES
Assessment & Plan     Type 2 diabetes mellitus with complication, without long-term current use of insulin (H)  The patient's diabetes is well controlled.  I let him know he does not have to test his blood sugar every day if he does not want to.  He is interested in continuous glucose monitoring.  I am not sure if this would be covered as he has good diabetic control but I have placed a referral for him to talk to the diabetic educator      - Comprehensive metabolic panel (BMP + Alb, Alk Phos, ALT, AST, Total. Bili, TP); Future  - [facility] Adult Diabetes Educator Referral; Future  - Hemoglobin A1c; Future    Trochanteric bursitis of right hip  This is much improved.  I have given him the figure-of-four stretch to prevent recurrence    Hyperpigmentation  Likely related to some local trauma such as dry skin.  I have suggested he use a good moisturizer    Enlarged prostate without lower urinary tract symptoms (luts)  We will check a PSA today.  He denies symptoms so will not start medication for this    Fatty liver  The patient has lost 40 pounds with his may be improved.  We will do labs  - Comprehensive metabolic panel (BMP + Alb, Alk Phos, ALT, AST, Total. Bili, TP); Future    Hyperlipidemia LDL goal <100  The current medical regimen is effective;  continue present plan and medications.      Stage 3 chronic kidney disease, unspecified whether stage 3a or 3b CKD  The current medical regimen is effective;  continue present plan and medications.  He is seen nephrology for this  - Comprehensive metabolic panel (BMP + Alb, Alk Phos, ALT, AST, Total. Bili, TP); Future    Travel advice encounter    - Travel Clinic Referral; Future    Elevated LFTs  From fatty liver infiltrate  - Comprehensive metabolic panel (BMP + Alb, Alk Phos, ALT, AST, Total. Bili, TP); Future    Screening for prostate cancer    - PSA, screen; Future    Screening for HIV (human immunodeficiency virus)    - HIV Antigen Antibody Combo; Future    40  minutes spent on the date of the encounter doing chart review, review of test results, interpretation of tests, patient visit and documentation        BMI:   Estimated body mass index is 28.56 kg/m  as calculated from the following:    Height as of this encounter: 1.829 m (6').    Weight as of this encounter: 95.5 kg (210 lb 9.6 oz).         Return in about 6 months (around 2/20/2022) for diabetes.    Sirena Talley DO  [facility]    Mary Thomas is a 56 year old who presents for the following health issues     HPI     --Go over renal ultrasound results that was done in Feb 2021.  He is urinating at night 2-3 times but he thinks he drinks too much water at night.  He has some hesitancy at times.  He denies double voiding.      US RENAL COMPLETE   2/5/2021 11:06 AM      HISTORY: Elevated serum creatinine. Type 2 diabetes.     COMPARISON: None.     FINDINGS: He is     Right kidney measures 11.4 x 5.6 x 5.2 cm. Cortical thickness measures  0.9 cm AP. There is no hydronephrosis. No renal calculi or solid renal  masses are evident.      Left kidney measures 11.3 x 4.6 x 4.9 cm. Cortical thickness measures  1.0 cm AP. There is no hydronephrosis. No renal calculi or solid renal  masses are evident.      Limited images of the bladder are unremarkable. The prostate is  enlarged, measuring 3.6 x 3.6 x 4.5 cm.     Incidentally noted diffuse fatty infiltration of the liver.                                                                      IMPRESSION:   1. Unremarkable renal ultrasound examination. No hydronephrosis.  2. Prostatic enlargement.  3. Incidentally noted diffuse fatty infiltration of the liver.      --  Clarification on Metformin Rx- patient has been without refills x 1 week due to no refills because he has been taking it twice daily- didn't know it was only suppose to be once daily.   He says it has always been bid.      -- Wondering about continuous glucometer?     -- Traveling in  Oct and wondering about medication or referral to Travel clinic    He is complaining of pain in the outside of his right hip. It improved with walking.  It hurts to lay on that side.  The pain radiates down to the knee on the lateral side.        Skin Lesion  Onset/Duration: 6-8 weeks  Description  Location: back  Color: brown and black  Border description: flat, itchy  Character: irregular  Itching: mild  Bleeding:  no  Intensity:  mild  Progression of Symptoms:  same  Accompanying signs and symptoms:   Bleeding: no  Scaling: no  Excessive sun exposure/tanning: no  Sunscreen used: no  History:           Any previous history of skin cancer: no  Any family history of melanoma: no  Previous episodes of similar lesion: no  Precipitating or alleviating factors: None  Therapies tried and outcome: hydrocortisone cream -  usually effective    Diabetes Follow-up    How often are you checking your blood sugar? One time daily  What time of day are you checking your blood sugars (select all that apply)?  Before meals  Have you had any blood sugars above 200?  No  Have you had any blood sugars below 70?  No    What symptoms do you notice when your blood sugar is low?  None    What concerns do you have today about your diabetes? None     Do you have any of these symptoms? (Select all that apply)  No numbness or tingling in feet.  No redness, sores or blisters on feet.  No complaints of excessive thirst.  No reports of blurry vision.  No significant changes to weight.   Lab Results   Component Value Date    A1C 5.6 06/22/2021    A1C 5.8 11/12/2020    A1C 5.6 07/23/2020    A1C 6.1 03/09/2020    A1C 6.5 11/11/2019           Hyperlipidemia Follow-Up      Are you regularly taking any medication or supplement to lower your cholesterol?   Yes- atorvastatin    Are you having muscle aches or other side effects that you think could be caused by your cholesterol lowering medication?  No    Hypertension Follow-up      Do you check your  blood pressure regularly outside of the clinic? Yes     Are you following a low salt diet? Yes    Are your blood pressures ever more than 140 on the top number (systolic) OR more   than 90 on the bottom number (diastolic), for example 140/90? No    BP Readings from Last 2 Encounters:   08/20/21 124/74   11/12/20 129/80     Hemoglobin A1C (%)   Date Value   06/22/2021 5.6   11/12/2020 5.8 (H)     LDL Cholesterol Calculated (mg/dL)   Date Value   11/12/2020 66   04/04/2019 60         How many servings of fruits and vegetables do you eat daily?  0-1    On average, how many sweetened beverages do you drink each day (Examples: soda, juice, sweet tea, etc.  Do NOT count diet or artificially sweetened beverages)?   0    How many days per week do you exercise enough to make your heart beat faster? 5    How many minutes a day do you exercise enough to make your heart beat faster? 60 or more    How many days per week do you miss taking your medication? 0      Review of Systems   Constitutional, HEENT, cardiovascular, pulmonary, gi and gu systems are negative, except as otherwise noted.      Objective    /74 (BP Location: Right arm, Patient Position: Sitting, Cuff Size: Adult Large)   Pulse 60   Temp 98.5  F (36.9  C) (Oral)   Resp 16   Ht 1.829 m (6')   Wt 95.5 kg (210 lb 9.6 oz)   SpO2 99%   BMI 28.56 kg/m    Body mass index is 28.56 kg/m .  Physical Exam   GENERAL: healthy, alert and no distress  NECK: no adenopathy, no asymmetry, masses, or scars and thyroid normal to palpation  RESP: lungs clear to auscultation - no rales, rhonchi or wheezes  CV: regular rate and rhythm, normal S1 S2, no S3 or S4, no murmur, click or rub, no peripheral edema and peripheral pulses strong  MS: Normal hip exam except tenderness over the right greater trochanteric prominence   SKIN: no suspicious lesions or rashes and hyperpigmentation over the base of the sacrum and center of his line in the l upper lumbars  PSYCH: mentation  appears normal, affect normal/bright

## 2021-08-21 LAB
ALBUMIN SERPL-MCNC: 4.2 G/DL (ref 3.4–5)
ALP SERPL-CCNC: 65 U/L (ref 40–150)
ALT SERPL W P-5'-P-CCNC: 38 U/L (ref 0–70)
ANION GAP SERPL CALCULATED.3IONS-SCNC: 9 MMOL/L (ref 3–14)
AST SERPL W P-5'-P-CCNC: 75 U/L (ref 0–45)
BILIRUB SERPL-MCNC: 0.4 MG/DL (ref 0.2–1.3)
BUN SERPL-MCNC: 12 MG/DL (ref 7–30)
CALCIUM SERPL-MCNC: 9 MG/DL (ref 8.5–10.1)
CHLORIDE BLD-SCNC: 112 MMOL/L (ref 94–109)
CO2 SERPL-SCNC: 18 MMOL/L (ref 20–32)
CREAT SERPL-MCNC: 1.37 MG/DL (ref 0.66–1.25)
GFR SERPL CREATININE-BSD FRML MDRD: 56 ML/MIN/1.73M2
GLUCOSE BLD-MCNC: 86 MG/DL (ref 70–99)
POTASSIUM BLD-SCNC: 4.1 MMOL/L (ref 3.4–5.3)
PROT SERPL-MCNC: 7.2 G/DL (ref 6.8–8.8)
PSA SERPL-MCNC: 0.66 UG/L (ref 0–4)
SODIUM SERPL-SCNC: 139 MMOL/L (ref 133–144)

## 2021-08-23 LAB — HIV 1+2 AB+HIV1 P24 AG SERPL QL IA: NONREACTIVE

## 2021-09-05 ENCOUNTER — HEALTH MAINTENANCE LETTER (OUTPATIENT)
Age: 56
End: 2021-09-05

## 2021-09-07 NOTE — PROGRESS NOTES
Nurse Note      Itinerary:  [facility]      Departure Date: 10/2/21      Return Date: 11/2/21      Length of Trip: 1 month      Reason for Travel: Visiting friends and relatives           Urban or rural: both      Accommodations: Family home        IMMUNIZATION HISTORY  Have you received any immunizations within the past 4 weeks?  Yes  Have you ever fainted from having your blood drawn or from an injection?  No  Have you ever had a fever reaction to vaccination?  Yes  Have you ever had any bad reaction or side effect from any vaccination?  No  Have you ever had hepatitis A or B vaccine?  Yes  Do you live (or work closely) with anyone who has AIDS, an AIDS-like condition, any other immune disorder or who is on chemotherapy for cancer?  No  Do you have a family history of immunodeficiency?  No  Have you received any injection of immune globulin or any blood products during the past 12 months?  No    Patient roomed by MARIN Marino  William SALDANA Jyoti is a 56 year old male seen today alone for counsultation for international travel.   Patient will be departing in  3 week(s) and  traveling alone.      Patient itinerary :  [place] of [facility] and [facility] which risk for Malaria, Yellow Fever, food borne illnesses, motor vehicle accidents, Typhoid and Cvoid 19. exposure.      Patient's activities will include visiting friends and relatives.    Patient's country of birth is [facility]    Special medical concerns: Diabetes Type 2  Pre-travel questionnaire was completed by patient and reviewed by provider.     Vitals: There were no vitals taken for this visit.  BMI= There is no height or weight on file to calculate BMI.    EXAM:  General:  Well-nourished, well-developed in no acute distress.  Appears to be stated age, interacts appropriately and expresses understanding of information given to patient.    Current Outpatient Medications   Medication Sig Dispense Refill     ACCU-CHEK GUIDE  test strip TEST DAILY 100 strip 0     ASPIRIN 81 MG OR TABS 1 tab po QD (Once per day) 100 3     atorvastatin (LIPITOR) 20 MG tablet TAKE 1 TABLET(20 MG) BY MOUTH DAILY 90 tablet 3     blood glucose monitoring (ACCU-CHEK FASTCLIX) lancets        Blood Glucose Monitoring Suppl (ACCU-CHEK GUIDE) w/Device KIT 1 kit daily Use to check blood sugar once daily and as needed 1 kit 0     cholecalciferol (VITAMIN  -D) 1000 UNITS capsule Take 1 capsule by mouth daily       diclofenac (VOLTAREN) 1 % topical gel Apply 4 grams to shoulders four times daily using enclosed dosing card. 100 g 1     lisinopril (ZESTRIL) 5 MG tablet Take 1 tablet (5 mg) by mouth daily TAKE 1 TABLET BY MOUTH DAILY 90 tablet 0     melatonin 3 MG tablet Take 6 mg by mouth nightly as needed for sleep       metFORMIN (GLUCOPHAGE) 1000 MG tablet Take 1 tablet (1,000 mg) by mouth daily (with dinner) 90 tablet 1     omeprazole (PRILOSEC) 20 MG DR capsule Take 20 mg by mouth daily as needed       OZEMPIC, 1 MG/DOSE, 4 MG/3ML SOPN INJECT 1 MG SUBCUTANEOUSLY ONCE WEEKLY 3 mL 3     topiramate (TOPAMAX) 25 MG tablet Take 3 tablets (75 mg) by mouth daily For additional refills, please schedule a follow-up appointment at [phone] 90 tablet 0     Patient Active Problem List   Diagnosis     Chronic gingivitis     Esophageal reflux     Hyperlipidemia LDL goal <100     Vitamin D deficiency     Elevated LFTs     Other chronic nonalcoholic liver disease     Glaucoma suspect, bilateral     Type 2 diabetes mellitus with complication, without long-term current use of insulin (H)     Background diabetic retinopathy, mild, ou     Chronic kidney disease, stage 3     Overweight (BMI 25.0-29.9)     Hx of obesity     No Known Allergies      Immunizations discussed include:   Hepatitis A:  Up to date  Hepatitis B: Immune  Influenza: vaccine is not available  Typhoid: Ordered/given today, risks, benefits and side effects reviewed  Rabies: Declined  reviewed managment of a animal  bite or scratch (washing wound, seek medical care within 24 hours for post exposure prophylaxis )  Yellow Fever: Up to date : updated card today  Finnish Encephalitis: Not indicated  Meningococcus: Not indicated  Tetanus/Diphtheria: Ordered/given today, risks, benefits and side effects reviewed  Measles/Mumps/Rubella: Titers drawn  Cholera: Not needed  Polio: Not indicated  Pneumococcal: Up to date  Varicella: Immune by disease history per patient report  Zostavax:  Not indicated  Shingrix: Up to date  HPV:  Not indicated  TB:  deferred      ASSESSMENT/PLAN:  William was seen today for travel clinic.    Diagnoses and all orders for this visit:    Travel advice encounter  -     atovaquone-proguanil (MALARONE) 250-100 MG tablet; Take 1 tablet by mouth daily Start 2 days before exposure to Malaria and continue daily till  7 days after exposure.  -     azithromycin (ZITHROMAX) 500 MG tablet; Take 1 tablet (500 mg) by mouth daily for 3 doses Take 1 tablet a day for up to 3 days for severe diarrhea    Immunity status testing  -     Rubella Antibody IgG; Future  -     Mumps Immune Status, IgG  -     Rubeola Antibody IgG; Future  -     Rubella Antibody IgG  -     Rubeola Antibody IgG    Encounter for laboratory testing for COVID-19 virus  -     Asymptomatic COVID-19 Virus (Coronavirus) by PCR Nose; Future    Other orders  -     TYPHOID VACCINE, IM  -     TDAP VACCINE (Adacel, Boostrix)  [[id number]]      I have reviewed general recommendations for safe travel   including: food/water precautions, insect precautions, safer sex   practices given high prevalence of Zika, HIV and other STDs,   roadway safety. Educational materials and Travax report provided.    Malaraia prophylaxis recommended: Malarone  Symptomatic treatment for traveler's diarrhea: azithromycin    Covid 19 PCR test ordered.  Instructions for scheduling and resulting through My Chart given to patient.     Personal protective measures reviewed including hand  sanitizing and contact precautions for the prevention of viral illnesses. Cover coughs and masking  during travel and upon return.  Current COVID 19 pandemic.   Monitor / follow current CDC guidelines.    Country specific and CDC Covid 19  testing requirements and resources given to patient.      Evacuation insurance advised and resources were provided to patient.    Total visit time 30 minutes  with over 50% of time spent counseling patient as detailed above.    Katt Dolan CNP

## 2021-09-08 ENCOUNTER — OFFICE VISIT (OUTPATIENT)
Dept: FAMILY MEDICINE | Facility: CLINIC | Age: 56
End: 2021-09-08
Payer: COMMERCIAL

## 2021-09-08 VITALS
SYSTOLIC BLOOD PRESSURE: 121 MMHG | OXYGEN SATURATION: 98 % | TEMPERATURE: 97.7 F | DIASTOLIC BLOOD PRESSURE: 80 MMHG | HEART RATE: 64 BPM | BODY MASS INDEX: 28.56 KG/M2 | HEIGHT: 72 IN

## 2021-09-08 DIAGNOSIS — Z01.84 IMMUNITY STATUS TESTING: ICD-10-CM

## 2021-09-08 DIAGNOSIS — Z20.822 ENCOUNTER FOR LABORATORY TESTING FOR COVID-19 VIRUS: ICD-10-CM

## 2021-09-08 DIAGNOSIS — Z71.84 TRAVEL ADVICE ENCOUNTER: Primary | ICD-10-CM

## 2021-09-08 PROCEDURE — 99402 PREV MED CNSL INDIV APPRX 30: CPT | Mod: 25 | Performed by: NURSE PRACTITIONER

## 2021-09-08 PROCEDURE — 90691 TYPHOID VACCINE IM: CPT | Mod: GA | Performed by: NURSE PRACTITIONER

## 2021-09-08 PROCEDURE — 90472 IMMUNIZATION ADMIN EACH ADD: CPT | Mod: GA | Performed by: NURSE PRACTITIONER

## 2021-09-08 PROCEDURE — 86765 RUBEOLA ANTIBODY: CPT | Mod: GA | Performed by: NURSE PRACTITIONER

## 2021-09-08 PROCEDURE — 86762 RUBELLA ANTIBODY: CPT | Mod: GA | Performed by: NURSE PRACTITIONER

## 2021-09-08 PROCEDURE — 90471 IMMUNIZATION ADMIN: CPT | Mod: GA | Performed by: NURSE PRACTITIONER

## 2021-09-08 PROCEDURE — 36415 COLL VENOUS BLD VENIPUNCTURE: CPT | Mod: GA | Performed by: NURSE PRACTITIONER

## 2021-09-08 PROCEDURE — 90715 TDAP VACCINE 7 YRS/> IM: CPT | Mod: GA | Performed by: NURSE PRACTITIONER

## 2021-09-08 PROCEDURE — 86735 MUMPS ANTIBODY: CPT | Mod: GA | Performed by: NURSE PRACTITIONER

## 2021-09-08 RX ORDER — ATOVAQUONE AND PROGUANIL HYDROCHLORIDE 250; 100 MG/1; MG/1
1 TABLET, FILM COATED ORAL DAILY
Qty: 47 TABLET | Refills: 0 | Status: SHIPPED | OUTPATIENT
Start: 2021-09-08 | End: 2021-12-13

## 2021-09-08 RX ORDER — AZITHROMYCIN 500 MG/1
500 TABLET, FILM COATED ORAL DAILY
Qty: 3 TABLET | Refills: 0 | Status: SHIPPED | OUTPATIENT
Start: 2021-09-08 | End: 2022-08-19

## 2021-09-08 NOTE — NURSING NOTE
Prior to immunization administration, verified patients identity using patient s name and date of birth. Please see Immunization Activity for additional information.     Screening Questionnaire for Adult Immunization    Are you sick today?   No   Do you have allergies to medications, food, a vaccine component or latex?   No   Have you ever had a serious reaction after receiving a vaccination?   No   Do you have a long-term health problem with heart, lung, kidney, or metabolic disease (e.g., diabetes), asthma, a blood disorder, no spleen, complement component deficiency, a cochlear implant, or a spinal fluid leak?  Are you on long-term aspirin therapy?   No   Do you have cancer, leukemia, HIV/AIDS, or any other immune system problem?   No   Do you have a parent, brother, or sister with an immune system problem?   No   In the past 3 months, have you taken medications that affect  your immune system, such as prednisone, other steroids, or anticancer drugs; drugs for the treatment of rheumatoid arthritis, Crohn s disease, or psoriasis; or have you had radiation treatments?   No   Have you had a seizure, or a brain or other nervous system problem?   No   During the past year, have you received a transfusion of blood or blood    products, or been given immune (gamma) globulin or antiviral drug?   No   For women: Are you pregnant or is there a chance you could become       pregnant during the next month?   No   Have you received any vaccinations in the past 4 weeks?   No     Immunization questionnaire answers were all negative.        Per orders of LUKASZ Abraham CNP, injection of Typhim Vi, Adacel given by Jamila Mckeon MA. Patient instructed to remain in clinic for 15 minutes afterwards, and to report any adverse reaction to me immediately.       Screening performed by Jamila Mckeon MA on 9/8/2021 at 9:53 AM.  Jamila Mckeon MA on 9/8/2021 at 9:53 AM

## 2021-09-08 NOTE — PATIENT INSTRUCTIONS
Thank you for visiting the [facility] : [phone]  Today September 8, 2021 you received the    Tetanus (Tdap) Vaccine    Typhoid - injectable. This vaccine is valid for two years.       Follow up vaccine appointments can be made as a NURSE ONLY visit at the Travel Clinic, (BE PREPARED TO WAIT, ) or at designated [facility].    If you are receiving the Rabies vaccines series, it is important that you follow the exact schedule ordered.     Pre-travel     We recommend that you purchase Trip Evacuation Insurance prior to your departure.  https://wwwnc.cdc.gov/travel/page/insurance    Post-travel  contact your provider if you develop a fever, rash, cough, diarrhea or other symptoms.  Inform your healthcare provider when and where you traveled to.    Animal Exposure: Avoid all mammals even if they look healthy.  If there is a bite, scratch or even a lick, wash area immediately with soap and water for 15 minutes and seek medical care within 24 hours for evaluation of Rabies post exposure treatment.  Contact your Medical Evacuation Insurance.    COVID 19 (Sars Cov2) prevention strategies  Physical distancing: Maintain 6 foot (2m) from others.              Avoid large gatherings and public transportation.   Avoid indoor shopping malls, theaters and restaurants   Practice consistent mask wearing covering the nose, mouth and underneath the chin when unable to maintain 6 foot distance from others.  Hand washing: frequent, thorough handwashing with soap and water for 20 seconds (or using a hand  containing 60% alcohol)   Avoid touching face, nose, eyes, mouth unless you have done appropriate hand washing as above.   Clean high touch surfaces with approved disinfectant against Covid 19  (70% Ethanol ) or a bleach solution (add 20 mL (4 teaspoons) of bleach to 1 L (1 quart) of water;)  Be careful not to breath or touch bleach.      Travel Covid 19 Testing:  updated  05/01/2021  International travelers: Pre-travel: diagnostic testing (antigen or PCR) as required for each country for entry:  See the Embassy websites or airline websites.    [place] Requirements: All air passengers coming to the [place], including [place] citizens, are required to have a negative COVID-19 test result (within 3 calendar days) even if vaccinated or documentation of recovery from COVID-19 before they board a flight to the [place].    Post travel: CDC recommends getting tested 3-5 days after your trip AND stay home and self-quarantine for 7 days. Even if you test negative, stay home and self-quarantine for the full 7 days. If you don t get tested, stay home and self-quarantine for 10 days.    COVID-19 testing for pre-travel through [facility]  [phone] (Must have an order)    Please call the [facility] with any questions [phone]  Or send your provider a 'My Chart' note.         Pre-Arrival to [facility]    Before arrival, exempt and non-exempt travelers must complete a Health Screening Arrival Form via the [employer] Travel application ( [employer] travel panfilo ):  Android link: [email or web address]  Informaat link: [email or web address]  2. Travelers must pay US$75 for the cost Of COVID Testing. It is highly recommended that travelers complete the payment process prior to arrival. (Anyone experiencing problems with the panfilo will receive assistance at the airport.)    Pre departure testing in [facility]  COVID-19 testing is available at the [facility] in [facility] from 9 a.m. to 4 p.m., seven days per week.  Testing for travelers costs $75 USD; testing is free of charge for non-travelers.

## 2021-09-09 LAB
MEV IGG SER IA-ACNC: >300 AU/ML
MEV IGG SER IA-ACNC: POSITIVE
MUMPS ANTIBODY IGG INSTRUMENT VALUE: >300 AU/ML
MUV IGG SER QL IA: POSITIVE
RUBV IGG SERPL QL IA: >33 INDEX
RUBV IGG SERPL QL IA: POSITIVE

## 2021-09-22 ENCOUNTER — OFFICE VISIT (OUTPATIENT)
Dept: OPHTHALMOLOGY | Facility: CLINIC | Age: 56
End: 2021-09-22
Payer: COMMERCIAL

## 2021-09-22 DIAGNOSIS — H40.003 GLAUCOMA SUSPECT, BILATERAL: ICD-10-CM

## 2021-09-22 DIAGNOSIS — E11.3299 BACKGROUND DIABETIC RETINOPATHY (H): ICD-10-CM

## 2021-09-22 DIAGNOSIS — Z01.01 ENCOUNTER FOR EXAMINATION OF EYES AND VISION WITH ABNORMAL FINDINGS: Primary | ICD-10-CM

## 2021-09-22 DIAGNOSIS — H52.4 PRESBYOPIA: ICD-10-CM

## 2021-09-22 PROCEDURE — 92014 COMPRE OPH EXAM EST PT 1/>: CPT | Performed by: OPHTHALMOLOGY

## 2021-09-22 PROCEDURE — 92015 DETERMINE REFRACTIVE STATE: CPT | Performed by: OPHTHALMOLOGY

## 2021-09-22 ASSESSMENT — REFRACTION_WEARINGRX
OD_SPHERE: +2.00
OS_AXIS: 037
OD_ADD: +2.50
OS_ADD: +2.50
OD_CYLINDER: SPHERE
OS_SPHERE: +1.75
OS_CYLINDER: +0.50

## 2021-09-22 ASSESSMENT — EXTERNAL EXAM - LEFT EYE: OS_EXAM: NORMAL

## 2021-09-22 ASSESSMENT — EXTERNAL EXAM - RIGHT EYE: OD_EXAM: NORMAL

## 2021-09-22 ASSESSMENT — CUP TO DISC RATIO
OS_RATIO: 0.7
OD_RATIO: 0.7

## 2021-09-22 ASSESSMENT — REFRACTION_MANIFEST
OD_SPHERE: +1.75
OS_CYLINDER: SPHERE
OS_SPHERE: +1.75
OD_ADD: +2.50
OD_CYLINDER: SPHERE
OS_ADD: +2.50

## 2021-09-22 ASSESSMENT — TONOMETRY
OS_IOP_MMHG: 14
IOP_METHOD: APPLANATION
OD_IOP_MMHG: 14

## 2021-09-22 ASSESSMENT — VISUAL ACUITY
OD_CC: 20/20
OS_CC: 20/20
METHOD: SNELLEN - LINEAR
CORRECTION_TYPE: GLASSES

## 2021-09-22 ASSESSMENT — CONF VISUAL FIELD
OD_NORMAL: 1
METHOD: COUNTING FINGERS
OS_NORMAL: 1

## 2021-09-22 ASSESSMENT — SLIT LAMP EXAM - LIDS
COMMENTS: NORMAL
COMMENTS: NORMAL

## 2021-09-22 NOTE — PROGRESS NOTES
Current Eye Medications:  None     Subjective:  Complete eye exam. Vision is doing fine both eyes distance and near. No eye pain or discomfort in either eye.   Patient is very tired today.     Lab Results   Component Value Date    A1C 5.7 08/20/2021    A1C 5.6 06/22/2021    A1C 5.8 11/12/2020    A1C 5.6 07/23/2020    A1C 6.1 03/09/2020    A1C 6.5 11/11/2019         Objective:  See Ophthalmology Exam.       Assessment:  Stable mild background diabetic retinopathy both eyes.      ICD-10-CM    1. Encounter for examination of eyes and vision with abnormal findings  Z01.01 EYE EXAM (SIMPLE-NONBILLABLE)   2. Presbyopia  H52.4 REFRACTION   3. Background diabetic retinopathy, mild, ou  E11.3299    4. Glaucoma suspect, bilateral  H40.003         Plan:  Glasses Rx given - optional   Continue observation with regard to glaucoma suspect status.   Return visit 6 months for intraocular pressure check, glaucoma OCT, retinal OCT and Bhandari Visual Field.  Harry Hodgson M.D.  [phone]

## 2021-09-22 NOTE — LETTER
9/22/2021         RE: William Montes  [address]  [place] [address]        Dear Colleague,    Thank you for referring your patient, William Montes, to the [facility]. Please see a copy of my visit note below.     Current Eye Medications:  None     Subjective:  Complete eye exam. Vision is doing fine both eyes distance and near. No eye pain or discomfort in either eye.   Patient is very tired today.     Lab Results   Component Value Date    A1C 5.7 08/20/2021    A1C 5.6 06/22/2021    A1C 5.8 11/12/2020    A1C 5.6 07/23/2020    A1C 6.1 03/09/2020    A1C 6.5 11/11/2019         Objective:  See Ophthalmology Exam.       Assessment:  Stable mild background diabetic retinopathy both eyes.      ICD-10-CM    1. Encounter for examination of eyes and vision with abnormal findings  Z01.01 EYE EXAM (SIMPLE-NONBILLABLE)   2. Presbyopia  H52.4 REFRACTION   3. Background diabetic retinopathy, mild, ou  E11.3299    4. Glaucoma suspect, bilateral  H40.003         Plan:  Glasses Rx given - optional   Continue observation with regard to glaucoma suspect status.   Return visit 6 months for intraocular pressure check, glaucoma OCT, retinal OCT and Bhandari Visual Field.  Harry Hodgson M.D.  [phone]            Again, thank you for allowing me to participate in the care of your patient.        Sincerely,        Harry Hodgson MD

## 2021-09-22 NOTE — PATIENT INSTRUCTIONS
Glasses Rx given - optional   Continue observation with regard to glaucoma suspect status.   Return visit 6 months for intraocular pressure check, glaucoma OCT, retinal OCT and Bhandari Visual Field.  Harry Hodgson M.D.  [phone]      Patient Education   Diabetes weakens the blood vessels all over the body, including the eyes. Damage to the blood vessels in the eyes can cause swelling or bleeding into part of the eye (called the retina). This is called diabetic retinopathy (GERALDINE-tin--[facility]-thee). If not treated, this disease can cause vision loss or blindness.   Symptoms may include blurred or distorted vision, but many people have no symptoms. It's important to see your eye doctor regularly to check for problems.   Early treatment and good control can help protect your vision. Here are the things you can do to help prevent vision loss:      1. Keep your blood sugar levels under tight control.      2. Bring high blood pressure under control.      3. No smoking.      4. Have yearly dilated eye exams.

## 2021-09-25 DIAGNOSIS — E11.65 TYPE 2 DIABETES MELLITUS WITH HYPERGLYCEMIA, WITHOUT LONG-TERM CURRENT USE OF INSULIN (H): ICD-10-CM

## 2021-09-25 DIAGNOSIS — I10 BENIGN ESSENTIAL HYPERTENSION: ICD-10-CM

## 2021-09-27 RX ORDER — LISINOPRIL 5 MG/1
5 TABLET ORAL DAILY
Qty: 90 TABLET | Refills: 1 | Status: SHIPPED | OUTPATIENT
Start: 2021-09-27 | End: 2022-02-18

## 2021-09-27 RX ORDER — BLOOD SUGAR DIAGNOSTIC
STRIP MISCELLANEOUS
Qty: 100 STRIP | Refills: 0 | Status: SHIPPED | OUTPATIENT
Start: 2021-09-27 | End: 2022-04-18

## 2021-09-27 NOTE — TELEPHONE ENCOUNTER
Routing refill request to provider for review/approval because:  Labs not current:  serum creatinine      Pending Prescriptions:                       Disp   Refills    lisinopril (ZESTRIL) 5 MG tablet [Pharmacy*90 tab*0        Sig: TAKE 1 TABLET(5 MG) BY MOUTH DAILY    Signed Prescriptions:                        Disp   Refills    ACCU-CHEK GUIDE test strip                 100 st*0        Sig: USE TO TEST BLOOD SUGAR DAILY  Authorizing Provider: CHAN RODAS  Ordering User: NAVEEN ESTRELLA, RN

## 2021-09-30 ENCOUNTER — LAB (OUTPATIENT)
Dept: LAB | Facility: CLINIC | Age: 56
End: 2021-09-30
Attending: NURSE PRACTITIONER
Payer: COMMERCIAL

## 2021-09-30 DIAGNOSIS — Z20.822 ENCOUNTER FOR LABORATORY TESTING FOR COVID-19 VIRUS: ICD-10-CM

## 2021-09-30 LAB — SARS-COV-2 RNA RESP QL NAA+PROBE: NEGATIVE

## 2021-09-30 PROCEDURE — U0003 INFECTIOUS AGENT DETECTION BY NUCLEIC ACID (DNA OR RNA); SEVERE ACUTE RESPIRATORY SYNDROME CORONAVIRUS 2 (SARS-COV-2) (CORONAVIRUS DISEASE [COVID-19]), AMPLIFIED PROBE TECHNIQUE, MAKING USE OF HIGH THROUGHPUT TECHNOLOGIES AS DESCRIBED BY CMS-2020-01-R: HCPCS

## 2021-09-30 PROCEDURE — U0005 INFEC AGEN DETEC AMPLI PROBE: HCPCS

## 2021-10-01 NOTE — RESULT ENCOUNTER NOTE
William Montes.  YOB: 1965  Specimen Collected: 09/30/21 10:01 AM  CST    William,    Your Covid 19 PCR test is NEGATIVE. Print a copy of these results and keep with your travel documents.  Have a safe trip .    Katt Dolan (Lori) CNP

## 2021-11-03 DIAGNOSIS — E66.3 OVERWEIGHT (BMI 25.0-29.9): ICD-10-CM

## 2021-11-03 DIAGNOSIS — E11.8 TYPE 2 DIABETES MELLITUS WITH COMPLICATION, WITHOUT LONG-TERM CURRENT USE OF INSULIN (H): ICD-10-CM

## 2021-11-05 NOTE — TELEPHONE ENCOUNTER
OZEMPIC 1MG PER DOSE (1X4MG PEN)  Last Written Prescription Date:  7/7/2021  Last Fill Quantity: 3,   # refills: 3  Last Office Visit : 4/1/2021  Future Office visit:  None    Routing refill request to provider for review/approval because:  Drug not on the [facility], P or [facility] refill protocol or controlled substance      Shelby Monk RN  Central Triage Red Flags/Med Refills

## 2021-11-15 ENCOUNTER — VIRTUAL VISIT (OUTPATIENT)
Dept: ENDOCRINOLOGY | Facility: CLINIC | Age: 56
End: 2021-11-15
Payer: COMMERCIAL

## 2021-11-15 VITALS — HEIGHT: 72 IN | WEIGHT: 212 LBS | BODY MASS INDEX: 28.71 KG/M2

## 2021-11-15 DIAGNOSIS — E11.8 TYPE 2 DIABETES MELLITUS WITH COMPLICATION, WITHOUT LONG-TERM CURRENT USE OF INSULIN (H): ICD-10-CM

## 2021-11-15 DIAGNOSIS — E66.3 OVERWEIGHT (BMI 25.0-29.9): Primary | ICD-10-CM

## 2021-11-15 DIAGNOSIS — Z86.39 HX OF OBESITY: ICD-10-CM

## 2021-11-15 DIAGNOSIS — R79.89 ELEVATED LFTS: ICD-10-CM

## 2021-11-15 PROCEDURE — 99213 OFFICE O/P EST LOW 20 MIN: CPT | Mod: 95 | Performed by: PHYSICIAN ASSISTANT

## 2021-11-15 RX ORDER — TOPIRAMATE 25 MG/1
75 TABLET, FILM COATED ORAL DAILY
Qty: 270 TABLET | Refills: 1 | Status: SHIPPED | OUTPATIENT
Start: 2021-11-15 | End: 2021-11-29

## 2021-11-15 RX ORDER — SEMAGLUTIDE 1.34 MG/ML
1 INJECTION, SOLUTION SUBCUTANEOUS
Qty: 9 ML | Refills: 1 | Status: SHIPPED | OUTPATIENT
Start: 2021-11-15 | End: 2022-02-18

## 2021-11-15 RX ORDER — SEMAGLUTIDE 1.34 MG/ML
1 INJECTION, SOLUTION SUBCUTANEOUS WEEKLY
Qty: 3 ML | OUTPATIENT
Start: 2021-11-15

## 2021-11-15 ASSESSMENT — PAIN SCALES - GENERAL: PAINLEVEL: NO PAIN (0)

## 2021-11-15 ASSESSMENT — MIFFLIN-ST. JEOR: SCORE: 1819.63

## 2021-11-15 NOTE — PROGRESS NOTES
William is a 56 year old who is being evaluated via a billable video visit.      How would you like to obtain your AVS? MyChart  If the video visit is dropped, the invitation should be resent by: Text to cell phone: [phone]  Will anyone else be joining your video visit? No    Video Start Time: 735     Video-Visit Details    Type of service:  Video Visit    Video End Time:745    Originating Location (pt. Location): Home    Distant Location (provider location):  [facility] [place]     Platform used for Video Visit: BackupAgent       10 minutes spent on the date of the encounter doing chart review, history and exam, documentation and further activities per the note    Return Medical Weight Management Note     William Montes  MRN:  [id number]  :  1965  NAKUL:  11/15/2021    Dear Sirena Talley DO,    I had the pleasure of seeing your patient William Montes. He is a 56 year old male who I am continuing to see for treatment of obesity related to:       10/26/2018   I have the following health issues associated with obesity: Type II Diabetes, GERD (Reflux), Fatty Liver   I have the following symptoms associated with obesity: -       Assessment & Plan   Problem List Items Addressed This Visit        Endocrine Diagnoses    Type 2 diabetes mellitus with complication, without long-term current use of insulin (H)    Relevant Medications    Semaglutide, 1 MG/DOSE, (OZEMPIC, 1 MG/DOSE,) 4 MG/3ML SOPN       Other    Elevated LFTs    Overweight (BMI 25.0-29.9)     INTERVAL HISTORY:  [facility] follow up  Has lost from 251 to 212 today (40 lbs and 16% TBW)  Taking ozempic 1mg   Last A1C 5.7 21 down from 6.5 2 years ago  Cr: 1.37     PLAN:  Refill ozempic 1mg weekly  Refill topiramate 75mg daily  CMP lab. Please call [phone] to schedule a lab only appointment at any [facility] lab.  Follow up 3 months [facility] Lauren Bloch  Follow up 6 months [facility] return [facility]             Relevant  Medications    Semaglutide, 1 MG/DOSE, (OZEMPIC, 1 MG/DOSE,) 4 MG/3ML SOPN    topiramate (TOPAMAX) 25 MG tablet    Hx of obesity - Primary             CURRENT WEIGHT:   212 lbs 0 oz    Initial Weight (lbs): 251.2 lbs  Last Visits Weight: 95.7 kg (211 lb)  Cumulative weight loss (lbs): 39.2  Weight Loss Percentage: 15.61%    Changes and Difficulties 11/15/2021   I have made the following changes to my diet since my last visit: no   With regards to my diet, I am still struggling with: no   I have made the following changes to my activity/exercise since my last visit: no   With regards to my activity/exercise, I am still struggling with: no not enough time         MEDICATIONS:   Current Outpatient Medications   Medication Sig Dispense Refill     ACCU-CHEK GUIDE test strip USE TO TEST BLOOD SUGAR DAILY 100 strip 0     ASPIRIN 81 MG OR TABS 1 tab po QD (Once per day) 100 3     atorvastatin (LIPITOR) 20 MG tablet TAKE 1 TABLET(20 MG) BY MOUTH DAILY 90 tablet 2     atovaquone-proguanil (MALARONE) 250-100 MG tablet Take 1 tablet by mouth daily Start 2 days before exposure to Malaria and continue daily till  7 days after exposure. 47 tablet 0     blood glucose monitoring (ACCU-CHEK FASTCLIX) lancets        Blood Glucose Monitoring Suppl (ACCU-CHEK GUIDE) w/Device KIT 1 kit daily Use to check blood sugar once daily and as needed 1 kit 0     cholecalciferol (VITAMIN  -D) 1000 UNITS capsule Take 1 capsule by mouth daily       diclofenac (VOLTAREN) 1 % topical gel Apply 4 grams to shoulders four times daily using enclosed dosing card. 100 g 1     lisinopril (ZESTRIL) 5 MG tablet Take 1 tablet (5 mg) by mouth daily 90 tablet 1     melatonin 3 MG tablet Take 6 mg by mouth nightly as needed for sleep       metFORMIN (GLUCOPHAGE) 1000 MG tablet Take 1 tablet (1,000 mg) by mouth daily (with dinner) 90 tablet 1     omeprazole (PRILOSEC) 20 MG DR capsule Take 20 mg by mouth daily as needed       Semaglutide, 1 MG/DOSE, (OZEMPIC, 1  MG/DOSE,) 4 MG/3ML SOPN Inject 1 mg Subcutaneous every 7 days 9 mL 1     topiramate (TOPAMAX) 25 MG tablet Take 3 tablets (75 mg) by mouth daily 270 tablet 1       Weight Loss Medication History Reviewed With Patient 11/15/2021   Which weight loss medications are you currently taking on a regular basis?  Ozempic, Topamax (topiramate)   Are you having any side effects from the weight loss medication that we have prescribed you? No   If you are having side effects please describe: none     Results for RAYO PINA (MRN [id number]) as of 11/15/2021 07:48   Ref. Range 8/20/2021 13:59   Sodium Latest Ref Range: 133 - 144 mmol/L 139   Potassium Latest Ref Range: 3.4 - 5.3 mmol/L 4.1   Chloride Latest Ref Range: 94 - 109 mmol/L 112 (H)   Carbon Dioxide Latest Ref Range: 20 - 32 mmol/L 18 (L)   Urea Nitrogen Latest Ref Range: 7 - 30 mg/dL 12   Creatinine Latest Ref Range: 0.66 - 1.25 mg/dL 1.37 (H)   GFR Estimate Latest Ref Range: >60 mL/min/1.73m2 56 (L)   Calcium Latest Ref Range: 8.5 - 10.1 mg/dL 9.0   Anion Gap Latest Ref Range: 3 - 14 mmol/L 9   Albumin Latest Ref Range: 3.4 - 5.0 g/dL 4.2   Protein Total Latest Ref Range: 6.8 - 8.8 g/dL 7.2   Bilirubin Total Latest Ref Range: 0.2 - 1.3 mg/dL 0.4   Alkaline Phosphatase Latest Ref Range: 40 - 150 U/L 65   ALT Latest Ref Range: 0 - 70 U/L 38   AST Latest Ref Range: 0 - 45 U/L 75 (H)   Hemoglobin A1C Latest Ref Range: 0.0 - 5.6 % 5.7 (H)   PSA Latest Ref Range: 0.00 - 4.00 ug/L 0.66   Glucose Latest Ref Range: 70 - 99 mg/dL 86   HIV Antigen Antibody Combo Latest Ref Range: Nonreactive  Nonreactive       PHYSICAL EXAM:  Objective    Ht 1.829 m (6')   Wt 96.2 kg (212 lb)   BMI 28.75 kg/m      Vitals - Patient Reported  Pain Score: No Pain (0)      GENERAL: Healthy, alert and no distress  EYES: Eyes grossly normal to inspection.  No discharge or erythema, or obvious scleral/conjunctival abnormalities.  RESP: No audible wheeze, cough, or visible cyanosis.  No visible  retractions or increased work of breathing.    SKIN: Visible skin clear. No significant rash, abnormal pigmentation or lesions.  NEURO: Cranial nerves grossly intact.  Mentation and speech appropriate for age.  PSYCH: Mentation appears normal, affect normal/bright, judgement and insight intact, normal speech and appearance well-groomed.    Sincerely,    Marine Flores PA-C

## 2021-11-15 NOTE — ASSESSMENT & PLAN NOTE
INTERVAL HISTORY:  [facility] follow up  Has lost from 251 to 212 today (40 lbs and 16% TBW)  Taking ozempic 1mg   Last A1C 5.7 8/20/21 down from 6.5 2 years ago  Cr: 1.37     PLAN:  Refill ozempic 1mg weekly  Refill topiramate 75mg daily  CMP lab. Please call [phone] to schedule a lab only appointment at [facility] lab.  Follow up 3 months M[facility] Lauren Bloch  Follow up 6 months [facility]

## 2021-11-15 NOTE — LETTER
11/15/2021       RE: William Montes  [address]  [facility] [address]     Dear Colleague,    Thank you for referring your patient, William Montes, to the [facility] [place] at [facility]. Please see a copy of my visit note below.    William is a 56 year old who is being evaluated via a billable video visit.      How would you like to obtain your AVS? MyChart  If the video visit is dropped, the invitation should be resent by: Text to cell phone: [phone]  Will anyone else be joining your video visit? No    Video Start Time: 735     Video-Visit Details    Type of service:  Video Visit    Video End Time:745    Originating Location (pt. Location): Home    Distant Location (provider location):  [facility] [place]     Platform used for Video Visit: StyleJam       10 minutes spent on the date of the encounter doing chart review, history and exam, documentation and further activities per the note    Return Medical Weight Management Note     William Montes  MRN:  [id number]  :  1965  NAKUL:  11/15/2021    Dear Sirena Talley DO,    I had the pleasure of seeing your patient William Montes. He is a 56 year old male who I am continuing to see for treatment of obesity related to:       10/26/2018   I have the following health issues associated with obesity: Type II Diabetes, GERD (Reflux), Fatty Liver   I have the following symptoms associated with obesity: -       Assessment & Plan   Problem List Items Addressed This Visit        Endocrine Diagnoses    Type 2 diabetes mellitus with complication, without long-term current use of insulin (H)    Relevant Medications    Semaglutide, 1 MG/DOSE, (OZEMPIC, 1 MG/DOSE,) 4 MG/3ML SOPN       Other    Elevated LFTs    Overweight (BMI 25.0-29.9)     INTERVAL HISTORY:  MW follow up  Has lost from 251 to 212 today (40 lbs and 16% TBW)  Taking ozempic 1mg   Last A1C  5.7 8/20/21 down from 6.5 2 years ago  Cr: 1.37     PLAN:  Refill ozempic 1mg weekly  Refill topiramate 75mg daily  [facility] lab. Please call [phone] to schedule a lab only appointment at [facility] lab.  Follow up 3 months MTM Lauren Bloch  Follow up 6 months Marine return MWM             Relevant Medications    Semaglutide, 1 MG/DOSE, (OZEMPIC, 1 MG/DOSE,) 4 MG/3ML SOPN    topiramate (TOPAMAX) 25 MG tablet    Hx of obesity - Primary             CURRENT WEIGHT:   212 lbs 0 oz    Initial Weight (lbs): 251.2 lbs  Last Visits Weight: 95.7 kg (211 lb)  Cumulative weight loss (lbs): 39.2  Weight Loss Percentage: 15.61%    Changes and Difficulties 11/15/2021   I have made the following changes to my diet since my last visit: no   With regards to my diet, I am still struggling with: no   I have made the following changes to my activity/exercise since my last visit: no   With regards to my activity/exercise, I am still struggling with: no not enough time         MEDICATIONS:   Current Outpatient Medications   Medication Sig Dispense Refill     ACCU-CHEK GUIDE test strip USE TO TEST BLOOD SUGAR DAILY 100 strip 0     ASPIRIN 81 MG OR TABS 1 tab po QD (Once per day) 100 3     atorvastatin (LIPITOR) 20 MG tablet TAKE 1 TABLET(20 MG) BY MOUTH DAILY 90 tablet 2     atovaquone-proguanil (MALARONE) 250-100 MG tablet Take 1 tablet by mouth daily Start 2 days before exposure to Malaria and continue daily till  7 days after exposure. 47 tablet 0     blood glucose monitoring (ACCU-CHEK FASTCLIX) lancets        Blood Glucose Monitoring Suppl (ACCU-CHEK GUIDE) w/Device KIT 1 kit daily Use to check blood sugar once daily and as needed 1 kit 0     cholecalciferol (VITAMIN  -D) 1000 UNITS capsule Take 1 capsule by mouth daily       diclofenac (VOLTAREN) 1 % topical gel Apply 4 grams to shoulders four times daily using enclosed dosing card. 100 g 1     lisinopril (ZESTRIL) 5 MG tablet Take 1 tablet (5 mg) by mouth daily 90  tablet 1     melatonin 3 MG tablet Take 6 mg by mouth nightly as needed for sleep       metFORMIN (GLUCOPHAGE) 1000 MG tablet Take 1 tablet (1,000 mg) by mouth daily (with dinner) 90 tablet 1     omeprazole (PRILOSEC) 20 MG DR capsule Take 20 mg by mouth daily as needed       Semaglutide, 1 MG/DOSE, (OZEMPIC, 1 MG/DOSE,) 4 MG/3ML SOPN Inject 1 mg Subcutaneous every 7 days 9 mL 1     topiramate (TOPAMAX) 25 MG tablet Take 3 tablets (75 mg) by mouth daily 270 tablet 1       Weight Loss Medication History Reviewed With Patient 11/15/2021   Which weight loss medications are you currently taking on a regular basis?  Ozempic, Topamax (topiramate)   Are you having any side effects from the weight loss medication that we have prescribed you? No   If you are having side effects please describe: none     Results for RAYO PINA (MRN [id number]) as of 11/15/2021 07:48   Ref. Range 8/20/2021 13:59   Sodium Latest Ref Range: 133 - 144 mmol/L 139   Potassium Latest Ref Range: 3.4 - 5.3 mmol/L 4.1   Chloride Latest Ref Range: 94 - 109 mmol/L 112 (H)   Carbon Dioxide Latest Ref Range: 20 - 32 mmol/L 18 (L)   Urea Nitrogen Latest Ref Range: 7 - 30 mg/dL 12   Creatinine Latest Ref Range: 0.66 - 1.25 mg/dL 1.37 (H)   GFR Estimate Latest Ref Range: >60 mL/min/1.73m2 56 (L)   Calcium Latest Ref Range: 8.5 - 10.1 mg/dL 9.0   Anion Gap Latest Ref Range: 3 - 14 mmol/L 9   Albumin Latest Ref Range: 3.4 - 5.0 g/dL 4.2   Protein Total Latest Ref Range: 6.8 - 8.8 g/dL 7.2   Bilirubin Total Latest Ref Range: 0.2 - 1.3 mg/dL 0.4   Alkaline Phosphatase Latest Ref Range: 40 - 150 U/L 65   ALT Latest Ref Range: 0 - 70 U/L 38   AST Latest Ref Range: 0 - 45 U/L 75 (H)   Hemoglobin A1C Latest Ref Range: 0.0 - 5.6 % 5.7 (H)   PSA Latest Ref Range: 0.00 - 4.00 ug/L 0.66   Glucose Latest Ref Range: 70 - 99 mg/dL 86   HIV Antigen Antibody Combo Latest Ref Range: Nonreactive  Nonreactive       PHYSICAL EXAM:  Objective    Ht 1.829 m (6')   Wt 96.2 kg  (212 lb)   BMI 28.75 kg/m      Vitals - Patient Reported  Pain Score: No Pain (0)      GENERAL: Healthy, alert and no distress  EYES: Eyes grossly normal to inspection.  No discharge or erythema, or obvious scleral/conjunctival abnormalities.  RESP: No audible wheeze, cough, or visible cyanosis.  No visible retractions or increased work of breathing.    SKIN: Visible skin clear. No significant rash, abnormal pigmentation or lesions.  NEURO: Cranial nerves grossly intact.  Mentation and speech appropriate for age.  PSYCH: Mentation appears normal, affect normal/bright, judgement and insight intact, normal speech and appearance well-groomed.    Sincerely,    Marine Flores PA-C

## 2021-11-15 NOTE — PATIENT INSTRUCTIONS
PLAN:  Refill ozempic 1mg weekly  Refill topiramate 75mg daily  [facility] lab. Please call [phone] to schedule a lab only appointment at [facility] lab.  Follow up 3 months MTM Lauren Bloch  Follow up 6 months Marine FRIEDM

## 2021-11-16 ENCOUNTER — MYC MEDICAL ADVICE (OUTPATIENT)
Dept: PHARMACY | Facility: CLINIC | Age: 56
End: 2021-11-16
Payer: COMMERCIAL

## 2021-11-17 ENCOUNTER — IMMUNIZATION (OUTPATIENT)
Dept: NURSING | Facility: CLINIC | Age: 56
End: 2021-11-17
Payer: COMMERCIAL

## 2021-11-17 PROCEDURE — 0064A COVID-19,PF,MODERNA (18+ YRS BOOSTER .25ML): CPT

## 2021-11-17 PROCEDURE — 91306 COVID-19,PF,MODERNA (18+ YRS BOOSTER .25ML): CPT

## 2021-11-29 ENCOUNTER — MYC MEDICAL ADVICE (OUTPATIENT)
Dept: ENDOCRINOLOGY | Facility: CLINIC | Age: 56
End: 2021-11-29
Payer: COMMERCIAL

## 2021-11-29 DIAGNOSIS — E66.3 OVERWEIGHT (BMI 25.0-29.9): ICD-10-CM

## 2021-11-29 RX ORDER — TOPIRAMATE 25 MG/1
50 TABLET, FILM COATED ORAL DAILY
Qty: 270 TABLET | Refills: 1 | COMMUNITY
Start: 2021-11-29 | End: 2022-02-18

## 2021-12-13 ENCOUNTER — OFFICE VISIT (OUTPATIENT)
Dept: NEPHROLOGY | Facility: CLINIC | Age: 56
End: 2021-12-13
Payer: COMMERCIAL

## 2021-12-13 ENCOUNTER — LAB (OUTPATIENT)
Dept: LAB | Facility: CLINIC | Age: 56
End: 2021-12-13

## 2021-12-13 VITALS
HEART RATE: 60 BPM | DIASTOLIC BLOOD PRESSURE: 74 MMHG | WEIGHT: 219 LBS | SYSTOLIC BLOOD PRESSURE: 117 MMHG | BODY MASS INDEX: 29.7 KG/M2

## 2021-12-13 DIAGNOSIS — E87.20 METABOLIC ACIDOSIS: Primary | ICD-10-CM

## 2021-12-13 DIAGNOSIS — E11.8 TYPE 2 DIABETES MELLITUS WITH COMPLICATION, WITHOUT LONG-TERM CURRENT USE OF INSULIN (H): ICD-10-CM

## 2021-12-13 DIAGNOSIS — E87.20 METABOLIC ACIDOSIS: ICD-10-CM

## 2021-12-13 DIAGNOSIS — R74.8 ELEVATED LIVER ENZYMES: ICD-10-CM

## 2021-12-13 DIAGNOSIS — R79.89 ELEVATED SERUM CREATININE: ICD-10-CM

## 2021-12-13 LAB
ALBUMIN SERPL-MCNC: 4.1 G/DL (ref 3.4–5)
ALP SERPL-CCNC: 77 U/L (ref 40–150)
ALT SERPL W P-5'-P-CCNC: 33 U/L (ref 0–70)
ANION GAP SERPL CALCULATED.3IONS-SCNC: 7 MMOL/L (ref 3–14)
AST SERPL W P-5'-P-CCNC: 21 U/L (ref 0–45)
BILIRUB DIRECT SERPL-MCNC: 0.1 MG/DL (ref 0–0.2)
BILIRUB SERPL-MCNC: 0.5 MG/DL (ref 0.2–1.3)
BUN SERPL-MCNC: 16 MG/DL (ref 7–30)
CALCIUM SERPL-MCNC: 9.1 MG/DL (ref 8.5–10.1)
CHLORIDE BLD-SCNC: 108 MMOL/L (ref 94–109)
CO2 SERPL-SCNC: 22 MMOL/L (ref 20–32)
CREAT SERPL-MCNC: 1.39 MG/DL (ref 0.66–1.25)
GFR SERPL CREATININE-BSD FRML MDRD: 55 ML/MIN/1.73M2
GLUCOSE BLD-MCNC: 90 MG/DL (ref 70–99)
PHOSPHATE SERPL-MCNC: 3.4 MG/DL (ref 2.5–4.5)
POTASSIUM BLD-SCNC: 4.2 MMOL/L (ref 3.4–5.3)
PROT SERPL-MCNC: 7.6 G/DL (ref 6.8–8.8)
SODIUM SERPL-SCNC: 137 MMOL/L (ref 133–144)

## 2021-12-13 PROCEDURE — 99214 OFFICE O/P EST MOD 30 MIN: CPT | Performed by: INTERNAL MEDICINE

## 2021-12-13 PROCEDURE — 36415 COLL VENOUS BLD VENIPUNCTURE: CPT

## 2021-12-13 PROCEDURE — 82248 BILIRUBIN DIRECT: CPT

## 2021-12-13 PROCEDURE — 80053 COMPREHEN METABOLIC PANEL: CPT

## 2021-12-13 PROCEDURE — 84100 ASSAY OF PHOSPHORUS: CPT

## 2021-12-13 NOTE — PROGRESS NOTES
Assessment and Plan:  56 year old male with history of diabetes mellitus since 1999, mild/ borderline hypertension, and mild retinopathy, fatty liver, overweight, 40 lbs weight loss since November 2018 who presents for followup of elevated creatinine. His Scr is 1.3-1.4 but cystatin C was 0.77 with eGFR >90  #Elevated creatinine/ normal GFR by cystatin - his Scr going back many years was 1.1-1.2, but is up to 1.3-1.4 range in recent years in setting of being on lisinopril. He has 20+ year history of diabetes which is well controlled, no retinopathy and no hypertension.  - lisinopril may be elevating creatinine to some extent, but did not change significantly off lisinopril  cystatin C was 0.7 with well preserved GFR- significant discordance between creatinine and cystatin based GFR   - can consider iothalamate but for now will consider him normal GFR/ not CKD   - continue lisinopril given he is a diabetic, as tolerated, for /80 - 130/80  -US kidney- normal  - topamax- discussed small risk of kidney stones, does not have symptoms, discussed with patient      - causing metabolic acidosis due to CA inhibitor action   # Hypertension: mild / borderline hypertension, controlled, on lisinopril 5mg at this time  - continue lisinopril    # Electrolytes:   - Potassium; level: Normal   - low bicarbonate- suspect metabolic acidosis due to carbonic anhydrase inhibition by topamax- he will do high Fruit and vegetable diet and we will repeat in 3 months.    # Mineral Bone Disorder:   - Calcium; level is:  Normal     Assessment and plan was discussed with patient and he voiced his understanding and agreement.      HPI:  He is a very pleasant male (originally from [facility]), who presents for followup of elevated creatinine.  His cystatin C was 0.7 with eGFR >90 at time of creatinine of 1.3-1.4 thus creatinine is underestimating his kidney function. This was discussed with the patient extensively today.  He has had diabetes  since 1999 which has been well controlled  He denies any eye changes. He has had some tingling in his hands, thought it might be due to topamax and has lowered the dose.    Review of labs show creatinine of 1-1.2 for many years, but has been up to 1.3-1.4s in the last couple of years. His albuminuria has always been normal except for one incidence of mild elevation.  He does not have high blood pressure but was started on lisinopril a few years back for renal protection.  He denies any skin or joint issues. He does not take NSAIDs or other OTC.s  He has fatty liver and has been seen in hepatology for this. He lost 40 lbs once he was diagnosed with this but has gained back a few pounds since covid.  He is otherwise fairly healthy and denies issues.  We discussed normal kidney function, creatinine measurement and indication of kidney function, muscle mass impact, and the factors that help preserve kidney function and avoid DERRICK.  He denies family history of renal failure.     We discussed his low bicarbonate of 18, which is likely due to topamax. We discussed effects of chronic metabolic acidosis. We discussed high F + V diet and decreased animal protein before considering bicarbonate supplement.  His blood pressure is low normal but he denies dizziness. .     Renal History:   DM    ROS:   A comprehensive review of systems was obtained and negative, except as noted in the HPI or PMH.    Active Medical Problems:  Patient Active Problem List   Diagnosis     Chronic gingivitis     Esophageal reflux     Hyperlipidemia LDL goal <100     Vitamin D deficiency     Elevated LFTs     Other chronic nonalcoholic liver disease     Glaucoma suspect, bilateral     Type 2 diabetes mellitus with complication, without long-term current use of insulin (H)     Background diabetic retinopathy, mild, ou     Chronic kidney disease, stage 3 (H)     Overweight (BMI 25.0-29.9)     Hx of obesity     PMH:   Medical record was reviewed and PMH was  discussed with patient and noted below.  Past Medical History:   Diagnosis Date     ABNORMAL LIVER FUNCTION STUDY 1/4/2008    increased ast, alt Normal since 2008     Chronic gingivitis      Esophageal reflux      Glaucoma      History of blood transfusion     My son has aplastic anemia and has had bld transfusions     Nonspecific abnormal results of liver function study      OBESITY NOS 12/13/2006     Pure hypercholesterolemia      Type II or unspecified type diabetes mellitus without mention of complication, not stated as uncontrolled      Uncomplicated asthma     My mother has asthma     PSH:   Past Surgical History:   Procedure Laterality Date     BIOPSY      My son had bone marrow biopsy     COLONOSCOPY N/A 5/22/2019    Procedure: COLONOSCOPY;  Surgeon: Leventhal, Thomas Michael, MD;  Location: [facility] OR     ENT SURGERY      My son has had tympanoplasty     NO HISTORY OF SURGERY       ORTHOPEDIC SURGERY      My mother had left hip replacement surgery       Family Hx:   Family History   Problem Relation Age of Onset     Hypertension Mother      Diabetes Sister      Diabetes Cousin      Hypertension Other      C.A.D. No family hx of      Cancer - colorectal No family hx of      Glaucoma No family hx of      Macular Degeneration No family hx of      Cancer No family hx of      Cerebrovascular Disease No family hx of      Breast Cancer No family hx of      Prostate Cancer No family hx of      Thyroid Disease No family hx of      Personal Hx:   Social History     Tobacco Use     Smoking status: Never Smoker     Smokeless tobacco: Never Used   Substance Use Topics     Alcohol use: No     Comment: none for 11/2011       Allergies:  No Known Allergies    Medications:  Current Outpatient Medications   Medication Sig     ACCU-CHEK GUIDE test strip USE TO TEST BLOOD SUGAR DAILY     ASPIRIN 81 MG OR TABS 1 tab po QD (Once per day)     atorvastatin (LIPITOR) 20 MG tablet TAKE 1 TABLET(20 MG) BY MOUTH DAILY     blood glucose  monitoring (ACCU-CHEK FASTCLIX) lancets      Blood Glucose Monitoring Suppl (ACCU-CHEK GUIDE) w/Device KIT 1 kit daily Use to check blood sugar once daily and as needed     cholecalciferol (VITAMIN  -D) 1000 UNITS capsule Take 1 capsule by mouth daily     diclofenac (VOLTAREN) 1 % topical gel Apply 4 grams to shoulders four times daily using enclosed dosing card.     lisinopril (ZESTRIL) 5 MG tablet Take 1 tablet (5 mg) by mouth daily     melatonin 3 MG tablet Take 6 mg by mouth nightly as needed for sleep     metFORMIN (GLUCOPHAGE) 1000 MG tablet Take 1 tablet (1,000 mg) by mouth daily (with dinner)     omeprazole (PRILOSEC) 20 MG DR capsule Take 20 mg by mouth daily as needed     Semaglutide, 1 MG/DOSE, (OZEMPIC, 1 MG/DOSE,) 4 MG/3ML SOPN Inject 1 mg Subcutaneous every 7 days     topiramate (TOPAMAX) 25 MG tablet Take 2 tablets (50 mg) by mouth daily     No current facility-administered medications for this visit.      Vitals:  BP (!) 150/84   Pulse 60   Wt 99.3 kg (219 lb)   BMI 29.70 kg/m      GENERAL: Healthy, alert and no distress  EYES: Eyes grossly normal to inspection.  No discharge or erythema, or obvious scleral/conjunctival abnormalities.  RESP: No audible wheeze, cough, or visible cyanosis.  No visible retractions or increased work of breathing.    SKIN: Visible skin clear. No significant rash, abnormal pigmentation or lesions.  NEURO: Cranial nerves grossly intact.  Mentation and speech appropriate for age.  PSYCH: Mentation appears normal, affect normal/bright, judgement and insight intact, normal speech and appearance well-groomed.  Lungs: clear bilaterally  CV: regular rate    LABS:   CMP  Recent Labs   Lab Test 08/20/21  1359 07/27/21  0935 06/22/21  1420 02/09/21  1529 01/04/21  1235 11/12/20  1158    139 135 135 138 138   POTASSIUM 4.1 4.2 4.1 4.1 4.2 4.0   CHLORIDE 112* 113* 106 108 110* 110*   CO2 18* 18* 23 23 22 21   ANIONGAP 9 8 6 4 6 7   GLC 86 90 91 71 91 82   BUN 12 12 13 14 17  14   CR 1.37* 1.26* 1.47* 1.40* 1.60* 1.40*   GFRESTIMATED 56* 62 52* 55* 47* 55*   GFRESTBLACK  --   --  60* 64 54* 64   SHANA 9.0 8.9 8.7 9.0 8.8 8.6     Recent Labs   Lab Test 08/20/21  1359 01/04/21  1235 01/09/20  0916 08/01/19  0944   BILITOTAL 0.4 0.4 0.3 0.5   ALKPHOS 65 78 75 67   ALT 38 29 27 30   AST 75* 20 16 20     CBC  Recent Labs   Lab Test 07/27/21  0935 01/04/21  1235 01/09/20  0916 09/05/19  1255   HGB 14.9 15.8 14.8 14.3   WBC 3.4* 2.9* 3.3* 3.4*   RBC 5.08 5.40 5.11 4.90   HCT 44.6 47.8 46.6 43.4   MCV 88 89 91 89   MCH 29.3 29.3 29.0 29.2   MCHC 33.4 33.1 31.8 32.9   RDW 12.9 13.0 12.1 13.0    130* 142* 134*     URINE STUDIES  Recent Labs   Lab Test 07/27/21  0935 02/09/21  1538   COLOR Yellow Yellow   APPEARANCE Clear Clear   URINEGLC Negative Negative   URINEBILI Negative Negative   URINEKETONE Negative Negative   SG 1.025 1.020   UBLD Negative Negative   URINEPH 5.0 7.0   PROTEIN Negative Negative   UROBILINOGEN 0.2 0.2   NITRITE Negative Negative   LEUKEST Negative Negative   RBCU 0-2 O - 2   WBCU 0-5 0 - 5     Recent Labs   Lab Test 07/27/21  0935   UTPG 0.05     PTH  Recent Labs   Lab Test 06/22/21  1422 11/07/18  0939   PTHI 30 59     IRON STUDIES  Recent Labs   Lab Test 12/08/14  1426 12/04/14  0820   LISET 411* 499*         Monique Valdo Fragoso MD

## 2021-12-13 NOTE — LETTER
12/13/2021     RE: William Montes  [address]  [facility] [address]     Dear Colleague,    Thank you for referring your patient, William Montes, to the [facility] at [facility]. Please see a copy of my visit note below.    Assessment and Plan:  56 year old male with history of diabetes mellitus since 1999, mild/ borderline hypertension, and mild retinopathy, fatty liver, overweight, 40 lbs weight loss since November 2018 who presents for followup of elevated creatinine. His Scr is 1.3-1.4 but cystatin C was 0.77 with eGFR >90  #Elevated creatinine/ normal GFR by cystatin - his Scr going back many years was 1.1-1.2, but is up to 1.3-1.4 range in recent years in setting of being on lisinopril. He has 20+ year history of diabetes which is well controlled, no retinopathy and no hypertension.  - lisinopril may be elevating creatinine to some extent, but did not change significantly off lisinopril  cystatin C was 0.7 with well preserved GFR- significant discordance between creatinine and cystatin based GFR   - can consider iothalamate but for now will consider him normal GFR/ not CKD   - continue lisinopril given he is a diabetic, as tolerated, for /80 - 130/80  -US kidney- normal  - topamax- discussed small risk of kidney stones, does not have symptoms, discussed with patient      - causing metabolic acidosis due to CA inhibitor action   # Hypertension: mild / borderline hypertension, controlled, on lisinopril 5mg at this time  - continue lisinopril    # Electrolytes:   - Potassium; level: Normal   - low bicarbonate- suspect metabolic acidosis due to carbonic anhydrase inhibition by topamax- he will do high Fruit and vegetable diet and we will repeat in 3 months.    # Mineral Bone Disorder:   - Calcium; level is:  Normal     Assessment and plan was discussed with patient and he voiced his understanding and agreement.      HPI:  He is a very  pleasant male (originally from [place]), who presents for followup of elevated creatinine.  His cystatin C was 0.7 with eGFR >90 at time of creatinine of 1.3-1.4 thus creatinine is underestimating his kidney function. This was discussed with the patient extensively today.  He has had diabetes since 1999 which has been well controlled  He denies any eye changes. He has had some tingling in his hands, thought it might be due to topamax and has lowered the dose.    Review of labs show creatinine of 1-1.2 for many years, but has been up to 1.3-1.4s in the last couple of years. His albuminuria has always been normal except for one incidence of mild elevation.  He does not have high blood pressure but was started on lisinopril a few years back for renal protection.  He denies any skin or joint issues. He does not take NSAIDs or other OTC.s  He has fatty liver and has been seen in hepatology for this. He lost 40 lbs once he was diagnosed with this but has gained back a few pounds since covid.  He is otherwise fairly healthy and denies issues.  We discussed normal kidney function, creatinine measurement and indication of kidney function, muscle mass impact, and the factors that help preserve kidney function and avoid DERRICK.  He denies family history of renal failure.     We discussed his low bicarbonate of 18, which is likely due to topamax. We discussed effects of chronic metabolic acidosis. We discussed high F + V diet and decreased animal protein before considering bicarbonate supplement.  His blood pressure is low normal but he denies dizziness. .     Renal History:   DM    ROS:   A comprehensive review of systems was obtained and negative, except as noted in the HPI or PMH.    Active Medical Problems:  Patient Active Problem List   Diagnosis     Chronic gingivitis     Esophageal reflux     Hyperlipidemia LDL goal <100     Vitamin D deficiency     Elevated LFTs     Other chronic nonalcoholic liver disease     Glaucoma  suspect, bilateral     Type 2 diabetes mellitus with complication, without long-term current use of insulin (H)     Background diabetic retinopathy, mild, ou     Chronic kidney disease, stage 3 (H)     Overweight (BMI 25.0-29.9)     Hx of obesity     PMH:   Medical record was reviewed and PMH was discussed with patient and noted below.  Past Medical History:   Diagnosis Date     ABNORMAL LIVER FUNCTION STUDY 1/4/2008    increased ast, alt Normal since 2008     Chronic gingivitis      Esophageal reflux      Glaucoma      History of blood transfusion     My son has aplastic anemia and has had bld transfusions     Nonspecific abnormal results of liver function study      OBESITY NOS 12/13/2006     Pure hypercholesterolemia      Type II or unspecified type diabetes mellitus without mention of complication, not stated as uncontrolled      Uncomplicated asthma     My mother has asthma     PSH:   Past Surgical History:   Procedure Laterality Date     BIOPSY      My son had bone marrow biopsy     COLONOSCOPY N/A 5/22/2019    Procedure: COLONOSCOPY;  Surgeon: Leventhal, Thomas Michael, MD;  Location: [facility] OR     ENT SURGERY      My son has had tympanoplasty     NO HISTORY OF SURGERY       ORTHOPEDIC SURGERY      My mother had left hip replacement surgery       Family Hx:   Family History   Problem Relation Age of Onset     Hypertension Mother      Diabetes Sister      Diabetes Cousin      Hypertension Other      C.A.D. No family hx of      Cancer - colorectal No family hx of      Glaucoma No family hx of      Macular Degeneration No family hx of      Cancer No family hx of      Cerebrovascular Disease No family hx of      Breast Cancer No family hx of      Prostate Cancer No family hx of      Thyroid Disease No family hx of      Personal Hx:   Social History     Tobacco Use     Smoking status: Never Smoker     Smokeless tobacco: Never Used   Substance Use Topics     Alcohol use: No     Comment: none for 11/2011        Allergies:  No Known Allergies    Medications:  Current Outpatient Medications   Medication Sig     ACCU-CHEK GUIDE test strip USE TO TEST BLOOD SUGAR DAILY     ASPIRIN 81 MG OR TABS 1 tab po QD (Once per day)     atorvastatin (LIPITOR) 20 MG tablet TAKE 1 TABLET(20 MG) BY MOUTH DAILY     blood glucose monitoring (ACCU-CHEK FASTCLIX) lancets      Blood Glucose Monitoring Suppl (ACCU-CHEK GUIDE) w/Device KIT 1 kit daily Use to check blood sugar once daily and as needed     cholecalciferol (VITAMIN  -D) 1000 UNITS capsule Take 1 capsule by mouth daily     diclofenac (VOLTAREN) 1 % topical gel Apply 4 grams to shoulders four times daily using enclosed dosing card.     lisinopril (ZESTRIL) 5 MG tablet Take 1 tablet (5 mg) by mouth daily     melatonin 3 MG tablet Take 6 mg by mouth nightly as needed for sleep     metFORMIN (GLUCOPHAGE) 1000 MG tablet Take 1 tablet (1,000 mg) by mouth daily (with dinner)     omeprazole (PRILOSEC) 20 MG DR capsule Take 20 mg by mouth daily as needed     Semaglutide, 1 MG/DOSE, (OZEMPIC, 1 MG/DOSE,) 4 MG/3ML SOPN Inject 1 mg Subcutaneous every 7 days     topiramate (TOPAMAX) 25 MG tablet Take 2 tablets (50 mg) by mouth daily     No current facility-administered medications for this visit.      Vitals:  BP (!) 150/84   Pulse 60   Wt 99.3 kg (219 lb)   BMI 29.70 kg/m      GENERAL: Healthy, alert and no distress  EYES: Eyes grossly normal to inspection.  No discharge or erythema, or obvious scleral/conjunctival abnormalities.  RESP: No audible wheeze, cough, or visible cyanosis.  No visible retractions or increased work of breathing.    SKIN: Visible skin clear. No significant rash, abnormal pigmentation or lesions.  NEURO: Cranial nerves grossly intact.  Mentation and speech appropriate for age.  PSYCH: Mentation appears normal, affect normal/bright, judgement and insight intact, normal speech and appearance well-groomed.  Lungs: clear bilaterally  CV: regular rate    LABS:    CMP  Recent Labs   Lab Test 08/20/21  1359 07/27/21  0935 06/22/21  1420 02/09/21  1529 01/04/21  1235 11/12/20  1158    139 135 135 138 138   POTASSIUM 4.1 4.2 4.1 4.1 4.2 4.0   CHLORIDE 112* 113* 106 108 110* 110*   CO2 18* 18* 23 23 22 21   ANIONGAP 9 8 6 4 6 7   GLC 86 90 91 71 91 82   BUN 12 12 13 14 17 14   CR 1.37* 1.26* 1.47* 1.40* 1.60* 1.40*   GFRESTIMATED 56* 62 52* 55* 47* 55*   GFRESTBLACK  --   --  60* 64 54* 64   SHANA 9.0 8.9 8.7 9.0 8.8 8.6     Recent Labs   Lab Test 08/20/21  1359 01/04/21  1235 01/09/20  0916 08/01/19  0944   BILITOTAL 0.4 0.4 0.3 0.5   ALKPHOS 65 78 75 67   ALT 38 29 27 30   AST 75* 20 16 20     CBC  Recent Labs   Lab Test 07/27/21  0935 01/04/21  1235 01/09/20  0916 09/05/19  1255   HGB 14.9 15.8 14.8 14.3   WBC 3.4* 2.9* 3.3* 3.4*   RBC 5.08 5.40 5.11 4.90   HCT 44.6 47.8 46.6 43.4   MCV 88 89 91 89   MCH 29.3 29.3 29.0 29.2   MCHC 33.4 33.1 31.8 32.9   RDW 12.9 13.0 12.1 13.0    130* 142* 134*     URINE STUDIES  Recent Labs   Lab Test 07/27/21  0935 02/09/21  1538   COLOR Yellow Yellow   APPEARANCE Clear Clear   URINEGLC Negative Negative   URINEBILI Negative Negative   URINEKETONE Negative Negative   SG 1.025 1.020   UBLD Negative Negative   URINEPH 5.0 7.0   PROTEIN Negative Negative   UROBILINOGEN 0.2 0.2   NITRITE Negative Negative   LEUKEST Negative Negative   RBCU 0-2 O - 2   WBCU 0-5 0 - 5     Recent Labs   Lab Test 07/27/21  0935   UTPG 0.05     PTH  Recent Labs   Lab Test 06/22/21  1422 11/07/18  0939   PTHI 30 59     IRON STUDIES  Recent Labs   Lab Test 12/08/14  1426 12/04/14  0820   LISET 411* 499*         Monique Valdo Fragoso MD

## 2021-12-26 ENCOUNTER — HEALTH MAINTENANCE LETTER (OUTPATIENT)
Age: 56
End: 2021-12-26

## 2022-01-07 NOTE — TELEPHONE ENCOUNTER
We have attempted to contact this patient two times to set up a MTM follow up appointment and were unsuccessful. Contact attempts were made via [employer] and call (today). We will no longer continue to contact this patient to schedule a visit at this time. Please refer back to MTM if you believe this patient would continue to benefit from our services.     Thank you!    Lauren Bloch, PharmD  Medication Therapy Management Pharmacist   [facility] [place]

## 2022-02-18 ENCOUNTER — OFFICE VISIT (OUTPATIENT)
Dept: FAMILY MEDICINE | Facility: CLINIC | Age: 57
End: 2022-02-18
Payer: COMMERCIAL

## 2022-02-18 VITALS
HEART RATE: 77 BPM | HEIGHT: 72 IN | DIASTOLIC BLOOD PRESSURE: 78 MMHG | TEMPERATURE: 97.6 F | WEIGHT: 216.4 LBS | OXYGEN SATURATION: 98 % | RESPIRATION RATE: 22 BRPM | BODY MASS INDEX: 29.31 KG/M2 | SYSTOLIC BLOOD PRESSURE: 126 MMHG

## 2022-02-18 DIAGNOSIS — I10 BENIGN ESSENTIAL HYPERTENSION: ICD-10-CM

## 2022-02-18 DIAGNOSIS — E66.3 OVERWEIGHT (BMI 25.0-29.9): ICD-10-CM

## 2022-02-18 DIAGNOSIS — M19.012 ARTHRITIS OF BOTH GLENOHUMERAL JOINTS: ICD-10-CM

## 2022-02-18 DIAGNOSIS — E11.3299 BACKGROUND DIABETIC RETINOPATHY (H): ICD-10-CM

## 2022-02-18 DIAGNOSIS — R20.2 PARESTHESIA: ICD-10-CM

## 2022-02-18 DIAGNOSIS — M19.011 ARTHRITIS OF BOTH GLENOHUMERAL JOINTS: ICD-10-CM

## 2022-02-18 DIAGNOSIS — N40.1 BENIGN PROSTATIC HYPERPLASIA WITH NOCTURIA: ICD-10-CM

## 2022-02-18 DIAGNOSIS — E11.8 TYPE 2 DIABETES MELLITUS WITH COMPLICATION, WITHOUT LONG-TERM CURRENT USE OF INSULIN (H): Primary | ICD-10-CM

## 2022-02-18 DIAGNOSIS — E78.5 HYPERLIPIDEMIA LDL GOAL <100: ICD-10-CM

## 2022-02-18 DIAGNOSIS — R35.1 BENIGN PROSTATIC HYPERPLASIA WITH NOCTURIA: ICD-10-CM

## 2022-02-18 LAB
HBA1C MFR BLD: 5.8 % (ref 0–5.6)
HOLD SPECIMEN: NORMAL

## 2022-02-18 PROCEDURE — 90682 RIV4 VACC RECOMBINANT DNA IM: CPT | Performed by: FAMILY MEDICINE

## 2022-02-18 PROCEDURE — 80061 LIPID PANEL: CPT | Performed by: FAMILY MEDICINE

## 2022-02-18 PROCEDURE — 90471 IMMUNIZATION ADMIN: CPT | Performed by: FAMILY MEDICINE

## 2022-02-18 PROCEDURE — 36415 COLL VENOUS BLD VENIPUNCTURE: CPT | Performed by: FAMILY MEDICINE

## 2022-02-18 PROCEDURE — 99207 PR FOOT EXAM NO CHARGE: CPT | Performed by: FAMILY MEDICINE

## 2022-02-18 PROCEDURE — 83036 HEMOGLOBIN GLYCOSYLATED A1C: CPT | Performed by: FAMILY MEDICINE

## 2022-02-18 PROCEDURE — 99215 OFFICE O/P EST HI 40 MIN: CPT | Mod: 25 | Performed by: FAMILY MEDICINE

## 2022-02-18 RX ORDER — LISINOPRIL 5 MG/1
5 TABLET ORAL DAILY
Qty: 90 TABLET | Refills: 1 | Status: SHIPPED | OUTPATIENT
Start: 2022-02-18 | End: 2022-10-23

## 2022-02-18 RX ORDER — TOPIRAMATE 25 MG/1
50 TABLET, FILM COATED ORAL DAILY
Qty: 270 TABLET | Refills: 1 | Status: SHIPPED | OUTPATIENT
Start: 2022-02-18 | End: 2022-06-23

## 2022-02-18 RX ORDER — SEMAGLUTIDE 1.34 MG/ML
1 INJECTION, SOLUTION SUBCUTANEOUS
Qty: 9 ML | Refills: 1 | Status: SHIPPED | OUTPATIENT
Start: 2022-02-18 | End: 2022-08-19

## 2022-02-18 ASSESSMENT — PAIN SCALES - GENERAL: PAINLEVEL: MODERATE PAIN (4)

## 2022-02-18 NOTE — PROGRESS NOTES
Assessment & Plan     Type 2 diabetes mellitus with complication, without long-term current use of insulin (H)  The current medical regimen is effective;  continue present plan and medications.    - HEMOGLOBIN A1C; Future  - HEMOGLOBIN A1C  - FOOT EXAM  - metFORMIN (GLUCOPHAGE) 1000 MG tablet; Take 1 tablet (1,000 mg) by mouth daily (with dinner) Do not fill until contacted by patient  - Semaglutide, 1 MG/DOSE, (OZEMPIC, 1 MG/DOSE,) 4 MG/3ML SOPN; Inject 1 mg Subcutaneous every 7 days  - [facility] Adult Diabetes Educator Referral; Future    Background diabetic retinopathy (H)  Followed by ophthalmology    Hyperlipidemia LDL goal <100  The current medical regimen is effective;  continue present plan and medications.      Benign essential hypertension  The current medical regimen is effective;  continue present plan and medications.    - lisinopril (ZESTRIL) 5 MG tablet; Take 1 tablet (5 mg) by mouth daily    Benign prostatic hyperplasia with nocturia  The patient's PSA was done normal low range 6 months ago.  He would rather have surgery for his left symptoms then take a medication so as he is being referred to urology  - Adult Urology Referral; Future    Arthritis of both glenohumeral joints  I encouraged the patient to continue the physical therapy for this    Paresthesia  The patient noticed paresthesias in his great toes which was attributed to his Topamax.  When he reduce the Topamax paresthesia improved.  However it still could be neuropathy from diabetes.  If this worsens I would recommend EMG studies through neurology    Overweight (BMI 25.0-29.9)  Managed through bariatric center  - Semaglutide, 1 MG/DOSE, (OZEMPIC, 1 MG/DOSE,) 4 MG/3ML SOPN; Inject 1 mg Subcutaneous every 7 days  - topiramate (TOPAMAX) 25 MG tablet; Take 2 tablets (50 mg) by mouth daily      40 minutes spent on the date of the encounter doing chart review, history and exam, documentation and further activities per the note       BMI:    Estimated body mass index is 29.35 kg/m  as calculated from the following:    Height as of this encounter: 1.829 m (6').    Weight as of this encounter: 98.2 kg (216 lb 6.4 oz).   Weight management plan: Discussed healthy diet and exercise guidelines      Return in about 6 months (around 8/18/2022) for diabetes.    Sirena Talley DO  [facility]    Mary Thomas is a 56 year old who presents for the following health issues     History of Present Illness       Diabetes:   He presents for follow up of diabetes.  He is checking home blood glucose one time daily. He checks blood glucose before meals.  Blood glucose is never over 200 and never under 70. He is aware of hypoglycemia symptoms including shakiness. He has no concerns regarding his diabetes at this time.  He is having numbness in feet.         He eats 0-1 servings of fruits and vegetables daily.He consumes 0 sweetened beverage(s) daily.He exercises with enough effort to increase his heart rate 9 or less minutes per day.  He exercises with enough effort to increase his heart rate 3 or less days per week.   He is taking medications regularly.       Diabetes Follow-up    How often are you checking your blood sugar? One time daily  What time of day are you checking your blood sugars (select all that apply)?  Before meals  Have you had any blood sugars above 200?  No  Have you had any blood sugars below 70?  No    What symptoms do you notice when your blood sugar is low?  Shaky    What concerns do you have today about your diabetes? None     Do you have any of these symptoms? (Select all that apply)  Numbness in feet     Metformin 1000 mg daily and Ozempic 1 mg weekly  Lab Results   Component Value Date    A1C 5.8 02/18/2022    A1C 5.7 08/20/2021    A1C 5.6 06/22/2021    A1C 5.8 11/12/2020    A1C 5.6 07/23/2020    A1C 6.1 03/09/2020    A1C 6.5 11/11/2019             BP Readings from Last 2 Encounters:   02/18/22 126/78   12/13/21  117/74     Hemoglobin A1C POCT (%)   Date Value   06/22/2021 5.6   11/12/2020 5.8 (H)     Hemoglobin A1C (%)   Date Value   02/18/2022 5.8 (H)   08/20/2021 5.7 (H)     LDL Cholesterol Calculated (mg/dL)   Date Value   11/12/2020 66   04/04/2019 60       Hyperlipidemia Follow-Up      Are you regularly taking any medication or supplement to lower your cholesterol?   Yes- Atorvastatin    Are you having muscle aches or other side effects that you think could be caused by your cholesterol lowering medication?  No    Hypertension Follow-up      Do you check your blood pressure regularly outside of the clinic? Yes     Are you following a low salt diet? Yes    Are your blood pressures ever more than 140 on the top number (systolic) OR more   than 90 on the bottom number (diastolic), for example 140/90? No   Lisinopril 5 mg daily    He is taking Topamax 50 mg for weight loss.  He has lost 30 pounds since starting this.    He is urinating 3-4 times at night which is worse.  His PSA 0.66 8/2021.  He would like to see urology and is considering surgery.  He has hesitancy.      He is complaining of shoulder pain but it is much better after PT last summer.  It is not resolved.      He has a tightness and tingling in his big toes bilaterally.  It is not in the smaller toes.  It is mild.  He has had DM for 23 years.  HE thought it was a callus.  He notices this less since he is taking less topamax.  He is noticing it only every few months.       Review of Systems   Constitutional, HEENT, cardiovascular, pulmonary, gi and gu systems are negative, except as otherwise noted.      Objective    /78 (BP Location: Right arm, Patient Position: Sitting, Cuff Size: Adult Large)   Pulse 77   Temp 97.6  F (36.4  C) (Tympanic)   Resp 22   Ht 1.829 m (6')   Wt 98.2 kg (216 lb 6.4 oz)   SpO2 98%   BMI 29.35 kg/m    Body mass index is 29.35 kg/m .  Physical Exam   GENERAL: healthy, alert and no distress  NECK: no adenopathy, no  asymmetry, masses, or scars and thyroid normal to palpation  RESP: lungs clear to auscultation - no rales, rhonchi or wheezes  CV: regular rate and rhythm, normal S1 S2, no S3 or S4, no murmur, click or rub, no peripheral edema and peripheral pulses strong  MS: tenderness to palpation glenohumeral joints bilaterally with full range of motion which is somewhat painful  PSYCH: mentation appears normal, affect normal/bright  Diabetic foot exam: normal DP and PT pulses, no trophic changes or ulcerative lesions, normal sensory exam and normal monofilament exam    Results for orders placed or performed in visit on 02/18/22 (from the past 24 hour(s))   HEMOGLOBIN A1C   Result Value Ref Range    Hemoglobin A1C 5.8 (H) 0.0 - 5.6 %   Extra Tube    Narrative    The following orders were created for panel order Extra Tube.  Procedure                               Abnormality         Status                     ---------                               -----------         ------                     Extra Red Top Tube[[id number]]                               Final result               Extra Green Top (Lithium...[[id number]]                      Final result               Extra Purple Top Tube[[id number]]                            Final result                 Please view results for these tests on the individual orders.   Extra Red Top Tube   Result Value Ref Range    Hold Specimen JIC    Extra Green Top (Lithium Heparin) Tube   Result Value Ref Range    Hold Specimen JIC    Extra Purple Top Tube   Result Value Ref Range    Hold Specimen JIC

## 2022-02-19 LAB
CHOLEST SERPL-MCNC: 125 MG/DL
FASTING STATUS PATIENT QL REPORTED: YES
HDLC SERPL-MCNC: 45 MG/DL
LDLC SERPL CALC-MCNC: 66 MG/DL
NONHDLC SERPL-MCNC: 80 MG/DL
TRIGL SERPL-MCNC: 70 MG/DL

## 2022-03-17 ENCOUNTER — VIRTUAL VISIT (OUTPATIENT)
Dept: EDUCATION SERVICES | Facility: CLINIC | Age: 57
End: 2022-03-17
Attending: FAMILY MEDICINE
Payer: COMMERCIAL

## 2022-03-17 DIAGNOSIS — E11.8 TYPE 2 DIABETES MELLITUS WITH COMPLICATION, WITHOUT LONG-TERM CURRENT USE OF INSULIN (H): Primary | ICD-10-CM

## 2022-03-17 DIAGNOSIS — E11.8 TYPE 2 DIABETES MELLITUS WITH COMPLICATION, WITHOUT LONG-TERM CURRENT USE OF INSULIN (H): ICD-10-CM

## 2022-03-17 PROCEDURE — 98967 PH1 ASSMT&MGMT NQHP 11-20: CPT | Mod: TEL | Performed by: DIETITIAN, REGISTERED

## 2022-03-17 RX ORDER — PROCHLORPERAZINE 25 MG/1
1 SUPPOSITORY RECTAL
Qty: 3 EACH | Refills: 5 | Status: SHIPPED | OUTPATIENT
Start: 2022-03-17 | End: 2022-06-13

## 2022-03-17 RX ORDER — PROCHLORPERAZINE 25 MG/1
1 SUPPOSITORY RECTAL
Qty: 1 EACH | Refills: 1 | Status: SHIPPED | OUTPATIENT
Start: 2022-03-17 | End: 2022-09-08

## 2022-03-17 NOTE — LETTER
3/17/2022         RE: William Montes  [address]  [facility] [address]        Dear Colleague,    Thank you for referring your patient, William Montes, to the [facility]. Please see a copy of my visit note below.    Diabetes Self-Management Education & Support    Presents for: Individual review    Type of Service: Telephone Visit    Originating Location (Patient Location): Home  Distant Location (Provider Location): Home  Mode of Communication:  Telephone    Telephone Visit Start Time: 9:00 am  Telephone Visit End Time (telephone visit stop time): 9:13 am    How would patient like to obtain AVS? MyChart    ASSESSMENT:    William is interested in pursuing CGM for ease of gathering glucose data and having more information. Discussed options of Dexcom and Freestyle Amaris 2, differences and similarities. William would like to pursue trying Dexcom and use his phone with the Dexcom panfilo.     Patient's most recent A1c is meeting goal of <7.0  Lab Results   Component Value Date    A1C 5.8 02/18/2022    A1C 5.6 06/22/2021       Diabetes knowledge and skills assessment:   Patient is knowledgeable in diabetes management concepts related to: Healthy Eating, Being Active, Monitoring, Taking Medication, Problem Solving, Reducing Risks and Healthy Coping    Continue education with the following diabetes management concepts: Monitoring    Based on learning assessment above, most appropriate setting for further diabetes education would be: Individual setting.    INTERVENTIONS:    Education provided today on:  AADE Self-Care Behaviors:  Monitoring: personal continuous glucose monitoring    Opportunities for ongoing education and support in diabetes-self management were discussed. Pt verbalized understanding of concepts discussed and recommendations provided today.       Education Materials Provided:  Dexcom website information          PLAN    Will pend Dexcom G6 sensors and transmitter prescription for PCP  review, approval and signature in a separate telephone call encounter. Will send to [facility], as they will verify insurance benefits and contact him with cost prior to shipping to his home.    William will determine if he is comfortable self-starting Dexcom at home or if he would like to schedule an appointment for the start.    Osmant with questions.    Topics to cover at upcoming visits: Monitoring  Follow-up: as needed    See Goals Section for co-developed, patient-stated behavior change goals.  AVS provided to patient today.          SUBJECTIVE / OBJECTIVE:  Presents for: Individual review  Accompanied by: Self  Diabetes education in the past 24mo: No  Focus of Visit: Monitoring, CGM  Diabetes type: Type 2  Disease course: Stable  How confident are you filling out medical forms by yourself:: Extremely  Diabetes management related comments/concerns: interested in personal CGM and would like to pursue ordering Dexcom  Transportation concerns: No  Difficulty affording diabetes medication?: No  Difficulty affording diabetes testing supplies?: No  Other concerns:: None  Cultural Influences/Ethnic Background:  [facility]      Diabetes Symptoms & Complications:  Weight trend: Stable  Complications assessed today?: Yes  Nephropathy: Yes    Patient Problem List and Family Medical History reviewed for relevant medical history, current medical status, and diabetes risk factors.    Vitals:  There were no vitals taken for this visit.  Estimated body mass index is 29.35 kg/m  as calculated from the following:    Height as of 2/18/22: 1.829 m (6').    Weight as of 2/18/22: 98.2 kg (216 lb 6.4 oz).   Last 3 BP:   BP Readings from Last 3 Encounters:   02/18/22 126/78   12/13/21 117/74   09/08/21 121/80       History   Smoking Status     Never Smoker   Smokeless Tobacco     Never Used       Labs:  Lab Results   Component Value Date    A1C 5.8 02/18/2022    A1C 5.6 06/22/2021     Lab Results   Component  Value Date    GLC 90 12/13/2021    GLC 91 06/22/2021     Lab Results   Component Value Date    LDL 66 02/18/2022    LDL 66 11/12/2020     HDL Cholesterol   Date Value Ref Range Status   11/12/2020 38 (L) >39 mg/dL Final     Direct Measure HDL   Date Value Ref Range Status   02/18/2022 45 >=40 mg/dL Final   ]  GFR Estimate   Date Value Ref Range Status   12/13/2021 55 (L) >60 mL/min/1.73m2 Final     Comment:     As of July 11, 2021, eGFR is calculated by the CKD-EPI creatinine equation, without race adjustment. eGFR can be influenced by muscle mass, exercise, and diet. The reported eGFR is an estimation only and is only applicable if the renal function is stable.   06/22/2021 52 (L) >60 mL/min/[1.73_m2] Final     Comment:     Non  GFR Calc  Starting 12/18/2018, serum creatinine based estimated GFR (eGFR) will be   calculated using the Chronic Kidney Disease Epidemiology Collaboration   (CKD-EPI) equation.       GFR Estimate If Black   Date Value Ref Range Status   06/22/2021 60 (L) >60 mL/min/[1.73_m2] Final     Comment:      GFR Calc  Starting 12/18/2018, serum creatinine based estimated GFR (eGFR) will be   calculated using the Chronic Kidney Disease Epidemiology Collaboration   (CKD-EPI) equation.       Lab Results   Component Value Date    CR 1.39 12/13/2021    CR 1.47 06/22/2021     No results found for: MICROALBUMIN    Healthy Eating:  Healthy Eating Assessed Today: No    Being Active:  Being Active Assessed Today: No    Monitoring:  Times checking blood sugar at home (number): 1  Times checking blood sugar at home (per): Day    Glucose data:  Not available today, William had noted no hypoglycemia or glucose >200 mg/dL.       Taking Medications:  Diabetes Medication(s)     Biguanides       metFORMIN (GLUCOPHAGE) 1000 MG tablet    Take 1 tablet (1,000 mg) by mouth daily (with dinner) Do not fill until contacted by patient    Incretin Mimetic Agents (GLP-1 Receptor Agonists)        Semaglutide, 1 MG/DOSE, (OZEMPIC, 1 MG/DOSE,) 4 MG/3ML SOPN    Inject 1 mg Subcutaneous every 7 days          Taking Medication Assessed Today: Yes  Current Treatments: Diet, Non-insulin Injectables, Oral Medication (taken by mouth)  Problems taking diabetes medications regularly?: No  Diabetes medication side effects?: No    Problem Solving:  Is the patient at risk for hypoglycemia?: No  Is the patient at risk for DKA?: No    Reducing Risks:  Diabetes Risks: Age over 45 years, Ethnicity, Sedentary Lifestyle  CAD Risks: Diabetes Mellitus, Male sex, Sedentary lifestyle  Feet checked by healthcare provider in the last year?: Yes    Healthy Coping:  Emotional response to diabetes: Ready to learn, Concern for health and well-being  Informal Support system:: Family  Stage of change: ACTION (Actively working towards change)  Support resources: Websites  Patient Activation Measure Survey Score:  BENNY Score (Last Two) 12/6/2010 6/30/2011   BENNY Raw Score 42 42   Activation Score 66 66   BENNY Level 3 3             Lulú Garza, MPH, RDN, LD, CDCES   Time Spent: 13 minutes  Encounter Type: Individual        Any diabetes medication dose changes were made via the Certified Diabetes Care & Education Protocol in collaboration with the patient's {diabetes education provider list:542020}. A copy of this encounter was shared with the provider.

## 2022-03-17 NOTE — LETTER
3/17/2022         RE: William Montes  [address]  [place] [address]        Dear Colleague,    Thank you for referring your patient, William Montes, to the [facility]. Please see a copy of my visit note below.    Diabetes Self-Management Education & Support    Presents for: Individual review    Type of Service: Telephone Visit    Originating Location (Patient Location): Home  Distant Location (Provider Location): Home  Mode of Communication:  Telephone    Telephone Visit Start Time: 9:00 am  Telephone Visit End Time (telephone visit stop time): 9:13 am    How would patient like to obtain AVS? MyChart    ASSESSMENT:    William is interested in pursuing CGM for ease of gathering glucose data and having more information. Discussed options of Dexcom and Freestyle Amaris 2, differences and similarities. William would like to pursue trying Dexcom and use his phone with the Dexcom panfilo.     Patient's most recent A1c is meeting goal of <7.0  Lab Results   Component Value Date    A1C 5.8 02/18/2022    A1C 5.6 06/22/2021       Diabetes knowledge and skills assessment:   Patient is knowledgeable in diabetes management concepts related to: Healthy Eating, Being Active, Monitoring, Taking Medication, Problem Solving, Reducing Risks and Healthy Coping    Continue education with the following diabetes management concepts: Monitoring    Based on learning assessment above, most appropriate setting for further diabetes education would be: Individual setting.    INTERVENTIONS:    Education provided today on:  AADE Self-Care Behaviors:  Monitoring: personal continuous glucose monitoring    Opportunities for ongoing education and support in diabetes-self management were discussed. Pt verbalized understanding of concepts discussed and recommendations provided today.       Education Materials Provided:  Dexcom website information          PLAN    Will pend Dexcom G6 sensors and transmitter prescription for PCP  review, approval and signature in a separate telephone call encounter. Will send to [facility], as they will verify insurance benefits and contact him with cost prior to shipping to his home.    William will determine if he is comfortable self-starting Dexcom at home or if he would like to schedule an appointment for the start.    Osmant with questions.    Topics to cover at upcoming visits: Monitoring  Follow-up: as needed    See Goals Section for co-developed, patient-stated behavior change goals.  AVS provided to patient today.          SUBJECTIVE / OBJECTIVE:  Presents for: Individual review  Accompanied by: Self  Diabetes education in the past 24mo: No  Focus of Visit: Monitoring, CGM  Diabetes type: Type 2  Disease course: Stable  How confident are you filling out medical forms by yourself:: Extremely  Diabetes management related comments/concerns: interested in personal CGM and would like to pursue ordering Dexcom  Transportation concerns: No  Difficulty affording diabetes medication?: No  Difficulty affording diabetes testing supplies?: No  Other concerns:: None  Cultural Influences/Ethnic Background:  [facility]      Diabetes Symptoms & Complications:  Weight trend: Stable  Complications assessed today?: Yes  Nephropathy: Yes    Patient Problem List and Family Medical History reviewed for relevant medical history, current medical status, and diabetes risk factors.    Vitals:  There were no vitals taken for this visit.  Estimated body mass index is 29.35 kg/m  as calculated from the following:    Height as of 2/18/22: 1.829 m (6').    Weight as of 2/18/22: 98.2 kg (216 lb 6.4 oz).   Last 3 BP:   BP Readings from Last 3 Encounters:   02/18/22 126/78   12/13/21 117/74   09/08/21 121/80       History   Smoking Status     Never Smoker   Smokeless Tobacco     Never Used       Labs:  Lab Results   Component Value Date    A1C 5.8 02/18/2022    A1C 5.6 06/22/2021     Lab Results   Component  Value Date    GLC 90 12/13/2021    GLC 91 06/22/2021     Lab Results   Component Value Date    LDL 66 02/18/2022    LDL 66 11/12/2020     HDL Cholesterol   Date Value Ref Range Status   11/12/2020 38 (L) >39 mg/dL Final     Direct Measure HDL   Date Value Ref Range Status   02/18/2022 45 >=40 mg/dL Final   ]  GFR Estimate   Date Value Ref Range Status   12/13/2021 55 (L) >60 mL/min/1.73m2 Final     Comment:     As of July 11, 2021, eGFR is calculated by the CKD-EPI creatinine equation, without race adjustment. eGFR can be influenced by muscle mass, exercise, and diet. The reported eGFR is an estimation only and is only applicable if the renal function is stable.   06/22/2021 52 (L) >60 mL/min/[1.73_m2] Final     Comment:     Non  GFR Calc  Starting 12/18/2018, serum creatinine based estimated GFR (eGFR) will be   calculated using the Chronic Kidney Disease Epidemiology Collaboration   (CKD-EPI) equation.       GFR Estimate If Black   Date Value Ref Range Status   06/22/2021 60 (L) >60 mL/min/[1.73_m2] Final     Comment:      GFR Calc  Starting 12/18/2018, serum creatinine based estimated GFR (eGFR) will be   calculated using the Chronic Kidney Disease Epidemiology Collaboration   (CKD-EPI) equation.       Lab Results   Component Value Date    CR 1.39 12/13/2021    CR 1.47 06/22/2021     No results found for: MICROALBUMIN    Healthy Eating:  Healthy Eating Assessed Today: No    Being Active:  Being Active Assessed Today: No    Monitoring:  Times checking blood sugar at home (number): 1  Times checking blood sugar at home (per): Day    Glucose data:  Not available today, William had noted no hypoglycemia or glucose >200 mg/dL.       Taking Medications:  Diabetes Medication(s)     Biguanides       metFORMIN (GLUCOPHAGE) 1000 MG tablet    Take 1 tablet (1,000 mg) by mouth daily (with dinner) Do not fill until contacted by patient    Incretin Mimetic Agents (GLP-1 Receptor Agonists)        Semaglutide, 1 MG/DOSE, (OZEMPIC, 1 MG/DOSE,) 4 MG/3ML SOPN    Inject 1 mg Subcutaneous every 7 days          Taking Medication Assessed Today: Yes  Current Treatments: Diet, Non-insulin Injectables, Oral Medication (taken by mouth)  Problems taking diabetes medications regularly?: No  Diabetes medication side effects?: No    Problem Solving:  Is the patient at risk for hypoglycemia?: No  Is the patient at risk for DKA?: No    Reducing Risks:  Diabetes Risks: Age over 45 years, Ethnicity, Sedentary Lifestyle  CAD Risks: Diabetes Mellitus, Male sex, Sedentary lifestyle  Feet checked by healthcare provider in the last year?: Yes    Healthy Coping:  Emotional response to diabetes: Ready to learn, Concern for health and well-being  Informal Support system:: Family  Stage of change: ACTION (Actively working towards change)  Support resources: Websites  Patient Activation Measure Survey Score:  BENNY Score (Last Two) 12/6/2010 6/30/2011   BENNY Raw Score 42 42   Activation Score 66 66   BENNY Level 3 3             Lulú Garza, MPH, RDN, LD, CDCES   Time Spent: 13 minutes  Encounter Type: Individual        Any diabetes medication dose changes were made via the Certified Diabetes Care & Education Protocol in collaboration with the patient's primary care provider. A copy of this encounter was shared with the provider.

## 2022-03-17 NOTE — PROGRESS NOTES
Diabetes Self-Management Education & Support    Presents for: Individual review    Type of Service: Telephone Visit    Originating Location (Patient Location): Home  Distant Location (Provider Location): Home  Mode of Communication:  Telephone    Telephone Visit Start Time: 9:00 am  Telephone Visit End Time (telephone visit stop time): 9:13 am    How would patient like to obtain AVS? MyChart    ASSESSMENT:    William is interested in pursuing CGM for ease of gathering glucose data and having more information. Discussed options of Dexcom and Freestyle Amaris 2, differences and similarities. William would like to pursue trying Dexcom and use his phone with the Dexcom panfilo.     Patient's most recent A1c is meeting goal of <7.0  Lab Results   Component Value Date    A1C 5.8 02/18/2022    A1C 5.6 06/22/2021       Diabetes knowledge and skills assessment:   Patient is knowledgeable in diabetes management concepts related to: Healthy Eating, Being Active, Monitoring, Taking Medication, Problem Solving, Reducing Risks and Healthy Coping    Continue education with the following diabetes management concepts: Monitoring    Based on learning assessment above, most appropriate setting for further diabetes education would be: Individual setting.    INTERVENTIONS:    Education provided today on:  AADE Self-Care Behaviors:  Monitoring: personal continuous glucose monitoring    Opportunities for ongoing education and support in diabetes-self management were discussed. Pt verbalized understanding of concepts discussed and recommendations provided today.       Education Materials Provided:  Dexcom website information          PLAN    Will pend Dexcom G6 sensors and transmitter prescription for PCP review, approval and signature in a separate telephone call encounter. Will send to [facility], as they will verify insurance benefits and contact him with cost prior to shipping to his home.    William will determine if  he is comfortable self-starting Dexcom at home or if he would like to schedule an appointment for the start.    Toyin with questions.    Topics to cover at upcoming visits: Monitoring  Follow-up: as needed    See Goals Section for co-developed, patient-stated behavior change goals.  AVS provided to patient today.          SUBJECTIVE / OBJECTIVE:  Presents for: Individual review  Accompanied by: Self  Diabetes education in the past 24mo: No  Focus of Visit: Monitoring, CGM  Diabetes type: Type 2  Disease course: Stable  How confident are you filling out medical forms by yourself:: Extremely  Diabetes management related comments/concerns: interested in personal CGM and would like to pursue ordering Dexcom  Transportation concerns: No  Difficulty affording diabetes medication?: No  Difficulty affording diabetes testing supplies?: No  Other concerns:: None  Cultural Influences/Ethnic Background:  [facility]      Diabetes Symptoms & Complications:  Weight trend: Stable  Complications assessed today?: Yes  Nephropathy: Yes    Patient Problem List and Family Medical History reviewed for relevant medical history, current medical status, and diabetes risk factors.    Vitals:  There were no vitals taken for this visit.  Estimated body mass index is 29.35 kg/m  as calculated from the following:    Height as of 2/18/22: 1.829 m (6').    Weight as of 2/18/22: 98.2 kg (216 lb 6.4 oz).   Last 3 BP:   BP Readings from Last 3 Encounters:   02/18/22 126/78   12/13/21 117/74   09/08/21 121/80       History   Smoking Status     Never Smoker   Smokeless Tobacco     Never Used       Labs:  Lab Results   Component Value Date    A1C 5.8 02/18/2022    A1C 5.6 06/22/2021     Lab Results   Component Value Date    GLC 90 12/13/2021    GLC 91 06/22/2021     Lab Results   Component Value Date    LDL 66 02/18/2022    LDL 66 11/12/2020     HDL Cholesterol   Date Value Ref Range Status   11/12/2020 38 (L) >39 mg/dL Final     Direct Measure HDL    Date Value Ref Range Status   02/18/2022 45 >=40 mg/dL Final   ]  GFR Estimate   Date Value Ref Range Status   12/13/2021 55 (L) >60 mL/min/1.73m2 Final     Comment:     As of July 11, 2021, eGFR is calculated by the CKD-EPI creatinine equation, without race adjustment. eGFR can be influenced by muscle mass, exercise, and diet. The reported eGFR is an estimation only and is only applicable if the renal function is stable.   06/22/2021 52 (L) >60 mL/min/[1.73_m2] Final     Comment:     Non  GFR Calc  Starting 12/18/2018, serum creatinine based estimated GFR (eGFR) will be   calculated using the Chronic Kidney Disease Epidemiology Collaboration   (CKD-EPI) equation.       GFR Estimate If Black   Date Value Ref Range Status   06/22/2021 60 (L) >60 mL/min/[1.73_m2] Final     Comment:      GFR Calc  Starting 12/18/2018, serum creatinine based estimated GFR (eGFR) will be   calculated using the Chronic Kidney Disease Epidemiology Collaboration   (CKD-EPI) equation.       Lab Results   Component Value Date    CR 1.39 12/13/2021    CR 1.47 06/22/2021     No results found for: MICROALBUMIN    Healthy Eating:  Healthy Eating Assessed Today: No    Being Active:  Being Active Assessed Today: No    Monitoring:  Times checking blood sugar at home (number): 1  Times checking blood sugar at home (per): Day    Glucose data:  Not available today, William had noted no hypoglycemia or glucose >200 mg/dL.       Taking Medications:  Diabetes Medication(s)     Biguanides       metFORMIN (GLUCOPHAGE) 1000 MG tablet    Take 1 tablet (1,000 mg) by mouth daily (with dinner) Do not fill until contacted by patient    Incretin Mimetic Agents (GLP-1 Receptor Agonists)       Semaglutide, 1 MG/DOSE, (OZEMPIC, 1 MG/DOSE,) 4 MG/3ML SOPN    Inject 1 mg Subcutaneous every 7 days          Taking Medication Assessed Today: Yes  Current Treatments: Diet, Non-insulin Injectables, Oral Medication (taken by mouth)  Problems  taking diabetes medications regularly?: No  Diabetes medication side effects?: No    Problem Solving:  Is the patient at risk for hypoglycemia?: No  Is the patient at risk for DKA?: No    Reducing Risks:  Diabetes Risks: Age over 45 years, Ethnicity, Sedentary Lifestyle  CAD Risks: Diabetes Mellitus, Male sex, Sedentary lifestyle  Feet checked by healthcare provider in the last year?: Yes    Healthy Coping:  Emotional response to diabetes: Ready to learn, Concern for health and well-being  Informal Support system:: Family  Stage of change: ACTION (Actively working towards change)  Support resources: Websites  Patient Activation Measure Survey Score:  BENNY Score (Last Two) 12/6/2010 6/30/2011   BENNY Raw Score 42 42   Activation Score 66 66   BENNY Level 3 3             Lulú Garza, MPH, RDN, LD, CDCES   Time Spent: 13 minutes  Encounter Type: Individual        Any diabetes medication dose changes were made via the Certified Diabetes Care & Education Protocol in collaboration with the patient's primary care provider. A copy of this encounter was shared with the provider.

## 2022-03-17 NOTE — PATIENT INSTRUCTIONS
We will try sending the Dexcom sensor and transmitter prescriptions to the [facility].    Let Lulú know if you have questions - my chart message or call [phone]

## 2022-03-17 NOTE — TELEPHONE ENCOUNTER
Met with William for diabetes education today to discuss personal CGM options. He is interested in the Dexcom. Discussed insurance coverage of CGM varies and use of [facility], as they will be able to do an investigation of benefits and provide him with details on cost prior to shipping prescription to his home.    Orders pended for provider review and signature.    Lulú Garza, MPH, RDN, LD, CDCES

## 2022-03-23 ENCOUNTER — OFFICE VISIT (OUTPATIENT)
Dept: OPHTHALMOLOGY | Facility: CLINIC | Age: 57
End: 2022-03-23
Payer: COMMERCIAL

## 2022-03-23 DIAGNOSIS — H40.003 GLAUCOMA SUSPECT, BILATERAL: Primary | ICD-10-CM

## 2022-03-23 DIAGNOSIS — E11.3299 BACKGROUND DIABETIC RETINOPATHY (H): ICD-10-CM

## 2022-03-23 PROCEDURE — 92083 EXTENDED VISUAL FIELD XM: CPT | Performed by: OPHTHALMOLOGY

## 2022-03-23 PROCEDURE — 92012 INTRM OPH EXAM EST PATIENT: CPT | Performed by: OPHTHALMOLOGY

## 2022-03-23 ASSESSMENT — REFRACTION_WEARINGRX
OS_SPHERE: +1.75
OD_ADD: +2.50
OD_CYLINDER: SPHERE
SPECS_TYPE: PAL
OS_ADD: +2.50
OS_AXIS: 037
OD_SPHERE: +2.00
OS_CYLINDER: +0.50

## 2022-03-23 ASSESSMENT — PACHYMETRY
OD_CT(UM): 472
OS_CT(UM): 471

## 2022-03-23 ASSESSMENT — VISUAL ACUITY
OD_CC: 20/20
METHOD: SNELLEN - LINEAR
OS_CC: 20/20
CORRECTION_TYPE: GLASSES

## 2022-03-23 ASSESSMENT — SLIT LAMP EXAM - LIDS
COMMENTS: NORMAL
COMMENTS: NORMAL

## 2022-03-23 ASSESSMENT — EXTERNAL EXAM - RIGHT EYE: OD_EXAM: NORMAL

## 2022-03-23 ASSESSMENT — EXTERNAL EXAM - LEFT EYE: OS_EXAM: NORMAL

## 2022-03-23 ASSESSMENT — TONOMETRY
OS_IOP_MMHG: 15
IOP_METHOD: APPLANATION
OD_IOP_MMHG: 14

## 2022-03-23 NOTE — PROGRESS NOTES
Current Eye Medications:  none     Subjective: Here for intraocular pressure check, glaucoma OCT, retinal OCT and Bhandari Visual Field for glaucoma suspect. No new changes in vision since last visit.      Objective:  See Ophthalmology Exam.       Assessment:  Stable intraocular pressure, glaucoma OCT, and Bhandari Visual Field both eyes in patient who is a glaucoma suspect.  Retinal OCT within normal limits both eyes.      Plan:  Continue observation with regard to glaucoma suspect status.  Return visit in 6 months for a complete exam.  Harry Hodgson M.D.  [phone]

## 2022-03-23 NOTE — PATIENT INSTRUCTIONS
Continue observation with regard to glaucoma suspect status.  Return visit in 6 months for a complete exam.  Harry Hodgson M.D.  [phone]

## 2022-03-23 NOTE — LETTER
3/23/2022         RE: William Montes  [address]  [facility] [address]        Dear Colleague,    Thank you for referring your patient, William Montes, to the [facility]. Please see a copy of my visit note below.     Current Eye Medications:  none     Subjective: Here for intraocular pressure check, glaucoma OCT, retinal OCT and Bhandari Visual Field for glaucoma suspect. No new changes in vision since last visit.      Objective:  See Ophthalmology Exam.       Assessment:  Stable intraocular pressure, glaucoma OCT, and Bhandari Visual Field both eyes in patient who is a glaucoma suspect.  Retinal OCT within normal limits both eyes.      Plan:  Continue observation with regard to glaucoma suspect status.  Return visit in 6 months for a complete exam.  Harry Hodgson M.D.  [phone]          Again, thank you for allowing me to participate in the care of your patient.        Sincerely,        Harry Hodgson MD

## 2022-03-29 ENCOUNTER — OFFICE VISIT (OUTPATIENT)
Dept: ONCOLOGY | Facility: CLINIC | Age: 57
End: 2022-03-29
Attending: FAMILY MEDICINE
Payer: COMMERCIAL

## 2022-03-29 VITALS
TEMPERATURE: 98.5 F | HEART RATE: 64 BPM | DIASTOLIC BLOOD PRESSURE: 72 MMHG | RESPIRATION RATE: 18 BRPM | OXYGEN SATURATION: 99 % | SYSTOLIC BLOOD PRESSURE: 122 MMHG | WEIGHT: 222.2 LBS | BODY MASS INDEX: 30.14 KG/M2

## 2022-03-29 DIAGNOSIS — N40.1 BENIGN PROSTATIC HYPERPLASIA WITH NOCTURIA: ICD-10-CM

## 2022-03-29 DIAGNOSIS — R35.1 BENIGN PROSTATIC HYPERPLASIA WITH NOCTURIA: ICD-10-CM

## 2022-03-29 PROCEDURE — G0463 HOSPITAL OUTPT CLINIC VISIT: HCPCS

## 2022-03-29 PROCEDURE — 99203 OFFICE O/P NEW LOW 30 MIN: CPT | Performed by: STUDENT IN AN ORGANIZED HEALTH CARE EDUCATION/TRAINING PROGRAM

## 2022-03-29 ASSESSMENT — PAIN SCALES - GENERAL: PAINLEVEL: NO PAIN (0)

## 2022-03-29 NOTE — PROGRESS NOTES
Urology Rooming Note    March 29, 2022 1:15 PM   William Montes is a 56 year old male who presents for:    Chief Complaint   Patient presents with     Urology     Initial Vitals: /72   Pulse 64   Temp 98.5  F (36.9  C)   Resp 18   Wt 100.8 kg (222 lb 3.2 oz)   SpO2 99%   BMI 30.14 kg/m   Estimated body mass index is 30.14 kg/m  as calculated from the following:    Height as of 2/18/22: 1.829 m (6').    Weight as of this encounter: 100.8 kg (222 lb 3.2 oz). Body surface area is 2.26 meters squared.  No Pain (0) Comment: Data Unavailable     Allergies reviewed: Yes  Medications reviewed: Yes    Medications: Medication refills not needed today.  Pharmacy name entered into [facility]:    [facility] #[id number] - [place], [place] - [address] AT [address] [id number] - [place], [place] - [address] AT [facility] - [place], [place] - [address]

## 2022-03-29 NOTE — LETTER
3/29/2022         RE: William Montes  [address]  [facility] [address]        Dear Colleague,    Thank you for referring your patient, William Montes, to the [facility]. Please see a copy of my visit note below.    Urology Rooming Note    March 29, 2022 1:15 PM   William Montes is a 56 year old male who presents for:    Chief Complaint   Patient presents with     Urology     Initial Vitals: /72   Pulse 64   Temp 98.5  F (36.9  C)   Resp 18   Wt 100.8 kg (222 lb 3.2 oz)   SpO2 99%   BMI 30.14 kg/m   Estimated body mass index is 30.14 kg/m  as calculated from the following:    Height as of 2/18/22: 1.829 m (6').    Weight as of this encounter: 100.8 kg (222 lb 3.2 oz). Body surface area is 2.26 meters squared.  No Pain (0) Comment: Data Unavailable     Allergies reviewed: Yes  Medications reviewed: Yes    Medications: Medication refills not needed today.  Pharmacy name entered into [facility]:    [facility] #[id number] [place], [place] - [address] AT [address] [id number] [place], [place] - [address] AT [facility] - [place], [place] - [address]          Chief Complaint:   Nocturia           Consult or Referral:     Mr. William Montes is a 56 year old male seen at the request of Dr. Talley.         History of Present Illness:     William Montes is a 56 year old male being seen for nocturia.  Duration of problem: Few months  Previous treatments: None      William presents today for assessment for his lower urinary tract symptoms specifically nocturia.  He had an ultrasound done recently which showed that prostate was enlarged and is concerned about the risk of prostate cancer  No family history of prostate cancer known  Does have some decrease in urinary flow but specifically more at night  Does not consume a lot of caffeine or  caffeinated beverages             Past Medical History:     Past Medical History:   Diagnosis Date     ABNORMAL LIVER FUNCTION STUDY 1/4/2008    increased ast, alt Normal since 2008     Arthritis of both glenohumeral joints 2/18/2022     Chronic gingivitis      Esophageal reflux      Glaucoma      History of blood transfusion     My son has aplastic anemia and has had bld transfusions     Nonspecific abnormal results of liver function study      OBESITY NOS 12/13/2006     Pure hypercholesterolemia      Type II or unspecified type diabetes mellitus without mention of complication, not stated as uncontrolled      Uncomplicated asthma     My mother has asthma            Past Surgical History:     Past Surgical History:   Procedure Laterality Date     BIOPSY      My son had bone marrow biopsy     COLONOSCOPY N/A 5/22/2019    Procedure: COLONOSCOPY;  Surgeon: Leventhal, Thomas Michael, MD;  [facility]: [facility] OR     ENT SURGERY      My son has had tympanoplasty     NO HISTORY OF SURGERY       ORTHOPEDIC SURGERY      My mother had left hip replacement surgery            Medications     Current Outpatient Medications   Medication     ACCU-CHEK GUIDE test strip     ASPIRIN 81 MG OR TABS     atorvastatin (LIPITOR) 20 MG tablet     blood glucose monitoring (ACCU-CHEK FASTCLIX) lancets     Blood Glucose Monitoring Suppl (ACCU-CHEK GUIDE) w/Device KIT     cholecalciferol (VITAMIN  -D) 1000 UNITS capsule     Continuous Blood Gluc Sensor (DEXCOM G6 SENSOR) MISC     Continuous Blood Gluc Transmit (DEXCOM G6 TRANSMITTER) MISC     diclofenac (VOLTAREN) 1 % topical gel     lisinopril (ZESTRIL) 5 MG tablet     melatonin 3 MG tablet     metFORMIN (GLUCOPHAGE) 1000 MG tablet     omeprazole (PRILOSEC) 20 MG DR capsule     Semaglutide, 1 MG/DOSE, (OZEMPIC, 1 MG/DOSE,) 4 MG/3ML SOPN     topiramate (TOPAMAX) 25 MG tablet     No current facility-administered medications for this visit.            Family History:     Family History   Problem  Relation Age of Onset     Hypertension Mother      Diabetes Sister      Diabetes Cousin      Hypertension Other      C.A.D. No family hx of      Cancer - colorectal No family hx of      Glaucoma No family hx of      Macular Degeneration No family hx of      Cancer No family hx of      Cerebrovascular Disease No family hx of      Breast Cancer No family hx of      Prostate Cancer No family hx of      Thyroid Disease No family hx of             Social History:     Social History     Socioeconomic History     Marital status:      Spouse name: Luis     Number of children: 4     Years of education: Not on file     Highest education level: Not on file   Occupational History     Not on file   Tobacco Use     Smoking status: Never Smoker     Smokeless tobacco: Never Used   Vaping Use     Vaping Use: Never used   Substance and Sexual Activity     Alcohol use: No     Comment: none for 11/2011     Drug use: No     Sexual activity: Yes     Partners: Female     Birth control/protection: None   Other Topics Concern     Parent/sibling w/ CABG, MI or angioplasty before 65F 55M? No   Social History Narrative    Dairy/d 0 servings/d. Caffeine 1 per weekExercise 2 x weekSunscreen used - NoSeatbelts used - YesWorking smoke/CO detectors in the home - YesGuns stored in the home - NoSelf Breast Exams - NoSelf Testicular Exam - YesEye Exam up to date - YesDental Exam up to date - YesPap Smear up to date - NAMammogram up to date - NAPSA up to date - NoDexa Scan up to date - NoFlex Sig / Colonoscopy up to date - NoImmunizations up to date - YesAbuse: Current or Past(Physical, Sexual or Emotional)- NoDo you feel safe in your environment - YesConner Durham5/12/06        No tattoos or piercings, blood transfusions, no illicit IV or intranasal drug use.      Social Determinants of Health     Financial Resource Strain: Not on file   Food Insecurity: Not on file   Transportation Needs: Not on file   Physical Activity: Not on file    Stress: Not on file   Social Connections: Not on file   Intimate Partner Violence: Not on file   Housing Stability: Not on file            Allergies:   Patient has no known allergies.         Review of Systems:  From intake questionnaire     Skin: negative  Eyes: negative  Ears/Nose/Throat: negative  Respiratory: No shortness of breath, dyspnea on exertion, cough, or hemoptysis  Cardiovascular: No chest pain or palpitations  Gastrointestinal: negative; no nausea/vomiting, constipation or diarrhea  Genitourinary: as per HPI  Musculoskeletal: negative  Neurologic: negative  Psychiatric: negative  Hematologic/Lymphatic/Immunologic: negative  Endocrine: negative         Physical Exam:     Patient is a 56 year old  male   Vitals: Blood pressure 122/72, pulse 64, temperature 98.5  F (36.9  C), resp. rate 18, weight 100.8 kg (222 lb 3.2 oz), SpO2 99 %.  Constitutional: Body mass index is 30.14 kg/m .  Alert, no acute distress, oriented, conversant  Eyes: no scleral icterus; extraocular muscles intact, moist conjunctivae  Neck: trachea midline, no thyromegaly  Ears/nose/mouth: throat/mouth:normal, good dentition  Respiratory: no respiratory distress, or pursed lip breathing  Cardiovascular: pulses strong and intact; no obvious jugular venous distension present  Gastrointestinal: soft, nontender, no organomegaly or masses,   Lymphatics: No inguinal adenopathy  Musculoskeletal: extremities normal, no peripheral edema  Skin: no suspicious lesions or rashes  Neuro: Alert, oriented, speech and mentation normal  Psych: affect and mood normal, alert and oriented to person, place and time  Gait: Normal  : SANDY anodular, symmetric      Labs and Pathology:    The following labs were reviewed by me and discussed with the patient:    Significant for   Lab Results   Component Value Date    CR 1.39 12/13/2021    CR 1.37 08/20/2021    CR 1.26 07/27/2021    CR 1.47 06/22/2021    CR 1.40 02/09/2021    CR 1.60 01/04/2021    CR 1.40  11/12/2020    CR 1.33 08/27/2020    CR 1.52 07/23/2020    CR 1.42 03/09/2020    CR 1.43 01/09/2020    CR 1.39 11/11/2019    CR 1.31 08/01/2019     Prostate Specific Antigen Screen   Date Value Ref Range Status   08/20/2021 0.66 0.00 - 4.00 ug/L Final             Imaging:    The following imaging exams were independently viewed and interpreted by me and discussed with patient:  Renal/Bladder Ultrasound: Abnormal:   US RENAL COMPLETE   2/5/2021 11:06 AM      HISTORY: Elevated serum creatinine. Type 2 diabetes.     COMPARISON: None.     FINDINGS:      Right kidney measures 11.4 x 5.6 x 5.2 cm. Cortical thickness measures  0.9 cm AP. There is no hydronephrosis. No renal calculi or solid renal  masses are evident.      Left kidney measures 11.3 x 4.6 x 4.9 cm. Cortical thickness measures  1.0 cm AP. There is no hydronephrosis. No renal calculi or solid renal  masses are evident.      Limited images of the bladder are unremarkable. The prostate is  enlarged, measuring 3.6 x 3.6 x 4.5 cm.     Incidentally noted diffuse fatty infiltration of the liver.                                                                      IMPRESSION:   1. Unremarkable renal ultrasound examination. No hydronephrosis.  2. Prostatic enlargement.  3. Incidentally noted diffuse fatty infiltration of the liver.       Standardized Questionnaire:      AUASS: 9/35 QOL:3           Assessment and Plan:     Benign prostatic hyperplasia with nocturia  Discussed about the significance of his urinary symptoms and the fact that he has a normal PSA with a normal digital rectal examination  Therefore the risk for prostate cancer is not significant at this point in time  The severity of his symptoms are also not very severe and is not bothered too much by these.  Recommended that continue to see him with his primary care for yearly PSAs and rectal exams and at this point in time he does not need to start on any BPH specific medications  He is in agreement with  this plan and will see us as needed    - Adult Urology Referral      Plan:  Follow-up as needed yearly PSA and rectal exam with the primary care    Orders  No orders of the defined types were placed in this encounter.      Leon Gomez MD  [facility]      ==========================    Additional Billing and Coding Information:  Review of external notes as documented above   Review of the result(s) of each unique test - PSA, creatinine, ultrasound kidneys and bladder    Independent interpretation of a test performed by another physician/other qualified health care professional (not separately reported) -       Discussion of management or test interpretation with external physician/other qualified healthcare professional/appropriate source -       Diagnosis or treatment significantly limited by social determinants of health -       15 minutes spent on the date of the encounter doing chart review, review of test results, interpretation of tests, patient visit and documentation     ==========================      Again, thank you for allowing me to participate in the care of your patient.        Sincerely,        Leon Gomez MD

## 2022-03-29 NOTE — PROGRESS NOTES
Chief Complaint:   Nocturia           Consult or Referral:     Mr. William Montes is a 56 year old male seen at the request of Dr. Talley.         History of Present Illness:     William Montes is a 56 year old male being seen for nocturia.  Duration of problem: Few months  Previous treatments: None      William presents today for assessment for his lower urinary tract symptoms specifically nocturia.  He had an ultrasound done recently which showed that prostate was enlarged and is concerned about the risk of prostate cancer  No family history of prostate cancer known  Does have some decrease in urinary flow but specifically more at night  Does not consume a lot of caffeine or caffeinated beverages             Past Medical History:     Past Medical History:   Diagnosis Date     ABNORMAL LIVER FUNCTION STUDY 1/4/2008    increased ast, alt Normal since 2008     Arthritis of both glenohumeral joints 2/18/2022     Chronic gingivitis      Esophageal reflux      Glaucoma      History of blood transfusion     My son has aplastic anemia and has had bld transfusions     Nonspecific abnormal results of liver function study      OBESITY NOS 12/13/2006     Pure hypercholesterolemia      Type II or unspecified type diabetes mellitus without mention of complication, not stated as uncontrolled      Uncomplicated asthma     My mother has asthma            Past Surgical History:     Past Surgical History:   Procedure Laterality Date     BIOPSY      My son had bone marrow biopsy     COLONOSCOPY N/A 5/22/2019    Procedure: COLONOSCOPY;  Surgeon: Leventhal, Thomas Michael, MD;  [facility]     ENT SURGERY      My son has had tympanoplasty     NO HISTORY OF SURGERY       ORTHOPEDIC SURGERY      My mother had left hip replacement surgery            Medications     Current Outpatient Medications   Medication     ACCU-CHEK GUIDE test strip     ASPIRIN 81 MG OR TABS     atorvastatin (LIPITOR) 20 MG tablet     blood glucose  monitoring (ACCU-CHEK FASTCLIX) lancets     Blood Glucose Monitoring Suppl (ACCU-CHEK GUIDE) w/Device KIT     cholecalciferol (VITAMIN  -D) 1000 UNITS capsule     Continuous Blood Gluc Sensor (DEXCOM G6 SENSOR) MISC     Continuous Blood Gluc Transmit (DEXCOM G6 TRANSMITTER) MISC     diclofenac (VOLTAREN) 1 % topical gel     lisinopril (ZESTRIL) 5 MG tablet     melatonin 3 MG tablet     metFORMIN (GLUCOPHAGE) 1000 MG tablet     omeprazole (PRILOSEC) 20 MG DR capsule     Semaglutide, 1 MG/DOSE, (OZEMPIC, 1 MG/DOSE,) 4 MG/3ML SOPN     topiramate (TOPAMAX) 25 MG tablet     No current facility-administered medications for this visit.            Family History:     Family History   Problem Relation Age of Onset     Hypertension Mother      Diabetes Sister      Diabetes Cousin      Hypertension Other      C.A.D. No family hx of      Cancer - colorectal No family hx of      Glaucoma No family hx of      Macular Degeneration No family hx of      Cancer No family hx of      Cerebrovascular Disease No family hx of      Breast Cancer No family hx of      Prostate Cancer No family hx of      Thyroid Disease No family hx of             Social History:     Social History     Socioeconomic History     Marital status:      Spouse name: Luis     Number of children: 4     Years of education: Not on file     Highest education level: Not on file   Occupational History     Not on file   Tobacco Use     Smoking status: Never Smoker     Smokeless tobacco: Never Used   Vaping Use     Vaping Use: Never used   Substance and Sexual Activity     Alcohol use: No     Comment: none for 11/2011     Drug use: No     Sexual activity: Yes     Partners: Female     Birth control/protection: None   Other Topics Concern     Parent/sibling w/ CABG, MI or angioplasty before 65F 55M? No   Social History Narrative    Dairy/d 0 servings/d. Caffeine 1 per weekExercise 2 x weekSunscreen used - NoSeatbelts used - YesWorking smoke/CO detectors in the home  - YesGuns stored in the home - NoSelf Breast Exams - NoSelf Testicular Exam - YesEye Exam up to date - YesDental Exam up to date - YesPap Smear up to date - NAMammogram up to date - NAPSA up to date - NoDexa Scan up to date - NoFlex Sig / Colonoscopy up to date - NoImmunizations up to date - YesAbuse: Current or Past(Physical, Sexual or Emotional)- NoDo you feel safe in your environment - YesRobert Herminio5/12/06        No tattoos or piercings, blood transfusions, no illicit IV or intranasal drug use.      Social Determinants of Health     Financial Resource Strain: Not on file   Food Insecurity: Not on file   Transportation Needs: Not on file   Physical Activity: Not on file   Stress: Not on file   Social Connections: Not on file   Intimate Partner Violence: Not on file   Housing Stability: Not on file            Allergies:   Patient has no known allergies.         Review of Systems:  From intake questionnaire     Skin: negative  Eyes: negative  Ears/Nose/Throat: negative  Respiratory: No shortness of breath, dyspnea on exertion, cough, or hemoptysis  Cardiovascular: No chest pain or palpitations  Gastrointestinal: negative; no nausea/vomiting, constipation or diarrhea  Genitourinary: as per HPI  Musculoskeletal: negative  Neurologic: negative  Psychiatric: negative  Hematologic/Lymphatic/Immunologic: negative  Endocrine: negative         Physical Exam:     Patient is a 56 year old  male   Vitals: Blood pressure 122/72, pulse 64, temperature 98.5  F (36.9  C), resp. rate 18, weight 100.8 kg (222 lb 3.2 oz), SpO2 99 %.  Constitutional: Body mass index is 30.14 kg/m .  Alert, no acute distress, oriented, conversant  Eyes: no scleral icterus; extraocular muscles intact, moist conjunctivae  Neck: trachea midline, no thyromegaly  Ears/nose/mouth: throat/mouth:normal, good dentition  Respiratory: no respiratory distress, or pursed lip breathing  Cardiovascular: pulses strong and intact; no obvious jugular venous  distension present  Gastrointestinal: soft, nontender, no organomegaly or masses,   Lymphatics: No inguinal adenopathy  Musculoskeletal: extremities normal, no peripheral edema  Skin: no suspicious lesions or rashes  Neuro: Alert, oriented, speech and mentation normal  Psych: affect and mood normal, alert and oriented to person, place and time  Gait: Normal  : SANDY anodular, symmetric      Labs and Pathology:    The following labs were reviewed by me and discussed with the patient:    Significant for   Lab Results   Component Value Date    CR 1.39 12/13/2021    CR 1.37 08/20/2021    CR 1.26 07/27/2021    CR 1.47 06/22/2021    CR 1.40 02/09/2021    CR 1.60 01/04/2021    CR 1.40 11/12/2020    CR 1.33 08/27/2020    CR 1.52 07/23/2020    CR 1.42 03/09/2020    CR 1.43 01/09/2020    CR 1.39 11/11/2019    CR 1.31 08/01/2019     Prostate Specific Antigen Screen   Date Value Ref Range Status   08/20/2021 0.66 0.00 - 4.00 ug/L Final             Imaging:    The following imaging exams were independently viewed and interpreted by me and discussed with patient:  Renal/Bladder Ultrasound: Abnormal:   US RENAL COMPLETE   2/5/2021 11:06 AM      HISTORY: Elevated serum creatinine. Type 2 diabetes.     COMPARISON: None.     FINDINGS:      Right kidney measures 11.4 x 5.6 x 5.2 cm. Cortical thickness measures  0.9 cm AP. There is no hydronephrosis. No renal calculi or solid renal  masses are evident.      Left kidney measures 11.3 x 4.6 x 4.9 cm. Cortical thickness measures  1.0 cm AP. There is no hydronephrosis. No renal calculi or solid renal  masses are evident.      Limited images of the bladder are unremarkable. The prostate is  enlarged, measuring 3.6 x 3.6 x 4.5 cm.     Incidentally noted diffuse fatty infiltration of the liver.                                                                      IMPRESSION:   1. Unremarkable renal ultrasound examination. No hydronephrosis.  2. Prostatic enlargement.  3. Incidentally noted  diffuse fatty infiltration of the liver.       Standardized Questionnaire:      AUASS: 9/35 QOL:3           Assessment and Plan:     Benign prostatic hyperplasia with nocturia  Discussed about the significance of his urinary symptoms and the fact that he has a normal PSA with a normal digital rectal examination  Therefore the risk for prostate cancer is not significant at this point in time  The severity of his symptoms are also not very severe and is not bothered too much by these.  Recommended that continue to see him with his primary care for yearly PSAs and rectal exams and at this point in time he does not need to start on any BPH specific medications  He is in agreement with this plan and will see us as needed    - Adult Urology Referral      Plan:  Follow-up as needed yearly PSA and rectal exam with the primary care    Orders  No orders of the defined types were placed in this encounter.      Leon Gomez MD  [facility]      ==========================    Additional Billing and Coding Information:  Review of external notes as documented above   Review of the result(s) of each unique test - PSA, creatinine, ultrasound kidneys and bladder    Independent interpretation of a test performed by another physician/other qualified health care professional (not separately reported) -       Discussion of management or test interpretation with external physician/other qualified healthcare professional/appropriate source -       Diagnosis or treatment significantly limited by social determinants of health -       15 minutes spent on the date of the encounter doing chart review, review of test results, interpretation of tests, patient visit and documentation     ==========================

## 2022-06-02 ENCOUNTER — TELEPHONE (OUTPATIENT)
Dept: FAMILY MEDICINE | Facility: CLINIC | Age: 57
End: 2022-06-02

## 2022-06-02 ENCOUNTER — VIRTUAL VISIT (OUTPATIENT)
Dept: FAMILY MEDICINE | Facility: CLINIC | Age: 57
End: 2022-06-02
Payer: COMMERCIAL

## 2022-06-02 VITALS — OXYGEN SATURATION: 97 % | SYSTOLIC BLOOD PRESSURE: 128 MMHG | DIASTOLIC BLOOD PRESSURE: 78 MMHG | HEART RATE: 67 BPM

## 2022-06-02 DIAGNOSIS — U07.1 SARS-COV-2 POSITIVE: ICD-10-CM

## 2022-06-02 DIAGNOSIS — U07.1 SARS-COV-2 POSITIVE: Primary | ICD-10-CM

## 2022-06-02 PROCEDURE — 99213 OFFICE O/P EST LOW 20 MIN: CPT | Mod: TEL | Performed by: FAMILY MEDICINE

## 2022-06-02 RX ORDER — ACETAMINOPHEN 325 MG/1
325-650 TABLET ORAL EVERY 6 HOURS PRN
COMMUNITY

## 2022-06-02 ASSESSMENT — ENCOUNTER SYMPTOMS
CARDIOVASCULAR NEGATIVE: 1
HEMATOLOGIC/LYMPHATIC NEGATIVE: 1
ENDOCRINE NEGATIVE: 1
PSYCHIATRIC NEGATIVE: 1
MUSCULOSKELETAL NEGATIVE: 1
NEUROLOGICAL NEGATIVE: 1
CONSTITUTIONAL NEGATIVE: 1
ALLERGIC/IMMUNOLOGIC NEGATIVE: 1
SINUS PRESSURE: 1
COUGH: 1

## 2022-06-02 NOTE — TELEPHONE ENCOUNTER
Pharmacist Drake at [facility] is recommending renal dose of paxlovid, 2 tablets twice a day due to labs from 12/21 labs creat 1.39 and GFR 55.. His phone number is [phone]. High priority message forwarded to Dr Renner. Yasmine Todd RN

## 2022-06-02 NOTE — PROGRESS NOTES
William is a 57 year old who is being evaluated via a billable telephone visit.      What phone number would you like to be contacted at? [phone]  How would you like to obtain your AVS? MyChart    Assessment & Plan     SARS-CoV-2 positive  Medication faxed, recommend that patient hold his atorvastatin while on this medication  - nirmatrelvir and ritonavir (PAXLOVID) therapy pack; Take 3 tablets by mouth 2 times daily for 5 days Take 2 Nirmatrelvir tablets and 1 Ritonavir tablet twice daily for 5 days.    [id number]     FUTURE APPOINTMENTS:       - Follow-up visit in one month or sooner as needed.    Return in about 4 weeks (around 6/30/2022) for Follow up.    Juanita Renner MD  [facility]    Mary Thomas is a 57 year old who presents for the following health issues     HPI   Patient is a 67-year-old here for COVID.  He was positive for COVID 2 days ago and is requesting antivirals.  He has a cough and congestion and some sore throat.  No other systemic symptoms reported.  Discussed possible side effects of medication and drug drug interactions he is asked to hold his atorvastatin while he is on the medication.    COVID-19 Symptom Review  How many days ago did these symptoms start? Symptoms started Tuesday 5/31/2022 positive cov-19 at home test today 6/2/2022    Are any of the following symptoms significant for you?    New or worsening difficulty breathing? No    Worsening cough? Yes, I am coughing up mucus.    Fever or chills? Yes, the highest temperature was 100.3    Headache: no    Sore throat: no    Chest pain: YES- from coughing    Diarrhea: YES- on Tuesday 5/31    Body aches? YES    What treatments has patient tried? Acetaminophen and Cough syrup  Does patient live in a nursing home, group home, or shelter? no  Does patient have a way to get food/medications during quarantined? Yes, I have a friend or family member who can help me.            Review of Systems    Constitutional: Negative.    HENT: Positive for congestion and sinus pressure.    Respiratory: Positive for cough.    Cardiovascular: Negative.    Endocrine: Negative.    Genitourinary: Negative.    Musculoskeletal: Negative.    Skin: Negative.    Allergic/Immunologic: Negative.    Neurological: Negative.    Hematological: Negative.    Psychiatric/Behavioral: Negative.             Objective           Vitals:  No vitals were obtained today due to virtual visit.    Physical Exam   healthy, alert and no distress  PSYCH: Alert and oriented times 3; coherent speech, normal   rate and volume, able to articulate logical thoughts, able   to abstract reason, no tangential thoughts, no hallucinations   or delusions  His affect is normal  RESP: No cough, no audible wheezing, able to talk in full sentences  Remainder of exam unable to be completed due to telephone visits            Phone call duration: 5 minutes

## 2022-06-03 NOTE — TELEPHONE ENCOUNTER
Spoke with pharmacist Perico at [facility]. He has received the prescription and will notify the patient.    Yaneli Robison RN

## 2022-06-13 DIAGNOSIS — E11.8 TYPE 2 DIABETES MELLITUS WITH COMPLICATION, WITHOUT LONG-TERM CURRENT USE OF INSULIN (H): ICD-10-CM

## 2022-06-13 RX ORDER — PROCHLORPERAZINE 25 MG/1
1 SUPPOSITORY RECTAL
Qty: 3 EACH | Refills: 5 | Status: SHIPPED | OUTPATIENT
Start: 2022-06-13 | End: 2022-11-17

## 2022-06-22 VITALS — WEIGHT: 219 LBS | BODY MASS INDEX: 29.66 KG/M2 | HEIGHT: 72 IN

## 2022-06-22 NOTE — PROGRESS NOTES
Virtual Visit Check-In    During this virtual visit the patient is located in [place], patient verifies this as the location during the entirety of this visit.     William is a 57 year old who is being evaluated via a billable video visit.      How would you like to obtain your AVS? MyChart  If the video visit is dropped, the invitation should be resent by: Text to cell phone:  [phone]  Will anyone else be joining your video visit? No    Video-Visit Details    Video Start Time: 850    Type of service:  Video Visit    Video End Time:9:06 AM    Originating Location (pt. Location): Home    Distant Location (provider location):  [facility] [place]     Platform used for Video Visit: Ronna Harris NREMT      15 minutes spent on the date of the encounter doing chart review, history and exam, documentation and further activities per the note    Return Medical Weight Management Note     William Montes  MRN:  [id number]  :  1965  NAKUL:  2022    Dear Sirena Talley DO,    I had the pleasure of seeing your patient William Montes. He is a 57 year old male who I am continuing to see for treatment of obesity related to:       10/26/2018   I have the following health issues associated with obesity: Type II Diabetes, GERD (Reflux), Fatty Liver   I have the following symptoms associated with obesity: -       Assessment & Plan   Problem List Items Addressed This Visit        Endocrine Diagnoses    Type 2 diabetes mellitus with complication, without long-term current use of insulin (H)    Relevant Orders    Comprehensive metabolic panel       Other    Overweight (BMI 25.0-29.9) - Primary    Relevant Medications    topiramate (TOPAMAX) 25 MG tablet    Other Relevant Orders    Comprehensive metabolic panel    Hx of obesity    Relevant Orders    Comprehensive metabolic panel         INTERVAL HISTORY:  [facility]WM follow up. Has lost from 251 to 219 in total.  Still taking ozempic 1mg  weekly and topiramate 75mg daily  A1C 5.8 four months ago. Has CGM  Gained 5 lbs since last visit Nov 2022  Feeling more hungry recently    PLAN:  Consider increase Ozempic to 2mg weekly or switch to Wegovy 1.7mg weekly. Continue to drink plenty of fluids and monitor for GI side effects.  Refill topiramate 75mg daily   Labs are needed.  Please call [phone] to schedule a lab only appointment at [facility] lab.    Follow up 3 months return ISABELLA Hawthorne              CURRENT WEIGHT:   219 lbs 0 oz    Initial Weight (lbs): 251.2 lbs  Last Visits Weight: 96.2 kg (212 lb)  Cumulative weight loss (lbs): 32.2  Weight Loss Percentage: 12.82%    Changes and Difficulties 6/22/2022   I have made the following changes to my diet since my last visit: No changes.   With regards to my diet, I am still struggling with: Not really.   I have made the following changes to my activity/exercise since my last visit: About the same.   With regards to my activity/exercise, I am still struggling with: Still trying to exercise 2-3 x a week.         MEDICATIONS:   Current Outpatient Medications   Medication Sig Dispense Refill     topiramate (TOPAMAX) 25 MG tablet Take 3 tablets (75 mg) by mouth daily 270 tablet 1     ACCU-CHEK GUIDE test strip USE TO TEST BLOOD SUGAR DAILY 100 strip 0     acetaminophen (TYLENOL) 325 MG tablet Take 325-650 mg by mouth every 6 hours as needed for mild pain or fever       ASPIRIN 81 MG OR TABS 1 tab po QD (Once per day) 100 3     atorvastatin (LIPITOR) 20 MG tablet TAKE 1 TABLET(20 MG) BY MOUTH DAILY 90 tablet 0     blood glucose monitoring (ACCU-CHEK FASTCLIX) lancets        Blood Glucose Monitoring Suppl (ACCU-CHEK GUIDE) w/Device KIT 1 kit daily Use to check blood sugar once daily and as needed 1 kit 0     cholecalciferol (VITAMIN  -D) 1000 UNITS capsule Take 1 capsule by mouth daily       Continuous Blood Gluc  (DEXCOM G6 ) KIKE Use to read blood sugars as per 's  instructions. 1 each 0     Continuous Blood Gluc Sensor (DEXCOM G6 SENSOR) MISC 1 each every 10 days Change every 10 days. 3 each 5     Continuous Blood Gluc Transmit (DEXCOM G6 TRANSMITTER) MISC 1 each every 3 months Change every 3 months. 1 each 1     diclofenac (VOLTAREN) 1 % topical gel Apply 4 grams to shoulders four times daily using enclosed dosing card. 100 g 1     lisinopril (ZESTRIL) 5 MG tablet Take 1 tablet (5 mg) by mouth daily 90 tablet 1     melatonin 3 MG tablet Take 6 mg by mouth nightly as needed for sleep       metFORMIN (GLUCOPHAGE) 1000 MG tablet Take 1 tablet (1,000 mg) by mouth daily (with dinner) Do not fill until contacted by patient 90 tablet 0     omeprazole (PRILOSEC) 20 MG DR capsule Take 20 mg by mouth daily as needed       Semaglutide, 1 MG/DOSE, (OZEMPIC, 1 MG/DOSE,) 4 MG/3ML SOPN Inject 1 mg Subcutaneous every 7 days 9 mL 1       Weight Loss Medication History Reviewed With Patient 6/22/2022   Which weight loss medications are you currently taking on a regular basis?  Ozempic, Topamax (topiramate), Metformin   Are you having any side effects from the weight loss medication that we have prescribed you? No   If you are having side effects please describe: -       Office Visit on 02/18/2022   Component Date Value Ref Range Status     Cholesterol 02/18/2022 125  <200 mg/dL Final     Triglycerides 02/18/2022 70  <150 mg/dL Final     Direct Measure HDL 02/18/2022 45  >=40 mg/dL Final     LDL Cholesterol Calculated 02/18/2022 66  <=100 mg/dL Final     Non HDL Cholesterol 02/18/2022 80  <130 mg/dL Final     Patient Fasting > 8hrs? 02/18/2022 Yes   Final     Hemoglobin A1C 02/18/2022 5.8 (A) 0.0 - 5.6 % Final    Normal <5.7%   Prediabetes 5.7-6.4%    Diabetes 6.5% or higher     Note: Adopted from ADA consensus guidelines.     Hold Specimen 02/18/2022 JIC   Final     Hold Specimen 02/18/2022 JIC   Final     Hold Specimen 02/18/2022 JI   Final       PHYSICAL EXAM:  Objective    Ht 1.829 m (6')    Wt 99.3 kg (219 lb)   BMI 29.70 kg/m             Vitals:  No vitals were obtained today due to virtual visit.    GENERAL: Healthy, alert and no distress  EYES: Eyes grossly normal to inspection.  No discharge or erythema, or obvious scleral/conjunctival abnormalities.  RESP: No audible wheeze, cough, or visible cyanosis.  No visible retractions or increased work of breathing.    SKIN: Visible skin clear. No significant rash, abnormal pigmentation or lesions.  NEURO: Cranial nerves grossly intact.  Mentation and speech appropriate for age.  PSYCH: Mentation appears normal, affect normal/bright, judgement and insight intact, normal speech and appearance well-groomed.        Sincerely,    Marine Flores PA-C

## 2022-06-23 ENCOUNTER — VIRTUAL VISIT (OUTPATIENT)
Dept: ENDOCRINOLOGY | Facility: CLINIC | Age: 57
End: 2022-06-23
Payer: COMMERCIAL

## 2022-06-23 DIAGNOSIS — E66.3 OVERWEIGHT (BMI 25.0-29.9): Primary | ICD-10-CM

## 2022-06-23 DIAGNOSIS — Z86.39 HX OF OBESITY: ICD-10-CM

## 2022-06-23 DIAGNOSIS — E11.8 TYPE 2 DIABETES MELLITUS WITH COMPLICATION, WITHOUT LONG-TERM CURRENT USE OF INSULIN (H): ICD-10-CM

## 2022-06-23 PROCEDURE — 99212 OFFICE O/P EST SF 10 MIN: CPT | Mod: 95 | Performed by: PHYSICIAN ASSISTANT

## 2022-06-23 RX ORDER — TOPIRAMATE 25 MG/1
75 TABLET, FILM COATED ORAL DAILY
Qty: 270 TABLET | Refills: 1 | Status: SHIPPED | OUTPATIENT
Start: 2022-06-23 | End: 2022-12-02

## 2022-06-23 NOTE — LETTER
2022       RE: William Montes  [address]  [place] [address]     Dear Colleague,    Thank you for referring your patient, William Montes, to the [facility] [place] at [facility]. Please see a copy of my visit note below.    Virtual Visit Check-In    During this virtual visit the patient is located in [place], patient verifies this as the location during the entirety of this visit.     William is a 57 year old who is being evaluated via a billable video visit.      How would you like to obtain your AVS? MyChart  If the video visit is dropped, the invitation should be resent by: Text to cell phone:  [phone]  Will anyone else be joining your video visit? No    Video-Visit Details    Video Start Time: 850    Type of service:  Video Visit    Video End Time:9:06 AM    Originating Location (pt. Location): Home    Distant Location (provider location):  [facility] [place]     Platform used for Video Visit: Ronna Harris NREMT      15 minutes spent on the date of the encounter doing chart review, history and exam, documentation and further activities per the note    Return Medical Weight Management Note     William Montes  MRN:  [id number]  :  1965  NAKUL:  2022    Dear Sirena Talley DO,    I had the pleasure of seeing your patient William Montes. He is a 57 year old male who I am continuing to see for treatment of obesity related to:       10/26/2018   I have the following health issues associated with obesity: Type II Diabetes, GERD (Reflux), Fatty Liver   I have the following symptoms associated with obesity: -       Assessment & Plan   Problem List Items Addressed This Visit        Endocrine Diagnoses    Type 2 diabetes mellitus with complication, without long-term current use of insulin (H)    Relevant Orders    Comprehensive metabolic panel       Other    Overweight  (BMI 25.0-29.9) - Primary    Relevant Medications    topiramate (TOPAMAX) 25 MG tablet    Other Relevant Orders    Comprehensive metabolic panel    Hx of obesity    Relevant Orders    Comprehensive metabolic panel         INTERVAL HISTORY:  [facility] follow up. Has lost from 251 to 219 in total.  Still taking ozempic 1mg weekly and topiramate 75mg daily  A1C 5.8 four months ago. Has CGM  Gained 5 lbs since last visit Nov 2022  Feeling more hungry recently    PLAN:  Consider increase Ozempic to 2mg weekly or switch to Wegovy 1.7mg weekly. Continue to drink plenty of fluids and monitor for GI side effects.  Refill topiramate 75mg daily   Labs are needed.  Please call [phone] to schedule a lab only appointment at [facility] lab.    Follow up 3 months return ISABELLA Hawthorne              CURRENT WEIGHT:   219 lbs 0 oz    Initial Weight (lbs): 251.2 lbs  Last Visits Weight: 96.2 kg (212 lb)  Cumulative weight loss (lbs): 32.2  Weight Loss Percentage: 12.82%    Changes and Difficulties 6/22/2022   I have made the following changes to my diet since my last visit: No changes.   With regards to my diet, I am still struggling with: Not really.   I have made the following changes to my activity/exercise since my last visit: About the same.   With regards to my activity/exercise, I am still struggling with: Still trying to exercise 2-3 x a week.         MEDICATIONS:   Current Outpatient Medications   Medication Sig Dispense Refill     topiramate (TOPAMAX) 25 MG tablet Take 3 tablets (75 mg) by mouth daily 270 tablet 1     ACCU-CHEK GUIDE test strip USE TO TEST BLOOD SUGAR DAILY 100 strip 0     acetaminophen (TYLENOL) 325 MG tablet Take 325-650 mg by mouth every 6 hours as needed for mild pain or fever       ASPIRIN 81 MG OR TABS 1 tab po QD (Once per day) 100 3     atorvastatin (LIPITOR) 20 MG tablet TAKE 1 TABLET(20 MG) BY MOUTH DAILY 90 tablet 0     blood glucose monitoring (ACCU-CHEK FASTCLIX) lancets        Blood  Glucose Monitoring Suppl (ACCU-CHEK GUIDE) w/Device KIT 1 kit daily Use to check blood sugar once daily and as needed 1 kit 0     cholecalciferol (VITAMIN  -D) 1000 UNITS capsule Take 1 capsule by mouth daily       Continuous Blood Gluc  (DEXCOM G6 ) KIKE Use to read blood sugars as per 's instructions. 1 each 0     Continuous Blood Gluc Sensor (DEXCOM G6 SENSOR) MISC 1 each every 10 days Change every 10 days. 3 each 5     Continuous Blood Gluc Transmit (DEXCOM G6 TRANSMITTER) MISC 1 each every 3 months Change every 3 months. 1 each 1     diclofenac (VOLTAREN) 1 % topical gel Apply 4 grams to shoulders four times daily using enclosed dosing card. 100 g 1     lisinopril (ZESTRIL) 5 MG tablet Take 1 tablet (5 mg) by mouth daily 90 tablet 1     melatonin 3 MG tablet Take 6 mg by mouth nightly as needed for sleep       metFORMIN (GLUCOPHAGE) 1000 MG tablet Take 1 tablet (1,000 mg) by mouth daily (with dinner) Do not fill until contacted by patient 90 tablet 0     omeprazole (PRILOSEC) 20 MG DR capsule Take 20 mg by mouth daily as needed       Semaglutide, 1 MG/DOSE, (OZEMPIC, 1 MG/DOSE,) 4 MG/3ML SOPN Inject 1 mg Subcutaneous every 7 days 9 mL 1       Weight Loss Medication History Reviewed With Patient 6/22/2022   Which weight loss medications are you currently taking on a regular basis?  Ozempic, Topamax (topiramate), Metformin   Are you having any side effects from the weight loss medication that we have prescribed you? No   If you are having side effects please describe: -       Office Visit on 02/18/2022   Component Date Value Ref Range Status     Cholesterol 02/18/2022 125  <200 mg/dL Final     Triglycerides 02/18/2022 70  <150 mg/dL Final     Direct Measure HDL 02/18/2022 45  >=40 mg/dL Final     LDL Cholesterol Calculated 02/18/2022 66  <=100 mg/dL Final     Non HDL Cholesterol 02/18/2022 80  <130 mg/dL Final     Patient Fasting > 8hrs? 02/18/2022 Yes   Final     Hemoglobin A1C  02/18/2022 5.8 (A) 0.0 - 5.6 % Final    Normal <5.7%   Prediabetes 5.7-6.4%    Diabetes 6.5% or higher     Note: Adopted from ADA consensus guidelines.     Hold Specimen 02/18/2022 JIC   Final     Hold Specimen 02/18/2022 JIC   Final     Hold Specimen 02/18/2022 JIC   Final       PHYSICAL EXAM:  Objective    Ht 1.829 m (6')   Wt 99.3 kg (219 lb)   BMI 29.70 kg/m             Vitals:  No vitals were obtained today due to virtual visit.    GENERAL: Healthy, alert and no distress  EYES: Eyes grossly normal to inspection.  No discharge or erythema, or obvious scleral/conjunctival abnormalities.  RESP: No audible wheeze, cough, or visible cyanosis.  No visible retractions or increased work of breathing.    SKIN: Visible skin clear. No significant rash, abnormal pigmentation or lesions.  NEURO: Cranial nerves grossly intact.  Mentation and speech appropriate for age.  PSYCH: Mentation appears normal, affect normal/bright, judgement and insight intact, normal speech and appearance well-groomed.        Sincerely,    Marine Flores PA-C

## 2022-07-01 ENCOUNTER — LAB (OUTPATIENT)
Dept: LAB | Facility: CLINIC | Age: 57
End: 2022-07-01
Payer: COMMERCIAL

## 2022-07-01 DIAGNOSIS — Z86.39 HX OF OBESITY: ICD-10-CM

## 2022-07-01 DIAGNOSIS — E11.3299 BACKGROUND DIABETIC RETINOPATHY (H): Primary | ICD-10-CM

## 2022-07-01 DIAGNOSIS — E66.3 OVERWEIGHT (BMI 25.0-29.9): ICD-10-CM

## 2022-07-01 DIAGNOSIS — E11.8 TYPE 2 DIABETES MELLITUS WITH COMPLICATION, WITHOUT LONG-TERM CURRENT USE OF INSULIN (H): ICD-10-CM

## 2022-07-01 LAB
ALBUMIN SERPL BCG-MCNC: 4.5 G/DL (ref 3.5–5.2)
ALP SERPL-CCNC: 76 U/L (ref 40–129)
ALT SERPL W P-5'-P-CCNC: 26 U/L (ref 10–50)
ANION GAP SERPL CALCULATED.3IONS-SCNC: 12 MMOL/L (ref 7–15)
AST SERPL W P-5'-P-CCNC: 24 U/L (ref 10–50)
BILIRUB SERPL-MCNC: 0.2 MG/DL
BUN SERPL-MCNC: 16.3 MG/DL (ref 8–23)
CALCIUM SERPL-MCNC: 9 MG/DL (ref 8.6–10)
CHLORIDE SERPL-SCNC: 110 MMOL/L (ref 98–107)
CREAT SERPL-MCNC: 1.48 MG/DL (ref 0.67–1.17)
CREAT UR-MCNC: 207 MG/DL
DEPRECATED HCO3 PLAS-SCNC: 19 MMOL/L (ref 22–29)
GFR SERPL CREATININE-BSD FRML MDRD: 54 ML/MIN/1.73M2
GLUCOSE SERPL-MCNC: 88 MG/DL (ref 70–99)
MICROALBUMIN UR-MCNC: <12 MG/L
MICROALBUMIN/CREAT UR: NORMAL MG/G{CREAT}
POTASSIUM SERPL-SCNC: 4.3 MMOL/L (ref 3.4–5.3)
PROT SERPL-MCNC: 6.7 G/DL (ref 6.4–8.3)
SODIUM SERPL-SCNC: 141 MMOL/L (ref 136–145)

## 2022-07-01 PROCEDURE — 82043 UR ALBUMIN QUANTITATIVE: CPT

## 2022-07-01 PROCEDURE — 36415 COLL VENOUS BLD VENIPUNCTURE: CPT

## 2022-07-01 PROCEDURE — 80053 COMPREHEN METABOLIC PANEL: CPT

## 2022-07-07 NOTE — RESULT ENCOUNTER NOTE
Dear William,     Here are your recent results. Urine testing is normal.     Please let us know if you have any questions or concerns.    Regards,  Radha Sterling,  (covering for Dr. Talley while she is out of the office)

## 2022-08-19 ENCOUNTER — OFFICE VISIT (OUTPATIENT)
Dept: FAMILY MEDICINE | Facility: CLINIC | Age: 57
End: 2022-08-19
Payer: COMMERCIAL

## 2022-08-19 VITALS
WEIGHT: 215.4 LBS | DIASTOLIC BLOOD PRESSURE: 70 MMHG | OXYGEN SATURATION: 99 % | BODY MASS INDEX: 29.17 KG/M2 | TEMPERATURE: 98.4 F | HEART RATE: 56 BPM | HEIGHT: 72 IN | SYSTOLIC BLOOD PRESSURE: 120 MMHG | RESPIRATION RATE: 16 BRPM

## 2022-08-19 DIAGNOSIS — Z71.84 TRAVEL ADVICE ENCOUNTER: ICD-10-CM

## 2022-08-19 DIAGNOSIS — E78.5 HYPERLIPIDEMIA LDL GOAL <100: ICD-10-CM

## 2022-08-19 DIAGNOSIS — R25.2 LEG CRAMPS: ICD-10-CM

## 2022-08-19 DIAGNOSIS — Z12.5 SCREENING FOR PROSTATE CANCER: ICD-10-CM

## 2022-08-19 DIAGNOSIS — I10 BENIGN ESSENTIAL HYPERTENSION: ICD-10-CM

## 2022-08-19 DIAGNOSIS — E66.3 OVERWEIGHT (BMI 25.0-29.9): ICD-10-CM

## 2022-08-19 DIAGNOSIS — E11.8 TYPE 2 DIABETES MELLITUS WITH COMPLICATION, WITHOUT LONG-TERM CURRENT USE OF INSULIN (H): Primary | ICD-10-CM

## 2022-08-19 LAB
ANION GAP SERPL CALCULATED.3IONS-SCNC: 7 MMOL/L (ref 3–14)
BUN SERPL-MCNC: 13 MG/DL (ref 7–30)
CALCIUM SERPL-MCNC: 9 MG/DL (ref 8.5–10.1)
CHLORIDE BLD-SCNC: 112 MMOL/L (ref 94–109)
CO2 SERPL-SCNC: 20 MMOL/L (ref 20–32)
CREAT SERPL-MCNC: 1.43 MG/DL (ref 0.66–1.25)
GFR SERPL CREATININE-BSD FRML MDRD: 56 ML/MIN/1.73M2
GLUCOSE BLD-MCNC: 91 MG/DL (ref 70–99)
HBA1C MFR BLD: 5.8 % (ref 0–5.6)
POTASSIUM BLD-SCNC: 4.1 MMOL/L (ref 3.4–5.3)
PSA SERPL-MCNC: 0.69 UG/L (ref 0–4)
SODIUM SERPL-SCNC: 139 MMOL/L (ref 133–144)

## 2022-08-19 PROCEDURE — 90677 PCV20 VACCINE IM: CPT | Performed by: FAMILY MEDICINE

## 2022-08-19 PROCEDURE — 99214 OFFICE O/P EST MOD 30 MIN: CPT | Mod: 25 | Performed by: FAMILY MEDICINE

## 2022-08-19 PROCEDURE — 83036 HEMOGLOBIN GLYCOSYLATED A1C: CPT | Performed by: FAMILY MEDICINE

## 2022-08-19 PROCEDURE — 90471 IMMUNIZATION ADMIN: CPT | Performed by: FAMILY MEDICINE

## 2022-08-19 PROCEDURE — 80048 BASIC METABOLIC PNL TOTAL CA: CPT | Performed by: FAMILY MEDICINE

## 2022-08-19 PROCEDURE — 36415 COLL VENOUS BLD VENIPUNCTURE: CPT | Performed by: FAMILY MEDICINE

## 2022-08-19 PROCEDURE — G0103 PSA SCREENING: HCPCS | Performed by: FAMILY MEDICINE

## 2022-08-19 RX ORDER — LISINOPRIL 2.5 MG/1
2.5 TABLET ORAL DAILY
Qty: 90 TABLET | Refills: 1 | Status: SHIPPED | OUTPATIENT
Start: 2022-08-19 | End: 2022-12-02

## 2022-08-19 RX ORDER — ATOVAQUONE AND PROGUANIL HYDROCHLORIDE 250; 100 MG/1; MG/1
1 TABLET, FILM COATED ORAL DAILY
Qty: 47 TABLET | Refills: 0 | Status: SHIPPED | OUTPATIENT
Start: 2022-08-19 | End: 2022-12-02

## 2022-08-19 RX ORDER — SEMAGLUTIDE 1.34 MG/ML
1 INJECTION, SOLUTION SUBCUTANEOUS
Qty: 40 ML | Refills: 1 | Status: SHIPPED | OUTPATIENT
Start: 2022-08-19 | End: 2023-01-16

## 2022-08-19 RX ORDER — BLOOD SUGAR DIAGNOSTIC
100 STRIP MISCELLANEOUS 2 TIMES DAILY
Qty: 100 STRIP | Refills: 0 | Status: SHIPPED | OUTPATIENT
Start: 2022-08-19 | End: 2024-02-23

## 2022-08-19 RX ORDER — TOPIRAMATE 25 MG/1
75 TABLET, FILM COATED ORAL DAILY
Qty: 270 TABLET | Refills: 1 | Status: CANCELLED | OUTPATIENT
Start: 2022-08-19

## 2022-08-19 RX ORDER — AZITHROMYCIN 500 MG/1
500 TABLET, FILM COATED ORAL DAILY
Qty: 3 TABLET | Refills: 0 | Status: SHIPPED | OUTPATIENT
Start: 2022-08-19 | End: 2022-12-02

## 2022-08-19 RX ORDER — TOPIRAMATE 100 MG/1
100 TABLET, FILM COATED ORAL DAILY
Qty: 90 TABLET | Refills: 1 | Status: SHIPPED | OUTPATIENT
Start: 2022-08-19 | End: 2022-12-02

## 2022-08-19 RX ORDER — ATORVASTATIN CALCIUM 20 MG/1
20 TABLET, FILM COATED ORAL DAILY
Qty: 90 TABLET | Refills: 3 | Status: SHIPPED | OUTPATIENT
Start: 2022-08-19 | End: 2023-10-11

## 2022-08-19 RX ORDER — LISINOPRIL 5 MG/1
5 TABLET ORAL DAILY
Qty: 90 TABLET | Refills: 1 | Status: CANCELLED | OUTPATIENT
Start: 2022-08-19

## 2022-08-19 ASSESSMENT — PAIN SCALES - GENERAL: PAINLEVEL: NO PAIN (0)

## 2022-08-19 NOTE — PROGRESS NOTES
Assessment & Plan     Type 2 diabetes mellitus with complication, without long-term current use of insulin (H) background diabetic retinopathy mild    The patient's A1c is very low.  We will stop the metformin and see how he does with Ozempic alone    - Hemoglobin A1c  - Semaglutide, 1 MG/DOSE, (OZEMPIC, 1 MG/DOSE,) 4 MG/3ML SOPN; Inject 1 mg Subcutaneous every 7 days for 92 days  - blood glucose (ACCU-CHEK GUIDE) test strip; 100 strips by In Vitro route 2 times daily Use to test blood sugar 2 times daily or as directed.  - Basic metabolic panel  (Ca, Cl, CO2, Creat, Gluc, K, Na, BUN); Future    Hyperlipidemia LDL goal <100  The current medical regimen is effective;  continue present plan and medications.    - atorvastatin (LIPITOR) 20 MG tablet; Take 1 tablet (20 mg) by mouth daily    Benign essential hypertension  Patient's nephrologist that his creatinine was elevated related to his lisinopril.  The patient would like to reduce his lisinopril dose.  This is reasonable.  We will recheck in 3 months  - lisinopril (ZESTRIL) 2.5 MG tablet; Take 1 tablet (2.5 mg) by mouth daily  - Basic metabolic panel  (Ca, Cl, CO2, Creat, Gluc, K, Na, BUN); Future    Leg cramps  We will recheck potassium today although this was normal a month ago.  I have given the patient stretches to do    Overweight (BMI 25.0-29.9)  The current medical regimen is effective;  continue present plan and medications.    - Semaglutide, 1 MG/DOSE, (OZEMPIC, 1 MG/DOSE,) 4 MG/3ML SOPN; Inject 1 mg Subcutaneous every 7 days for 92 days  - topiramate (TOPAMAX) 100 MG tablet; Take 1 tablet (100 mg) by mouth daily    Screening for prostate cancer    - PSA, screen    Travel advice encounter    - atovaquone-proguanil (MALARONE) 250-100 MG tablet; Take 1 tablet by mouth daily Start 2 days before exposure to Malaria and continue daily till  7 days after exposure.  - azithromycin (ZITHROMAX) 500 MG tablet; Take 1 tablet (500 mg) by mouth daily Take 1 tablet a  day for up to 3 days for severe diarrhea    30 minutes spent on the date of the encounter doing chart review, history and exam, documentation and further activities per the note       BMI:   Estimated body mass index is 29.21 kg/m  as calculated from the following:    Height as of this encounter: 1.829 m (6').    Weight as of this encounter: 97.7 kg (215 lb 6.4 oz).   Weight management plan: Discussed healthy diet and exercise guidelines      Return in about 3 months (around 11/19/2022) for diabetes.    Sirena Talley DO  [facility]    Mary Thomas is a 57 year old, presenting for the following health issues:  Chronic Disease Management      History of Present Illness       Diabetes:   He presents for follow up of diabetes.  He is checking home blood glucose with a continuous glucose monitor.  He checks blood glucose before meals.  Blood glucose is never over 200 and never under 70. He is aware of hypoglycemia symptoms including shakiness. He has no concerns regarding his diabetes at this time.  He is having burning in feet.         He eats 2-3 servings of fruits and vegetables daily.He consumes 0 sweetened beverage(s) daily.He exercises with enough effort to increase his heart rate 30 to 60 minutes per day.  He exercises with enough effort to increase his heart rate 3 or less days per week.   He is taking medications regularly.     Metformin 1000 mg daily and Ozempic 1 mg injected every 7 days  Lab Results   Component Value Date    A1C 5.8 08/19/2022    A1C 5.8 02/18/2022    A1C 5.7 08/20/2021    A1C 5.6 06/22/2021    A1C 5.8 11/12/2020    A1C 5.6 07/23/2020    A1C 6.1 03/09/2020    A1C 6.5 11/11/2019     Wt Readings from Last 4 Encounters:   08/19/22 97.7 kg (215 lb 6.4 oz)   06/22/22 99.3 kg (219 lb)   03/29/22 100.8 kg (222 lb 3.2 oz)   02/18/22 98.2 kg (216 lb 6.4 oz)         Hyperlipidemia Follow-Up      Are you regularly taking any medication or supplement to lower your  cholesterol?   Yes- atorvastatin    Are you having muscle aches or other side effects that you think could be caused by your cholesterol lowering medication?  No   LDL Cholesterol Calculated   Date Value Ref Range Status   02/18/2022 66 <=100 mg/dL Final   11/12/2020 66 <100 mg/dL Final     Comment:     Desirable:       <100 mg/dl             Hypertension Follow-up      Do you check your blood pressure regularly outside of the clinic? Yes     Are you following a low salt diet? Yes    Are your blood pressures ever more than 140 on the top number (systolic) OR more   than 90 on the bottom number (diastolic), for example 140/90? No 110-120/ 60-70's    Lisinopril 5 mg daily    He is traveling to [facility] in November       Review of Systems   Constitutional, HEENT, cardiovascular, pulmonary, gi and gu systems are negative, except as otherwise noted.      Objective    /70 (BP Location: Right arm, Patient Position: Sitting, Cuff Size: Adult Large)   Pulse 56   Temp 98.4  F (36.9  C) (Oral)   Resp 16   Ht 1.829 m (6')   Wt 97.7 kg (215 lb 6.4 oz)   SpO2 99%   BMI 29.21 kg/m    Body mass index is 29.21 kg/m .  Physical Exam   GENERAL: healthy, alert and no distress  NECK: no adenopathy, no asymmetry, masses, or scars and thyroid normal to palpation  RESP: lungs clear to auscultation - no rales, rhonchi or wheezes  CV: regular rate and rhythm, normal S1 S2, no S3 or S4, no murmur, click or rub, no peripheral edema and peripheral pulses strong  ABDOMEN: soft, nontender, no hepatosplenomegaly, no masses and bowel sounds normal  MS: no gross musculoskeletal defects noted, no edema  SKIN: no suspicious lesions or rashes  PSYCH: mentation appears normal, affect normal/bright    Results for orders placed or performed in visit on 08/19/22 (from the past 24 hour(s))   Hemoglobin A1c   Result Value Ref Range    Hemoglobin A1C 5.8 (H) 0.0 - 5.6 %                 .  ..

## 2022-09-06 DIAGNOSIS — E11.8 TYPE 2 DIABETES MELLITUS WITH COMPLICATION, WITHOUT LONG-TERM CURRENT USE OF INSULIN (H): ICD-10-CM

## 2022-09-08 RX ORDER — PROCHLORPERAZINE 25 MG/1
1 SUPPOSITORY RECTAL
Qty: 1 EACH | Refills: 1 | Status: SHIPPED | OUTPATIENT
Start: 2022-09-08 | End: 2023-12-08

## 2022-09-08 NOTE — TELEPHONE ENCOUNTER
Prescription approved per [facility] Refill Protocol.    Yaneli Reese RN BSN  [facility]

## 2022-09-26 ENCOUNTER — IMMUNIZATION (OUTPATIENT)
Dept: NURSING | Facility: CLINIC | Age: 57
End: 2022-09-26
Payer: COMMERCIAL

## 2022-09-26 PROCEDURE — 0134A COVID-19,PF,MODERNA BIVALENT: CPT

## 2022-09-26 PROCEDURE — 91313 COVID-19,PF,MODERNA BIVALENT: CPT

## 2022-09-28 ENCOUNTER — OFFICE VISIT (OUTPATIENT)
Dept: OPHTHALMOLOGY | Facility: CLINIC | Age: 57
End: 2022-09-28
Payer: COMMERCIAL

## 2022-09-28 DIAGNOSIS — E11.3299 BACKGROUND DIABETIC RETINOPATHY (H): ICD-10-CM

## 2022-09-28 DIAGNOSIS — H40.003 GLAUCOMA SUSPECT, BILATERAL: ICD-10-CM

## 2022-09-28 DIAGNOSIS — Z01.01 ENCOUNTER FOR EXAMINATION OF EYES AND VISION WITH ABNORMAL FINDINGS: Primary | ICD-10-CM

## 2022-09-28 DIAGNOSIS — H52.4 PRESBYOPIA: ICD-10-CM

## 2022-09-28 DIAGNOSIS — E11.8 TYPE 2 DIABETES MELLITUS WITH COMPLICATION, WITHOUT LONG-TERM CURRENT USE OF INSULIN (H): ICD-10-CM

## 2022-09-28 PROCEDURE — 92015 DETERMINE REFRACTIVE STATE: CPT | Performed by: OPHTHALMOLOGY

## 2022-09-28 PROCEDURE — 92014 COMPRE OPH EXAM EST PT 1/>: CPT | Performed by: OPHTHALMOLOGY

## 2022-09-28 ASSESSMENT — REFRACTION_MANIFEST
OD_CYLINDER: SPHERE
OS_SPHERE: +1.75
OS_ADD: +2.50
OD_SPHERE: +1.75
OD_ADD: +2.50
OS_CYLINDER: SPHERE

## 2022-09-28 ASSESSMENT — TONOMETRY
OD_IOP_MMHG: 12
OS_IOP_MMHG: 11
IOP_METHOD: APPLANATION

## 2022-09-28 ASSESSMENT — CONF VISUAL FIELD
OS_NORMAL: 1
METHOD: COUNTING FINGERS
OD_NORMAL: 1

## 2022-09-28 ASSESSMENT — CUP TO DISC RATIO
OD_RATIO: 0.7
OS_RATIO: 0.7

## 2022-09-28 ASSESSMENT — VISUAL ACUITY
OD_CC+: -2
OS_CC: 20/20
CORRECTION_TYPE: GLASSES
OD_CC: 20/20
METHOD: SNELLEN - LINEAR

## 2022-09-28 ASSESSMENT — EXTERNAL EXAM - RIGHT EYE: OD_EXAM: NORMAL

## 2022-09-28 ASSESSMENT — REFRACTION_WEARINGRX
OD_ADD: +2.50
OS_SPHERE: +1.75
SPECS_TYPE: PAL
OS_ADD: +2.50
OS_CYLINDER: SPHERE
OD_SPHERE: +2.00
OD_CYLINDER: SPHERE

## 2022-09-28 ASSESSMENT — SLIT LAMP EXAM - LIDS
COMMENTS: 1+ PTOSIS, 1+ SCLERAL SHOW
COMMENTS: 1+ PTOSIS, 1+ SCLERAL SHOW

## 2022-09-28 ASSESSMENT — EXTERNAL EXAM - LEFT EYE: OS_EXAM: NORMAL

## 2022-09-28 NOTE — PATIENT INSTRUCTIONS
"Glasses prescription given - optional  May use artificial tears up to four times a day (like Refresh Optive, Systane Balance, TheraTears, or generic artificial tears are ok. Avoid \"get the red out\" drops).   Continue observation with regard to glaucoma suspect status.    Call in May 2023 for an appointment in September 2023 for Complete Exam    Dr. Hodgson [phone]     Patient Education   Diabetes weakens the blood vessels all over the body, including the eyes. Damage to the blood vessels in the eyes can cause swelling or bleeding into part of the eye (called the retina). This is called diabetic retinopathy (GERALDINE-tin-AH-puh-thee). If not treated, this disease can cause vision loss or blindness.   Symptoms may include blurred or distorted vision, but many people have no symptoms. It's important to see your eye doctor regularly to check for problems.   Early treatment and good control can help protect your vision. Here are the things you can do to help prevent vision loss:      1. Keep your blood sugar levels under tight control.      2. Bring high blood pressure under control.      3. No smoking.      4. Have yearly dilated eye exams.       "

## 2022-09-28 NOTE — LETTER
"    9/28/2022         RE: William Montes  [address]  [facility] [address]        Dear Colleague,    Thank you for referring your patient, William Montes, to the [facility]. Please see a copy of my visit note below.     Current Eye Medications:  none     Subjective:  Diabetic, dilated exam - vision is stable with current gls. No pain or discomfort.     Type II DM   Last blood sugar. - 92 this AM    Lab Results   Component Value Date    A1C 5.8 08/19/2022    A1C 5.8 02/18/2022    A1C 5.7 08/20/2021    A1C 5.6 06/22/2021    A1C 5.8 11/12/2020    A1C 5.6 07/23/2020    A1C 6.1 03/09/2020    A1C 6.5 11/11/2019        Objective:  See Ophthalmology Exam.       Assessment:  Stable eye exam.  Stable mild background diabetic retinopathy both eyes.      ICD-10-CM    1. Encounter for examination of eyes and vision with abnormal findings  Z01.01    2. Presbyopia  H52.4    3. Type 2 diabetes mellitus with complication, without long-term current use of insulin (H)  E11.8    4. Background diabetic retinopathy, mild, ou  E11.3299    5. Glaucoma suspect, bilateral  H40.003         Plan:  Glasses prescription given - optional  May use artificial tears up to four times a day (like Refresh Optive, Systane Balance, TheraTears, or generic artificial tears are ok. Avoid \"get the red out\" drops).   Continue observation with regard to glaucoma suspect status.    Call in May 2023 for an appointment in September 2023 for Complete Exam    Dr. Hodgson [phone]              Again, thank you for allowing me to participate in the care of your patient.        Sincerely,        Harry Hodgson MD    "

## 2022-09-28 NOTE — PROGRESS NOTES
" Current Eye Medications:  none     Subjective:  Diabetic, dilated exam - vision is stable with current gls. No pain or discomfort.     Type II DM   Last blood sugar. - 92 this AM    Lab Results   Component Value Date    A1C 5.8 08/19/2022    A1C 5.8 02/18/2022    A1C 5.7 08/20/2021    A1C 5.6 06/22/2021    A1C 5.8 11/12/2020    A1C 5.6 07/23/2020    A1C 6.1 03/09/2020    A1C 6.5 11/11/2019        Objective:  See Ophthalmology Exam.       Assessment:  Stable eye exam.  Stable mild background diabetic retinopathy both eyes.      ICD-10-CM    1. Encounter for examination of eyes and vision with abnormal findings  Z01.01    2. Presbyopia  H52.4    3. Type 2 diabetes mellitus with complication, without long-term current use of insulin (H)  E11.8    4. Background diabetic retinopathy, mild, ou  E11.3299    5. Glaucoma suspect, bilateral  H40.003         Plan:  Glasses prescription given - optional  May use artificial tears up to four times a day (like Refresh Optive, Systane Balance, TheraTears, or generic artificial tears are ok. Avoid \"get the red out\" drops).   Continue observation with regard to glaucoma suspect status.    Call in May 2023 for an appointment in September 2023 for Complete Exam    Dr. Hodgson [phone]          "

## 2022-10-21 DIAGNOSIS — I10 BENIGN ESSENTIAL HYPERTENSION: ICD-10-CM

## 2022-10-23 ENCOUNTER — HEALTH MAINTENANCE LETTER (OUTPATIENT)
Age: 57
End: 2022-10-23

## 2022-10-23 RX ORDER — LISINOPRIL 5 MG/1
TABLET ORAL
Qty: 90 TABLET | Refills: 1 | Status: SHIPPED | OUTPATIENT
Start: 2022-10-23 | End: 2022-12-02

## 2022-11-16 DIAGNOSIS — E11.8 TYPE 2 DIABETES MELLITUS WITH COMPLICATION, WITHOUT LONG-TERM CURRENT USE OF INSULIN (H): ICD-10-CM

## 2022-11-17 RX ORDER — PROCHLORPERAZINE 25 MG/1
1 SUPPOSITORY RECTAL
Qty: 3 EACH | Refills: 5 | Status: SHIPPED | OUTPATIENT
Start: 2022-11-17 | End: 2023-12-08

## 2022-12-02 ENCOUNTER — OFFICE VISIT (OUTPATIENT)
Dept: FAMILY MEDICINE | Facility: CLINIC | Age: 57
End: 2022-12-02
Payer: COMMERCIAL

## 2022-12-02 VITALS
HEART RATE: 49 BPM | BODY MASS INDEX: 29.4 KG/M2 | SYSTOLIC BLOOD PRESSURE: 118 MMHG | OXYGEN SATURATION: 100 % | HEIGHT: 72 IN | DIASTOLIC BLOOD PRESSURE: 76 MMHG | TEMPERATURE: 98.1 F | RESPIRATION RATE: 20 BRPM | WEIGHT: 217.1 LBS

## 2022-12-02 DIAGNOSIS — N18.30 STAGE 3 CHRONIC KIDNEY DISEASE, UNSPECIFIED WHETHER STAGE 3A OR 3B CKD (H): ICD-10-CM

## 2022-12-02 DIAGNOSIS — E78.5 HYPERLIPIDEMIA LDL GOAL <100: ICD-10-CM

## 2022-12-02 DIAGNOSIS — E11.8 TYPE 2 DIABETES MELLITUS WITH COMPLICATION, WITHOUT LONG-TERM CURRENT USE OF INSULIN (H): Primary | ICD-10-CM

## 2022-12-02 DIAGNOSIS — E66.3 OVERWEIGHT (BMI 25.0-29.9): ICD-10-CM

## 2022-12-02 DIAGNOSIS — I10 BENIGN ESSENTIAL HYPERTENSION: ICD-10-CM

## 2022-12-02 LAB — HBA1C MFR BLD: 5.9 % (ref 0–5.6)

## 2022-12-02 PROCEDURE — 83036 HEMOGLOBIN GLYCOSYLATED A1C: CPT | Performed by: FAMILY MEDICINE

## 2022-12-02 PROCEDURE — 90471 IMMUNIZATION ADMIN: CPT | Performed by: FAMILY MEDICINE

## 2022-12-02 PROCEDURE — 99214 OFFICE O/P EST MOD 30 MIN: CPT | Mod: 25 | Performed by: FAMILY MEDICINE

## 2022-12-02 PROCEDURE — 90682 RIV4 VACC RECOMBINANT DNA IM: CPT | Performed by: FAMILY MEDICINE

## 2022-12-02 PROCEDURE — 36415 COLL VENOUS BLD VENIPUNCTURE: CPT | Performed by: FAMILY MEDICINE

## 2022-12-02 PROCEDURE — 80061 LIPID PANEL: CPT | Performed by: FAMILY MEDICINE

## 2022-12-02 PROCEDURE — 80048 BASIC METABOLIC PNL TOTAL CA: CPT | Performed by: FAMILY MEDICINE

## 2022-12-02 RX ORDER — LISINOPRIL 2.5 MG/1
2.5 TABLET ORAL DAILY
Qty: 90 TABLET | Refills: 1 | Status: SHIPPED | OUTPATIENT
Start: 2022-12-02 | End: 2023-07-17

## 2022-12-02 RX ORDER — TOPIRAMATE 100 MG/1
100 TABLET, FILM COATED ORAL DAILY
Qty: 90 TABLET | Refills: 1 | Status: SHIPPED | OUTPATIENT
Start: 2022-12-02 | End: 2023-05-08

## 2022-12-02 ASSESSMENT — PAIN SCALES - GENERAL: PAINLEVEL: NO PAIN (0)

## 2022-12-02 NOTE — PROGRESS NOTES
Assessment & Plan     Type 2 diabetes mellitus with complication, without long-term current use of insulin (H)  The current medical regimen is effective;  continue present plan and medications.  Continue Ozempic only.  Doing well off metformin  - Hemoglobin A1c; Future  - Basic metabolic panel  (Ca, Cl, CO2, Creat, Gluc, K, Na, BUN); Future  - Basic metabolic panel  (Ca, Cl, CO2, Creat, Gluc, K, Na, BUN)  - Hemoglobin A1c    Hyperlipidemia LDL goal <100  The current medical regimen is effective;  continue present plan and medications.    - Lipid panel reflex to direct LDL Fasting; Future  - Lipid panel reflex to direct LDL Fasting    Overweight (BMI 25.0-29.9)  The current medical regimen is effective;  continue present plan and medications.    - topiramate (TOPAMAX) 100 MG tablet; Take 1 tablet (100 mg) by mouth daily    Benign essential hypertension  The patient is on a lower dose of lisinopril to see if this would help his creatinine.  His blood pressure still controlled  - lisinopril (ZESTRIL) 2.5 MG tablet; Take 1 tablet (2.5 mg) by mouth daily    Stage 3 chronic kidney disease, unspecified whether stage 3a or 3b CKD (H)  Recheck labs today        30 minutes spent on the date of the encounter doing chart review, history and exam, documentation and further activities per the note         Return in about 6 months (around 6/2/2023) for BP Recheck, Physical Exam.    Sirena Talley DO  [facility]    Mary Thomas is a 57 year old, presenting for the following health issues:  Chronic Disease Management      HPI     --Wanting his creatinine levels checked today too    Diabetes Follow-up    How often are you checking your blood sugar? Continuous glucose monitor  What time of day are you checking your blood sugars (select all that apply)?  Before and after meals  Have you had any blood sugars above 200?  No  Have you had any blood sugars below 70?  No    What symptoms do you notice when  your blood sugar is low?  None    What concerns do you have today about your diabetes? None     Do you have any of these symptoms? (Select all that apply)  No numbness or tingling in feet.  No redness, sores or blisters on feet.  No complaints of excessive thirst.  No reports of blurry vision.  No significant changes to weight.  Diet controlled    Lab Results   Component Value Date    A1C 5.9 12/02/2022    A1C 5.8 08/19/2022    A1C 5.8 02/18/2022    A1C 5.7 08/20/2021    A1C 5.6 06/22/2021    A1C 5.8 11/12/2020    A1C 5.6 07/23/2020    A1C 6.1 03/09/2020    A1C 6.5 11/11/2019       Hyperlipidemia Follow-Up      Are you regularly taking any medication or supplement to lower your cholesterol?   Yes- atorvastatin    Are you having muscle aches or other side effects that you think could be caused by your cholesterol lowering medication?  No   LDL Cholesterol Calculated   Date Value Ref Range Status   02/18/2022 66 <=100 mg/dL Final   11/12/2020 66 <100 mg/dL Final     Comment:     Desirable:       <100 mg/dl       Lipitor 20 mg daily    Hypertension Follow-up      Do you check your blood pressure regularly outside of the clinic? Yes     Are you following a low salt diet? Yes    Are your blood pressures ever more than 140 on the top number (systolic) OR more   than 90 on the bottom number (diastolic), for example 140/90? No 120/73, 128/78, 114/74, 119/71, 113/69  Lisinopril 2.5 mg daily REDUCED FROM 5 MG     BP Readings from Last 2 Encounters:   12/02/22 118/76   08/19/22 120/70     Hemoglobin A1C (%)   Date Value   12/02/2022 5.9 (H)   08/19/2022 5.8 (H)   06/22/2021 5.6   11/12/2020 5.8 (H)     LDL Cholesterol Calculated (mg/dL)   Date Value   02/18/2022 66   11/12/2020 66   04/04/2019 60     The patient has been on Topamax 100 mg twice daily for weight loss.    Wt Readings from Last 4 Encounters:   12/02/22 98.5 kg (217 lb 1.6 oz)   08/19/22 97.7 kg (215 lb 6.4 oz)   06/22/22 99.3 kg (219 lb)   03/29/22 100.8 kg (222  lb 3.2 oz)     He has a itchy spot on the right chest for a few months on and off.  He used vaseline.        Review of Systems   Constitutional, HEENT, cardiovascular, pulmonary, gi and gu systems are negative, except as otherwise noted.      Objective    /76 (BP Location: Right arm, Patient Position: Sitting, Cuff Size: Adult Large)   Pulse (!) 49   Temp 98.1  F (36.7  C) (Oral)   Resp 20   Ht 1.829 m (6')   Wt 98.5 kg (217 lb 1.6 oz)   SpO2 100%   BMI 29.44 kg/m    Body mass index is 29.44 kg/m .  Physical Exam   GENERAL: healthy, alert and no distress  NECK: no adenopathy, no asymmetry, masses, or scars and thyroid normal to palpation  RESP: lungs clear to auscultation - no rales, rhonchi or wheezes  CV: regular rate and rhythm, normal S1 S2, no S3 or S4, no murmur, click or rub, no peripheral edema and peripheral pulses strong  MS: no gross musculoskeletal defects noted, no edema  PSYCH: mentation appears normal, affect normal/bright    Results for orders placed or performed in visit on 12/02/22 (from the past 24 hour(s))   Hemoglobin A1c   Result Value Ref Range    Hemoglobin A1C 5.9 (H) 0.0 - 5.6 %

## 2022-12-03 LAB
ANION GAP SERPL CALCULATED.3IONS-SCNC: 14 MMOL/L (ref 7–15)
BUN SERPL-MCNC: 11.2 MG/DL (ref 8–23)
CALCIUM SERPL-MCNC: 9 MG/DL (ref 8.6–10)
CHLORIDE SERPL-SCNC: 108 MMOL/L (ref 98–107)
CHOLEST SERPL-MCNC: 119 MG/DL
CREAT SERPL-MCNC: 1.65 MG/DL (ref 0.67–1.17)
DEPRECATED HCO3 PLAS-SCNC: 16 MMOL/L (ref 22–29)
GFR SERPL CREATININE-BSD FRML MDRD: 48 ML/MIN/1.73M2
GLUCOSE SERPL-MCNC: 98 MG/DL (ref 70–99)
HDLC SERPL-MCNC: 36 MG/DL
LDLC SERPL CALC-MCNC: 70 MG/DL
NONHDLC SERPL-MCNC: 83 MG/DL
POTASSIUM SERPL-SCNC: 4.6 MMOL/L (ref 3.4–5.3)
SODIUM SERPL-SCNC: 138 MMOL/L (ref 136–145)
TRIGL SERPL-MCNC: 63 MG/DL

## 2022-12-04 ENCOUNTER — TELEPHONE (OUTPATIENT)
Dept: FAMILY MEDICINE | Facility: CLINIC | Age: 57
End: 2022-12-04

## 2022-12-04 DIAGNOSIS — N18.30 STAGE 3 CHRONIC KIDNEY DISEASE, UNSPECIFIED WHETHER STAGE 3A OR 3B CKD (H): Primary | ICD-10-CM

## 2022-12-04 NOTE — TELEPHONE ENCOUNTER
Malissa Thomas,    Thank you for getting your labs done.  Your labs showed your kidney function is worse.  It could be from the lower dose of lisinopril.  I suggest you see the nephrologist/kidney specialist.  I have placed a referral and someone will call you to schedule.   Feel free to email or call if you have any questions.    Take care,    Sirena Talley D.O.

## 2022-12-05 ENCOUNTER — TELEPHONE (OUTPATIENT)
Dept: NEPHROLOGY | Facility: CLINIC | Age: 57
End: 2022-12-05

## 2022-12-05 DIAGNOSIS — N18.30 STAGE 3 CHRONIC KIDNEY DISEASE, UNSPECIFIED WHETHER STAGE 3A OR 3B CKD (H): Primary | ICD-10-CM

## 2022-12-05 NOTE — TELEPHONE ENCOUNTER
[facility] Call Center    Phone Message    May a detailed message be left on voicemail: yes     Reason for Call: Order(s): Other:   Reason for requested: Lab  Date needed: 1/23/22  Provider name: Dr. Fragoso      Action Taken: Message routed to:  [facility] NEPH    Travel Screening: Not Applicable

## 2023-01-11 ENCOUNTER — TELEPHONE (OUTPATIENT)
Dept: FAMILY MEDICINE | Facility: CLINIC | Age: 58
End: 2023-01-11

## 2023-01-11 NOTE — TELEPHONE ENCOUNTER
{Screenshot of Product, Insurance, and Cardholder ID)  Product: Dexcom Transmitter and Dexcom Sensor  Insurance: [employer]  ID: [id number]        Please route determinations to the Pharm Diabetes pool ([id number]).      Thank you!    Diabetes Care Services Team   [facility]  [address] [place] [address]

## 2023-01-15 ENCOUNTER — TELEPHONE (OUTPATIENT)
Dept: FAMILY MEDICINE | Facility: CLINIC | Age: 58
End: 2023-01-15
Payer: COMMERCIAL

## 2023-01-15 NOTE — TELEPHONE ENCOUNTER
Central Prior Authorization Team   Phone: [phone]    PA Initiation    Medication: Continuous Blood Gluc Transmit (DEXCOM G6 TRANSMITTER) MISC  Insurance Company:    Pharmacy Filling the Rx: [facility] - [place], [place] - [address]  Filling Pharmacy Phone: [phone]  Filling Pharmacy Fax: [phone]  Start Date: 1/15/2023

## 2023-01-15 NOTE — TELEPHONE ENCOUNTER
Central Prior Authorization Team   Phone: [phone]    PA Initiation    Medication:   Insurance Company:    Pharmacy Filling the Rx: [facility] - [place], [place] - [facility] [address]  Filling Pharmacy Phone: [phone]  Filling Pharmacy Fax: [phone]  Start Date: 1/15/2023

## 2023-01-16 ENCOUNTER — TELEPHONE (OUTPATIENT)
Dept: FAMILY MEDICINE | Facility: CLINIC | Age: 58
End: 2023-01-16

## 2023-01-16 NOTE — TELEPHONE ENCOUNTER
PRIOR AUTHORIZATION DENIED    Medication: Continuous Blood Gluc Transmit (DEXCOM G6 TRANSMITTER) MISC - DENIED    Denial Date: 1/15/2023    Denial Rational: PATIENT DID NOT MEET CRITERIA -        Appeal Information:  IF YOU WOULD LIKE TO APPEAL PLEASE SUPPLY PA TEAM WITH A LETTER OF MEDICAL NECESSITY WITH CLINICAL REASON.

## 2023-01-16 NOTE — TELEPHONE ENCOUNTER
PRIOR AUTHORIZATION DENIED    Medication: DEXCOM - DENIED    Denial Date: 1/15/2023    Denial Rational: PATIENT DID NOT MEET CRITERIA -        Appeal Information:  IF YOU WOULD LIKE TO APPEAL PLEASE SUPPLY PA TEAM WITH A LETTER OF MEDICAL NECESSITY WITH CLINICAL REASON.

## 2023-01-23 ENCOUNTER — LAB (OUTPATIENT)
Dept: LAB | Facility: CLINIC | Age: 58
End: 2023-01-23
Payer: COMMERCIAL

## 2023-01-23 DIAGNOSIS — N18.30 STAGE 3 CHRONIC KIDNEY DISEASE, UNSPECIFIED WHETHER STAGE 3A OR 3B CKD (H): ICD-10-CM

## 2023-01-23 LAB
ALBUMIN MFR UR ELPH: 10.5 MG/DL (ref 1–14)
ALBUMIN SERPL BCG-MCNC: 4.4 G/DL (ref 3.5–5.2)
ANION GAP SERPL CALCULATED.3IONS-SCNC: 11 MMOL/L (ref 7–15)
BUN SERPL-MCNC: 17.6 MG/DL (ref 8–23)
CALCIUM SERPL-MCNC: 8.7 MG/DL (ref 8.6–10)
CHLORIDE SERPL-SCNC: 112 MMOL/L (ref 98–107)
CREAT SERPL-MCNC: 1.57 MG/DL (ref 0.67–1.17)
CREAT UR-MCNC: 141 MG/DL
DEPRECATED HCO3 PLAS-SCNC: 18 MMOL/L (ref 22–29)
GFR SERPL CREATININE-BSD FRML MDRD: 50 ML/MIN/1.73M2
GLUCOSE SERPL-MCNC: 97 MG/DL (ref 70–99)
HGB BLD-MCNC: 15 G/DL (ref 13.3–17.7)
PHOSPHATE SERPL-MCNC: 3.4 MG/DL (ref 2.5–4.5)
POTASSIUM SERPL-SCNC: 4.2 MMOL/L (ref 3.4–5.3)
PROT/CREAT 24H UR: 0.07 MG/MG CR (ref 0–0.2)
PTH-INTACT SERPL-MCNC: 34 PG/ML (ref 15–65)
SODIUM SERPL-SCNC: 141 MMOL/L (ref 136–145)

## 2023-01-23 PROCEDURE — 80069 RENAL FUNCTION PANEL: CPT

## 2023-01-23 PROCEDURE — 84156 ASSAY OF PROTEIN URINE: CPT

## 2023-01-23 PROCEDURE — 85018 HEMOGLOBIN: CPT

## 2023-01-23 PROCEDURE — 36415 COLL VENOUS BLD VENIPUNCTURE: CPT

## 2023-01-23 PROCEDURE — 83970 ASSAY OF PARATHORMONE: CPT

## 2023-01-29 NOTE — TELEPHONE ENCOUNTER
Please let the patient know he does not meet his insurance companies criteria for a continuous glucose monitor.  An appeal would not be successful because he has well-controlled diabetes.  He could see how much this would cost out-of-pocket if he would like to pursue this.    Sirena Talley DO

## 2023-01-30 ENCOUNTER — VIRTUAL VISIT (OUTPATIENT)
Dept: NEPHROLOGY | Facility: CLINIC | Age: 58
End: 2023-01-30
Attending: FAMILY MEDICINE
Payer: COMMERCIAL

## 2023-01-30 DIAGNOSIS — R79.89 ELEVATED SERUM CREATININE: Primary | ICD-10-CM

## 2023-01-30 DIAGNOSIS — E11.8 TYPE 2 DIABETES MELLITUS WITH COMPLICATION, WITHOUT LONG-TERM CURRENT USE OF INSULIN (H): ICD-10-CM

## 2023-01-30 PROCEDURE — 99214 OFFICE O/P EST MOD 30 MIN: CPT | Mod: 95 | Performed by: INTERNAL MEDICINE

## 2023-01-30 NOTE — LETTER
1/30/2023       RE: William Montes  [address]  [facility] [address]     Dear Colleague,    Thank you for referring your patient, William Montes, to the [facility]. Please see a copy of my visit note below.    William is a 57 year old who is being evaluated via a billable video visit.      How would you like to obtain your AVS? MyChart  If the video visit is dropped, the invitation should be resent by: Text to cell phone: [phone]  Will anyone else be joining your video visit? No      Video-Visit Details    Type of service:  Video Visit     Originating Location (pt. Location): Home  Distant Location (provider location):  On-site  Platform used for Video Visit: [employer]  Video for 8 minutes, then video stopped and finished visit with telephone for 13- total of 21 minutes    Assessment and Plan:  57 year old male with history of diabetes mellitus since 1999, mild/ borderline hypertension, and mild retinopathy, fatty liver, overweight,  who presents for followup of elevated creatinine. His Scr is 1.3-1.4 but cystatin C was 0.77 with eGFR >90 in 2019.  His Scr recently is 1.5 thus follows up  #Elevated creatinine/ normal GFR by cystatin - his Scr going back many years was 1.1-1.2, but is up to 1.3-1.4 range in recent years in setting of being on lisinopril. He has 20+ year history of diabetes which is well controlled, no retinopathy and no hypertension.  - lisinopril may be elevating creatinine to some extent, but did not change significantly off/ on lower dose lisinopril  cystatin C was 0.7 with well preserved GFR- significant discordance between creatinine and cystatin based GFR   - can consider iothalamate but for now will consider him normal GFR/ not CKD  - reassured him today and will do another cystatin C at next lab visit.   - continue lisinopril given he is a diabetic, as tolerated, for /80 - 130/80- he is on 2.5mg and BP  is 110 range, thus will not increase it, given no proteinuria at this time compelling us  -US kidney- normal  - topamax- discussed small risk of kidney stones, does not have symptoms, discussed with patient      - causing metabolic acidosis due to CA inhibitor action - bicarb lower but had diarrhea recently- monitor  # Hypertension: mild / borderline hypertension, controlled, on lisinopril 2.5mg at this time  - continue lisinopril    # Electrolytes:   - Potassium; level: Normal   - low bicarbonate- suspect metabolic acidosis due to carbonic anhydrase inhibition by topamax- he will do high Fruit and vegetable diet and we will repeat in 3 months.    # Mineral Bone Disorder:   - Calcium; level is:  Normal     Assessment and plan was discussed with patient and he voiced his understanding and agreement.      HPI:  He is a very pleasant male (originally from [facility]), who presents for followup of elevated creatinine.  His cystatin C was 0.7 with eGFR >90 at time of creatinine of 1.3-1.4 thus creatinine is underestimating his kidney function. This was discussed with the patient at prior visit and again today.  Given he is at risk, however, I would like to continue lisinopril and continue to monitor renal function and check cystatin C yearly to compare.  He has had diabetes since 1999 which has been well controlled  He denies any eye changes. He has had some tingling in his hands, thought it might be due to topamax and has lowered the dose.    Review of labs show creatinine of 1-1.2 for many years, but has been up to 1.3-1.4s in the last couple of years. His albuminuria has always been normal except for one incidence of mild elevation.  He does not have high blood pressure but was started on lisinopril a few years back for renal protection.  He denies any skin or joint issues. He does not take NSAIDs or other OTC.s  He has fatty liver and has been seen in hepatology for this. He lost 40 lbs once he was diagnosed with this  but has gained back a few pounds since covid.  He is otherwise fairly healthy and denies issues.  We discussed normal kidney function, creatinine measurement and indication of kidney function, muscle mass impact, and the factors that help preserve kidney function and avoid DERRICK.  He denies family history of renal failure.     We discussed his low bicarbonate of 19, which is likely due to topamax. We discussed effects of chronic metabolic acidosis. We discussed high F + V diet and decreased animal protein before considering bicarbonate supplement.  He did have some diarrhea last week. His blood pressure is low normal but he denies dizziness. .  He is concerned that his creatinine is up to 1.5 despite lowering lisinopril to 2.5mg.      Renal History:   DM    ROS:   A comprehensive review of systems was obtained and negative, except as noted in the HPI or PMH.    Active Medical Problems:  Patient Active Problem List   Diagnosis     Chronic gingivitis     Esophageal reflux     Hyperlipidemia LDL goal <100     Vitamin D deficiency     Elevated LFTs     Other chronic nonalcoholic liver disease     Glaucoma suspect, bilateral     Type 2 diabetes mellitus with complication, without long-term current use of insulin (H)     Background diabetic retinopathy, mild, ou     Overweight (BMI 25.0-29.9)     Hx of obesity     Arthritis of both glenohumeral joints     Benign essential hypertension     Stage 3 chronic kidney disease, unspecified whether stage 3a or 3b CKD (H)     PMH:   Medical record was reviewed and PMH was discussed with patient and noted below.  Past Medical History:   Diagnosis Date     ABNORMAL LIVER FUNCTION STUDY 1/4/2008    increased ast, alt Normal since 2008     Arthritis of both glenohumeral joints 2/18/2022     Benign essential hypertension 12/2/2022     Chronic gingivitis      Esophageal reflux      Glaucoma      History of blood transfusion     My son has aplastic anemia and has had bld transfusions      Nonspecific abnormal results of liver function study      OBESITY NOS 12/13/2006     Pure hypercholesterolemia      Type II or unspecified type diabetes mellitus without mention of complication, not stated as uncontrolled      Uncomplicated asthma     My mother has asthma     PSH:   Past Surgical History:   Procedure Laterality Date     BIOPSY      My son had bone marrow biopsy     COLONOSCOPY N/A 5/22/2019    Procedure: COLONOSCOPY;  Surgeon: Leventhal, Thomas Michael, MD;  Location: [facility] OR     ENT SURGERY      My son has had tympanoplasty     NO HISTORY OF SURGERY       ORTHOPEDIC SURGERY      My mother had left hip replacement surgery       Family Hx:   Family History   Problem Relation Age of Onset     Hypertension Mother      Diabetes Sister      Diabetes Cousin      Hypertension Other      C.A.D. No family hx of      Cancer - colorectal No family hx of      Glaucoma No family hx of      Macular Degeneration No family hx of      Cancer No family hx of      Cerebrovascular Disease No family hx of      Breast Cancer No family hx of      Prostate Cancer No family hx of      Thyroid Disease No family hx of      Personal Hx:   Social History     Tobacco Use     Smoking status: Never     Smokeless tobacco: Never   Substance Use Topics     Alcohol use: No     Comment: none for 11/2011       Allergies:  No Known Allergies    Medications:  Current Outpatient Medications   Medication Sig     acetaminophen (TYLENOL) 325 MG tablet Take 325-650 mg by mouth every 6 hours as needed for mild pain or fever     ASPIRIN 81 MG OR TABS 1 tab po QD (Once per day)     atorvastatin (LIPITOR) 20 MG tablet Take 1 tablet (20 mg) by mouth daily     blood glucose (ACCU-CHEK GUIDE) test strip 100 strips by In Vitro route 2 times daily Use to test blood sugar 2 times daily or as directed.     blood glucose monitoring (ACCU-CHEK FASTCLIX) lancets      Blood Glucose Monitoring Suppl (ACCU-CHEK GUIDE) w/Device KIT 1 kit daily Use to check blood  sugar once daily and as needed     cholecalciferol (VITAMIN  -D) 1000 UNITS capsule Take 1 capsule by mouth daily     Continuous Blood Gluc  (DEXCOM G6 ) KIKE Use to read blood sugars as per 's instructions.     Continuous Blood Gluc Sensor (DEXCOM G6 SENSOR) MISC 1 each every 10 days Change every 10 days.     Continuous Blood Gluc Transmit (DEXCOM G6 TRANSMITTER) MISC 1 each every 3 months Change every 3 months.     diclofenac (VOLTAREN) 1 % topical gel Apply 4 grams to shoulders four times daily using enclosed dosing card.     lisinopril (ZESTRIL) 2.5 MG tablet Take 1 tablet (2.5 mg) by mouth daily     melatonin 3 MG tablet Take 6 mg by mouth nightly as needed for sleep     omeprazole (PRILOSEC) 20 MG DR capsule Take 20 mg by mouth daily as needed     Semaglutide, 1 MG/DOSE, (OZEMPIC, 1 MG/DOSE,) 4 MG/3ML SOPN Inject 1 mg Subcutaneous every 7 days     topiramate (TOPAMAX) 100 MG tablet Take 1 tablet (100 mg) by mouth daily     No current facility-administered medications for this visit.      Vitals:  There were no vitals taken for this visit.    GENERAL: Healthy, alert and no distress  EYES: Eyes grossly normal to inspection.  No discharge or erythema, or obvious scleral/conjunctival abnormalities.  RESP: No audible wheeze, cough, or visible cyanosis.  No visible retractions or increased work of breathing.    SKIN: Visible skin clear. No significant rash, abnormal pigmentation or lesions.  NEURO: Cranial nerves grossly intact.  Mentation and speech appropriate for age.  PSYCH: Mentation appears normal, affect normal/bright, judgement and insight intact, normal speech and appearance well-groomed.  Lungs: clear bilaterally  CV: regular rate    LABS:   CMP  Recent Labs   Lab Test 01/23/23  0818 12/02/22  0936 08/19/22  0833 07/01/22  0945 07/27/21  0935 06/22/21  1420 02/09/21  1529 01/04/21  1235 11/12/20  1158    138 139 141   < > 135 135 138 138   POTASSIUM 4.2 4.6 4.1 4.3   < >  4.1 4.1 4.2 4.0   CHLORIDE 112* 108* 112* 110*   < > 106 108 110* 110*   CO2 18* 16* 20 19*   < > 23 23 22 21   ANIONGAP 11 14 7 12   < > 6 4 6 7   GLC 97 98 91 88   < > 91 71 91 82   BUN 17.6 11.2 13 16.3   < > 13 14 17 14   CR 1.57* 1.65* 1.43* 1.48*   < > 1.47* 1.40* 1.60* 1.40*   GFRESTIMATED 50* 48* 56* 54*   < > 52* 55* 47* 55*   GFRESTBLACK  --   --   --   --   --  60* 64 54* 64   SHANA 8.7 9.0 9.0 9.0   < > 8.7 9.0 8.8 8.6    < > = values in this interval not displayed.     Recent Labs   Lab Test 07/01/22  0945 12/13/21  1252 08/20/21  1359 01/04/21  1235   BILITOTAL 0.2 0.5 0.4 0.4   ALKPHOS 76 77 65 78   ALT 26 33 38 29   AST 24 21 75* 20     CBC  Recent Labs   Lab Test 01/23/23  0818 07/27/21  0935 01/04/21  1235 01/09/20  0916 09/05/19  1255   HGB 15.0 14.9 15.8 14.8 14.3   WBC  --  3.4* 2.9* 3.3* 3.4*   RBC  --  5.08 5.40 5.11 4.90   HCT  --  44.6 47.8 46.6 43.4   MCV  --  88 89 91 89   MCH  --  29.3 29.3 29.0 29.2   MCHC  --  33.4 33.1 31.8 32.9   RDW  --  12.9 13.0 12.1 13.0   PLT  --  159 130* 142* 134*     URINE STUDIES  Recent Labs   Lab Test 07/27/21  0935 02/09/21  1538   COLOR Yellow Yellow   APPEARANCE Clear Clear   URINEGLC Negative Negative   URINEBILI Negative Negative   URINEKETONE Negative Negative   SG 1.025 1.020   UBLD Negative Negative   URINEPH 5.0 7.0   PROTEIN Negative Negative   UROBILINOGEN 0.2 0.2   NITRITE Negative Negative   LEUKEST Negative Negative   RBCU 0-2 O - 2   WBCU 0-5 0 - 5     Recent Labs   Lab Test 07/27/21  0935   UTPG 0.05     PTH  Recent Labs   Lab Test 01/23/23  0818 06/22/21  1422 11/07/18  0939   PTHI 34 30 59     IRON STUDIES  Recent Labs   Lab Test 12/08/14  1426 12/04/14  0820   LISET 411* 499*         Monique Valdo Fragoso MD

## 2023-01-30 NOTE — TELEPHONE ENCOUNTER
Called patient and relayed Dr Talley message below. Patient said he understood.    SHANNON Huitron  [facility]

## 2023-01-30 NOTE — PROGRESS NOTES
William is a 57 year old who is being evaluated via a billable video visit.      How would you like to obtain your AVS? [employer]  If the video visit is dropped, the invitation should be resent by: Text to cell phone: [phone]  Will anyone else be joining your video visit? No      Video-Visit Details    Type of service:  Video Visit     Originating Location (pt. Location): Home  Distant Location (provider location):  On-site  Platform used for Video Visit: Zwipe  Video for 8 minutes, then video stopped and finished visit with telephone for 13- total of 21 minutes    Assessment and Plan:  57 year old male with history of diabetes mellitus since 1999, mild/ borderline hypertension, and mild retinopathy, fatty liver, overweight,  who presents for followup of elevated creatinine. His Scr is 1.3-1.4 but cystatin C was 0.77 with eGFR >90 in 2019.  His Scr recently is 1.5 thus follows up  #Elevated creatinine/ normal GFR by cystatin - his Scr going back many years was 1.1-1.2, but is up to 1.3-1.4 range in recent years in setting of being on lisinopril. He has 20+ year history of diabetes which is well controlled, no retinopathy and no hypertension.  - lisinopril may be elevating creatinine to some extent, but did not change significantly off/ on lower dose lisinopril  cystatin C was 0.7 with well preserved GFR- significant discordance between creatinine and cystatin based GFR   - can consider iothalamate but for now will consider him normal GFR/ not CKD  - reassured him today and will do another cystatin C at next lab visit.   - continue lisinopril given he is a diabetic, as tolerated, for /80 - 130/80- he is on 2.5mg and BP is 110 range, thus will not increase it, given no proteinuria at this time compelling us  -US kidney- normal  - topamax- discussed small risk of kidney stones, does not have symptoms, discussed with patient      - causing metabolic acidosis due to CA inhibitor action - bicarb lower but had  diarrhea recently- monitor  # Hypertension: mild / borderline hypertension, controlled, on lisinopril 2.5mg at this time  - continue lisinopril    # Electrolytes:   - Potassium; level: Normal   - low bicarbonate- suspect metabolic acidosis due to carbonic anhydrase inhibition by topamax- he will do high Fruit and vegetable diet and we will repeat in 3 months.    # Mineral Bone Disorder:   - Calcium; level is:  Normal     Assessment and plan was discussed with patient and he voiced his understanding and agreement.      HPI:  He is a very pleasant male (originally from [facility]), who presents for followup of elevated creatinine.  His cystatin C was 0.7 with eGFR >90 at time of creatinine of 1.3-1.4 thus creatinine is underestimating his kidney function. This was discussed with the patient at prior visit and again today.  Given he is at risk, however, I would like to continue lisinopril and continue to monitor renal function and check cystatin C yearly to compare.  He has had diabetes since 1999 which has been well controlled  He denies any eye changes. He has had some tingling in his hands, thought it might be due to topamax and has lowered the dose.    Review of labs show creatinine of 1-1.2 for many years, but has been up to 1.3-1.4s in the last couple of years. His albuminuria has always been normal except for one incidence of mild elevation.  He does not have high blood pressure but was started on lisinopril a few years back for renal protection.  He denies any skin or joint issues. He does not take NSAIDs or other OTC.s  He has fatty liver and has been seen in hepatology for this. He lost 40 lbs once he was diagnosed with this but has gained back a few pounds since covid.  He is otherwise fairly healthy and denies issues.  We discussed normal kidney function, creatinine measurement and indication of kidney function, muscle mass impact, and the factors that help preserve kidney function and avoid DERRICK.  He denies  family history of renal failure.     We discussed his low bicarbonate of 19, which is likely due to topamax. We discussed effects of chronic metabolic acidosis. We discussed high F + V diet and decreased animal protein before considering bicarbonate supplement.  He did have some diarrhea last week. His blood pressure is low normal but he denies dizziness. .  He is concerned that his creatinine is up to 1.5 despite lowering lisinopril to 2.5mg.      Renal History:   DM    ROS:   A comprehensive review of systems was obtained and negative, except as noted in the HPI or PMH.    Active Medical Problems:  Patient Active Problem List   Diagnosis     Chronic gingivitis     Esophageal reflux     Hyperlipidemia LDL goal <100     Vitamin D deficiency     Elevated LFTs     Other chronic nonalcoholic liver disease     Glaucoma suspect, bilateral     Type 2 diabetes mellitus with complication, without long-term current use of insulin (H)     Background diabetic retinopathy, mild, ou     Overweight (BMI 25.0-29.9)     Hx of obesity     Arthritis of both glenohumeral joints     Benign essential hypertension     Stage 3 chronic kidney disease, unspecified whether stage 3a or 3b CKD (H)     PMH:   Medical record was reviewed and PMH was discussed with patient and noted below.  Past Medical History:   Diagnosis Date     ABNORMAL LIVER FUNCTION STUDY 1/4/2008    increased ast, alt Normal since 2008     Arthritis of both glenohumeral joints 2/18/2022     Benign essential hypertension 12/2/2022     Chronic gingivitis      Esophageal reflux      Glaucoma      History of blood transfusion     My son has aplastic anemia and has had bld transfusions     Nonspecific abnormal results of liver function study      OBESITY NOS 12/13/2006     Pure hypercholesterolemia      Type II or unspecified type diabetes mellitus without mention of complication, not stated as uncontrolled      Uncomplicated asthma     My mother has asthma     PSH:   Past  Surgical History:   Procedure Laterality Date     BIOPSY      My son had bone marrow biopsy     COLONOSCOPY N/A 5/22/2019    Procedure: COLONOSCOPY;  Surgeon: Leventhal, Thomas Michael, MD;  Location: [facility] OR     ENT SURGERY      My son has had tympanoplasty     NO HISTORY OF SURGERY       ORTHOPEDIC SURGERY      My mother had left hip replacement surgery       Family Hx:   Family History   Problem Relation Age of Onset     Hypertension Mother      Diabetes Sister      Diabetes Cousin      Hypertension Other      C.A.D. No family hx of      Cancer - colorectal No family hx of      Glaucoma No family hx of      Macular Degeneration No family hx of      Cancer No family hx of      Cerebrovascular Disease No family hx of      Breast Cancer No family hx of      Prostate Cancer No family hx of      Thyroid Disease No family hx of      Personal Hx:   Social History     Tobacco Use     Smoking status: Never     Smokeless tobacco: Never   Substance Use Topics     Alcohol use: No     Comment: none for 11/2011       Allergies:  No Known Allergies    Medications:  Current Outpatient Medications   Medication Sig     acetaminophen (TYLENOL) 325 MG tablet Take 325-650 mg by mouth every 6 hours as needed for mild pain or fever     ASPIRIN 81 MG OR TABS 1 tab po QD (Once per day)     atorvastatin (LIPITOR) 20 MG tablet Take 1 tablet (20 mg) by mouth daily     blood glucose (ACCU-CHEK GUIDE) test strip 100 strips by In Vitro route 2 times daily Use to test blood sugar 2 times daily or as directed.     blood glucose monitoring (ACCU-CHEK FASTCLIX) lancets      Blood Glucose Monitoring Suppl (ACCU-CHEK GUIDE) w/Device KIT 1 kit daily Use to check blood sugar once daily and as needed     cholecalciferol (VITAMIN  -D) 1000 UNITS capsule Take 1 capsule by mouth daily     Continuous Blood Gluc  (DEXCOM G6 ) KIKE Use to read blood sugars as per 's instructions.     Continuous Blood Gluc Sensor (DEXCOM G6 SENSOR)  MISC 1 each every 10 days Change every 10 days.     Continuous Blood Gluc Transmit (DEXCOM G6 TRANSMITTER) MISC 1 each every 3 months Change every 3 months.     diclofenac (VOLTAREN) 1 % topical gel Apply 4 grams to shoulders four times daily using enclosed dosing card.     lisinopril (ZESTRIL) 2.5 MG tablet Take 1 tablet (2.5 mg) by mouth daily     melatonin 3 MG tablet Take 6 mg by mouth nightly as needed for sleep     omeprazole (PRILOSEC) 20 MG DR capsule Take 20 mg by mouth daily as needed     Semaglutide, 1 MG/DOSE, (OZEMPIC, 1 MG/DOSE,) 4 MG/3ML SOPN Inject 1 mg Subcutaneous every 7 days     topiramate (TOPAMAX) 100 MG tablet Take 1 tablet (100 mg) by mouth daily     No current facility-administered medications for this visit.      Vitals:  There were no vitals taken for this visit.    GENERAL: Healthy, alert and no distress  EYES: Eyes grossly normal to inspection.  No discharge or erythema, or obvious scleral/conjunctival abnormalities.  RESP: No audible wheeze, cough, or visible cyanosis.  No visible retractions or increased work of breathing.    SKIN: Visible skin clear. No significant rash, abnormal pigmentation or lesions.  NEURO: Cranial nerves grossly intact.  Mentation and speech appropriate for age.  PSYCH: Mentation appears normal, affect normal/bright, judgement and insight intact, normal speech and appearance well-groomed.  Lungs: clear bilaterally  CV: regular rate    LABS:   CMP  Recent Labs   Lab Test 01/23/23  0818 12/02/22  0936 08/19/22  0833 07/01/22  0945 07/27/21  0935 06/22/21  1420 02/09/21  1529 01/04/21  1235 11/12/20  1158    138 139 141   < > 135 135 138 138   POTASSIUM 4.2 4.6 4.1 4.3   < > 4.1 4.1 4.2 4.0   CHLORIDE 112* 108* 112* 110*   < > 106 108 110* 110*   CO2 18* 16* 20 19*   < > 23 23 22 21   ANIONGAP 11 14 7 12   < > 6 4 6 7   GLC 97 98 91 88   < > 91 71 91 82   BUN 17.6 11.2 13 16.3   < > 13 14 17 14   CR 1.57* 1.65* 1.43* 1.48*   < > 1.47* 1.40* 1.60* 1.40*    GFRESTIMATED 50* 48* 56* 54*   < > 52* 55* 47* 55*   GFRESTBLACK  --   --   --   --   --  60* 64 54* 64   SHANA 8.7 9.0 9.0 9.0   < > 8.7 9.0 8.8 8.6    < > = values in this interval not displayed.     Recent Labs   Lab Test 07/01/22  0945 12/13/21  1252 08/20/21  1359 01/04/21  1235   BILITOTAL 0.2 0.5 0.4 0.4   ALKPHOS 76 77 65 78   ALT 26 33 38 29   AST 24 21 75* 20     CBC  Recent Labs   Lab Test 01/23/23  0818 07/27/21  0935 01/04/21  1235 01/09/20  0916 09/05/19  1255   HGB 15.0 14.9 15.8 14.8 14.3   WBC  --  3.4* 2.9* 3.3* 3.4*   RBC  --  5.08 5.40 5.11 4.90   HCT  --  44.6 47.8 46.6 43.4   MCV  --  88 89 91 89   MCH  --  29.3 29.3 29.0 29.2   MCHC  --  33.4 33.1 31.8 32.9   RDW  --  12.9 13.0 12.1 13.0   PLT  --  159 130* 142* 134*     URINE STUDIES  Recent Labs   Lab Test 07/27/21  0935 02/09/21  1538   COLOR Yellow Yellow   APPEARANCE Clear Clear   URINEGLC Negative Negative   URINEBILI Negative Negative   URINEKETONE Negative Negative   SG 1.025 1.020   UBLD Negative Negative   URINEPH 5.0 7.0   PROTEIN Negative Negative   UROBILINOGEN 0.2 0.2   NITRITE Negative Negative   LEUKEST Negative Negative   RBCU 0-2 O - 2   WBCU 0-5 0 - 5     Recent Labs   Lab Test 07/27/21  0935   UTPG 0.05     PTH  Recent Labs   Lab Test 01/23/23  0818 06/22/21  1422 11/07/18  0939   PTHI 34 30 59     IRON STUDIES  Recent Labs   Lab Test 12/08/14  1426 12/04/14  0820   LISET 411* 499*         Monique Valdo Fragoso MD

## 2023-03-21 ENCOUNTER — OFFICE VISIT (OUTPATIENT)
Dept: FAMILY MEDICINE | Facility: CLINIC | Age: 58
End: 2023-03-21
Payer: COMMERCIAL

## 2023-03-21 VITALS
BODY MASS INDEX: 29.39 KG/M2 | HEIGHT: 72 IN | RESPIRATION RATE: 20 BRPM | DIASTOLIC BLOOD PRESSURE: 78 MMHG | HEART RATE: 55 BPM | WEIGHT: 217 LBS | TEMPERATURE: 97.4 F | OXYGEN SATURATION: 99 % | SYSTOLIC BLOOD PRESSURE: 137 MMHG

## 2023-03-21 DIAGNOSIS — M54.50 ACUTE RIGHT-SIDED LOW BACK PAIN WITHOUT SCIATICA: Primary | ICD-10-CM

## 2023-03-21 PROCEDURE — 99213 OFFICE O/P EST LOW 20 MIN: CPT | Performed by: FAMILY MEDICINE

## 2023-03-21 RX ORDER — CYCLOBENZAPRINE HCL 5 MG
5 TABLET ORAL
Qty: 30 TABLET | Refills: 0 | Status: SHIPPED | OUTPATIENT
Start: 2023-03-21 | End: 2023-04-20

## 2023-03-21 ASSESSMENT — PAIN SCALES - GENERAL: PAINLEVEL: MODERATE PAIN (5)

## 2023-03-21 NOTE — PROGRESS NOTES
Assessment & Plan     Acute right-sided low back pain without sciatica  Advised patient with supportive care, increase walking, exercising, stretching every day.  - cyclobenzaprine (FLEXERIL) 5 MG tablet; Take 1 tablet (5 mg) by mouth nightly as needed for muscle spasms  - Physical Therapy Referral; Future       BMI:   Estimated body mass index is 29.43 kg/m  as calculated from the following:    Height as of this encounter: 1.829 m (6').    Weight as of this encounter: 98.4 kg (217 lb).   Weight management plan: Discussed healthy diet and exercise guidelines    Work on weight loss  Regular exercise      Mary Thomas is a 57 year old presenting for the following health issues:  Back Pain (Low back pain, no known injury )  Patient reports for over a month he has been having stiffness in his right lower lumbar region, comes and goes.  Increased with standing or walking.  He has no lower extremity numbness and tingling or weakness.  He has no nausea no vomiting no fever no chills.    No blood in urine or stool, no history of Kidney stones.  Denies any injury, trauma, has not done anything different.  He has been doing home exercise and stretches.    History of Present Illness       Back Pain:  He presents for follow up of back pain. Patient's back pain is a recurring problem.  Location of back pain:  Right lower back, left lower back, right middle of back, right hip and right side of waist  Description of back pain: cramping and other  Back pain spreads: nowhere    Since patient first noticed back pain, pain is: always present, but gets better and worse  Does back pain interfere with his job:  No      He eats 2-3 servings of fruits and vegetables daily.He consumes 1 sweetened beverage(s) daily.He exercises with enough effort to increase his heart rate 60 or more minutes per day.  He exercises with enough effort to increase his heart rate 3 or less days per week.   He is taking medications regularly.       Review  of Systems   Constitutional, HEENT, cardiovascular, pulmonary, GI, , musculoskeletal, neuro, skin, endocrine and psych systems are negative, except as otherwise noted.      Objective    /78 (BP Location: Right arm, Patient Position: Sitting, Cuff Size: Adult Large)   Pulse 55   Temp 97.4  F (36.3  C) (Tympanic)   Resp 20   Ht 1.829 m (6')   Wt 98.4 kg (217 lb)   SpO2 99%   BMI 29.43 kg/m    Body mass index is 29.43 kg/m .  Physical Exam   GENERAL: healthy, alert and no distress  ABDOMEN: soft, nontender, no hepatosplenomegaly, no masses and bowel sounds normal  NEURO: Normal strength and tone, mentation intact and speech normal  BACK: no CVA tenderness, no paralumbar tenderness  Comprehensive back pain exam:  Tenderness of right lower lumbar, Range of motion not limited by pain, Lower extremity strength functional and equal on both sides, Lower extremity reflexes within normal limits bilaterally, Lower extremity sensation normal and equal on both sides and Straight leg raise negative bilaterally    Orders Placed This Encounter   Procedures     Physical Therapy Referral     Hannah Evangelista MD

## 2023-03-30 ENCOUNTER — HOSPITAL ENCOUNTER (OUTPATIENT)
Dept: PHYSICAL THERAPY | Facility: REHABILITATION | Age: 58
Discharge: HOME | End: 2023-03-30
Attending: FAMILY MEDICINE
Payer: COMMERCIAL

## 2023-03-30 DIAGNOSIS — M54.50 ACUTE RIGHT-SIDED LOW BACK PAIN WITHOUT SCIATICA: Primary | ICD-10-CM

## 2023-03-30 DIAGNOSIS — R10.9 RIGHT FLANK PAIN: ICD-10-CM

## 2023-03-30 PROCEDURE — 97161 PT EVAL LOW COMPLEX 20 MIN: CPT | Mod: GP | Performed by: PHYSICAL THERAPIST

## 2023-03-30 PROCEDURE — 97110 THERAPEUTIC EXERCISES: CPT | Mod: GP | Performed by: PHYSICAL THERAPIST

## 2023-03-30 NOTE — ADDENDUM NOTE
Encounter addended by: Urszula Sinha Pt, PT on: 3/30/2023 10:09 AM   Actions taken: Flowsheet accepted

## 2023-04-02 ENCOUNTER — HEALTH MAINTENANCE LETTER (OUTPATIENT)
Age: 58
End: 2023-04-02

## 2023-04-11 ENCOUNTER — HOSPITAL ENCOUNTER (OUTPATIENT)
Dept: PHYSICAL THERAPY | Facility: REHABILITATION | Age: 58
Discharge: HOME | End: 2023-04-11
Attending: FAMILY MEDICINE
Payer: COMMERCIAL

## 2023-04-11 DIAGNOSIS — M25.511 CHRONIC RIGHT SHOULDER PAIN: ICD-10-CM

## 2023-04-11 DIAGNOSIS — M25.512 CHRONIC PAIN OF BOTH SHOULDERS: ICD-10-CM

## 2023-04-11 DIAGNOSIS — M25.511 CHRONIC PAIN OF BOTH SHOULDERS: ICD-10-CM

## 2023-04-11 DIAGNOSIS — M54.50 ACUTE RIGHT-SIDED LOW BACK PAIN WITHOUT SCIATICA: Primary | ICD-10-CM

## 2023-04-11 DIAGNOSIS — R10.9 RIGHT FLANK PAIN: ICD-10-CM

## 2023-04-11 DIAGNOSIS — G89.29 CHRONIC RIGHT SHOULDER PAIN: ICD-10-CM

## 2023-04-11 DIAGNOSIS — G89.29 CHRONIC PAIN OF BOTH SHOULDERS: ICD-10-CM

## 2023-04-11 PROCEDURE — 97140 MANUAL THERAPY 1/> REGIONS: CPT | Mod: GP | Performed by: PHYSICAL THERAPIST

## 2023-04-11 PROCEDURE — 97110 THERAPEUTIC EXERCISES: CPT | Mod: GP | Performed by: PHYSICAL THERAPIST

## 2023-04-20 DIAGNOSIS — E66.3 OVERWEIGHT (BMI 25.0-29.9): ICD-10-CM

## 2023-04-20 DIAGNOSIS — E11.8 TYPE 2 DIABETES MELLITUS WITH COMPLICATION, WITHOUT LONG-TERM CURRENT USE OF INSULIN (H): ICD-10-CM

## 2023-04-24 RX ORDER — SEMAGLUTIDE 1.34 MG/ML
INJECTION, SOLUTION SUBCUTANEOUS
Qty: 9 ML | Refills: 1 | Status: SHIPPED | OUTPATIENT
Start: 2023-04-24 | End: 2023-05-08

## 2023-05-02 ENCOUNTER — HOSPITAL ENCOUNTER (OUTPATIENT)
Dept: PHYSICAL THERAPY | Facility: REHABILITATION | Age: 58
Discharge: HOME | End: 2023-05-02
Payer: COMMERCIAL

## 2023-05-02 DIAGNOSIS — M54.50 ACUTE RIGHT-SIDED LOW BACK PAIN WITHOUT SCIATICA: ICD-10-CM

## 2023-05-02 DIAGNOSIS — M25.512 CHRONIC PAIN OF BOTH SHOULDERS: ICD-10-CM

## 2023-05-02 DIAGNOSIS — M25.511 CHRONIC RIGHT SHOULDER PAIN: ICD-10-CM

## 2023-05-02 DIAGNOSIS — G89.29 CHRONIC RIGHT SHOULDER PAIN: ICD-10-CM

## 2023-05-02 DIAGNOSIS — R10.9 RIGHT FLANK PAIN: ICD-10-CM

## 2023-05-02 DIAGNOSIS — G89.29 CHRONIC PAIN OF BOTH SHOULDERS: ICD-10-CM

## 2023-05-02 DIAGNOSIS — M62.81 GENERALIZED MUSCLE WEAKNESS: Primary | ICD-10-CM

## 2023-05-02 DIAGNOSIS — M25.511 CHRONIC PAIN OF BOTH SHOULDERS: ICD-10-CM

## 2023-05-02 PROCEDURE — 97140 MANUAL THERAPY 1/> REGIONS: CPT | Mod: GP | Performed by: PHYSICAL THERAPY ASSISTANT

## 2023-05-02 PROCEDURE — 97110 THERAPEUTIC EXERCISES: CPT | Mod: GP | Performed by: PHYSICAL THERAPY ASSISTANT

## 2023-05-08 ENCOUNTER — VIRTUAL VISIT (OUTPATIENT)
Dept: ENDOCRINOLOGY | Facility: CLINIC | Age: 58
End: 2023-05-08
Payer: COMMERCIAL

## 2023-05-08 VITALS — BODY MASS INDEX: 30.34 KG/M2 | HEIGHT: 72 IN | WEIGHT: 224 LBS

## 2023-05-08 DIAGNOSIS — E66.3 OVERWEIGHT (BMI 25.0-29.9): ICD-10-CM

## 2023-05-08 DIAGNOSIS — E11.8 TYPE 2 DIABETES MELLITUS WITH COMPLICATION, WITHOUT LONG-TERM CURRENT USE OF INSULIN (H): Primary | ICD-10-CM

## 2023-05-08 PROCEDURE — 99213 OFFICE O/P EST LOW 20 MIN: CPT | Mod: VID | Performed by: PHYSICIAN ASSISTANT

## 2023-05-08 RX ORDER — TOPIRAMATE 100 MG/1
100 TABLET, FILM COATED ORAL DAILY
Qty: 90 TABLET | Refills: 1 | Status: SHIPPED | OUTPATIENT
Start: 2023-05-08 | End: 2023-07-17

## 2023-05-08 ASSESSMENT — PAIN SCALES - GENERAL: PAINLEVEL: SEVERE PAIN (7)

## 2023-05-08 NOTE — PROGRESS NOTES
Return Medical Weight Management Note     William Montes  MRN:  [id number]  :  1965  NAKUL:  2023    Dear Sirena Talley DO,    I had the pleasure of seeing your patient William Montes. He is a 57 year old male who I am continuing to see for treatment of obesity related to:        10/26/2018     4:11 PM   --   I have the following health issues associated with obesity Type II Diabetes    GERD (Reflux)    Fatty Liver       Assessment & Plan   Problem List Items Addressed This Visit        Endocrine Diagnoses    Type 2 diabetes mellitus with complication, without long-term current use of insulin (H) - Primary    Relevant Medications    Semaglutide, 2 MG/DOSE, (OZEMPIC) 8 MG/3ML pen       Other    Overweight (BMI 25.0-29.9)    Relevant Medications    topiramate (TOPAMAX) 100 MG tablet      Increase Ozempic to 2mg weekly  Continue topiramate 100mg daily    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    Discussed increase ozempic to 2mg but never did.  Taking Topiramate 100mg daily    Back pain the last few months, seeing PT. Taking tylenol. Avoiding ibuprofen    CKD: Creatinine stable 1.57. seeing nephrology. Per pt thought to be due to lisinopril.    Anti-obesity medications:     Current:   Ozempic 1mg no side effects  Topiramate 75mg some tingling occasionally    Past/Failed/contraindicated:   Metformin stopped by PCP one year ago due to A1C well controlled    Recent diet changes: not struggling with hunger but more difficult recently to avoid dessert/sweets after meals    Recent exercise/activity changes: PT exercises for back, goes to the gym 2-3x per week, walks treadmill    Recent stressors: recent back pain    Recent sleep changes: usually sleeps well but occasionally has weeks where he doesn't sleep well. Sometimes headaches at night.    Vitamins/Labs: BMP Cr 1.57, A1C 5.9 five mo ago. Getting A1C checked   2nd    CURRENT WEIGHT:   224 lbs 0 oz    Initial Weight (lbs): 251 lbs  Last Visits Weight: 99.3 kg (219 lb)  Cumulative weight loss (lbs): 27  Weight Loss Percentage: 10.76%        6/22/2022     4:46 PM   Changes and Difficulties   I have made the following changes to my diet since my last visit: No changes.   With regards to my diet, I am still struggling with: Not really.   I have made the following changes to my activity/exercise since my last visit: About the same.   With regards to my activity/exercise, I am still struggling with: Still trying to exercise 2-3 x a week.         MEDICATIONS:   Current Outpatient Medications   Medication Sig Dispense Refill     acetaminophen (TYLENOL) 325 MG tablet Take 325-650 mg by mouth every 6 hours as needed for mild pain or fever       ASPIRIN 81 MG OR TABS 1 tab po QD (Once per day) 100 3     atorvastatin (LIPITOR) 20 MG tablet Take 1 tablet (20 mg) by mouth daily 90 tablet 3     blood glucose (ACCU-CHEK GUIDE) test strip 100 strips by In Vitro route 2 times daily Use to test blood sugar 2 times daily or as directed. 100 strip 0     blood glucose monitoring (ACCU-CHEK FASTCLIX) lancets        Blood Glucose Monitoring Suppl (ACCU-CHEK GUIDE) w/Device KIT 1 kit daily Use to check blood sugar once daily and as needed 1 kit 0     cholecalciferol (VITAMIN  -D) 1000 UNITS capsule Take 1 capsule by mouth daily       diclofenac (VOLTAREN) 1 % topical gel Apply 4 grams to shoulders four times daily using enclosed dosing card. 100 g 1     lisinopril (ZESTRIL) 2.5 MG tablet Take 1 tablet (2.5 mg) by mouth daily 90 tablet 1     melatonin 3 MG tablet Take 6 mg by mouth nightly as needed for sleep       omeprazole (PRILOSEC) 20 MG DR capsule Take 20 mg by mouth daily as needed       Semaglutide, 2 MG/DOSE, (OZEMPIC) 8 MG/3ML pen Inject 2 mg Subcutaneous every 7 days 9 mL 3     topiramate (TOPAMAX) 100 MG tablet Take 1 tablet (100 mg) by mouth daily 90 tablet 1     Continuous Blood Gluc   (DEXCOM G6 ) KIKE Use to read blood sugars as per 's instructions. (Patient not taking: Reported on 3/21/2023) 1 each 0     Continuous Blood Gluc Sensor (DEXCOM G6 SENSOR) MISC 1 each every 10 days Change every 10 days. (Patient not taking: Reported on 3/21/2023) 3 each 5     Continuous Blood Gluc Transmit (DEXCOM G6 TRANSMITTER) MISC 1 each every 3 months Change every 3 months. (Patient not taking: Reported on 3/21/2023) 1 each 1           6/22/2022     4:46 PM   Weight Loss Medication History Reviewed With Patient   Which weight loss medications are you currently taking on a regular basis? Ozempic    Topamax (topiramate)    Metformin   Are you having any side effects from the weight loss medication that we have prescribed you? No       Lab on 01/23/2023   Component Date Value Ref Range Status     Hemoglobin 01/23/2023 15.0  13.3 - 17.7 g/dL Final     Parathyroid Hormone Intact 01/23/2023 34  15 - 65 pg/mL Final     Total Protein Urine mg/dL 01/23/2023 10.5  1.0 - 14.0 mg/dL Final    The reference ranges have not been established in urine protein. The results should be integrated into the clinical context for interpretation.     Total Protein UR MG/MG CR 01/23/2023 0.07  0.00 - 0.20 mg/mg Cr Final     Creatinine Urine mg/dL 01/23/2023 141.0  mg/dL Final    The reference ranges have not been established in urine creatinine. The results should be integrated into the clinical context for interpretation.     Sodium 01/23/2023 141  136 - 145 mmol/L Final     Potassium 01/23/2023 4.2  3.4 - 5.3 mmol/L Final     Chloride 01/23/2023 112 (H)  98 - 107 mmol/L Final     Carbon Dioxide (CO2) 01/23/2023 18 (L)  22 - 29 mmol/L Final     Anion Gap 01/23/2023 11  7 - 15 mmol/L Final     Glucose 01/23/2023 97  70 - 99 mg/dL Final     Urea Nitrogen 01/23/2023 17.6  8.0 - 23.0 mg/dL Final     Creatinine 01/23/2023 1.57 (H)  0.67 - 1.17 mg/dL Final     GFR Estimate 01/23/2023 50 (L)  >60 mL/min/1.73m2  Final    eGFR calculated using 2021 CKD-EPI equation.     Calcium 01/23/2023 8.7  8.6 - 10.0 mg/dL Final     Albumin 01/23/2023 4.4  3.5 - 5.2 g/dL Final     Phosphorus 01/23/2023 3.4  2.5 - 4.5 mg/dL Final       PHYSICAL EXAM:  Objective    Ht 1.829 m (6')   Wt 101.6 kg (224 lb)   BMI 30.38 kg/m      Vitals - Patient Reported  Pain Score: Severe Pain (7)  Pain Loc: Low Back        GENERAL: Healthy, alert and no distress  EYES: Eyes grossly normal to inspection.  No discharge or erythema, or obvious scleral/conjunctival abnormalities.  RESP: No audible wheeze, cough, or visible cyanosis.  No visible retractions or increased work of breathing.    SKIN: Visible skin clear. No significant rash, abnormal pigmentation or lesions.  NEURO: Cranial nerves grossly intact.  Mentation and speech appropriate for age.  PSYCH: Mentation appears normal, affect normal/bright, judgement and insight intact, normal speech and appearance well-groomed.        Sincerely,    Marine Flores PA-C      20 minutes spent by me on the date of the encounter doing chart review, history and exam, documentation and further activities per the note      William Montes is a 57 year old who is being evaluated via a billable video visit.      How would you like to obtain your AVS? MyChart  If the video visit is dropped, the invitation should be resent by: Text to cell phone: [phone]  Will anyone else be joining your video visit? No    During this virtual visit the patient is located in [place], patient verifies this as the location during the entirety of this visit.     Video-Visit Details    Video Start Time: 10:02 AM    Type of service:  Video Visit    Video End Time:1020    Originating Location (pt. Location): Home    Distant Location (provider location):  On site    Platform used for Video Visit: Ronna Wooten 5/8/2023      9:37 AM

## 2023-05-08 NOTE — LETTER
2023       RE: William Montes  [address]  [facility] [address]     Dear Colleague,    Thank you for referring your patient, William Montes, to the [facility] [place] at [facility]. Please see a copy of my visit note below.      Return Medical Weight Management Note     William Montes  MRN:  [id number]  :  1965  NAKUL:  2023    Dear Sirena Talley DO,    I had the pleasure of seeing your patient William Montes. He is a 57 year old male who I am continuing to see for treatment of obesity related to:        10/26/2018     4:11 PM   --   I have the following health issues associated with obesity Type II Diabetes    GERD (Reflux)    Fatty Liver       Assessment & Plan   Problem List Items Addressed This Visit          Endocrine Diagnoses    Type 2 diabetes mellitus with complication, without long-term current use of insulin (H) - Primary    Relevant Medications    Semaglutide, 2 MG/DOSE, (OZEMPIC) 8 MG/3ML pen       Other    Overweight (BMI 25.0-29.9)    Relevant Medications    topiramate (TOPAMAX) 100 MG tablet      Increase Ozempic to 2mg weekly  Continue topiramate 100mg daily    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    Discussed increase ozempic to 2mg but never did.  Taking Topiramate 100mg daily    Back pain the last few months, seeing PT. Taking tylenol. Avoiding ibuprofen    CKD: Creatinine stable 1.57. seeing nephrology. Per pt thought to be due to lisinopril.    Anti-obesity medications:     Current:   Ozempic 1mg no side effects  Topiramate 75mg some tingling occasionally    Past/Failed/contraindicated:   Metformin stopped by PCP one year ago due to A1C well controlled    Recent diet changes: not struggling with hunger but more difficult recently to avoid dessert/sweets after meals    Recent exercise/activity changes: PT  exercises for back, goes to the gym 2-3x per week, walks treadmill    Recent stressors: recent back pain    Recent sleep changes: usually sleeps well but occasionally has weeks where he doesn't sleep well. Sometimes headaches at night.    Vitamins/Labs: BMP Cr 1.57, A1C 5.9 five mo ago. Getting A1C checked June 2nd    CURRENT WEIGHT:   224 lbs 0 oz    Initial Weight (lbs): 251 lbs  Last Visits Weight: 99.3 kg (219 lb)  Cumulative weight loss (lbs): 27  Weight Loss Percentage: 10.76%        6/22/2022     4:46 PM   Changes and Difficulties   I have made the following changes to my diet since my last visit: No changes.   With regards to my diet, I am still struggling with: Not really.   I have made the following changes to my activity/exercise since my last visit: About the same.   With regards to my activity/exercise, I am still struggling with: Still trying to exercise 2-3 x a week.         MEDICATIONS:   Current Outpatient Medications   Medication Sig Dispense Refill    acetaminophen (TYLENOL) 325 MG tablet Take 325-650 mg by mouth every 6 hours as needed for mild pain or fever      ASPIRIN 81 MG OR TABS 1 tab po QD (Once per day) 100 3    atorvastatin (LIPITOR) 20 MG tablet Take 1 tablet (20 mg) by mouth daily 90 tablet 3    blood glucose (ACCU-CHEK GUIDE) test strip 100 strips by In Vitro route 2 times daily Use to test blood sugar 2 times daily or as directed. 100 strip 0    blood glucose monitoring (ACCU-CHEK FASTCLIX) lancets       Blood Glucose Monitoring Suppl (ACCU-CHEK GUIDE) w/Device KIT 1 kit daily Use to check blood sugar once daily and as needed 1 kit 0    cholecalciferol (VITAMIN  -D) 1000 UNITS capsule Take 1 capsule by mouth daily      diclofenac (VOLTAREN) 1 % topical gel Apply 4 grams to shoulders four times daily using enclosed dosing card. 100 g 1    lisinopril (ZESTRIL) 2.5 MG tablet Take 1 tablet (2.5 mg) by mouth daily 90 tablet 1    melatonin 3 MG tablet Take 6 mg by mouth nightly as needed  for sleep      omeprazole (PRILOSEC) 20 MG DR capsule Take 20 mg by mouth daily as needed      Semaglutide, 2 MG/DOSE, (OZEMPIC) 8 MG/3ML pen Inject 2 mg Subcutaneous every 7 days 9 mL 3    topiramate (TOPAMAX) 100 MG tablet Take 1 tablet (100 mg) by mouth daily 90 tablet 1    Continuous Blood Gluc  (DEXCOM G6 ) KIKE Use to read blood sugars as per 's instructions. (Patient not taking: Reported on 3/21/2023) 1 each 0    Continuous Blood Gluc Sensor (DEXCOM G6 SENSOR) MISC 1 each every 10 days Change every 10 days. (Patient not taking: Reported on 3/21/2023) 3 each 5    Continuous Blood Gluc Transmit (DEXCOM G6 TRANSMITTER) MISC 1 each every 3 months Change every 3 months. (Patient not taking: Reported on 3/21/2023) 1 each 1           6/22/2022     4:46 PM   Weight Loss Medication History Reviewed With Patient   Which weight loss medications are you currently taking on a regular basis? Ozempic    Topamax (topiramate)    Metformin   Are you having any side effects from the weight loss medication that we have prescribed you? No       Lab on 01/23/2023   Component Date Value Ref Range Status    Hemoglobin 01/23/2023 15.0  13.3 - 17.7 g/dL Final    Parathyroid Hormone Intact 01/23/2023 34  15 - 65 pg/mL Final    Total Protein Urine mg/dL 01/23/2023 10.5  1.0 - 14.0 mg/dL Final    The reference ranges have not been established in urine protein. The results should be integrated into the clinical context for interpretation.    Total Protein UR MG/MG CR 01/23/2023 0.07  0.00 - 0.20 mg/mg Cr Final    Creatinine Urine mg/dL 01/23/2023 141.0  mg/dL Final    The reference ranges have not been established in urine creatinine. The results should be integrated into the clinical context for interpretation.    Sodium 01/23/2023 141  136 - 145 mmol/L Final    Potassium 01/23/2023 4.2  3.4 - 5.3 mmol/L Final    Chloride 01/23/2023 112 (H)  98 - 107 mmol/L Final    Carbon Dioxide (CO2) 01/23/2023 18 (L)  22 -  29 mmol/L Final    Anion Gap 01/23/2023 11  7 - 15 mmol/L Final    Glucose 01/23/2023 97  70 - 99 mg/dL Final    Urea Nitrogen 01/23/2023 17.6  8.0 - 23.0 mg/dL Final    Creatinine 01/23/2023 1.57 (H)  0.67 - 1.17 mg/dL Final    GFR Estimate 01/23/2023 50 (L)  >60 mL/min/1.73m2 Final    eGFR calculated using 2021 CKD-EPI equation.    Calcium 01/23/2023 8.7  8.6 - 10.0 mg/dL Final    Albumin 01/23/2023 4.4  3.5 - 5.2 g/dL Final    Phosphorus 01/23/2023 3.4  2.5 - 4.5 mg/dL Final       PHYSICAL EXAM:  Objective    Ht 1.829 m (6')   Wt 101.6 kg (224 lb)   BMI 30.38 kg/m      Vitals - Patient Reported  Pain Score: Severe Pain (7)  Pain Loc: Low Back        GENERAL: Healthy, alert and no distress  EYES: Eyes grossly normal to inspection.  No discharge or erythema, or obvious scleral/conjunctival abnormalities.  RESP: No audible wheeze, cough, or visible cyanosis.  No visible retractions or increased work of breathing.    SKIN: Visible skin clear. No significant rash, abnormal pigmentation or lesions.  NEURO: Cranial nerves grossly intact.  Mentation and speech appropriate for age.  PSYCH: Mentation appears normal, affect normal/bright, judgement and insight intact, normal speech and appearance well-groomed.        Sincerely,    Marine Flores PA-C      20 minutes spent by me on the date of the encounter doing chart review, history and exam, documentation and further activities per the note      William Montes is a 57 year old who is being evaluated via a billable video visit.      How would you like to obtain your AVS? MyChart  If the video visit is dropped, the invitation should be resent by: Text to cell phone: [phone]  Will anyone else be joining your video visit? No    During this virtual visit the patient is located in [place], patient verifies this as the location during the entirety of this visit.     Video-Visit Details    Video Start Time: 10:02 AM    Type of service:  Video Visit    Video End  Time:1020    Originating Location (pt. Location): Home    Distant Location (provider location):  On site    Platform used for Video Visit: Ronna Wooten 5/8/2023      9:37 AM

## 2023-05-08 NOTE — NURSING NOTE
(   Chief Complaint   Patient presents with     RECHECK     Return MW    )    ( Weight: 101.6 kg (224 lb) )  ( Height: 182.9 cm (6') )  ( BMI (Calculated): 30.38 )  (   )  (   )  (   )  (   )  (   )  (   )    (   )  (   )  (   )  (   )  (   )  (   )  (   )    (   Patient Active Problem List   Diagnosis     Chronic gingivitis     Esophageal reflux     Hyperlipidemia LDL goal <100     Vitamin D deficiency     Elevated LFTs     Other chronic nonalcoholic liver disease     Glaucoma suspect, bilateral     Type 2 diabetes mellitus with complication, without long-term current use of insulin (H)     Background diabetic retinopathy, mild, ou     Overweight (BMI 25.0-29.9)     Hx of obesity     Arthritis of both glenohumeral joints     Benign essential hypertension     Stage 3 chronic kidney disease, unspecified whether stage 3a or 3b CKD (H)     Elevated serum creatinine    )  (   Current Outpatient Medications   Medication Sig Dispense Refill     acetaminophen (TYLENOL) 325 MG tablet Take 325-650 mg by mouth every 6 hours as needed for mild pain or fever       ASPIRIN 81 MG OR TABS 1 tab po QD (Once per day) 100 3     atorvastatin (LIPITOR) 20 MG tablet Take 1 tablet (20 mg) by mouth daily 90 tablet 3     blood glucose (ACCU-CHEK GUIDE) test strip 100 strips by In Vitro route 2 times daily Use to test blood sugar 2 times daily or as directed. 100 strip 0     blood glucose monitoring (ACCU-CHEK FASTCLIX) lancets        Blood Glucose Monitoring Suppl (ACCU-CHEK GUIDE) w/Device KIT 1 kit daily Use to check blood sugar once daily and as needed 1 kit 0     cholecalciferol (VITAMIN  -D) 1000 UNITS capsule Take 1 capsule by mouth daily       diclofenac (VOLTAREN) 1 % topical gel Apply 4 grams to shoulders four times daily using enclosed dosing card. 100 g 1     lisinopril (ZESTRIL) 2.5 MG tablet Take 1 tablet (2.5 mg) by mouth daily 90 tablet 1     melatonin 3 MG tablet Take 6 mg by mouth nightly as needed for sleep        omeprazole (PRILOSEC) 20 MG DR capsule Take 20 mg by mouth daily as needed       OZEMPIC, 1 MG/DOSE, 4 MG/3ML pen INJECT 1MG UNDER THE SKIN EVERY 7 DAYS 9 mL 1     topiramate (TOPAMAX) 100 MG tablet Take 1 tablet (100 mg) by mouth daily 90 tablet 1     Continuous Blood Gluc  (DEXCOM G6 ) KIKE Use to read blood sugars as per 's instructions. (Patient not taking: Reported on 3/21/2023) 1 each 0     Continuous Blood Gluc Sensor (DEXCOM G6 SENSOR) MISC 1 each every 10 days Change every 10 days. (Patient not taking: Reported on 3/21/2023) 3 each 5     Continuous Blood Gluc Transmit (DEXCOM G6 TRANSMITTER) MISC 1 each every 3 months Change every 3 months. (Patient not taking: Reported on 3/21/2023) 1 each 1    )  ( Diabetes Eval:    )    ( Pain Eval:  Severe Pain (7) )    ( Wound Eval:       )    (   History   Smoking Status     Never   Smokeless Tobacco     Never    )    ( Signed By:  Charles Wooten; May 8, 2023; 9:40 AM )

## 2023-05-22 ENCOUNTER — THERAPY VISIT (OUTPATIENT)
Dept: PHYSICAL THERAPY | Facility: REHABILITATION | Age: 58
End: 2023-05-22
Payer: COMMERCIAL

## 2023-05-22 DIAGNOSIS — M62.81 GENERALIZED MUSCLE WEAKNESS: ICD-10-CM

## 2023-05-22 DIAGNOSIS — R10.9 RIGHT FLANK PAIN: Primary | ICD-10-CM

## 2023-05-22 DIAGNOSIS — M54.50 ACUTE RIGHT-SIDED LOW BACK PAIN WITHOUT SCIATICA: ICD-10-CM

## 2023-05-22 PROCEDURE — 97110 THERAPEUTIC EXERCISES: CPT | Mod: GP | Performed by: PHYSICAL THERAPIST

## 2023-05-22 PROCEDURE — 97140 MANUAL THERAPY 1/> REGIONS: CPT | Mod: GP | Performed by: PHYSICAL THERAPIST

## 2023-06-02 ENCOUNTER — OFFICE VISIT (OUTPATIENT)
Dept: FAMILY MEDICINE | Facility: CLINIC | Age: 58
End: 2023-06-02
Payer: COMMERCIAL

## 2023-06-02 VITALS
SYSTOLIC BLOOD PRESSURE: 120 MMHG | RESPIRATION RATE: 16 BRPM | WEIGHT: 223.6 LBS | BODY MASS INDEX: 30.28 KG/M2 | OXYGEN SATURATION: 99 % | HEART RATE: 66 BPM | HEIGHT: 72 IN | TEMPERATURE: 98.5 F | DIASTOLIC BLOOD PRESSURE: 70 MMHG

## 2023-06-02 DIAGNOSIS — E11.8 TYPE 2 DIABETES MELLITUS WITH COMPLICATION, WITHOUT LONG-TERM CURRENT USE OF INSULIN (H): ICD-10-CM

## 2023-06-02 DIAGNOSIS — I10 BENIGN ESSENTIAL HYPERTENSION: ICD-10-CM

## 2023-06-02 DIAGNOSIS — Z00.00 ROUTINE GENERAL MEDICAL EXAMINATION AT A HEALTH CARE FACILITY: Primary | ICD-10-CM

## 2023-06-02 DIAGNOSIS — Z12.5 SCREENING FOR PROSTATE CANCER: ICD-10-CM

## 2023-06-02 DIAGNOSIS — R53.83 OTHER FATIGUE: ICD-10-CM

## 2023-06-02 DIAGNOSIS — G25.81 RESTLESS LEGS: ICD-10-CM

## 2023-06-02 DIAGNOSIS — E11.3299 BACKGROUND DIABETIC RETINOPATHY (H): ICD-10-CM

## 2023-06-02 DIAGNOSIS — M54.50 ACUTE RIGHT-SIDED LOW BACK PAIN WITHOUT SCIATICA: ICD-10-CM

## 2023-06-02 DIAGNOSIS — N18.30 STAGE 3 CHRONIC KIDNEY DISEASE, UNSPECIFIED WHETHER STAGE 3A OR 3B CKD (H): ICD-10-CM

## 2023-06-02 LAB
ERYTHROCYTE [DISTWIDTH] IN BLOOD BY AUTOMATED COUNT: 11.8 % (ref 10–15)
HBA1C MFR BLD: 6.3 % (ref 0–5.6)
HCT VFR BLD AUTO: 43.7 % (ref 40–53)
HGB BLD-MCNC: 14.5 G/DL (ref 13.3–17.7)
MCH RBC QN AUTO: 29.2 PG (ref 26.5–33)
MCHC RBC AUTO-ENTMCNC: 33.2 G/DL (ref 31.5–36.5)
MCV RBC AUTO: 88 FL (ref 78–100)
PLATELET # BLD AUTO: 145 10E3/UL (ref 150–450)
RBC # BLD AUTO: 4.97 10E6/UL (ref 4.4–5.9)
WBC # BLD AUTO: 3.6 10E3/UL (ref 4–11)

## 2023-06-02 PROCEDURE — 82248 BILIRUBIN DIRECT: CPT | Performed by: FAMILY MEDICINE

## 2023-06-02 PROCEDURE — 85027 COMPLETE CBC AUTOMATED: CPT | Performed by: FAMILY MEDICINE

## 2023-06-02 PROCEDURE — 82728 ASSAY OF FERRITIN: CPT | Performed by: FAMILY MEDICINE

## 2023-06-02 PROCEDURE — 82043 UR ALBUMIN QUANTITATIVE: CPT | Performed by: FAMILY MEDICINE

## 2023-06-02 PROCEDURE — 99214 OFFICE O/P EST MOD 30 MIN: CPT | Mod: 25 | Performed by: FAMILY MEDICINE

## 2023-06-02 PROCEDURE — 82570 ASSAY OF URINE CREATININE: CPT | Performed by: FAMILY MEDICINE

## 2023-06-02 PROCEDURE — 99396 PREV VISIT EST AGE 40-64: CPT | Performed by: FAMILY MEDICINE

## 2023-06-02 PROCEDURE — 80053 COMPREHEN METABOLIC PANEL: CPT | Performed by: FAMILY MEDICINE

## 2023-06-02 PROCEDURE — 84443 ASSAY THYROID STIM HORMONE: CPT | Performed by: FAMILY MEDICINE

## 2023-06-02 PROCEDURE — 36415 COLL VENOUS BLD VENIPUNCTURE: CPT | Performed by: FAMILY MEDICINE

## 2023-06-02 PROCEDURE — 83036 HEMOGLOBIN GLYCOSYLATED A1C: CPT | Performed by: FAMILY MEDICINE

## 2023-06-02 PROCEDURE — G0103 PSA SCREENING: HCPCS | Performed by: FAMILY MEDICINE

## 2023-06-02 ASSESSMENT — PAIN SCALES - GENERAL: PAINLEVEL: SEVERE PAIN (6)

## 2023-06-02 NOTE — PROGRESS NOTES
SUBJECTIVE:   CC: William is an 58 year old who presents for preventative health visit.       6/2/2023     9:42 AM   Additional Questions   Roomed by Nery         6/2/2023     9:42 AM   Patient Reported Additional Medications   Patient reports taking the following new medications None     Healthy Habits:    Getting at least 3 servings of Calcium per day:  NO    Bi-annual eye exam:  Yes (Appt due in Sept )    Dental care twice a year:  Yes    Sleep apnea or symptoms of sleep apnea:  None    Diet:  Regular (no restrictions)    Frequency of exercise:  None    Duration of exercise:  N/A    Taking medications regularly:  No    Barriers to taking medications:  None    PHQ-2 Total Score:    Additional concerns today:  Yes (Follow up on back pain- PT did help a little . Has also noticed fatigue for the past month)    The fatigue started about a month ago.   He goes to bed about 11 pm and doesn't fall asleep for about half hour.  He wakes up 2 times to urinate.  He is then up for the day 7 am.  He is not snoring that he knows of.  He is waking up tired and is falling asleep when he is not stimulated during the day.  His wife wakes him a few times a week due to these headaches.  He moves his legs a lot.  His mother does that also.      He will sleep well for two weeks then have trouble sleeping for a week.      He had had the lower back pain for about 6 months.  He had done PT and a muscle relaxer's.  He has pain in the right lower quadrant also.  It is coming and going.  The back pain is getting better.  It is worse with prolonged sitting.  He is mostly working from home.  He thinks his working environment is to blame.  He denies a lump in the area.      Diabetes Follow-up    How often are you checking your blood sugar? One time daily  What time of day are you checking your blood sugars (select all that apply)?  Before meals  Have you had any blood sugars above 200?  No  Have you had any blood sugars below 70?  No    What  symptoms do you notice when your blood sugar is low?  Shaky, Dizzy and Weak    What concerns do you have today about your diabetes? None     Do you have any of these symptoms? (Select all that apply)  Numbness in feet and Burning in feet  Ozempic 2 mg weekly    Lab Results   Component Value Date    A1C 6.3 06/02/2023    A1C 5.9 12/02/2022    A1C 5.8 08/19/2022    A1C 5.8 02/18/2022    A1C 5.7 08/20/2021    A1C 5.6 06/22/2021    A1C 5.8 11/12/2020    A1C 5.6 07/23/2020    A1C 6.1 03/09/2020    A1C 6.5 11/11/2019           Hyperlipidemia Follow-Up      Are you regularly taking any medication or supplement to lower your cholesterol?   Yes- atorvastatin    Are you having muscle aches or other side effects that you think could be caused by your cholesterol lowering medication?  Yes- aches and pains but not sure if related   LDL Cholesterol Calculated   Date Value Ref Range Status   12/02/2022 70 <=100 mg/dL Final   11/12/2020 66 <100 mg/dL Final     Comment:     Desirable:       <100 mg/dl         Hypertension Follow-up      Do you check your blood pressure regularly outside of the clinic? Yes     Are you following a low salt diet? Yes    Are your blood pressures ever more than 140 on the top number (systolic) OR more   than 90 on the bottom number (diastolic), for example 140/90? No 117/75  Lisinopril 2.5 mg daily    BP Readings from Last 2 Encounters:   06/02/23 120/70   03/21/23 137/78     Hemoglobin A1C (%)   Date Value   06/02/2023 6.3 (H)   12/02/2022 5.9 (H)   06/22/2021 5.6   11/12/2020 5.8 (H)     LDL Cholesterol Calculated (mg/dL)   Date Value   12/02/2022 70   02/18/2022 66   11/12/2020 66   04/04/2019 60       The patient has been taking Topamax for weight loss on 100 mg daily and also Ozempic 2 mg weekly.  His top weight was 250 lbs.      Wt Readings from Last 4 Encounters:   06/02/23 101.4 kg (223 lb 9.6 oz)   05/08/23 101.6 kg (224 lb)   03/21/23 98.4 kg (217 lb)   12/02/22 98.5 kg (217 lb 1.6 oz)        Social History     Tobacco Use     Smoking status: Never     Passive exposure: Never     Smokeless tobacco: Never   Vaping Use     Vaping status: Never Used     Passive vaping exposure: Yes   Substance Use Topics     Alcohol use: No     Comment: none for 11/2011 11/12/2020    10:55 AM   Alcohol Use   Prescreen: >3 drinks/day or >7 drinks/week? Not Applicable          View : No data to display.                Last PSA:   Prostate Specific Antigen Screen   Date Value Ref Range Status   08/19/2022 0.69 0.00 - 4.00 ug/L Final       Reviewed orders with patient. Reviewed health maintenance and updated orders accordingly - Yes  BP Readings from Last 3 Encounters:   06/02/23 120/70   03/21/23 137/78   12/02/22 118/76    Wt Readings from Last 3 Encounters:   06/02/23 101.4 kg (223 lb 9.6 oz)   05/08/23 101.6 kg (224 lb)   03/21/23 98.4 kg (217 lb)                    Reviewed and updated as needed this visit by clinical staff   Tobacco  Allergies  Meds              Reviewed and updated as needed this visit by Provider                     Review of Systems  CONSTITUTIONAL: NEGATIVE for fever, chills, change in weight  INTEGUMENTARY/SKIN: NEGATIVE for worrisome rashes, moles or lesions  EYES: NEGATIVE for vision changes or irritation  ENT: NEGATIVE for ear, mouth and throat problems  RESP: NEGATIVE for significant cough or SOB  CV: NEGATIVE for chest pain, palpitations or peripheral edema  GI: NEGATIVE for nausea, abdominal pain, heartburn, or change in bowel habits   male: negative for dysuria, hematuria, decreased urinary stream, erectile dysfunction, urethral discharge  MUSCULOSKELETAL: NEGATIVE for significant arthralgias or myalgia  NEURO: NEGATIVE for weakness, dizziness or paresthesias  PSYCHIATRIC: NEGATIVE for changes in mood or affect    OBJECTIVE:   /70 (BP Location: Right arm, Patient Position: Sitting, Cuff Size: Adult Large)   Pulse 66   Temp 98.5  F (36.9  C) (Oral)   Resp 16    Ht 1.829 m (6')   Wt 101.4 kg (223 lb 9.6 oz)   SpO2 99%   BMI 30.33 kg/m      Physical Exam  GENERAL: healthy, alert and no distress  EYES: Eyes grossly normal to inspection, PERRL and conjunctivae and sclerae normal  HENT: normal cephalic/atraumatic, ear canals and TM's normal, nose and mouth without ulcers or lesions, oropharynx clear, oral mucous membranes moist and oropharxnx crowded  NECK: no adenopathy, no asymmetry, masses, or scars and thyroid normal to palpation  RESP: lungs clear to auscultation - no rales, rhonchi or wheezes  CV: regular rate and rhythm, normal S1 S2, no S3 or S4, no murmur, click or rub, no peripheral edema and peripheral pulses strong  ABDOMEN: soft, nontender, no hepatosplenomegaly, no masses and bowel sounds normal  MS: spine exam shows no scoliosis, ROM is normal and paraspinal muscle spasms bilaterally in the lumbar region right greater than left  SKIN: no suspicious lesions or rashes  NEURO: Normal strength and tone, mentation intact and speech normal  PSYCH: mentation appears normal, affect normal/bright    Diagnostic Test Results:  Labs reviewed in Epic  Results for orders placed or performed in visit on 06/02/23 (from the past 24 hour(s))   HEMOGLOBIN A1C   Result Value Ref Range    Hemoglobin A1C 6.3 (H) 0.0 - 5.6 %       ASSESSMENT/PLAN:   (Z00.00) Routine general medical examination at a health care facility  (primary encounter diagnosis)  Comment:   Plan:     (R53.83) Other fatigue  Comment: The patient's fatigue has been approximately the last month.  He has numerous things waking him in the night including having to urinate and his wife having anxiety.  The patient is falling asleep easily during the day unintentionally and is not feeling rested when he wakes up.  Due to this I am concerned about obstructive sleep apnea.  The patient is working with the bariatric team for weight loss improvements with refer to sleep medicine.    Plan: CBC with platelets, TSH with free  T4 reflex            (E11.8) Type 2 diabetes mellitus with complication, without long-term current use of insulin (H)  Comment: The current medical regimen is effective;  continue present plan and medications.    Plan: HEMOGLOBIN A1C            (E11.3299) Background diabetic retinopathy (H)  Comment:   Plan: HEMOGLOBIN A1C            (N18.30) Stage 3 chronic kidney disease, unspecified whether stage 3a or 3b CKD (H)  Comment: Monitor with labs today  Plan: Albumin Random Urine Quantitative with Creat         Ratio            (I10) Benign essential hypertension  Comment: The current medical regimen is effective;  continue present plan and medications.    Plan: Basic metabolic panel  (Ca, Cl, CO2, Creat,         Gluc, K, Na, BUN)            (G25.81) Restless legs  Comment: The patient states he is always moved his legs a lot before he goes to sleep.  Patient states his mother does the same.  We discussed restless leg syndrome.  I recommended starting on iron supplement we will also get a ferritin level today  Plan: Ferritin            (M54.50) Acute right-sided low back pain without sciatica  Comment:   Plan: Continue physical therapy and work ergonomic    (Z12.5) Screening for prostate cancer  Comment:   Plan: PSA, screen                COUNSELING:   Reviewed preventive health counseling, as reflected in patient instructions       Regular exercise       Healthy diet/nutrition        He reports that he has never smoked. He has never been exposed to tobacco smoke. He has never used smokeless tobacco.        Sirena Talley DO  [facility]

## 2023-06-02 NOTE — PROGRESS NOTES
"  {PROVIDER CHARTING PREFERENCE:161971}    Mary Thomas is a 58 year old, presenting for the following health issues:  Chronic Disease Management        6/2/2023     9:42 AM   Additional Questions   Roomed by Nery         6/2/2023     9:42 AM   Patient Reported Additional Medications   Patient reports taking the following new medications None     HPI      Diabetes Follow-up      How often are you checking your blood sugar? { :209788}    What concerns do you have today about your diabetes? { :654711::\"None\"}     Do you have any of these symptoms? (Select all that apply)  { :040361}      {Reference  Diabetes Management Resources  Blood Glucose Log - 3 weeks  Blood Glucose Log with Food and Insulin Record :540753}    Hyperlipidemia Follow-Up      Are you regularly taking any medication or supplement to lower your cholesterol?   { :545757}    Are you having muscle aches or other side effects that you think could be caused by your cholesterol lowering medication?  { :436145}    Hypertension Follow-up      Do you check your blood pressure regularly outside of the clinic? { :325620}     Are you following a low salt diet? { :256907}    Are your blood pressures ever more than 140 on the top number (systolic) OR more   than 90 on the bottom number (diastolic), for example 140/90? { :804970}    BP Readings from Last 2 Encounters:   03/21/23 137/78   12/02/22 118/76     Hemoglobin A1C (%)   Date Value   12/02/2022 5.9 (H)   08/19/2022 5.8 (H)   06/22/2021 5.6   11/12/2020 5.8 (H)     LDL Cholesterol Calculated (mg/dL)   Date Value   12/02/2022 70   02/18/2022 66   11/12/2020 66   04/04/2019 60       {additonal problems for provider to add (Optional):820333}      Review of Systems   {ROS COMP (Optional):845836}      Objective    There were no vitals taken for this visit.  There is no height or weight on file to calculate BMI.  Physical Exam   {Exam List (Optional):224685}    {Diagnostic Test Results " (Optional):464184}    {AMBULATORY ATTESTATION (Optional):520801}

## 2023-06-03 ENCOUNTER — MYC MEDICAL ADVICE (OUTPATIENT)
Dept: FAMILY MEDICINE | Facility: CLINIC | Age: 58
End: 2023-06-03
Payer: COMMERCIAL

## 2023-06-03 LAB
ANION GAP SERPL CALCULATED.3IONS-SCNC: 11 MMOL/L (ref 7–15)
BUN SERPL-MCNC: 16.7 MG/DL (ref 8–23)
CALCIUM SERPL-MCNC: 9 MG/DL (ref 8.8–10.2)
CHLORIDE SERPL-SCNC: 111 MMOL/L (ref 98–107)
CREAT SERPL-MCNC: 1.52 MG/DL (ref 0.67–1.17)
CREAT UR-MCNC: 178 MG/DL
DEPRECATED HCO3 PLAS-SCNC: 18 MMOL/L (ref 22–29)
FERRITIN SERPL-MCNC: 124 NG/ML (ref 31–409)
GFR SERPL CREATININE-BSD FRML MDRD: 52 ML/MIN/1.73M2
GLUCOSE SERPL-MCNC: 92 MG/DL (ref 70–99)
MICROALBUMIN UR-MCNC: <12 MG/L
MICROALBUMIN/CREAT UR: NORMAL MG/G{CREAT}
POTASSIUM SERPL-SCNC: 4.1 MMOL/L (ref 3.4–5.3)
PSA SERPL DL<=0.01 NG/ML-MCNC: 0.76 NG/ML (ref 0–4.5)
SODIUM SERPL-SCNC: 140 MMOL/L (ref 136–145)
TSH SERPL DL<=0.005 MIU/L-ACNC: 1.92 UIU/ML (ref 0.3–4.2)

## 2023-06-05 LAB
ALBUMIN SERPL BCG-MCNC: 4.5 G/DL (ref 3.5–5.2)
ALP SERPL-CCNC: 69 U/L (ref 40–129)
ALT SERPL W P-5'-P-CCNC: 28 U/L (ref 10–50)
AST SERPL W P-5'-P-CCNC: 25 U/L (ref 10–50)
BILIRUB DIRECT SERPL-MCNC: <0.2 MG/DL (ref 0–0.3)
BILIRUB SERPL-MCNC: 0.3 MG/DL
PROT SERPL-MCNC: 6.9 G/DL (ref 6.4–8.3)

## 2023-06-05 NOTE — TELEPHONE ENCOUNTER
Routing to provider    Pt requesting CMP rather then BMP- willing to place new lab order?    Renee Real, RN

## 2023-07-15 DIAGNOSIS — E66.3 OVERWEIGHT (BMI 25.0-29.9): ICD-10-CM

## 2023-07-15 DIAGNOSIS — I10 BENIGN ESSENTIAL HYPERTENSION: ICD-10-CM

## 2023-07-17 RX ORDER — TOPIRAMATE 100 MG/1
TABLET, FILM COATED ORAL
Qty: 90 TABLET | Refills: 1 | Status: SHIPPED | OUTPATIENT
Start: 2023-07-17 | End: 2024-05-02

## 2023-07-17 RX ORDER — LISINOPRIL 2.5 MG/1
TABLET ORAL
Qty: 90 TABLET | Refills: 1 | Status: SHIPPED | OUTPATIENT
Start: 2023-07-17 | End: 2023-12-08

## 2023-08-24 ENCOUNTER — ANCILLARY PROCEDURE (OUTPATIENT)
Dept: GENERAL RADIOLOGY | Facility: CLINIC | Age: 58
End: 2023-08-24
Attending: FAMILY MEDICINE
Payer: COMMERCIAL

## 2023-08-24 ENCOUNTER — OFFICE VISIT (OUTPATIENT)
Dept: FAMILY MEDICINE | Facility: CLINIC | Age: 58
End: 2023-08-24
Payer: COMMERCIAL

## 2023-08-24 VITALS
OXYGEN SATURATION: 98 % | SYSTOLIC BLOOD PRESSURE: 131 MMHG | RESPIRATION RATE: 18 BRPM | DIASTOLIC BLOOD PRESSURE: 78 MMHG | HEART RATE: 59 BPM | TEMPERATURE: 99 F

## 2023-08-24 DIAGNOSIS — R07.9 CHEST PAIN, UNSPECIFIED TYPE: ICD-10-CM

## 2023-08-24 DIAGNOSIS — R07.9 CHEST PAIN, UNSPECIFIED TYPE: Primary | ICD-10-CM

## 2023-08-24 LAB
ATRIAL RATE - MUSE: 55 BPM
DIASTOLIC BLOOD PRESSURE - MUSE: NORMAL MMHG
INTERPRETATION ECG - MUSE: NORMAL
P AXIS - MUSE: 42 DEGREES
PR INTERVAL - MUSE: 176 MS
QRS DURATION - MUSE: 88 MS
QT - MUSE: 388 MS
QTC - MUSE: 371 MS
R AXIS - MUSE: 18 DEGREES
SYSTOLIC BLOOD PRESSURE - MUSE: NORMAL MMHG
T AXIS - MUSE: 12 DEGREES
VENTRICULAR RATE- MUSE: 55 BPM

## 2023-08-24 PROCEDURE — 93010 ELECTROCARDIOGRAM REPORT: CPT | Performed by: GENERAL ACUTE CARE HOSPITAL

## 2023-08-24 PROCEDURE — 93005 ELECTROCARDIOGRAM TRACING: CPT | Performed by: FAMILY MEDICINE

## 2023-08-24 PROCEDURE — 99213 OFFICE O/P EST LOW 20 MIN: CPT | Mod: 25 | Performed by: FAMILY MEDICINE

## 2023-08-24 PROCEDURE — 71046 X-RAY EXAM CHEST 2 VIEWS: CPT | Mod: TC | Performed by: RADIOLOGY

## 2023-08-24 NOTE — PROGRESS NOTES
Assessment:       Chest pain, unspecified type    - XR Chest 2 Views  - EKG 12-lead, tracing only         Plan:   Patient with atypical chest pain with pain radiating from his back towards the left side of his chest.  Chest x-ray ordered and reviewed by myself showing no infiltrate, pneumothorax, or other significant abnormality.  Radiology review of x-ray concurs.  EKG ordered and reviewed by myself showing normal sinus rhythm and some PACs but otherwise no acute ST or T wave changes.  Suspect this may be musculoskeletal.  May take cautious amounts of acetaminophen, no more than 2 g/day given fatty liver disease.  Would avoid NSAIDs given chronic kidney disease.  Recommend heating pad to his back.  Follow-up with PCP if symptoms getting worse or not improving over the next several days and go to ED if developing significantly worsening chest pain, shortness of breath, heaviness in the chest,        Subjective:       58 year old male with fatty liver disease and 3 chronic kidney disease presents for evaluation of a 2-day history of chest pain.  He states that the pain starts on the left side of his back and radiates forward into his chest.  He describes the pain as sharp.  It is fairly constant but seems to get worse if he takes a deep breath in.  It is worse with movement.  He tried taking 4 Tums yesterday which was not really helpful.  He woke up this morning and the pain was improved but then has gotten worse through the day.  He was unable to take Tylenol because of his liver disease or ibuprofen because of his chronic kidney disease.  He did try taking some oxycodone that his wife had leftover from a surgery and this did help him sleep at night.  It is not associated with exertion.  He did not injure himself.  No fevers or cough.  He is otherwise feeling okay.  Is been undergoing a lot of stress as his mother just  earlier this week.  Denies recent travel.  No leg pain or swelling.    Patient Active Problem  List   Diagnosis    Chronic gingivitis    Esophageal reflux    Hyperlipidemia LDL goal <100    Vitamin D deficiency    Elevated LFTs    Other chronic nonalcoholic liver disease    Glaucoma suspect, bilateral    Type 2 diabetes mellitus with complication, without long-term current use of insulin (H)    Background diabetic retinopathy, mild, ou    Overweight (BMI 25.0-29.9)    Hx of obesity    Arthritis of both glenohumeral joints    Benign essential hypertension    Stage 3 chronic kidney disease, unspecified whether stage 3a or 3b CKD (H)    Elevated serum creatinine       Past Medical History:   Diagnosis Date    ABNORMAL LIVER FUNCTION STUDY 1/4/2008    increased ast, alt Normal since 2008    Arthritis of both glenohumeral joints 2/18/2022    Benign essential hypertension 12/2/2022    Chronic gingivitis     Esophageal reflux     Glaucoma     History of blood transfusion     My son has aplastic anemia and has had bld transfusions    Nonspecific abnormal results of liver function study     OBESITY NOS 12/13/2006    Pure hypercholesterolemia     Type II or unspecified type diabetes mellitus without mention of complication, not stated as uncontrolled     Uncomplicated asthma     My mother has asthma       Past Surgical History:   Procedure Laterality Date    BIOPSY      My son had bone marrow biopsy    COLONOSCOPY N/A 5/22/2019    Procedure: COLONOSCOPY;  Surgeon: Leventhal, Thomas Michael, MD;  Location: [facility] OR    ENT SURGERY      My son has had tympanoplasty    NO HISTORY OF SURGERY      ORTHOPEDIC SURGERY      My mother had left hip replacement surgery       Current Outpatient Medications   Medication    acetaminophen (TYLENOL) 325 MG tablet    ASPIRIN 81 MG OR TABS    atorvastatin (LIPITOR) 20 MG tablet    blood glucose (ACCU-CHEK GUIDE) test strip    blood glucose monitoring (ACCU-CHEK FASTCLIX) lancets    Blood Glucose Monitoring Suppl (ACCU-CHEK GUIDE) w/Device KIT    cholecalciferol (VITAMIN  -D) 1000 UNITS  capsule    diclofenac (VOLTAREN) 1 % topical gel    lisinopril (ZESTRIL) 2.5 MG tablet    melatonin 3 MG tablet    omeprazole (PRILOSEC) 20 MG DR capsule    Semaglutide, 2 MG/DOSE, (OZEMPIC) 8 MG/3ML pen    topiramate (TOPAMAX) 100 MG tablet    Continuous Blood Gluc  (DEXCOM G6 ) KIKE    Continuous Blood Gluc Sensor (DEXCOM G6 SENSOR) MISC    Continuous Blood Gluc Transmit (DEXCOM G6 TRANSMITTER) MISC     No current facility-administered medications for this visit.       No Known Allergies    Family History   Problem Relation Age of Onset    Hypertension Mother     Diabetes Sister     Diabetes Cousin     Hypertension Other     C.A.D. No family hx of     Cancer - colorectal No family hx of     Glaucoma No family hx of     Macular Degeneration No family hx of     Cancer No family hx of     Cerebrovascular Disease No family hx of     Breast Cancer No family hx of     Prostate Cancer No family hx of     Thyroid Disease No family hx of        Social History     Socioeconomic History    Marital status:      Spouse name: Luis    Number of children: 4   Tobacco Use    Smoking status: Never     Passive exposure: Never    Smokeless tobacco: Never   Vaping Use    Vaping Use: Never used   Substance and Sexual Activity    Alcohol use: No     Comment: none for 11/2011    Drug use: No    Sexual activity: Yes     Partners: Female     Birth control/protection: None   Other Topics Concern    Parent/sibling w/ CABG, MI or angioplasty before 65F 55M? No   Social History Narrative    Dairy/d 0 servings/d. Caffeine 1 per weekExercise 2 x weekSunscreen used - NoSeatbelts used - YesWorking smoke/CO detectors in the home - YesGuns stored in the home - NoSelf Breast Exams - NoSelf Testicular Exam - YesEye Exam up to date - YesDental Exam up to date - YesPap Smear up to date - NAMammogram up to date - NAPSA up to date - NoDexa Scan up to date - NoFlex Sig / Colonoscopy up to date - NoImmunizations up to date -  YesAbuse: Current or Past(Physical, Sexual or Emotional)- NoDo you feel safe in your environment - YesRobert RboinMA5/12/06        No tattoos or piercings, blood transfusions, no illicit IV or intranasal drug use.          Review of Systems  Pertinent items are noted in HPI.      Objective:                   General Appearance:    /78   Pulse 59   Temp 99  F (37.2  C) (Oral)   Resp 18   SpO2 98%         Alert, pleasant, cooperative, no distress, appears stated age   Head:    Normocephalic, without obvious abnormality, atraumatic   Eyes:    Conjunctiva/corneas clear   Ears:    Normal TM's without erythema or bulging. Normal external ear canals, both ears   Nose:   Nares normal, septum midline, mucosa normal, no drainage    or sinus tenderness   Throat:   Lips, mucosa, and tongue normal; teeth and gums normal.  No tonsilar hypertrophy or exudate.   Neck:   Supple, symmetrical, trachea midline, no adenopathy    Lungs:     Clear to auscultation bilaterally without wheezes, rales, or rhonchi, respirations unlabored  Back: Patient with some tenderness of the musculature in the left upper back just lateral to the midline.    Heart:    Regular rate and rhythm, S1 and S2 normal, no murmur, rub or gallop       Extremities:   Extremities normal, atraumatic, no cyanosis or edema   Skin:   Skin color, texture, turgor normal, no rashes or lesions         Results for orders placed or performed in visit on 08/24/23   XR Chest 2 Views     Status: None    Narrative    EXAM: XR CHEST 2 VIEWS  LOCATION: [facility]  DATE: 8/24/2023    INDICATION:  Chest pain, unspecified type  COMPARISON: None.      Impression    IMPRESSION: Negative chest.   Results for orders placed or performed in visit on 08/24/23   EKG 12-lead, tracing only     Status: None (Preliminary result)   Result Value Ref Range    Systolic Blood Pressure  mmHg    Diastolic Blood Pressure  mmHg    Ventricular Rate 55 BPM    Atrial Rate 55  BPM    IN Interval 176 ms    QRS Duration 88 ms     ms    QTc 371 ms    P Axis 42 degrees    R AXIS 18 degrees    T Axis 12 degrees    Interpretation ECG       Sinus bradycardia with Premature atrial complexes  Otherwise normal ECG  When compared with ECG of 04-AUG-1999 22:36,  Premature atrial complexes are now Present         This note has been dictated using voice recognition software. Any grammatical or context distortions are unintentional and inherent to the software

## 2023-08-24 NOTE — PATIENT INSTRUCTIONS
Your EKG overall looks normal and your chest x-ray is normal as well.  Is unclear what is causing your chest discomfort.  It is possibly muscular or perhaps stress related.  I did look at your recent lab work of your liver function which looks normal.  I think you can take some Tylenol as needed for discomfort.  Would recommend using a heating pad to the back as well.  Continue to monitor your symptoms and follow-up with your primary doctor if your symptoms are not improving.  Go to the emergency department if having significantly worsening chest pain over the next couple days.

## 2023-09-28 ENCOUNTER — OFFICE VISIT (OUTPATIENT)
Dept: OPHTHALMOLOGY | Facility: CLINIC | Age: 58
End: 2023-09-28
Payer: COMMERCIAL

## 2023-09-28 DIAGNOSIS — E11.3299 BACKGROUND DIABETIC RETINOPATHY (H): ICD-10-CM

## 2023-09-28 DIAGNOSIS — E11.8 TYPE 2 DIABETES MELLITUS WITH COMPLICATION, WITHOUT LONG-TERM CURRENT USE OF INSULIN (H): Primary | ICD-10-CM

## 2023-09-28 DIAGNOSIS — H52.4 PRESBYOPIA: ICD-10-CM

## 2023-09-28 DIAGNOSIS — Z01.01 ENCOUNTER FOR EXAMINATION OF EYES AND VISION WITH ABNORMAL FINDINGS: ICD-10-CM

## 2023-09-28 DIAGNOSIS — H40.003 GLAUCOMA SUSPECT, BILATERAL: ICD-10-CM

## 2023-09-28 PROCEDURE — 92015 DETERMINE REFRACTIVE STATE: CPT | Performed by: OPHTHALMOLOGY

## 2023-09-28 PROCEDURE — 92014 COMPRE OPH EXAM EST PT 1/>: CPT | Performed by: OPHTHALMOLOGY

## 2023-09-28 ASSESSMENT — CONF VISUAL FIELD
OD_SUPERIOR_TEMPORAL_RESTRICTION: 0
OD_SUPERIOR_NASAL_RESTRICTION: 0
OD_NORMAL: 1
OS_SUPERIOR_NASAL_RESTRICTION: 0
OS_SUPERIOR_TEMPORAL_RESTRICTION: 0
OS_INFERIOR_NASAL_RESTRICTION: 0
METHOD: COUNTING FINGERS
OS_NORMAL: 1
OD_INFERIOR_NASAL_RESTRICTION: 0
OS_INFERIOR_TEMPORAL_RESTRICTION: 0
OD_INFERIOR_TEMPORAL_RESTRICTION: 0

## 2023-09-28 ASSESSMENT — REFRACTION_MANIFEST
OD_CYLINDER: +0.25
OS_CYLINDER: +0.25
OS_ADD: +2.50
OD_AXIS: 017
OS_AXIS: 105
OS_SPHERE: +1.50
OD_SPHERE: +1.50
OD_ADD: +2.50

## 2023-09-28 ASSESSMENT — VISUAL ACUITY
OD_CC: J1+
OS_CC: 20/20
OS_CC: J1+
OD_CC: 20/20
CORRECTION_TYPE: GLASSES
METHOD: SNELLEN - LINEAR

## 2023-09-28 ASSESSMENT — TONOMETRY
OS_IOP_MMHG: 13
IOP_METHOD: APPLANATION
OD_IOP_MMHG: 12

## 2023-09-28 ASSESSMENT — EXTERNAL EXAM - RIGHT EYE: OD_EXAM: NORMAL

## 2023-09-28 ASSESSMENT — REFRACTION_WEARINGRX
OD_ADD: +2.50
SPECS_TYPE: PAL
OD_SPHERE: +1.75
OD_AXIS: 040
OS_ADD: +2.50
OS_CYLINDER: SPHERE
OS_SPHERE: +2.00
OD_CYLINDER: +0.50

## 2023-09-28 ASSESSMENT — EXTERNAL EXAM - LEFT EYE: OS_EXAM: NORMAL

## 2023-09-28 ASSESSMENT — SLIT LAMP EXAM - LIDS
COMMENTS: 1+ PTOSIS, 1+ SCLERAL SHOW
COMMENTS: 1+ PTOSIS, 1+ SCLERAL SHOW

## 2023-09-28 ASSESSMENT — CUP TO DISC RATIO
OS_RATIO: 0.7
OD_RATIO: 0.7

## 2023-09-28 NOTE — LETTER
"    9/28/2023         RE: William Montes  [address] [place] [address]        Dear Colleague,    Thank you for referring your patient, William Montes, to the [facility]. Please see a copy of my visit note below.     Current Eye Medications:  None     Subjective:  Pt presents to the clinic for his annual diabetic eye exam. BSL upon waking this am 103.  He denies vision changes or flashes/floaters each eye. Eyes remain comfortable. He is thinking about updating his glasses RX this year due to poor fit of the frames.     Lab Results   Component Value Date    A1C 6.3 06/02/2023    A1C 5.9 12/02/2022    A1C 5.8 08/19/2022    A1C 5.8 02/18/2022    A1C 5.7 08/20/2021    A1C 5.6 06/22/2021    A1C 5.8 11/12/2020    A1C 5.6 07/23/2020    A1C 6.1 03/09/2020    A1C 6.5 11/11/2019       Objective:  See Ophthalmology Exam.       Assessment:  Stable mild background diabetic retinopathy both eyes.  No change in appearance of optic nerves.      ICD-10-CM    1. Type 2 diabetes mellitus with complication, without long-term current use of insulin (H)  E11.8       2. Background diabetic retinopathy, mild, ou  E11.3299       3. Glaucoma suspect, bilateral  H40.003       4. Encounter for examination of eyes and vision with abnormal findings  Z01.01       5. Presbyopia  H52.4            Plan:  Glasses prescription given - optional    May use artificial tears up to four times a day (like Refresh Optive, Systane Balance, TheraTears, or generic artificial tears are ok. Avoid \"get the red out\" drops).     Call in May 2024 for an appointment in September 2024 for Complete Exam    Dr. Hodgson [phone]            Again, thank you for allowing me to participate in the care of your patient.        Sincerely,        Harry Hodgson MD  "

## 2023-09-28 NOTE — PROGRESS NOTES
" Current Eye Medications:  None     Subjective:  Pt presents to the clinic for his annual diabetic eye exam. BSL upon waking this am 103.  He denies vision changes or flashes/floaters each eye. Eyes remain comfortable. He is thinking about updating his glasses RX this year due to poor fit of the frames.     Lab Results   Component Value Date    A1C 6.3 06/02/2023    A1C 5.9 12/02/2022    A1C 5.8 08/19/2022    A1C 5.8 02/18/2022    A1C 5.7 08/20/2021    A1C 5.6 06/22/2021    A1C 5.8 11/12/2020    A1C 5.6 07/23/2020    A1C 6.1 03/09/2020    A1C 6.5 11/11/2019       Objective:  See Ophthalmology Exam.       Assessment:  Stable mild background diabetic retinopathy both eyes.  No change in appearance of optic nerves.      ICD-10-CM    1. Type 2 diabetes mellitus with complication, without long-term current use of insulin (H)  E11.8       2. Background diabetic retinopathy, mild, ou  E11.3299       3. Glaucoma suspect, bilateral  H40.003       4. Encounter for examination of eyes and vision with abnormal findings  Z01.01       5. Presbyopia  H52.4            Plan:  Glasses prescription given - optional    May use artificial tears up to four times a day (like Refresh Optive, Systane Balance, TheraTears, or generic artificial tears are ok. Avoid \"get the red out\" drops).     Call in May 2024 for an appointment in September 2024 for Complete Exam    Dr. Hodgson [phone]        "

## 2023-09-28 NOTE — PATIENT INSTRUCTIONS
"Glasses prescription given - optional    May use artificial tears up to four times a day (like Refresh Optive, Systane Balance, TheraTears, or generic artificial tears are ok. Avoid \"get the red out\" drops).     Call in May 2024 for an appointment in September 2024 for Complete Exam    Dr. Hodgson [phone]      Patient Education   Diabetes weakens the blood vessels all over the body, including the eyes. Damage to the blood vessels in the eyes can cause swelling or bleeding into part of the eye (called the retina). This is called diabetic retinopathy (GERALDINE-tin--puh-thee). If not treated, this disease can cause vision loss or blindness.   Symptoms may include blurred or distorted vision, but many people have no symptoms. It's important to see your eye doctor regularly to check for problems.   Early treatment and good control can help protect your vision. Here are the things you can do to help prevent vision loss:      1. Keep your blood sugar levels under tight control.      2. Bring high blood pressure under control.      3. No smoking.      4. Have yearly dilated eye exams.     "

## 2023-10-11 ENCOUNTER — MYC MEDICAL ADVICE (OUTPATIENT)
Dept: FAMILY MEDICINE | Facility: CLINIC | Age: 58
End: 2023-10-11
Payer: COMMERCIAL

## 2023-10-11 DIAGNOSIS — E78.5 HYPERLIPIDEMIA LDL GOAL <100: ICD-10-CM

## 2023-10-11 DIAGNOSIS — E11.8 TYPE 2 DIABETES MELLITUS WITH COMPLICATION, WITHOUT LONG-TERM CURRENT USE OF INSULIN (H): Primary | ICD-10-CM

## 2023-10-11 RX ORDER — ATORVASTATIN CALCIUM 20 MG/1
20 TABLET, FILM COATED ORAL DAILY
Qty: 90 TABLET | Refills: 2 | Status: SHIPPED | OUTPATIENT
Start: 2023-10-11 | End: 2024-05-02

## 2023-10-11 NOTE — TELEPHONE ENCOUNTER
Prescription approved per [facility] Refill Protocol.     DONNIE Vasquez [facility]

## 2023-10-16 ENCOUNTER — TELEPHONE (OUTPATIENT)
Dept: FAMILY MEDICINE | Facility: CLINIC | Age: 58
End: 2023-10-16
Payer: COMMERCIAL

## 2023-10-16 ENCOUNTER — TELEPHONE (OUTPATIENT)
Dept: ENDOCRINOLOGY | Facility: CLINIC | Age: 58
End: 2023-10-16
Payer: COMMERCIAL

## 2023-10-16 NOTE — TELEPHONE ENCOUNTER
"Prior Authorization Retail Medication Request    Medication/Dose: RYBELSUS 14 MG TABLETS    Insurance Name:  [facility]  Insurance ID:  [id number]     Pharmacy Information (if different than what is on RX)  Name:  [facility]  Phone:  [phone]    A Prior Authorization has been started.  To submit the PA, please follow the instructions below:    Login to [email or web address] and \"enter a Key\"  KEY: [id number]      "

## 2023-10-16 NOTE — TELEPHONE ENCOUNTER
Called from [facility]'s pharmacy requesting verbal authorization to dispense Ozempic 1mg dose. Patient was prescribed 2mg, but due to supply issues, is unable to get. OK per Marine Flores PA-C. Gave verbal over the phone to dispense 2 month's worth of Ozempic 1mg dose. Patient needs to schedule an appointment.

## 2023-10-18 NOTE — TELEPHONE ENCOUNTER
Prior Authorization Approval    Medication: SEMAGLUTIDE 14 MG PO TABS  Authorization Effective Date: 10/18/2023  Authorization Expiration Date: 10/18/2024  Approved Dose/Quantity: 30/30ds  Reference #: [id number]   Insurance Company: [employer] ([facility]) - Phone [phone] Fax [phone]  Which Pharmacy is filling the prescription: [facility] [address] [place] [address] [address] AT [place] [id number]  Pharmacy Notified: y  Patient Notified: y - pharmacy to notify

## 2023-12-08 ENCOUNTER — OFFICE VISIT (OUTPATIENT)
Dept: FAMILY MEDICINE | Facility: CLINIC | Age: 58
End: 2023-12-08
Payer: COMMERCIAL

## 2023-12-08 VITALS
OXYGEN SATURATION: 99 % | BODY MASS INDEX: 30.48 KG/M2 | SYSTOLIC BLOOD PRESSURE: 138 MMHG | HEART RATE: 62 BPM | WEIGHT: 225 LBS | HEIGHT: 72 IN | TEMPERATURE: 98.1 F | RESPIRATION RATE: 20 BRPM | DIASTOLIC BLOOD PRESSURE: 80 MMHG

## 2023-12-08 DIAGNOSIS — I10 BENIGN ESSENTIAL HYPERTENSION: ICD-10-CM

## 2023-12-08 DIAGNOSIS — N18.30 STAGE 3 CHRONIC KIDNEY DISEASE, UNSPECIFIED WHETHER STAGE 3A OR 3B CKD (H): ICD-10-CM

## 2023-12-08 DIAGNOSIS — E11.42 DIABETIC POLYNEUROPATHY ASSOCIATED WITH TYPE 2 DIABETES MELLITUS (H): ICD-10-CM

## 2023-12-08 DIAGNOSIS — R30.0 DYSURIA: ICD-10-CM

## 2023-12-08 DIAGNOSIS — E11.8 TYPE 2 DIABETES MELLITUS WITH COMPLICATION, WITHOUT LONG-TERM CURRENT USE OF INSULIN (H): ICD-10-CM

## 2023-12-08 DIAGNOSIS — R07.9 CHEST PAIN, UNSPECIFIED TYPE: Primary | ICD-10-CM

## 2023-12-08 DIAGNOSIS — E78.5 HYPERLIPIDEMIA LDL GOAL <100: ICD-10-CM

## 2023-12-08 PROBLEM — R79.89 ELEVATED SERUM CREATININE: Status: RESOLVED | Noted: 2023-01-30 | Resolved: 2023-12-08

## 2023-12-08 LAB
ALBUMIN UR-MCNC: NEGATIVE MG/DL
ANION GAP SERPL CALCULATED.3IONS-SCNC: 11 MMOL/L (ref 7–15)
APPEARANCE UR: CLEAR
BACTERIA #/AREA URNS HPF: ABNORMAL /HPF
BILIRUB UR QL STRIP: NEGATIVE
BUN SERPL-MCNC: 15.5 MG/DL (ref 8–23)
CALCIUM SERPL-MCNC: 9.2 MG/DL (ref 8.8–10.2)
CHLORIDE SERPL-SCNC: 109 MMOL/L (ref 98–107)
CHOLEST SERPL-MCNC: 124 MG/DL
COLOR UR AUTO: YELLOW
CREAT SERPL-MCNC: 1.46 MG/DL (ref 0.67–1.17)
CYSTATIN C (ROCHE): 0.9 MG/L (ref 0.6–1)
DEPRECATED HCO3 PLAS-SCNC: 19 MMOL/L (ref 22–29)
EGFRCR SERPLBLD CKD-EPI 2021: 55 ML/MIN/1.73M2
GFR SERPL CREATININE-BSD FRML MDRD: 89 ML/MIN/1.73M2
GLUCOSE SERPL-MCNC: 113 MG/DL (ref 70–99)
GLUCOSE UR STRIP-MCNC: NEGATIVE MG/DL
HBA1C MFR BLD: 5.8 % (ref 0–5.6)
HDLC SERPL-MCNC: 33 MG/DL
HGB UR QL STRIP: NEGATIVE
KETONES UR STRIP-MCNC: NEGATIVE MG/DL
LDLC SERPL CALC-MCNC: 68 MG/DL
LEUKOCYTE ESTERASE UR QL STRIP: NEGATIVE
NITRATE UR QL: NEGATIVE
NONHDLC SERPL-MCNC: 91 MG/DL
PH UR STRIP: 6.5 [PH] (ref 5–7)
POTASSIUM SERPL-SCNC: 4.2 MMOL/L (ref 3.4–5.3)
RBC #/AREA URNS AUTO: ABNORMAL /HPF
SODIUM SERPL-SCNC: 139 MMOL/L (ref 135–145)
SP GR UR STRIP: 1.02 (ref 1–1.03)
TRIGL SERPL-MCNC: 116 MG/DL
UROBILINOGEN UR STRIP-ACNC: 0.2 E.U./DL
WBC #/AREA URNS AUTO: ABNORMAL /HPF

## 2023-12-08 PROCEDURE — 80048 BASIC METABOLIC PNL TOTAL CA: CPT | Performed by: FAMILY MEDICINE

## 2023-12-08 PROCEDURE — 90678 RSV VACC PREF BIVALENT IM: CPT | Performed by: FAMILY MEDICINE

## 2023-12-08 PROCEDURE — 90471 IMMUNIZATION ADMIN: CPT | Performed by: FAMILY MEDICINE

## 2023-12-08 PROCEDURE — 81001 URINALYSIS AUTO W/SCOPE: CPT | Performed by: FAMILY MEDICINE

## 2023-12-08 PROCEDURE — 99207 PR FOOT EXAM NO CHARGE: CPT | Mod: 25 | Performed by: FAMILY MEDICINE

## 2023-12-08 PROCEDURE — 83036 HEMOGLOBIN GLYCOSYLATED A1C: CPT | Performed by: FAMILY MEDICINE

## 2023-12-08 PROCEDURE — 82610 CYSTATIN C: CPT | Performed by: FAMILY MEDICINE

## 2023-12-08 PROCEDURE — 36415 COLL VENOUS BLD VENIPUNCTURE: CPT | Performed by: FAMILY MEDICINE

## 2023-12-08 PROCEDURE — 90472 IMMUNIZATION ADMIN EACH ADD: CPT | Performed by: FAMILY MEDICINE

## 2023-12-08 PROCEDURE — 80061 LIPID PANEL: CPT | Performed by: FAMILY MEDICINE

## 2023-12-08 PROCEDURE — 99214 OFFICE O/P EST MOD 30 MIN: CPT | Mod: 25 | Performed by: FAMILY MEDICINE

## 2023-12-08 PROCEDURE — 90686 IIV4 VACC NO PRSV 0.5 ML IM: CPT | Performed by: FAMILY MEDICINE

## 2023-12-08 RX ORDER — LISINOPRIL 2.5 MG/1
2.5 TABLET ORAL DAILY
Qty: 90 TABLET | Refills: 1 | Status: SHIPPED | OUTPATIENT
Start: 2023-12-08 | End: 2024-05-02

## 2023-12-08 RX ORDER — GABAPENTIN 100 MG/1
CAPSULE ORAL
Qty: 201 CAPSULE | Refills: 0 | Status: SHIPPED | OUTPATIENT
Start: 2023-12-08 | End: 2024-03-02

## 2023-12-08 ASSESSMENT — PAIN SCALES - GENERAL: PAINLEVEL: NO PAIN (0)

## 2023-12-08 NOTE — NURSING NOTE
Prior to immunization administration, verified patients identity using patient s name and date of birth. Please see Immunization Activity for additional information.     Screening Questionnaire for Adult Immunization    Are you sick today?   No   Do you have allergies to medications, food, a vaccine component or latex?   No   Have you ever had a serious reaction after receiving a vaccination?   No   Do you have a long-term health problem with heart, lung, kidney, or metabolic disease (e.g., diabetes), asthma, a blood disorder, no spleen, complement component deficiency, a cochlear implant, or a spinal fluid leak?  Are you on long-term aspirin therapy?   No   Do you have cancer, leukemia, HIV/AIDS, or any other immune system problem?   No   Do you have a parent, brother, or sister with an immune system problem?   No   In the past 3 months, have you taken medications that affect  your immune system, such as prednisone, other steroids, or anticancer drugs; drugs for the treatment of rheumatoid arthritis, Crohn s disease, or psoriasis; or have you had radiation treatments?   No   Have you had a seizure, or a brain or other nervous system problem?   No   During the past year, have you received a transfusion of blood or blood    products, or been given immune (gamma) globulin or antiviral drug?   No   For women: Are you pregnant or is there a chance you could become       pregnant during the next month?   No   Have you received any vaccinations in the past 4 weeks?   No     Immunization questionnaire answers were all negative.      Patient instructed to remain in clinic for 15 minutes afterwards, and to report any adverse reactions.     Screening performed by Nery Gamez CMA on 12/8/2023 at 8:48 AM.

## 2023-12-08 NOTE — PROGRESS NOTES
Assessment & Plan     Chest pain, unspecified type  Plan: The patient has high risk for coronary artery and had an episode of chest pain about 6 months ago when he was under extreme stress.  We discussed the options of coronary calcium use score versus a stress test.  Will proceed with a coronary calcium scan if this is abnormal would refer to cardiology.  - CT Coronary Calcium Scan; Future    Type 2 diabetes mellitus with complication, without long-term current use of insulin (H)  The current medical regimen is effective;  continue present plan and medications.    - HEMOGLOBIN A1C; Future  - HEMOGLOBIN A1C  - FOOT EXAM  - Semaglutide (RYBELSUS) 14 MG tablet; Take 14 mg by mouth daily    Hyperlipidemia LDL goal <100  The current medical regimen is effective;  continue present plan and medications.    - Lipid panel reflex to direct LDL Fasting; Future    Diabetic polyneuropathy associated with type 2 diabetes mellitus (H)  The patient is showing symptoms of neuropathy.  He would like to try gabapentin.  I have prescribed the following.  He can send him [employer]el[employer] message with an update in a few weeks.  - gabapentin (NEURONTIN) 100 MG capsule; Take 1 capsule (100 mg) by mouth at bedtime for 7 days, THEN 1 capsule (100 mg) 2 times daily for 7 days, THEN 1 capsule (100 mg) 3 times daily for 60 days.    Benign essential hypertension  The current medical regimen is effective;  continue present plan and medications.    - Basic metabolic panel  (Ca, Cl, CO2, Creat, Gluc, K, Na, BUN); Future  - Basic metabolic panel  (Ca, Cl, CO2, Creat, Gluc, K, Na, BUN)  - lisinopril (ZESTRIL) 2.5 MG tablet; Take 1 tablet (2.5 mg) by mouth daily    Stage 3 chronic kidney disease, unspecified whether stage 3a or 3b CKD (H)  Below labs ordered for nephrology  - Cystatin C with GFR; Future    Dysuria  No infection seen  - UA with Microscopic reflex to Culture - lab collect; Future  - UA with Microscopic reflex to Culture - lab collect  - UA  Microscopic with Reflex to Culture      30 minutes spent by me on the date of the encounter doing chart review, history and exam, documentation and further activities per the note       BMI:   Estimated body mass index is 30.52 kg/m  as calculated from the following:    Height as of this encounter: 1.829 m (6').    Weight as of this encounter: 102.1 kg (225 lb).     Follow-up in 6 months for diabetes    DO TRISH [facility]    Mary Thomas is a 58 year old, presenting for the following health issues:  Chronic Disease Management        12/8/2023     7:58 AM   Additional Questions   Roomed by Nery ROSA   Accompanied by self         12/8/2023     7:58 AM   Patient Reported Additional Medications   Patient reports taking the following new medications none       History of Present Illness       Diabetes:   He presents for follow up of diabetes.  He is checking home blood glucose one time daily.   He checks blood glucose before meals.  Blood glucose is never over 200 and never under 70. He is aware of hypoglycemia symptoms including shakiness.    He has no concerns regarding his diabetes at this time.  He is having burning in feet and weight gain.            He eats 0-1 servings of fruits and vegetables daily.He consumes 0 sweetened beverage(s) daily.He exercises with enough effort to increase his heart rate 9 or less minutes per day.  He exercises with enough effort to increase his heart rate 3 or less days per week. He is missing 1 dose(s) of medications per week.  He is not taking prescribed medications regularly due to remembering to take.     Semaglutide 14 mg tab p.o. daily    Lab Results   Component Value Date    A1C 5.8 12/08/2023    A1C 6.3 06/02/2023    A1C 5.9 12/02/2022    A1C 5.8 08/19/2022    A1C 5.8 02/18/2022    A1C 5.6 06/22/2021    A1C 5.8 11/12/2020    A1C 5.6 07/23/2020    A1C 6.1 03/09/2020    A1C 6.5 11/11/2019         Hyperlipidemia Follow-Up    Are you  regularly taking any medication or supplement to lower your cholesterol?   Yes- atorvastatin  Are you having muscle aches or other side effects that you think could be caused by your cholesterol lowering medication?  No  LDL Cholesterol Calculated   Date Value Ref Range Status   2022 70 <=100 mg/dL Final   2020 66 <100 mg/dL Final     Comment:     Desirable:       <100 mg/dl         Hypertension Follow-up    Do you check your blood pressure regularly outside of the clinic? Yes   Are you following a low salt diet? Yes  Are your blood pressures ever more than 140 on the top number (systolic) OR more   than 90 on the bottom number (diastolic), for example 140/90? No  Lisinopril 2.5 mg daily      He has itchy sensation when he urinates.  He hsa pain with erections.      The patient had chest pain last summer after his mother .  This was substernal.  He went to urgent care and had an EKG.  He did not have a stress test or any other workup at that time.  He denies any further chest pain.  He has never had a stress test or coronary calcium score    Review of Systems   Constitutional, HEENT, cardiovascular, pulmonary, gi and gu systems are negative, except as otherwise noted.      Objective    /80 (BP Location: Right arm, Patient Position: Sitting, Cuff Size: Adult Large)   Pulse 62   Temp 98.1  F (36.7  C) (Oral)   Resp 20   Ht 1.829 m (6')   Wt 102.1 kg (225 lb)   SpO2 99%   BMI 30.52 kg/m    Body mass index is 30.52 kg/m .  Physical Exam   GENERAL: healthy, alert and no distress  NECK: no adenopathy, no asymmetry, masses, or scars and thyroid normal to palpation  RESP: lungs clear to auscultation - no rales, rhonchi or wheezes  CV: regular rate and rhythm, normal S1 S2, no S3 or S4, no murmur, click or rub, no peripheral edema and peripheral pulses strong  ABDOMEN: soft, nontender, no hepatosplenomegaly, no masses and bowel sounds normal  MS: no gross musculoskeletal defects noted, no  edema  PSYCH: mentation appears normal, affect normal/bright  Diabetic foot exam: normal DP and PT pulses, no trophic changes or ulcerative lesions, normal sensory exam, and normal monofilament exam    No results found for this or any previous visit (from the past 24 hour(s)).

## 2023-12-22 ENCOUNTER — HOSPITAL ENCOUNTER (OUTPATIENT)
Dept: CT IMAGING | Facility: CLINIC | Age: 58
Discharge: HOME | End: 2023-12-22
Attending: FAMILY MEDICINE | Admitting: FAMILY MEDICINE
Payer: COMMERCIAL

## 2023-12-22 DIAGNOSIS — R07.9 CHEST PAIN, UNSPECIFIED TYPE: ICD-10-CM

## 2023-12-22 LAB
CV CALCIUM SCORE AGATSTON LM: 0
CV CALCIUM SCORING AGATSON LAD: 108
CV CALCIUM SCORING AGATSTON CX: 0
CV CALCIUM SCORING AGATSTON RCA: 0
CV CALCIUM SCORING AGATSTON TOTAL: 108

## 2023-12-22 PROCEDURE — 75571 CT HRT W/O DYE W/CA TEST: CPT

## 2023-12-22 PROCEDURE — 75571 CT HRT W/O DYE W/CA TEST: CPT | Mod: 26 | Performed by: INTERNAL MEDICINE

## 2023-12-28 ENCOUNTER — TELEPHONE (OUTPATIENT)
Dept: FAMILY MEDICINE | Facility: CLINIC | Age: 58
End: 2023-12-28
Payer: COMMERCIAL

## 2023-12-28 DIAGNOSIS — R93.1 ELEVATED CORONARY ARTERY CALCIUM SCORE: Primary | ICD-10-CM

## 2023-12-28 NOTE — TELEPHONE ENCOUNTER
Malissa Thomas,    I am glad you got your coronary calcium scan done.  The results showed you are in mid range for coronary artery calcifications.  Because of this I would like you to see the cardiologist.  I have placed a referral and someone will contact you.   Feel free to email or call if you have any questions.     Take care,     Sirena Talley D.O.

## 2024-02-23 ENCOUNTER — OFFICE VISIT (OUTPATIENT)
Dept: CARDIOLOGY | Facility: CLINIC | Age: 59
End: 2024-02-23
Payer: COMMERCIAL

## 2024-02-23 VITALS
DIASTOLIC BLOOD PRESSURE: 72 MMHG | WEIGHT: 229 LBS | HEART RATE: 70 BPM | HEIGHT: 72 IN | SYSTOLIC BLOOD PRESSURE: 136 MMHG | RESPIRATION RATE: 16 BRPM | BODY MASS INDEX: 31.02 KG/M2

## 2024-02-23 DIAGNOSIS — R07.89 OTHER CHEST PAIN: Primary | ICD-10-CM

## 2024-02-23 DIAGNOSIS — R93.1 ELEVATED CORONARY ARTERY CALCIUM SCORE: ICD-10-CM

## 2024-02-23 PROCEDURE — 99204 OFFICE O/P NEW MOD 45 MIN: CPT | Performed by: INTERNAL MEDICINE

## 2024-02-23 NOTE — PROGRESS NOTES
HEART CARE ENCOUNTER CONSULTATON NOTE      [facility]  [phone]      Assessment/Recommendations   Assessment:  1.  Chest pain: Intermittent chest pain with risk factors.  He was found to have coronary artery disease involving his LAD and recommend proceeding with exercise echo stress testing for further evaluation.  2.  Dyslipidemia: Goal LDL of less than 70 given known coronary artery disease and diabetes  3.  Hypertension: Well-controlled  4.  Chronic kidney disease     Plan:  1.  Continue on aspirin and statin therapy for goal LDL of less than 70  2.  Continue with healthy diet and recommend regular exercise  3.  Will proceed with exercise stress echocardiogram to exclude structural heart disease and ischemia  Follow-up as needed depending on results       History of Present Illness/Subjective    HPI: William Montes is a 58 year old male with history of diabetes mellitus type 2, dyslipidemia, hypertension, chronic kidney disease who I am seeing today for initial consultation after CT coronary calcium scan.  CT coronary calcium scan with 108 in the LAD placing him in the 50th percentile.  He experienced an episode of back pain rating to the chest last August.  Initial workup at that time was unremarkable including EKG and chest x-ray.  Over the past month or 2 he has noticed mid to left-sided chest pain that he notices when he moves in a certain way.  It feels more like a muscular pain potentially.  He denies any shortness of breath.  He admits to lack of exercise.  He is very busy at work working up until 7 to 8 PM at night at times.  He is going to try to resume some exercise at [employer].  He does follow a good diet with an A1c of 5.8%.  He eats fish on a daily basis as part of his [place] culture.  He feels occasional heart pounding at night.  He underwent a monitor for this in the past few years ago and this was unremarkable.  He does have a Kardia that he can use to monitor  his rhythms.  He has used it occasionally and it always shows normal rhythm.    CT coronary calcium scan 12/22/2023  Calcium Scoring: The total Agatston score is 108. A calcium score in this range places the individual in the 48th percentile when compared to an age and gender matched control group and implies a high risk of cardiac events in the next ten years.      Physical Examination  Review of Systems   Vitals: /72 (BP Location: Right arm, Patient Position: Sitting, Cuff Size: Adult Large)   Pulse 70   Resp 16   Ht 1.829 m (6')   Wt 103.9 kg (229 lb)   BMI 31.06 kg/m    BMI= Body mass index is 31.06 kg/m .  Wt Readings from Last 3 Encounters:   02/23/24 103.9 kg (229 lb)   12/08/23 102.1 kg (225 lb)   06/02/23 101.4 kg (223 lb 9.6 oz)       General Appearance:   no distress, normal body habitus   ENT/Mouth: membranes moist, no oral lesions or bleeding gums.      EYES:  no scleral icterus, normal conjunctivae   Neck: no carotid bruits or thyromegaly   Chest/Lungs:   lungs are clear to auscultation   Cardiovascular:   Regular. Normal first and second heart sounds with no murmur no edema bilaterally    Abdomen:  no organomegaly, masses, bruits, or tenderness; bowel sounds are present   Extremities: no cyanosis or clubbing   Skin: no xanthelasma, warm.    Neurologic: normal  bilateral, no tremors     Psychiatric: alert and oriented x3, calm        Please refer above for cardiac ROS details.        Medical History  Surgical History Family History Social History   Past Medical History:   Diagnosis Date    ABNORMAL LIVER FUNCTION STUDY 01/04/2008    increased ast, alt Normal since 2008    Arthritis of both glenohumeral joints 02/18/2022    Background diabetic retinopathy, mild, ou 04/01/2018    Benign essential hypertension 12/02/2022    Chronic gingivitis     Esophageal reflux     Glaucoma     History of blood transfusion     My son has aplastic anemia and has had bld transfusions    Nonspecific  abnormal results of liver function study     OBESITY NOS 12/13/2006    Pure hypercholesterolemia     Type II or unspecified type diabetes mellitus without mention of complication, not stated as uncontrolled     Uncomplicated asthma     My mother has asthma     Past Surgical History:   Procedure Laterality Date    BIOPSY      My son had bone marrow biopsy    COLONOSCOPY N/A 5/22/2019    Procedure: COLONOSCOPY;  Surgeon: Leventhal, Thomas Michael, MD;  Location: [facility] OR    ENT SURGERY      My son has had tympanoplasty    NO HISTORY OF SURGERY      ORTHOPEDIC SURGERY      My mother had left hip replacement surgery     Family History   Problem Relation Age of Onset    Hypertension Mother     Diabetes Sister     Diabetes Cousin     Hypertension Other     C.A.D. No family hx of     Cancer - colorectal No family hx of     Glaucoma No family hx of     Macular Degeneration No family hx of     Cancer No family hx of     Cerebrovascular Disease No family hx of     Breast Cancer No family hx of     Prostate Cancer No family hx of     Thyroid Disease No family hx of         Social History     Socioeconomic History    Marital status:      Spouse name: Luis    Number of children: 4    Years of education: Not on file    Highest education level: Not on file   Occupational History    Not on file   Tobacco Use    Smoking status: Never     Passive exposure: Never    Smokeless tobacco: Never   Vaping Use    Vaping Use: Never used   Substance and Sexual Activity    Alcohol use: No     Comment: none for 11/2011    Drug use: No    Sexual activity: Yes     Partners: Female     Birth control/protection: None   Other Topics Concern    Parent/sibling w/ CABG, MI or angioplasty before 65F 55M? No   Social History Narrative    Dairy/d 0 servings/d. Caffeine 1 per weekExercise 2 x weekSunscreen used - NoSeatbelts used - YesWorking smoke/CO detectors in the home - YesGuns stored in the home - NoSelf Breast Exams - NoSelf Testicular Exam -  YesEye Exam up to date - YesDental Exam up to date - YesPap Smear up to date - NAMammogram up to date - NAPSA up to date - NoDexa Scan up to date - NoFlex Sig / Colonoscopy up to date - NoImmunizations up to date - YesAbuse: Current or Past(Physical, Sexual or Emotional)- NoDo you feel safe in your environment - YesRobert PhoenixDavidMA5/12/06        No tattoos or piercings, blood transfusions, no illicit IV or intranasal drug use.      Social Determinants of Health     Financial Resource Strain: Low Risk  (12/8/2023)    Financial Resource Strain     Within the past 12 months, have you or your family members you live with been unable to get utilities (heat, electricity) when it was really needed?: No   Food Insecurity: Low Risk  (12/8/2023)    Food Insecurity     Within the past 12 months, did you worry that your food would run out before you got money to buy more?: No     Within the past 12 months, did the food you bought just not last and you didn t have money to get more?: No   Transportation Needs: Low Risk  (12/8/2023)    Transportation Needs     Within the past 12 months, has lack of transportation kept you from medical appointments, getting your medicines, non-medical meetings or appointments, work, or from getting things that you need?: No   Physical Activity: Not on file   Stress: Not on file   Social Connections: Not on file   Interpersonal Safety: Low Risk  (12/8/2023)    Interpersonal Safety     Do you feel physically and emotionally safe where you currently live?: Yes     Within the past 12 months, have you been hit, slapped, kicked or otherwise physically hurt by someone?: No     Within the past 12 months, have you been humiliated or emotionally abused in other ways by your partner or ex-partner?: No   Housing Stability: Low Risk  (12/8/2023)    Housing Stability     Do you have housing? : Yes     Are you worried about losing your housing?: No           Medications  Allergies   Current Outpatient  Medications   Medication Sig Dispense Refill    acetaminophen (TYLENOL) 325 MG tablet Take 325-650 mg by mouth every 6 hours as needed for mild pain or fever      ASPIRIN 81 MG OR TABS 1 tab po QD (Once per day) 100 3    atorvastatin (LIPITOR) 20 MG tablet TAKE 1 TABLET(20 MG) BY MOUTH DAILY 90 tablet 2    cholecalciferol (VITAMIN  -D) 1000 UNITS capsule Take 1 capsule by mouth daily      diclofenac (VOLTAREN) 1 % topical gel Apply 4 grams to shoulders four times daily using enclosed dosing card. 100 g 1    lisinopril (ZESTRIL) 2.5 MG tablet Take 1 tablet (2.5 mg) by mouth daily 90 tablet 1    melatonin 3 MG tablet Take 6 mg by mouth nightly as needed for sleep      omeprazole (PRILOSEC) 20 MG DR capsule Take 20 mg by mouth daily as needed      Semaglutide (RYBELSUS) 14 MG tablet Take 14 mg by mouth daily 90 tablet 1    topiramate (TOPAMAX) 100 MG tablet TAKE 1 TABLET(100 MG) BY MOUTH DAILY 90 tablet 1    gabapentin (NEURONTIN) 100 MG capsule Take 1 capsule (100 mg) by mouth at bedtime for 7 days, THEN 1 capsule (100 mg) 2 times daily for 7 days, THEN 1 capsule (100 mg) 3 times daily for 60 days. 201 capsule 0     No Known Allergies       Lab Results    Chemistry/lipid CBC Cardiac Enzymes/BNP/TSH/INR   Recent Labs   Lab Test 12/08/23  0807   CHOL 124   HDL 33*   LDL 68   TRIG 116     Recent Labs   Lab Test 12/08/23  0807 12/02/22  0936 02/18/22  0905   LDL 68 70 66     Recent Labs   Lab Test 12/08/23  0807      POTASSIUM 4.2   CHLORIDE 109*   CO2 19*   *   BUN 15.5   CR 1.46*   GFRESTIMATED 55*   SHANA 9.2     Recent Labs   Lab Test 12/08/23  0807 06/02/23  0947 01/23/23  0818   CR 1.46* 1.52* 1.57*     Recent Labs   Lab Test 12/08/23  0807 06/02/23  0947 12/02/22  0936   A1C 5.8* 6.3* 5.9*          Recent Labs   Lab Test 06/02/23  0947   WBC 3.6*   HGB 14.5   HCT 43.7   MCV 88   *     Recent Labs   Lab Test 06/02/23  0947 01/23/23  0818 07/27/21  0935   HGB 14.5 15.0 14.9    No results for  "input(s): \"TROPONINI\" in the last 35254 hours.  No results for input(s): \"BNP\", \"NTBNPI\", \"NTBNP\" in the last 21203 hours.  Recent Labs   Lab Test 06/02/23  0947   TSH 1.92     Recent Labs   Lab Test 01/04/21  1235 01/09/20  0916   INR 1.06 1.02        Sujatha Dc MD                                     "

## 2024-02-23 NOTE — LETTER
2/23/2024    Sirena Talley, DO  [address] [facility] [address]    RE: William Montes       Dear Colleague,     [facility] the [facility]    HEART CARE ENCOUNTER CONSULTATON NOTE      [facility]  [phone]      Assessment/Recommendations   Assessment:  1.  Chest pain: Intermittent chest pain with risk factors.  He was found to have coronary artery disease involving his LAD and recommend proceeding with exercise echo stress testing for further evaluation.  2.  Dyslipidemia: Goal LDL of less than 70 given known coronary artery disease and diabetes  3.  Hypertension: Well-controlled  4.  Chronic kidney disease     Plan:  1.  Continue on aspirin and statin therapy for goal LDL of less than 70  2.  Continue with healthy diet and recommend regular exercise  3.  Will proceed with exercise stress echocardiogram to exclude structural heart disease and ischemia  Follow-up as needed depending on results       History of Present Illness/Subjective    HPI: William Montes is a 58 year old male with history of diabetes mellitus type 2, dyslipidemia, hypertension, chronic kidney disease who I am seeing today for initial consultation after CT coronary calcium scan.  CT coronary calcium scan with 108 in the LAD placing him in the 50th percentile.  He experienced an episode of back pain rating to the chest last August.  Initial workup at that time was unremarkable including EKG and chest x-ray.  Over the past month or 2 he has noticed mid to left-sided chest pain that he notices when he moves in a certain way.  It feels more like a muscular pain potentially.  He denies any shortness of breath.  He admits to lack of exercise.  He is very busy at work working up until 7 to 8 PM at night at times.  He is going to try to resume some exercise at [employer].  He does follow a good diet with an A1c of 5.8%.  He eats fish on a daily basis as part of  his [place] culture.  He feels occasional heart pounding at night.  He underwent a monitor for this in the past few years ago and this was unremarkable.  He does have a Kardia that he can use to monitor his rhythms.  He has used it occasionally and it always shows normal rhythm.    CT coronary calcium scan 12/22/2023  Calcium Scoring: The total Agatston score is 108. A calcium score in this range places the individual in the 48th percentile when compared to an age and gender matched control group and implies a high risk of cardiac events in the next ten years.      Physical Examination  Review of Systems   Vitals: /72 (BP Location: Right arm, Patient Position: Sitting, Cuff Size: Adult Large)   Pulse 70   Resp 16   Ht 1.829 m (6')   Wt 103.9 kg (229 lb)   BMI 31.06 kg/m    BMI= Body mass index is 31.06 kg/m .  Wt Readings from Last 3 Encounters:   02/23/24 103.9 kg (229 lb)   12/08/23 102.1 kg (225 lb)   06/02/23 101.4 kg (223 lb 9.6 oz)       General Appearance:   no distress, normal body habitus   ENT/Mouth: membranes moist, no oral lesions or bleeding gums.      EYES:  no scleral icterus, normal conjunctivae   Neck: no carotid bruits or thyromegaly   Chest/Lungs:   lungs are clear to auscultation   Cardiovascular:   Regular. Normal first and second heart sounds with no murmur no edema bilaterally    Abdomen:  no organomegaly, masses, bruits, or tenderness; bowel sounds are present   Extremities: no cyanosis or clubbing   Skin: no xanthelasma, warm.    Neurologic: normal  bilateral, no tremors     Psychiatric: alert and oriented x3, calm        Please refer above for cardiac ROS details.        Medical History  Surgical History Family History Social History   Past Medical History:   Diagnosis Date    ABNORMAL LIVER FUNCTION STUDY 01/04/2008    increased ast, alt Normal since 2008    Arthritis of both glenohumeral joints 02/18/2022    Background diabetic retinopathy, mild, ou 04/01/2018    Benign  essential hypertension 12/02/2022    Chronic gingivitis     Esophageal reflux     Glaucoma     History of blood transfusion     My son has aplastic anemia and has had bld transfusions    Nonspecific abnormal results of liver function study     OBESITY NOS 12/13/2006    Pure hypercholesterolemia     Type II or unspecified type diabetes mellitus without mention of complication, not stated as uncontrolled     Uncomplicated asthma     My mother has asthma     Past Surgical History:   Procedure Laterality Date    BIOPSY      My son had bone marrow biopsy    COLONOSCOPY N/A 5/22/2019    Procedure: COLONOSCOPY;  Surgeon: Leventhal, Thomas Michael, MD;  [facility]    ENT SURGERY      My son has had tympanoplasty    NO HISTORY OF SURGERY      ORTHOPEDIC SURGERY      My mother had left hip replacement surgery     Family History   Problem Relation Age of Onset    Hypertension Mother     Diabetes Sister     Diabetes Cousin     Hypertension Other     C.A.D. No family hx of     Cancer - colorectal No family hx of     Glaucoma No family hx of     Macular Degeneration No family hx of     Cancer No family hx of     Cerebrovascular Disease No family hx of     Breast Cancer No family hx of     Prostate Cancer No family hx of     Thyroid Disease No family hx of         Social History     Socioeconomic History    Marital status:      Spouse name: Luis    Number of children: 4    Years of education: Not on file    Highest education level: Not on file   Occupational History    Not on file   Tobacco Use    Smoking status: Never     Passive exposure: Never    Smokeless tobacco: Never   Vaping Use    Vaping Use: Never used   Substance and Sexual Activity    Alcohol use: No     Comment: none for 11/2011    Drug use: No    Sexual activity: Yes     Partners: Female     Birth control/protection: None   Other Topics Concern    Parent/sibling w/ CABG, MI or angioplasty before 65F 55M? No   Social History Narrative    Dairy/d 0  servings/d. Caffeine 1 per weekExercise 2 x weekSunscreen used - NoSeatbelts used - YesWorking smoke/CO detectors in the home - YesGuns stored in the home - NoSelf Breast Exams - NoSelf Testicular Exam - YesEye Exam up to date - YesDental Exam up to date - YesPap Smear up to date - NAMammogram up to date - NAPSA up to date - NoDexa Scan up to date - NoFlex Sig / Colonoscopy up to date - NoImmunizations up to date - YesAbuse: Current or Past(Physical, Sexual or Emotional)- NoDo you feel safe in your environment - YesRobert HeziggynCMA5/12/06        No tattoos or piercings, blood transfusions, no illicit IV or intranasal drug use.      Social Determinants of Health     Financial Resource Strain: Low Risk  (12/8/2023)    Financial Resource Strain     Within the past 12 months, have you or your family members you live with been unable to get utilities (heat, electricity) when it was really needed?: No   Food Insecurity: Low Risk  (12/8/2023)    Food Insecurity     Within the past 12 months, did you worry that your food would run out before you got money to buy more?: No     Within the past 12 months, did the food you bought just not last and you didn t have money to get more?: No   Transportation Needs: Low Risk  (12/8/2023)    Transportation Needs     Within the past 12 months, has lack of transportation kept you from medical appointments, getting your medicines, non-medical meetings or appointments, work, or from getting things that you need?: No   Physical Activity: Not on file   Stress: Not on file   Social Connections: Not on file   Interpersonal Safety: Low Risk  (12/8/2023)    Interpersonal Safety     Do you feel physically and emotionally safe where you currently live?: Yes     Within the past 12 months, have you been hit, slapped, kicked or otherwise physically hurt by someone?: No     Within the past 12 months, have you been humiliated or emotionally abused in other ways by your partner or ex-partner?: No    Housing Stability: Low Risk  (12/8/2023)    Housing Stability     Do you have housing? : Yes     Are you worried about losing your housing?: No           Medications  Allergies   Current Outpatient Medications   Medication Sig Dispense Refill    acetaminophen (TYLENOL) 325 MG tablet Take 325-650 mg by mouth every 6 hours as needed for mild pain or fever      ASPIRIN 81 MG OR TABS 1 tab po QD (Once per day) 100 3    atorvastatin (LIPITOR) 20 MG tablet TAKE 1 TABLET(20 MG) BY MOUTH DAILY 90 tablet 2    cholecalciferol (VITAMIN  -D) 1000 UNITS capsule Take 1 capsule by mouth daily      diclofenac (VOLTAREN) 1 % topical gel Apply 4 grams to shoulders four times daily using enclosed dosing card. 100 g 1    lisinopril (ZESTRIL) 2.5 MG tablet Take 1 tablet (2.5 mg) by mouth daily 90 tablet 1    melatonin 3 MG tablet Take 6 mg by mouth nightly as needed for sleep      omeprazole (PRILOSEC) 20 MG DR capsule Take 20 mg by mouth daily as needed      Semaglutide (RYBELSUS) 14 MG tablet Take 14 mg by mouth daily 90 tablet 1    topiramate (TOPAMAX) 100 MG tablet TAKE 1 TABLET(100 MG) BY MOUTH DAILY 90 tablet 1    gabapentin (NEURONTIN) 100 MG capsule Take 1 capsule (100 mg) by mouth at bedtime for 7 days, THEN 1 capsule (100 mg) 2 times daily for 7 days, THEN 1 capsule (100 mg) 3 times daily for 60 days. 201 capsule 0     No Known Allergies       Lab Results    Chemistry/lipid CBC Cardiac Enzymes/BNP/TSH/INR   Recent Labs   Lab Test 12/08/23  0807   CHOL 124   HDL 33*   LDL 68   TRIG 116     Recent Labs   Lab Test 12/08/23  0807 12/02/22  0936 02/18/22  0905   LDL 68 70 66     Recent Labs   Lab Test 12/08/23  0807      POTASSIUM 4.2   CHLORIDE 109*   CO2 19*   *   BUN 15.5   CR 1.46*   GFRESTIMATED 55*   SHANA 9.2     Recent Labs   Lab Test 12/08/23  0807 06/02/23  0947 01/23/23  0818   CR 1.46* 1.52* 1.57*     Recent Labs   Lab Test 12/08/23  0807 06/02/23  0947 12/02/22  0936   A1C 5.8* 6.3* 5.9*          Recent  "Labs   Lab Test 06/02/23  0947   WBC 3.6*   HGB 14.5   HCT 43.7   MCV 88   *     Recent Labs   Lab Test 06/02/23  0947 01/23/23  0818 07/27/21  0935   HGB 14.5 15.0 14.9    No results for input(s): \"TROPONINI\" in the last 60709 hours.  No results for input(s): \"BNP\", \"NTBNPI\", \"NTBNP\" in the last 21162 hours.  Recent Labs   Lab Test 06/02/23  0947   TSH 1.92     Recent Labs   Lab Test 01/04/21  1235 01/09/20  0916   INR 1.06 1.02        Sujatha Dc MD                Thank you for allowing me to participate in the care of your patient.      Sincerely,     Sujatha Dc MD     [facility]  cc:   Sirena Talley, DO  [address] [place] [address]      "

## 2024-02-26 ENCOUNTER — MYC MEDICAL ADVICE (OUTPATIENT)
Dept: FAMILY MEDICINE | Facility: CLINIC | Age: 59
End: 2024-02-26
Payer: COMMERCIAL

## 2024-02-26 DIAGNOSIS — E11.8 TYPE 2 DIABETES MELLITUS WITH COMPLICATION, WITHOUT LONG-TERM CURRENT USE OF INSULIN (H): Primary | ICD-10-CM

## 2024-02-27 NOTE — TELEPHONE ENCOUNTER
Routing My chart message to PCP    Patient requesting to go back to Ozempic.  Now in stock    Also asking for different way to take Neurontin.      Office visit 12/2      ype 2 diabetes mellitus with complication, without long-term current use of insulin (H)  The current medical regimen is effective;  continue present plan and medications.     - HEMOGLOBIN A1C; Future  - HEMOGLOBIN A1C  - FOOT EXAM  - Semaglutide (RYBELSUS) 14 MG tablet; Take 14 mg by mouth daily     Hyperlipidemia LDL goal <100  The current medical regimen is effective;  continue present plan and medications.     - Lipid panel reflex to direct LDL Fasting; Future     Diabetic polyneuropathy associated with type 2 diabetes mellitus (H)  The patient is showing symptoms of neuropathy.  He would like to try gabapentin.  I have prescribed the following.  He can send him Zarbee's message with an update in a few weeks.  - gabapentin (NEURONTIN) 100 MG capsule; Take 1 capsule (100 mg) by mouth at bedtime for 7 days, THEN 1 capsule (100 mg) 2 times daily for 7 days, THEN 1 capsule (100 mg) 3 times daily for 60 days.                 Darwin Soria, RN, BSN, PHN  [facility]

## 2024-03-01 DIAGNOSIS — E11.42 DIABETIC POLYNEUROPATHY ASSOCIATED WITH TYPE 2 DIABETES MELLITUS (H): ICD-10-CM

## 2024-03-02 RX ORDER — GABAPENTIN 100 MG/1
CAPSULE ORAL
Qty: 201 CAPSULE | Refills: 0 | Status: SHIPPED | OUTPATIENT
Start: 2024-03-02 | End: 2024-05-21

## 2024-03-06 ENCOUNTER — HOSPITAL ENCOUNTER (OUTPATIENT)
Dept: CARDIOLOGY | Facility: HOSPITAL | Age: 59
Discharge: HOME | End: 2024-03-06
Attending: INTERNAL MEDICINE | Admitting: INTERNAL MEDICINE
Payer: COMMERCIAL

## 2024-03-06 DIAGNOSIS — R07.89 OTHER CHEST PAIN: ICD-10-CM

## 2024-03-06 DIAGNOSIS — R93.1 ELEVATED CORONARY ARTERY CALCIUM SCORE: ICD-10-CM

## 2024-03-06 PROCEDURE — 93325 DOPPLER ECHO COLOR FLOW MAPG: CPT | Mod: 26 | Performed by: INTERNAL MEDICINE

## 2024-03-06 PROCEDURE — 93325 DOPPLER ECHO COLOR FLOW MAPG: CPT | Mod: TC

## 2024-03-06 PROCEDURE — 93016 CV STRESS TEST SUPVJ ONLY: CPT | Performed by: INTERNAL MEDICINE

## 2024-03-06 PROCEDURE — 93321 DOPPLER ECHO F-UP/LMTD STD: CPT | Mod: 26 | Performed by: INTERNAL MEDICINE

## 2024-03-06 PROCEDURE — 93350 STRESS TTE ONLY: CPT | Mod: 26 | Performed by: INTERNAL MEDICINE

## 2024-03-06 PROCEDURE — 93018 CV STRESS TEST I&R ONLY: CPT | Performed by: INTERNAL MEDICINE

## 2024-03-21 ENCOUNTER — VIRTUAL VISIT (OUTPATIENT)
Dept: ENDOCRINOLOGY | Facility: CLINIC | Age: 59
End: 2024-03-21
Payer: COMMERCIAL

## 2024-03-21 VITALS — BODY MASS INDEX: 30.48 KG/M2 | WEIGHT: 225 LBS | HEIGHT: 72 IN

## 2024-03-21 DIAGNOSIS — E66.3 OVERWEIGHT (BMI 25.0-29.9): ICD-10-CM

## 2024-03-21 PROCEDURE — 99212 OFFICE O/P EST SF 10 MIN: CPT | Mod: 95 | Performed by: PHYSICIAN ASSISTANT

## 2024-03-21 RX ORDER — TOPIRAMATE 25 MG/1
75 TABLET, FILM COATED ORAL DAILY
Qty: 270 TABLET | Refills: 1 | Status: SHIPPED | OUTPATIENT
Start: 2024-03-21

## 2024-03-21 NOTE — LETTER
3/21/2024       RE: William Montes  [address]  [facility] [address]     Dear Colleague,    Thank you for referring your patient, William Montes, to the [facility] [place] at [facility]. Please see a copy of my visit note below.      Return Medical Weight Management Note     William Montes  MRN:  [id number]  :  1965  NAKUL:  3/21/2024    Dear Sirena Talley DO,    I had the pleasure of seeing your patient William Montes. He is a 58 year old male who I am continuing to see for treatment of obesity related to:        10/26/2018     4:11 PM   --   I have the following health issues associated with obesity Type II Diabetes    GERD (Reflux)    Fatty Liver       Assessment & Plan  Problem List Items Addressed This Visit       Overweight (BMI 25.0-29.9)    Relevant Medications    topiramate (TOPAMAX) 25 MG tablet      Weight mgmt follow up    He has lost from 225 to 207 and regained to 225. He would like to lose more weight to around 210 lbs   Last year had trouble getting ozempic and switched to rybelsus temporarily but it wasn't as helpful for blood sugars or weight loss.  Restarted ozempic and currently on 1mg.   Still taking topiramate 100mg daily, will decrease to 75mg to see if improvements in tingling and word finding issues.    Follow up:  MTM 2 mo if covered  Marine 4 mo return MW      INTERVAL HISTORY:    T2DM: last A1C 5.8    He would like to decrease topiramate to 75mg daily to see if improvement with tingling.  Neuropathy symptoms likely due to DM neuropathy also. Also has some word finding issues.    Anti-obesity medication history    Current:   Ozempic 1mg  Topiramate 100mg daily     CURRENT WEIGHT:   225 lbs 0 oz    Initial Weight (lbs): 251 lbs  Last Visits Weight: 101.6 kg (224 lb)  Cumulative weight loss (lbs): 26  Weight Loss Percentage: 10.36%        2022     4:46 PM   Changes and Difficulties   I have made  the following changes to my diet since my last visit: No changes.   With regards to my diet, I am still struggling with: Not really.   I have made the following changes to my activity/exercise since my last visit: About the same.   With regards to my activity/exercise, I am still struggling with: Still trying to exercise 2-3 x a week.         MEDICATIONS:   Current Outpatient Medications   Medication Sig Dispense Refill    acetaminophen (TYLENOL) 325 MG tablet Take 325-650 mg by mouth every 6 hours as needed for mild pain or fever      ASPIRIN 81 MG OR TABS 1 tab po QD (Once per day) 100 3    atorvastatin (LIPITOR) 20 MG tablet TAKE 1 TABLET(20 MG) BY MOUTH DAILY 90 tablet 2    cholecalciferol (VITAMIN  -D) 1000 UNITS capsule Take 1 capsule by mouth daily      diclofenac (VOLTAREN) 1 % topical gel Apply 4 grams to shoulders four times daily using enclosed dosing card. 100 g 1    gabapentin (NEURONTIN) 100 MG capsule TAKE 1 CAPSULE BY MOUTH AT BEDTIME FOR 7 DAYS, 1 CAPSULE TWICE DAILY FOR 7 DAYS, THEN 1 CAPSULE THREE TIMES DAILY FOR 60 DAYS. 201 capsule 0    lisinopril (ZESTRIL) 2.5 MG tablet Take 1 tablet (2.5 mg) by mouth daily 90 tablet 1    melatonin 3 MG tablet Take 6 mg by mouth nightly as needed for sleep      omeprazole (PRILOSEC) 20 MG DR capsule Take 20 mg by mouth daily as needed      Semaglutide, 1 MG/DOSE, (OZEMPIC) 4 MG/3ML pen Inject 1 mg Subcutaneous every 7 days 3 mL 0    Semaglutide, 2 MG/DOSE, (OZEMPIC) 8 MG/3ML pen Inject 2 mg Subcutaneous every 7 days Take after 1 month of semaglutide 1 mg 9 mL 3    topiramate (TOPAMAX) 100 MG tablet TAKE 1 TABLET(100 MG) BY MOUTH DAILY 90 tablet 1    topiramate (TOPAMAX) 25 MG tablet Take 3 tablets (75 mg) by mouth daily 270 tablet 1           3/21/2024    11:21 AM   Weight Loss Medication History Reviewed With Patient   Which weight loss medications are you currently taking on a regular basis? Ozempic    Topamax (topiramate)   Are you having any side effects  from the weight loss medication that we have prescribed you? Yes   If you are having side effects please describe: Burning in my feet       Hospital Outpatient Visit on 12/22/2023   Component Date Value Ref Range Status    Agatston Score of Left Main 12/22/2023 0   Final    Agatston Score of the Left Anterio* 12/22/2023 108   Final    Agatston Score of Circumflex 12/22/2023 0   Final    Agatston Score of Right Coronary A* 12/22/2023 0   Final    Total Score 12/22/2023 108.00   Final           PHYSICAL EXAM:  Objective   Ht 1.829 m (6')   Wt 102.1 kg (225 lb)   BMI 30.52 kg/m      Vitals - Patient Reported  Pain Score: Severe Pain (6)  Pain Loc: Low Back        GENERAL: alert and no distress  EYES: Eyes grossly normal to inspection.  No discharge or erythema, or obvious scleral/conjunctival abnormalities.  RESP: No audible wheeze, cough, or visible cyanosis.    SKIN: Visible skin clear. No significant rash, abnormal pigmentation or lesions.  NEURO: Cranial nerves grossly intact.  Mentation and speech appropriate for age.  PSYCH: Appropriate affect, tone, and pace of words        Sincerely,    Marine Flores PA-C      10 minutes spent by me on the date of the encounter doing chart review, history and exam, documentation and further activities per the note

## 2024-03-21 NOTE — PROGRESS NOTES
Return Medical Weight Management Note     William Montes  MRN:  [id number]  :  1965  NAKUL:  3/21/2024    Dear Sirena Talley DO,    I had the pleasure of seeing your patient William Montes. He is a 58 year old male who I am continuing to see for treatment of obesity related to:        10/26/2018     4:11 PM   --   I have the following health issues associated with obesity Type II Diabetes    GERD (Reflux)    Fatty Liver       Assessment & Plan   Problem List Items Addressed This Visit       Overweight (BMI 25.0-29.9)    Relevant Medications    topiramate (TOPAMAX) 25 MG tablet      Weight mgmt follow up    He has lost from 225 to 207 and regained to 225. He would like to lose more weight to around 210 lbs   Last year had trouble getting ozempic and switched to rybelsus temporarily but it wasn't as helpful for blood sugars or weight loss.  Restarted ozempic and currently on 1mg.   Still taking topiramate 100mg daily, will decrease to 75mg to see if improvements in tingling and word finding issues.    Follow up:  MTM 2 mo if covered  Marine 4 mo return MWM      INTERVAL HISTORY:    T2DM: last A1C 5.8    He would like to decrease topiramate to 75mg daily to see if improvement with tingling.  Neuropathy symptoms likely due to DM neuropathy also. Also has some word finding issues.    Anti-obesity medication history    Current:   Ozempic 1mg  Topiramate 100mg daily     CURRENT WEIGHT:   225 lbs 0 oz    Initial Weight (lbs): 251 lbs  Last Visits Weight: 101.6 kg (224 lb)  Cumulative weight loss (lbs): 26  Weight Loss Percentage: 10.36%        2022     4:46 PM   Changes and Difficulties   I have made the following changes to my diet since my last visit: No changes.   With regards to my diet, I am still struggling with: Not really.   I have made the following changes to my activity/exercise since my last visit: About the same.   With regards to my activity/exercise, I am still struggling with: Still trying to  exercise 2-3 x a week.         MEDICATIONS:   Current Outpatient Medications   Medication Sig Dispense Refill    acetaminophen (TYLENOL) 325 MG tablet Take 325-650 mg by mouth every 6 hours as needed for mild pain or fever      ASPIRIN 81 MG OR TABS 1 tab po QD (Once per day) 100 3    atorvastatin (LIPITOR) 20 MG tablet TAKE 1 TABLET(20 MG) BY MOUTH DAILY 90 tablet 2    cholecalciferol (VITAMIN  -D) 1000 UNITS capsule Take 1 capsule by mouth daily      diclofenac (VOLTAREN) 1 % topical gel Apply 4 grams to shoulders four times daily using enclosed dosing card. 100 g 1    gabapentin (NEURONTIN) 100 MG capsule TAKE 1 CAPSULE BY MOUTH AT BEDTIME FOR 7 DAYS, 1 CAPSULE TWICE DAILY FOR 7 DAYS, THEN 1 CAPSULE THREE TIMES DAILY FOR 60 DAYS. 201 capsule 0    lisinopril (ZESTRIL) 2.5 MG tablet Take 1 tablet (2.5 mg) by mouth daily 90 tablet 1    melatonin 3 MG tablet Take 6 mg by mouth nightly as needed for sleep      omeprazole (PRILOSEC) 20 MG DR capsule Take 20 mg by mouth daily as needed      Semaglutide, 1 MG/DOSE, (OZEMPIC) 4 MG/3ML pen Inject 1 mg Subcutaneous every 7 days 3 mL 0    Semaglutide, 2 MG/DOSE, (OZEMPIC) 8 MG/3ML pen Inject 2 mg Subcutaneous every 7 days Take after 1 month of semaglutide 1 mg 9 mL 3    topiramate (TOPAMAX) 100 MG tablet TAKE 1 TABLET(100 MG) BY MOUTH DAILY 90 tablet 1    topiramate (TOPAMAX) 25 MG tablet Take 3 tablets (75 mg) by mouth daily 270 tablet 1           3/21/2024    11:21 AM   Weight Loss Medication History Reviewed With Patient   Which weight loss medications are you currently taking on a regular basis? Ozempic    Topamax (topiramate)   Are you having any side effects from the weight loss medication that we have prescribed you? Yes   If you are having side effects please describe: Burning in my feet       Hospital Outpatient Visit on 12/22/2023   Component Date Value Ref Range Status    Agatston Score of Left Main 12/22/2023 0   Final    Agatston Score of the Left Anterio*  12/22/2023 108   Final    Agatston Score of Circumflex 12/22/2023 0   Final    Agatston Score of Right Coronary A* 12/22/2023 0   Final    Total Score 12/22/2023 108.00   Final           PHYSICAL EXAM:  Objective    Ht 1.829 m (6')   Wt 102.1 kg (225 lb)   BMI 30.52 kg/m      Vitals - Patient Reported  Pain Score: Severe Pain (6)  Pain Loc: Low Back        GENERAL: alert and no distress  EYES: Eyes grossly normal to inspection.  No discharge or erythema, or obvious scleral/conjunctival abnormalities.  RESP: No audible wheeze, cough, or visible cyanosis.    SKIN: Visible skin clear. No significant rash, abnormal pigmentation or lesions.  NEURO: Cranial nerves grossly intact.  Mentation and speech appropriate for age.  PSYCH: Appropriate affect, tone, and pace of words        Sincerely,    Marine Flores PA-C      10 minutes spent by me on the date of the encounter doing chart review, history and exam, documentation and further activities per the note

## 2024-03-21 NOTE — NURSING NOTE
Is the patient currently in the state of [place]? YES    Visit mode:VIDEO    If the visit is dropped, the patient can be reconnected by: TELEPHONE VISIT: Phone number:   Telephone Information:   Mobile [phone]       Will anyone else be joining the visit? NO  (If patient encounters technical issues they should call [phone]    How would you like to obtain your AVS? MyChart    Are changes needed to the allergy or medication list? Pt stated no med changes    Reason for visit: Video Visit (Follow-up )    Ladonna CORDERO

## 2024-03-27 ENCOUNTER — TELEPHONE (OUTPATIENT)
Dept: SURGERY | Facility: CLINIC | Age: 59
End: 2024-03-27
Payer: COMMERCIAL

## 2024-03-27 ENCOUNTER — TELEPHONE (OUTPATIENT)
Dept: ENDOCRINOLOGY | Facility: CLINIC | Age: 59
End: 2024-03-27
Payer: COMMERCIAL

## 2024-03-27 NOTE — TELEPHONE ENCOUNTER
Patient agreed to following appointments:    Lauren Bloch on 5/21/24  Marine Flores on 9/25/24

## 2024-04-02 NOTE — CONFIDENTIAL NOTE
DIAGNOSIS:  Hepatic Steatosis    Appt Date:  06.13.2024    NOTES STATUS DETAILS   OFFICE NOTE from referring provider     OFFICE NOTES from other specialists Internal 08.20.2021 Sirena Talley,      11.30.2020 Moniqeu Fragoso MD    DISCHARGE SUMMARY from hospital     MEDICATION LIST Internal    LIVER BIOSPY (IF APPLICABLE)      PATHOLOGY REPORTS      IMAGING     ENDOSCOPY (IF AVAILABLE) Received 10.26.2020   COLONOSCOPY (IF AVAILABLE)     ULTRASOUND LIVER Internal 02.05.2021 US Renal Complete   CT OF ABDOMEN     MRI OF LIVER     FIBROSCAN, US ELASTOGRAPHY, FIBROSIS SCAN, MR ELASTOGRAPHY     LABS     HEPATIC PANEL (LIVER PANEL) Internal 06.02.2023    BASIC METABOLIC PANEL Internal 12.08.2023    COMPLETE METABOLIC PANEL Internal 06.02.2023   COMPLETE BLOOD COUNT (CBC) Internal 06.02.2023    INTERNATIONAL NORMALIZED RATIO (INR) Internal 01.04.2021   HEPATITIS C ANTIBODY     HEPATITIS C VIRAL LOAD/PCR     HEPATITIS C GENOTYPE     HEPATITIS B SURFACE ANTIGEN     HEPATITIS B SURFACE ANTIBODY     HEPATITIS B DNA QUANT LEVEL     HEPATITIS B CORE ANTIBODY         No

## 2024-05-02 ENCOUNTER — OFFICE VISIT (OUTPATIENT)
Dept: FAMILY MEDICINE | Facility: CLINIC | Age: 59
End: 2024-05-02
Payer: COMMERCIAL

## 2024-05-02 VITALS
RESPIRATION RATE: 20 BRPM | SYSTOLIC BLOOD PRESSURE: 130 MMHG | DIASTOLIC BLOOD PRESSURE: 76 MMHG | HEIGHT: 72 IN | BODY MASS INDEX: 30.96 KG/M2 | TEMPERATURE: 97.9 F | WEIGHT: 228.6 LBS | HEART RATE: 60 BPM | OXYGEN SATURATION: 100 %

## 2024-05-02 DIAGNOSIS — I10 BENIGN ESSENTIAL HYPERTENSION: ICD-10-CM

## 2024-05-02 DIAGNOSIS — N18.30 STAGE 3 CHRONIC KIDNEY DISEASE, UNSPECIFIED WHETHER STAGE 3A OR 3B CKD (H): ICD-10-CM

## 2024-05-02 DIAGNOSIS — E11.8 TYPE 2 DIABETES MELLITUS WITH COMPLICATION, WITHOUT LONG-TERM CURRENT USE OF INSULIN (H): Primary | ICD-10-CM

## 2024-05-02 DIAGNOSIS — E78.5 HYPERLIPIDEMIA LDL GOAL <100: ICD-10-CM

## 2024-05-02 DIAGNOSIS — E11.42 DIABETIC POLYNEUROPATHY ASSOCIATED WITH TYPE 2 DIABETES MELLITUS (H): ICD-10-CM

## 2024-05-02 DIAGNOSIS — N48.30 PAINFUL ERECTION: ICD-10-CM

## 2024-05-02 DIAGNOSIS — M54.50 LOW BACK PAIN, UNSPECIFIED BACK PAIN LATERALITY, UNSPECIFIED CHRONICITY, UNSPECIFIED WHETHER SCIATICA PRESENT: ICD-10-CM

## 2024-05-02 LAB
HBA1C MFR BLD: 6.2 % (ref 0–5.6)
HGB BLD-MCNC: 14.4 G/DL (ref 13.3–17.7)
HOLD SPECIMEN: NORMAL

## 2024-05-02 PROCEDURE — 83036 HEMOGLOBIN GLYCOSYLATED A1C: CPT | Performed by: FAMILY MEDICINE

## 2024-05-02 PROCEDURE — 36415 COLL VENOUS BLD VENIPUNCTURE: CPT | Performed by: FAMILY MEDICINE

## 2024-05-02 PROCEDURE — 80048 BASIC METABOLIC PNL TOTAL CA: CPT | Performed by: FAMILY MEDICINE

## 2024-05-02 PROCEDURE — 99214 OFFICE O/P EST MOD 30 MIN: CPT | Performed by: FAMILY MEDICINE

## 2024-05-02 PROCEDURE — 85018 HEMOGLOBIN: CPT | Performed by: FAMILY MEDICINE

## 2024-05-02 PROCEDURE — 82043 UR ALBUMIN QUANTITATIVE: CPT | Performed by: FAMILY MEDICINE

## 2024-05-02 PROCEDURE — 82570 ASSAY OF URINE CREATININE: CPT | Performed by: FAMILY MEDICINE

## 2024-05-02 RX ORDER — GABAPENTIN 100 MG/1
CAPSULE ORAL
Qty: 201 CAPSULE | Refills: 0 | Status: CANCELLED | OUTPATIENT
Start: 2024-05-02

## 2024-05-02 RX ORDER — GABAPENTIN 300 MG/1
300 CAPSULE ORAL 2 TIMES DAILY PRN
Qty: 180 CAPSULE | Refills: 1 | Status: SHIPPED | OUTPATIENT
Start: 2024-05-02 | End: 2024-08-20

## 2024-05-02 RX ORDER — ATORVASTATIN CALCIUM 20 MG/1
20 TABLET, FILM COATED ORAL DAILY
Qty: 90 TABLET | Refills: 2 | Status: SHIPPED | OUTPATIENT
Start: 2024-05-02

## 2024-05-02 RX ORDER — LISINOPRIL 2.5 MG/1
2.5 TABLET ORAL DAILY
Qty: 90 TABLET | Refills: 1 | Status: SHIPPED | OUTPATIENT
Start: 2024-05-02

## 2024-05-02 RX ORDER — CYCLOBENZAPRINE HCL 10 MG
10 TABLET ORAL 3 TIMES DAILY PRN
Qty: 30 TABLET | Refills: 1 | Status: SHIPPED | OUTPATIENT
Start: 2024-05-02

## 2024-05-02 ASSESSMENT — PAIN SCALES - GENERAL: PAINLEVEL: MODERATE PAIN (5)

## 2024-05-02 NOTE — PROGRESS NOTES
Assessment & Plan     Type 2 diabetes mellitus with complication, without long-term current use of insulin (H)  The current medical regimen is effective;  continue present plan and medications.    - Hemoglobin A1c; Future  - Hemoglobin A1c  - Semaglutide, 2 MG/DOSE, (OZEMPIC) 8 MG/3ML pen; Inject 2 mg Subcutaneous every 7 days Take after 1 month of semaglutide 1 mg    Benign essential hypertension  The current medical regimen is effective;  continue present plan and medications.    - Basic metabolic panel  (Ca, Cl, CO2, Creat, Gluc, K, Na, BUN); Future  - Basic metabolic panel  (Ca, Cl, CO2, Creat, Gluc, K, Na, BUN)  - lisinopril (ZESTRIL) 2.5 MG tablet; Take 1 tablet (2.5 mg) by mouth daily    Hyperlipidemia LDL goal <100  The current medical regimen is effective;  continue present plan and medications.    - atorvastatin (LIPITOR) 20 MG tablet; Take 1 tablet (20 mg) by mouth daily    Stage 3 chronic kidney disease, unspecified whether stage 3a or 3b CKD (H)  Monitor with labs today  - Albumin Random Urine Quantitative with Creat Ratio; Future  - Hemoglobin; Future  - Albumin Random Urine Quantitative with Creat Ratio  - Hemoglobin    Diabetic polyneuropathy associated with type 2 diabetes mellitus (H)  The patient has seen improvement in pain since starting the gabapentin I have encouraged him to continue 300 mg at night and take 300 during the day if needed.  I reminded him this can cause sedation  - gabapentin (NEURONTIN) 300 MG capsule; Take 1 capsule (300 mg) by mouth 2 times daily as needed for neuropathic pain    Low back pain, unspecified back pain laterality, unspecified chronicity, unspecified whether sciatica present  The patient found Flexeril to be very helpful for his back pain in the past.  He also does the physical therapy he learned last year for his back pain  - cyclobenzaprine (FLEXERIL) 10 MG tablet; Take 1 tablet (10 mg) by mouth 3 times daily as needed for muscle spasms    Painful  erection  Patient expressed desire to see urologist for this issue.  I have placed a referral  - Adult Urology [facility] Referral; Future      30 minutes spent by me on the date of the encounter doing chart review, history and exam, documentation and further activities per the note      BMI  Estimated body mass index is 31 kg/m  as calculated from the following:    Height as of this encounter: 1.829 m (6').    Weight as of this encounter: 103.7 kg (228 lb 9.6 oz).       Follow-up in 6 months for diabetes    Mary Thomas is a 58 year old, presenting for the following health issues:  Chronic Disease Management        5/2/2024     8:40 AM   Additional Questions   Roomed by Nery ROSA   Accompanied by self     History of Present Illness       Diabetes:   He presents for follow up of diabetes.  He is checking home blood glucose one time daily.   He checks blood glucose before meals.  Blood glucose is never over 200 and never under 70. He is aware of hypoglycemia symptoms including shakiness.    He has no concerns regarding his diabetes at this time.  He is having numbness in feet, burning in feet and weight gain.            He eats 2-3 servings of fruits and vegetables daily.He consumes 0 sweetened beverage(s) daily.He exercises with enough effort to increase his heart rate 30 to 60 minutes per day.  He exercises with enough effort to increase his heart rate 3 or less days per week.   He is taking medications regularly.     Semaglutide 2 mg weekly    -- Wondering if he can get Rx of Gabapentin 300mg- once daily vs 100mg three times daily.  He takes gabapentin for neuropathy    He is having back pain which started 3/23.  He did PT and was given flexeril for this.  He is requesting a refill.  It is okay if he does the exercises.        Hyperlipidemia Follow-Up    Are you regularly taking any medication or supplement to lower your cholesterol?   Yes- atorvastatin  Are you having muscle aches or other side effects that  you think could be caused by your cholesterol lowering medication?  No    Hypertension Follow-up    Do you check your blood pressure regularly outside of the clinic? Yes   Are you following a low salt diet? Yes  Are your blood pressures ever more than 140 on the top number (systolic) OR more   than 90 on the bottom number (diastolic), for example 140/90? No          Review of Systems  Constitutional, HEENT, cardiovascular, pulmonary, gi and gu systems are negative, except as otherwise noted.      Objective    /76 (BP Location: Right arm, Patient Position: Sitting, Cuff Size: Adult Large)   Pulse 60   Temp 97.9  F (36.6  C) (Oral)   Resp 20   Ht 1.829 m (6')   Wt 103.7 kg (228 lb 9.6 oz)   SpO2 100%   BMI 31.00 kg/m    Body mass index is 31 kg/m .  Physical Exam   GENERAL: alert and no distress  NECK: no adenopathy, no asymmetry, masses, or scars  RESP: lungs clear to auscultation - no rales, rhonchi or wheezes  CV: regular rate and rhythm, normal S1 S2, no S3 or S4, no murmur, click or rub, no peripheral edema  ABDOMEN: soft, nontender, no hepatosplenomegaly, no masses and bowel sounds normal  MS: no gross musculoskeletal defects noted, no edema  PSYCH: mentation appears normal, affect normal/bright    Results for orders placed or performed in visit on 05/02/24 (from the past 24 hour(s))   Hemoglobin A1c   Result Value Ref Range    Hemoglobin A1C 6.2 (H) 0.0 - 5.6 %   Extra Tube    Narrative    The following orders were created for panel order Extra Tube.  Procedure                               Abnormality         Status                     ---------                               -----------         ------                     Extra Red Top Tube[[id number]]                               In process                   Please view results for these tests on the individual orders.           Signed Electronically by: Sirena Talley DO

## 2024-05-03 LAB
ANION GAP SERPL CALCULATED.3IONS-SCNC: 9 MMOL/L (ref 7–15)
BUN SERPL-MCNC: 14.5 MG/DL (ref 8–23)
CALCIUM SERPL-MCNC: 9 MG/DL (ref 8.8–10.2)
CHLORIDE SERPL-SCNC: 108 MMOL/L (ref 98–107)
CREAT SERPL-MCNC: 1.48 MG/DL (ref 0.67–1.17)
CREAT UR-MCNC: 202 MG/DL
DEPRECATED HCO3 PLAS-SCNC: 21 MMOL/L (ref 22–29)
EGFRCR SERPLBLD CKD-EPI 2021: 54 ML/MIN/1.73M2
GLUCOSE SERPL-MCNC: 117 MG/DL (ref 70–99)
MICROALBUMIN UR-MCNC: <12 MG/L
MICROALBUMIN/CREAT UR: NORMAL MG/G{CREAT}
POTASSIUM SERPL-SCNC: 4.1 MMOL/L (ref 3.4–5.3)
SODIUM SERPL-SCNC: 138 MMOL/L (ref 135–145)

## 2024-05-21 ENCOUNTER — VIRTUAL VISIT (OUTPATIENT)
Dept: CARDIOLOGY | Facility: CLINIC | Age: 59
End: 2024-05-21
Attending: PHYSICIAN ASSISTANT
Payer: COMMERCIAL

## 2024-05-21 VITALS — BODY MASS INDEX: 31 KG/M2 | HEIGHT: 72 IN

## 2024-05-21 DIAGNOSIS — G62.9 NEUROPATHY: ICD-10-CM

## 2024-05-21 DIAGNOSIS — E66.811 CLASS 1 OBESITY DUE TO EXCESS CALORIES IN ADULT, UNSPECIFIED BMI, UNSPECIFIED WHETHER SERIOUS COMORBIDITY PRESENT: ICD-10-CM

## 2024-05-21 DIAGNOSIS — E78.5 HYPERLIPIDEMIA LDL GOAL <100: ICD-10-CM

## 2024-05-21 DIAGNOSIS — E11.8 TYPE 2 DIABETES MELLITUS WITH COMPLICATION, WITHOUT LONG-TERM CURRENT USE OF INSULIN (H): Primary | ICD-10-CM

## 2024-05-21 DIAGNOSIS — K21.9 GERD WITHOUT ESOPHAGITIS: ICD-10-CM

## 2024-05-21 DIAGNOSIS — E66.09 CLASS 1 OBESITY DUE TO EXCESS CALORIES IN ADULT, UNSPECIFIED BMI, UNSPECIFIED WHETHER SERIOUS COMORBIDITY PRESENT: ICD-10-CM

## 2024-05-21 ASSESSMENT — PAIN SCALES - GENERAL: PAINLEVEL: SEVERE PAIN (6)

## 2024-05-21 NOTE — PATIENT INSTRUCTIONS
"Recommendations from today's MTM visit:                                                       Taper off topiramate: decrease topiramate 2 tablets once daily 3 days, then 1 tablet daily for 3 days then stop. Pharmacist will send you a message in 3 weeks.     Follow-up: Return in about 3 months (around 8/21/2024) for Medication Therapy Management Pharmacist Visit.    It was great speaking with you today.  I value your experience and would be very thankful for your time in providing feedback in our clinic survey. In the next few days, you may receive an email or text message from [facility] [employer] with a link to a survey related to your  clinical pharmacist.\"     To schedule another MTM appointment, please call the clinic directly or you may call the MTM scheduling line at [phone] or toll-free at [phone].     My Clinical Pharmacist's contact information:                                                      Please feel free to contact me with any questions or concerns you have.      Lauren Bloch, PharmD  Medication Therapy Management Pharmacist   [facility] [place]             "

## 2024-05-21 NOTE — PROGRESS NOTES
Medication Therapy Management (MTM) Encounter    ASSESSMENT:                            Medication Adherence/Access: No issues identified    Diabetes /Weight Management:   Due to patient's concerns that topiramate may be causing/contributing to neuropathy due to side effect of paraesthesias, will completely taper off at this time and see how impacts. Otherwise, A1c within goal < 7%.      Neuropathy:  See above regarding topiramate, otherwise stable for now.     GERD:   Would benefit from staying on famotidine 20 mg daily.     Hyperlipidemia   Stable.       PLAN:                            Taper off topiramate: decrease topiramate 2 tablets once daily 3 days, then 1 tablet daily for 3 days then stop.     Follow-up: Return in about 3 months (around 8/21/2024) for Medication Therapy Management Pharmacist Visit.    SUBJECTIVE/OBJECTIVE:                          William Montes is a 58 year old male contacted via secure video for a follow-up visit.       Reason for visit: topiramate decreased dose.    Allergies/ADRs: Reviewed in chart  Past Medical History: Reviewed in chart  Tobacco: He reports that he has never smoked. He has never been exposed to tobacco smoke. He has never used smokeless tobacco.  Alcohol: not currently using  Caffeine: coffee daily. Previously was drinking energy drinks, but noticed heart palpitations so stopped.     Medication Adherence/Access: no issues reported    Diabetes /Weight Management:   Ozempic 2 mg weekly    Weight Loss Medication:  Topiramate 75 mg daily bedtime     Side effects? Tingling/numbness in feet. No other side effects reported. Increased to Ozempic 2 mg weekly a couple months ago and tolerating well. No longer hungry between meals.   Current diabetes symptoms: none  Blood sugar monitoring: never  Diet/Exercise: 2 meals per day. Reports he is feeling full between meals.      Eye exam is up to date  Foot exam is up to date    Hemoglobin A1C   Date Value Ref Range Status    05/02/2024 6.2 (H) 0.0 - 5.6 % Final     Comment:     Normal <5.7%   Prediabetes 5.7-6.4%    Diabetes 6.5% or higher     Note: Adopted from ADA consensus guidelines.   06/22/2021 5.6 0 - 5.6 % Final     Comment:     Normal <5.7% Prediabetes 5.7-6.4%  Diabetes 6.5% or higher - adopted from ADA   consensus guidelines.       Wt Readings from Last 4 Encounters:   05/02/24 103.7 kg (228 lb 9.6 oz)   03/21/24 102.1 kg (225 lb)   02/23/24 103.9 kg (229 lb)   12/08/23 102.1 kg (225 lb)     Estimated body mass index is 31 kg/m  as calculated from the following:    Height as of this encounter: 1.829 m (6').    Weight as of 5/2/24: 103.7 kg (228 lb 9.6 oz).    Neuropathy:  Gabapentin 300 mg twice daily     Reports that he started having tingling in lower extremities after starting topiramate but unsure it is related as he has been on for so many years. Was placed on gabapentin thereafter. Reports gabapentin helpful. Reports fatigue/drowsiness during day, has been longstanding, unsure if related to gabapentin. Continues to feel tingling in feet but improved on gabapentin.     GERD:   Famotidine 20 mg daily - not taking   Omeprazole 20 mg once daily - not taking     Reports that he will switch from famotidine to omeprazole and back every so often to not get used to one versus the other too much. But reports will be off either for 2 weeks then when heartburn symptoms come back will restart one of them. Reports each time he tries to be off one or the other that heartburn returns. Does find that the famotidine and omeprazole works well when he takes consistently.   Patient feels that current regimen is effective.  The patient does not have a history of GI bleed.    Hyperlipidemia   atorvastatin 20mg daily  Aspirin 81 mg daily     Patient reports no significant myalgias or other side effects. CAC score 12/2023 shows 108.            Today's Vitals: Ht 1.829 m (6')   BMI 31.00 kg/m    ----------------      I spent 20 minutes with  this patient today. All changes were made via collaborative practice agreement with Marine Flores PA-C. A copy of the visit note was provided to the patient's provider(s).    A summary of these recommendations was sent via [employer].    Lauren Bloch, PharmD, BCACP   Medication Therapy Management Pharmacist   [facility]    Telemedicine Visit Details  Type of service:  Video Conference via [employer]  Start Time:  10:31 AM  End Time:  10:51 AM     Medication Therapy Recommendations  Class 1 obesity due to excess calories in adult, unspecified BMI, unspecified whether serious comorbidity present    Current Medication: topiramate (TOPAMAX) 25 MG tablet   Rationale: Undesirable effect - Adverse medication event - Safety   Recommendation: Discontinue Medication   Status: Accepted per CPA

## 2024-05-21 NOTE — NURSING NOTE
Is the patient currently in the state of [place]? YES    Visit mode:VIDEO    If the visit is dropped, the patient can be reconnected by: VIDEO VISIT: Text to cell phone:   Telephone Information:   Mobile [phone]       Will anyone else be joining the visit? NO  (If patient encounters technical issues they should call [phone]    How would you like to obtain your AVS? MyChart    Are changes needed to the allergy or medication list? No    Are refills needed on medications prescribed by this physician? NO     Reason for visit: RECHECK    Wt other than 24 hrs:  wt given was from last month so didn't add  Pain more than one location:  no  Edelmira TREJOF

## 2024-05-21 NOTE — LETTER
5/21/2024      RE: William Montes  [address]  [facility] [address]       Dear Colleague,    Thank you for the opportunity to participate in the care of your patient, William Montes, at the [facility] [place] at [facility]. Please see a copy of my visit note below.    Medication Therapy Management (MTM) Encounter    ASSESSMENT:                            Medication Adherence/Access: No issues identified    Diabetes /Weight Management:   Due to patient's concerns that topiramate may be causing/contributing to neuropathy due to side effect of paraesthesias, will completely taper off at this time and see how impacts. Otherwise, A1c within goal < 7%.      Neuropathy:  See above regarding topiramate, otherwise stable for now.     GERD:   Would benefit from staying on famotidine 20 mg daily.     Hyperlipidemia   Stable.       PLAN:                            Taper off topiramate: decrease topiramate 2 tablets once daily 3 days, then 1 tablet daily for 3 days then stop.     Follow-up: Return in about 3 months (around 8/21/2024) for Medication Therapy Management Pharmacist Visit.    SUBJECTIVE/OBJECTIVE:                          William Montes is a 58 year old male contacted via secure video for a follow-up visit.       Reason for visit: topiramate decreased dose.    Allergies/ADRs: Reviewed in chart  Past Medical History: Reviewed in chart  Tobacco: He reports that he has never smoked. He has never been exposed to tobacco smoke. He has never used smokeless tobacco.  Alcohol: not currently using  Caffeine: coffee daily. Previously was drinking energy drinks, but noticed heart palpitations so stopped.     Medication Adherence/Access: no issues reported    Diabetes/Weight Management:   Ozempic 2 mg weekly    Weight Loss Medication:  Topiramate 75 mg daily bedtime     Side effects? Tingling/numbness in feet. No other side effects reported. Increased to Ozempic  2 mg weekly a couple months ago and tolerating well. No longer hungry between meals.   Current diabetes symptoms: none  Blood sugar monitoring: never  Diet/Exercise: 2 meals per day. Reports he is feeling full between meals.      Eye exam is up to date  Foot exam is up to date    Hemoglobin A1C   Date Value Ref Range Status   05/02/2024 6.2 (H) 0.0 - 5.6 % Final     Comment:     Normal <5.7%   Prediabetes 5.7-6.4%    Diabetes 6.5% or higher     Note: Adopted from ADA consensus guidelines.   06/22/2021 5.6 0 - 5.6 % Final     Comment:     Normal <5.7% Prediabetes 5.7-6.4%  Diabetes 6.5% or higher - adopted from ADA   consensus guidelines.       Wt Readings from Last 4 Encounters:   05/02/24 103.7 kg (228 lb 9.6 oz)   03/21/24 102.1 kg (225 lb)   02/23/24 103.9 kg (229 lb)   12/08/23 102.1 kg (225 lb)     Estimated body mass index is 31 kg/m  as calculated from the following:    Height as of this encounter: 1.829 m (6').    Weight as of 5/2/24: 103.7 kg (228 lb 9.6 oz).    Neuropathy:  Gabapentin 300 mg twice daily     Reports that he started having tingling in lower extremities after starting topiramate but unsure it is related as he has been on for so many years. Was placed on gabapentin thereafter. Reports gabapentin helpful. Reports fatigue/drowsiness during day, has been longstanding, unsure if related to gabapentin. Continues to feel tingling in feet but improved on gabapentin.     GERD:   Famotidine 20 mg daily - not taking   Omeprazole 20 mg once daily - not taking     Reports that he will switch from famotidine to omeprazole and back every so often to not get used to one versus the other too much. But reports will be off either for 2 weeks then when heartburn symptoms come back will restart one of them. Reports each time he tries to be off one or the other that heartburn returns. Does find that the famotidine and omeprazole works well when he takes consistently.   Patient feels that current regimen is  effective.  The patient does not have a history of GI bleed.    Hyperlipidemia  atorvastatin 20mg daily  Aspirin 81 mg daily     Patient reports no significant myalgias or other side effects. CAC score 12/2023 shows 108.            Today's Vitals: Ht 1.829 m (6')   BMI 31.00 kg/m    ----------------      I spent 20 minutes with this patient today. All changes were made via collaborative practice agreement with Marine Flores PA-C. A copy of the visit note was provided to the patient's provider(s).    A summary of these recommendations was sent via [employer].    Lauren Bloch, PharmD, BCACP   Medication Therapy Management Pharmacist   [facility]    Telemedicine Visit Details  Type of service:  Video Conference via [employer]  Start Time:  10:31 AM  End Time:  10:51 AM     Medication Therapy Recommendations  Class 1 obesity due to excess calories in adult, unspecified BMI, unspecified whether serious comorbidity present    Current Medication: topiramate (TOPAMAX) 25 MG tablet   Rationale: Undesirable effect - Adverse medication event - Safety   Recommendation: Discontinue Medication   Status: Accepted per CPA                Please do not hesitate to contact me if you have any questions/concerns.     Sincerely,     Lauren T. Bloch, [facility]

## 2024-05-23 ENCOUNTER — OFFICE VISIT (OUTPATIENT)
Dept: UROLOGY | Facility: CLINIC | Age: 59
End: 2024-05-23
Attending: FAMILY MEDICINE
Payer: COMMERCIAL

## 2024-05-23 VITALS
HEIGHT: 72 IN | WEIGHT: 228 LBS | SYSTOLIC BLOOD PRESSURE: 128 MMHG | HEART RATE: 54 BPM | TEMPERATURE: 97.3 F | OXYGEN SATURATION: 100 % | BODY MASS INDEX: 30.88 KG/M2 | DIASTOLIC BLOOD PRESSURE: 73 MMHG

## 2024-05-23 DIAGNOSIS — N48.30 PAINFUL ERECTION: ICD-10-CM

## 2024-05-23 PROCEDURE — 99213 OFFICE O/P EST LOW 20 MIN: CPT | Performed by: STUDENT IN AN ORGANIZED HEALTH CARE EDUCATION/TRAINING PROGRAM

## 2024-05-23 ASSESSMENT — PAIN SCALES - GENERAL: PAINLEVEL: NO PAIN (0)

## 2024-05-23 NOTE — PROGRESS NOTES
Chief Complaint:   Painful erections         History of Present Illness:   William Montes is a 59 year old male with a history of T2 DM, HLD, HTN, and stage CKD who presents for evaluation of painful erections.     The patient reports a several month history of pain with erections. He feels like the pain is improving. He notices some curvature of the penis.     He denies trauma to the area.          Past Medical History:     Past Medical History:   Diagnosis Date    ABNORMAL LIVER FUNCTION STUDY 01/04/2008    increased ast, alt Normal since 2008    Arthritis of both glenohumeral joints 02/18/2022    Background diabetic retinopathy, mild, ou 04/01/2018    Benign essential hypertension 12/02/2022    Chronic gingivitis     Esophageal reflux     Glaucoma     History of blood transfusion     My son has aplastic anemia and has had bld transfusions    Nonspecific abnormal results of liver function study     OBESITY NOS 12/13/2006    Pure hypercholesterolemia     Type II or unspecified type diabetes mellitus without mention of complication, not stated as uncontrolled     Uncomplicated asthma     My mother has asthma            Past Surgical History:     Past Surgical History:   Procedure Laterality Date    BIOPSY      My son had bone marrow biopsy    COLONOSCOPY N/A 5/22/2019    Procedure: COLONOSCOPY;  Surgeon: Leventhal, Thomas Michael, MD;  Location: [facility] OR    ENT SURGERY      My son has had tympanoplasty    NO HISTORY OF SURGERY      ORTHOPEDIC SURGERY      My mother had left hip replacement surgery            Medications     Current Outpatient Medications   Medication Sig Dispense Refill    acetaminophen (TYLENOL) 325 MG tablet Take 325-650 mg by mouth every 6 hours as needed for mild pain or fever      ASPIRIN 81 MG OR TABS 1 tab po QD (Once per day) 100 3    atorvastatin (LIPITOR) 20 MG tablet Take 1 tablet (20 mg) by mouth daily 90 tablet 2    cholecalciferol (VITAMIN  -D) 1000 UNITS capsule Take 1 capsule by  mouth daily      cyclobenzaprine (FLEXERIL) 10 MG tablet Take 1 tablet (10 mg) by mouth 3 times daily as needed for muscle spasms 30 tablet 1    diclofenac (VOLTAREN) 1 % topical gel Apply 4 grams to shoulders four times daily using enclosed dosing card. 100 g 1    gabapentin (NEURONTIN) 300 MG capsule Take 1 capsule (300 mg) by mouth 2 times daily as needed for neuropathic pain 180 capsule 1    lisinopril (ZESTRIL) 2.5 MG tablet Take 1 tablet (2.5 mg) by mouth daily 90 tablet 1    melatonin 3 MG tablet Take 6 mg by mouth nightly as needed for sleep      omeprazole (PRILOSEC) 20 MG DR capsule Take 20 mg by mouth daily      Semaglutide, 2 MG/DOSE, (OZEMPIC) 8 MG/3ML pen Inject 2 mg Subcutaneous every 7 days Take after 1 month of semaglutide 1 mg 9 mL 3    topiramate (TOPAMAX) 25 MG tablet Take 3 tablets (75 mg) by mouth daily 270 tablet 1     No current facility-administered medications for this visit.            Allergies:   Patient has no known allergies.         Review of Systems:  From intake questionnaire   Negative 14 system review except as noted on HPI, nurse's note.         Physical Exam:   Patient is a 59 year old  male   Vitals: Blood pressure 128/73, pulse 54, temperature 97.3  F (36.3  C), temperature source Tympanic, height 1.829 m (6'), weight 103.4 kg (228 lb), SpO2 100%.  General Appearance Adult: Alert, no acute distress, oriented.  Lungs: Non-labored breathing.  Heart: No obvious jugular venous distension present.  Neuro: Alert, oriented, speech and mentation normal  : palpable plaque nears the ventral glans to the left of midline      Labs and Pathology:    I personally reviewed all applicable laboratory data and went over findings with patient  Significant for:    CBC RESULTS:  Recent Labs   Lab Test 05/02/24  0845 06/02/23  0947 01/23/23  0818 07/27/21  0935 01/04/21  1235 01/09/20  0916   WBC  --  3.6*  --  3.4* 2.9* 3.3*   HGB 14.4 14.5 15.0 14.9 15.8 14.8   PLT  --  145*  --  159 130* 142*         BMP RESULTS:  Recent Labs   Lab Test 05/02/24  0845 12/08/23  0807 06/02/23  0947 01/23/23  0818 07/27/21  0935 06/22/21  1420 02/09/21  1529 01/04/21  1235 11/12/20  1158    139 140 141   < > 135 135 138 138   POTASSIUM 4.1 4.2 4.1 4.2   < > 4.1 4.1 4.2 4.0   CHLORIDE 108* 109* 111* 112*   < > 106 108 110* 110*   CO2 21* 19* 18* 18*   < > 23 23 22 21   ANIONGAP 9 11 11 11   < > 6 4 6 7   * 113* 92 97   < > 91 71 91 82   BUN 14.5 15.5 16.7 17.6   < > 13 14 17 14   CR 1.48* 1.46* 1.52* 1.57*   < > 1.47* 1.40* 1.60* 1.40*   GFRESTIMATED 54* 55* 52* 50*   < > 52* 55* 47* 55*   GFRESTBLACK  --   --   --   --   --  60* 64 54* 64   SHANA 9.0 9.2 9.0 8.7   < > 8.7 9.0 8.8 8.6    < > = values in this interval not displayed.       UA RESULTS:   Recent Labs   Lab Test 12/08/23  0913 07/27/21  0935 02/09/21  1538   SG 1.020 1.025 1.020   URINEPH 6.5 5.0 7.0   NITRITE Negative Negative Negative   RBCU None Seen 0-2 O - 2   WBCU None Seen 0-5 0 - 5       PSA RESULTS  Prostate Specific Antigen Screen   Date Value Ref Range Status   06/02/2023 0.76 0.00 - 4.50 ng/mL Final   08/19/2022 0.69 0.00 - 4.00 ug/L Final   08/20/2021 0.66 0.00 - 4.00 ug/L Final            Assessment and Plan:     Assessment: 59 year old male seen in evaluation for painful erections with penile curvature. On exam, there is a palpable plaque on the ventral shaft near the glans to the left of midline.     We discussed that this is consistent with Peyronie's disease. Since the patient is still experiencing some pain with erections, I suspect he is in the active phase. We could consider Xiaflex injections or penile plication if the curvature continues to be bothersome. I would refer him to see Dr. Munguia if he is interested in this in the future.     Plan:  Follow up with urology as needed.     NILSA CARTER PA-C  Department of Urology

## 2024-06-13 ENCOUNTER — PRE VISIT (OUTPATIENT)
Dept: GASTROENTEROLOGY | Facility: CLINIC | Age: 59
End: 2024-06-13
Payer: COMMERCIAL

## 2024-06-13 ENCOUNTER — OFFICE VISIT (OUTPATIENT)
Dept: GASTROENTEROLOGY | Facility: CLINIC | Age: 59
End: 2024-06-13
Attending: INTERNAL MEDICINE
Payer: COMMERCIAL

## 2024-06-13 VITALS
DIASTOLIC BLOOD PRESSURE: 81 MMHG | BODY MASS INDEX: 31.83 KG/M2 | HEART RATE: 66 BPM | WEIGHT: 235 LBS | HEIGHT: 72 IN | SYSTOLIC BLOOD PRESSURE: 133 MMHG

## 2024-06-13 DIAGNOSIS — E11.8 TYPE 2 DIABETES MELLITUS WITH COMPLICATION, WITHOUT LONG-TERM CURRENT USE OF INSULIN (H): ICD-10-CM

## 2024-06-13 DIAGNOSIS — E66.09 CLASS 1 OBESITY DUE TO EXCESS CALORIES WITH SERIOUS COMORBIDITY AND BODY MASS INDEX (BMI) OF 31.0 TO 31.9 IN ADULT: ICD-10-CM

## 2024-06-13 DIAGNOSIS — I10 BENIGN ESSENTIAL HYPERTENSION: ICD-10-CM

## 2024-06-13 DIAGNOSIS — E66.811 CLASS 1 OBESITY DUE TO EXCESS CALORIES WITH SERIOUS COMORBIDITY AND BODY MASS INDEX (BMI) OF 31.0 TO 31.9 IN ADULT: ICD-10-CM

## 2024-06-13 DIAGNOSIS — K76.0 METABOLIC DYSFUNCTION-ASSOCIATED STEATOTIC LIVER DISEASE (MASLD): ICD-10-CM

## 2024-06-13 DIAGNOSIS — R69 DIAGNOSIS UNKNOWN: ICD-10-CM

## 2024-06-13 DIAGNOSIS — K76.0 METABOLIC DYSFUNCTION-ASSOCIATED STEATOTIC LIVER DISEASE (MASLD): Primary | ICD-10-CM

## 2024-06-13 PROBLEM — E66.3 OVERWEIGHT (BMI 25.0-29.9): Status: RESOLVED | Noted: 2021-04-02 | Resolved: 2024-06-13

## 2024-06-13 PROCEDURE — 91200 LIVER ELASTOGRAPHY: CPT | Mod: 26 | Performed by: PHYSICIAN ASSISTANT

## 2024-06-13 PROCEDURE — 99213 OFFICE O/P EST LOW 20 MIN: CPT | Performed by: INTERNAL MEDICINE

## 2024-06-13 PROCEDURE — 99204 OFFICE O/P NEW MOD 45 MIN: CPT | Mod: 25 | Performed by: INTERNAL MEDICINE

## 2024-06-13 ASSESSMENT — PAIN SCALES - GENERAL: PAINLEVEL: NO PAIN (0)

## 2024-06-13 NOTE — PROGRESS NOTES
Date of Service: June 13, 2024     Subjective:            William Montes is a 59 year old male presenting for follow up of abnormal liver tests    History of Present Illness   William Montes is a 59 year old male with past medical history of obesity, diabetes mellitus, hypercholesterolemia, CKD and known fatty liver disease with advanced fibrosis who presents to re-establish care ( >3 years since last seen)    When he was last seen in 2021 we had discussed lifestyle modifications and in that setting he had lost a significant amount of weight, with a gabriela of 210 pounds.  Since that time he has had weight gain back up to 235 pounds.  He has been working closely with his primary doctor with regard to management of his diabetes, on semaglutide, but despite this he has had weight gain.  He is engaged with the comprehensive weight management clinic here at the [place], but has not been seen in some time and has not scheduled for several months and follow-up.  He notes at baseline he is relatively inactive and does not participate in exercise.  He has not made significant dietary modifications.    Important note that he did have a liver biopsy in April 2014 that demonstrated steatohepatitis with stage III of 4 scarring    He had some issues with chest pain last fall, that led to him presenting to the emergency department.  There was no overt signs to suggest that he was having ACS.  And follow-up with his primary he was referred to cardiology and underwent a stress test which was also negative    Past Medical History:  Past Medical History:   Diagnosis Date    ABNORMAL LIVER FUNCTION STUDY 01/04/2008    increased ast, alt Normal since 2008    Arthritis of both glenohumeral joints 02/18/2022    Background diabetic retinopathy, mild, ou 04/01/2018    Benign essential hypertension 12/02/2022    Chronic gingivitis     Esophageal reflux     Glaucoma     History of blood transfusion     My son has aplastic anemia and has had  bld transfusions    Nonspecific abnormal results of liver function study     OBESITY NOS 12/13/2006    Pure hypercholesterolemia     Type II or unspecified type diabetes mellitus without mention of complication, not stated as uncontrolled     Uncomplicated asthma     My mother has asthma       Surgical History:  Past Surgical History:   Procedure Laterality Date    BIOPSY      My son had bone marrow biopsy    COLONOSCOPY N/A 5/22/2019    Procedure: COLONOSCOPY;  Surgeon: Leventhal, Thomas Michael, MD;  Location: [facility] OR    ENT SURGERY      My son has had tympanoplasty    NO HISTORY OF SURGERY      ORTHOPEDIC SURGERY      My mother had left hip replacement surgery       Social History:  Social History     Tobacco Use    Smoking status: Never     Passive exposure: Never    Smokeless tobacco: Never   Vaping Use    Vaping status: Never Used   Substance Use Topics    Alcohol use: No     Comment: none for 11/2011    Drug use: No        Family History:  Family History   Problem Relation Age of Onset    Hypertension Mother     Diabetes Sister     Diabetes Cousin     Hypertension Other     C.A.D. No family hx of     Cancer - colorectal No family hx of     Glaucoma No family hx of     Macular Degeneration No family hx of     Cancer No family hx of     Cerebrovascular Disease No family hx of     Breast Cancer No family hx of     Prostate Cancer No family hx of     Thyroid Disease No family hx of        Medications:  Current Outpatient Medications   Medication Sig Dispense Refill    acetaminophen (TYLENOL) 325 MG tablet Take 325-650 mg by mouth every 6 hours as needed for mild pain or fever      ASPIRIN 81 MG OR TABS 1 tab po QD (Once per day) 100 3    atorvastatin (LIPITOR) 20 MG tablet Take 1 tablet (20 mg) by mouth daily 90 tablet 2    cholecalciferol (VITAMIN  -D) 1000 UNITS capsule Take 1 capsule by mouth daily      cyclobenzaprine (FLEXERIL) 10 MG tablet Take 1 tablet (10 mg) by mouth 3 times daily as needed for muscle  spasms 30 tablet 1    diclofenac (VOLTAREN) 1 % topical gel Apply 4 grams to shoulders four times daily using enclosed dosing card. 100 g 1    gabapentin (NEURONTIN) 300 MG capsule Take 1 capsule (300 mg) by mouth 2 times daily as needed for neuropathic pain 180 capsule 1    lisinopril (ZESTRIL) 2.5 MG tablet Take 1 tablet (2.5 mg) by mouth daily 90 tablet 1    melatonin 3 MG tablet Take 6 mg by mouth nightly as needed for sleep      omeprazole (PRILOSEC) 20 MG DR capsule Take 20 mg by mouth daily      Semaglutide, 2 MG/DOSE, (OZEMPIC) 8 MG/3ML pen Inject 2 mg Subcutaneous every 7 days Take after 1 month of semaglutide 1 mg 9 mL 3    topiramate (TOPAMAX) 25 MG tablet Take 3 tablets (75 mg) by mouth daily 270 tablet 1     No current facility-administered medications for this visit.       Review of Systems  A complete 10 point review of systems was asked and answered in the negative unless specifically commented upon in the HPI    Objective:         Vitals:    06/13/24 0823   BP: 133/81   Pulse: 66   Weight: 106.6 kg (235 lb)   Height: 1.829 m (6')     Body mass index is 31.87 kg/m .     Physical Exam  General: No acute distress  HEENT: Anicteric, wearing corrective lenses  Chest: Normal respiratory excursion  Abdomen: Obese, nontender  Extremities: No edema  Neuro: No tremor    Labs and Diagnostic tests:  Reviewed    Imaging:  reviewed    Procedures:  Fibrosis Scan:  Median Fibrosis: 9.2kPa  Consistent with F2-F3 fibrosis    Assessment and Plan:      Steatotic Liver Disease (SLD, formerly referred to as Non-alcoholic Fatty Liver Disease)  - I had a long discussion with the patient about metabolic dysfunction-associated steatotic liver disease (MASLD).  We discussed how fat deposits in the liver, how this leads to inflammation, and how chronic inflammation (metabolic dysfunction-associated steatohepatitis/MASH) can ultimately lead to scarring and cirrhosis.  The long-term complications of fatty liver disease were  discussed with the patient, including the increased risk of cardiovascular disease complications,the risk of developing diabetes (if not already), and variable progression to cirrhosis and end stage liver disease.    -He has biopsy-proven steatohepatitis with advanced hepatic fibrosis from a liver biopsy in 2014  -Given his progressive weight gain I do have concerns he may have progressed to stage IV of 4, which is cirrhosis      -We will obtain a FibroScan today for noninvasive evaluation of advanced hepatic fibrosis    -We will obtain an abdominal ultrasound completed to evaluate for signs of portal hypertension    -If there is evidence of cirrhosis, we will plan on referring him for an outpatient EGD to screen for esophageal varices    Management of MASLD/MASH  - We spent time discussing necessary lifestyle modifications including: appropriate diet, exercise and weight loss plan.      - Recommend exercise regularly: at least 4 times per week, with a minimum of 40-45 minutes per day.      - It has shown that patients who exercise regularly can have improvement of insulin resistance, increase in baseline metabolic activity and resolution of fatty liver disease (even if  appreciable weight loss does not occur)    - Recommend a low-carbohydrate, low-calorie diet (~1700 calories per day).    - An ideal weight loss plan would be to lose 7-10% of body weight over the next six months.  This has been proven to resolve fat and inflammation in the liver, and can lead to regression of scarring that might be present  - If the patient has diabetes, we recommend assertive management: ideal goal Hgb A1c goal of =/< 7%.     - There are no overt contraindications to medications used in the treatment of diabetes in persons with chronic liver disease  - Management of cholesterol is also very important.    - The use of statins are an effective means of therapy and are not contra-indicated in those with abnormal liver tests OR those  with cirrhosis.  The value of these medications in this population far outweigh the minor risks of abnormal liver tests.   - Goal LDL in those with EDWARDS are < 100 mg/dL  - Consider the utility of liberalizing coffee consumption as some data that this may slow progression and reverse effects of EDWARDS-related fibrosis.  - We recommend a referral to the [facility]   - He has already been referred      Follow Up:  6 months     Thank you very much for the opportunity to participate in the care of this patient.  If you have any further questions, please don't hesitate to contact our office.    Thomas M. Leventhal, M.D.   of Medicine  Advanced & Transplant Hepatology  [facility]

## 2024-06-13 NOTE — LETTER
6/13/2024      William Montes  [address]  [facility] [address]      Dear Colleague,    Thank you for referring your patient, William Montes, to the [facility] HEPATOLOG[facility] CLINIC [place]. Please see a copy of my visit note below.    Date of Service: June 13, 2024     Subjective:            William Montes is a 59 year old male presenting for follow up of abnormal liver tests    History of Present Illness   William Montes is a 59 year old male with past medical history of obesity, diabetes mellitus, hypercholesterolemia, CKD and known fatty liver disease with advanced fibrosis who presents to re-[facility] care ( >3 years since last seen)    When he was last seen in 2021 we had discussed lifestyle modifications and in that setting he had lost a significant amount of weight, with a gabriela of 210 pounds.  Since that time he has had weight gain back up to 235 pounds.  He has been working closely with his primary doctor with regard to management of his diabetes, on semaglutide, but despite this he has had weight gain.  He is engaged with the comprehensive weight management clinic here at the [facility], but has not been seen in some time and has not scheduled for several months and follow-up.  He notes at baseline he is relatively inactive and does not participate in exercise.  He has not made significant dietary modifications.    Important note that he did have a liver biopsy in April 2014 that demonstrated steatohepatitis with stage III of 4 scarring    He had some issues with chest pain last fall, that led to him presenting to the emergency department.  There was no overt signs to suggest that he was having ACS.  And follow-up with his primary he was referred to cardiology and underwent a stress test which was also negative    Past Medical History:  Past Medical History:   Diagnosis Date     ABNORMAL LIVER FUNCTION STUDY 01/04/2008    increased ast, alt Normal since 2008     Arthritis of both glenohumeral  joints 02/18/2022     Background diabetic retinopathy, mild, ou 04/01/2018     Benign essential hypertension 12/02/2022     Chronic gingivitis      Esophageal reflux      Glaucoma      History of blood transfusion     My son has aplastic anemia and has had bld transfusions     Nonspecific abnormal results of liver function study      OBESITY NOS 12/13/2006     Pure hypercholesterolemia      Type II or unspecified type diabetes mellitus without mention of complication, not stated as uncontrolled      Uncomplicated asthma     My mother has asthma       Surgical History:  Past Surgical History:   Procedure Laterality Date     BIOPSY      My son had bone marrow biopsy     COLONOSCOPY N/A 5/22/2019    Procedure: COLONOSCOPY;  Surgeon: Leventhal, Thomas Michael, MD;  Location: [facility] OR     ENT SURGERY      My son has had tympanoplasty     NO HISTORY OF SURGERY       ORTHOPEDIC SURGERY      My mother had left hip replacement surgery       Social History:  Social History     Tobacco Use     Smoking status: Never     Passive exposure: Never     Smokeless tobacco: Never   Vaping Use     Vaping status: Never Used   Substance Use Topics     Alcohol use: No     Comment: none for 11/2011     Drug use: No        Family History:  Family History   Problem Relation Age of Onset     Hypertension Mother      Diabetes Sister      Diabetes Cousin      Hypertension Other      C.A.D. No family hx of      Cancer - colorectal No family hx of      Glaucoma No family hx of      Macular Degeneration No family hx of      Cancer No family hx of      Cerebrovascular Disease No family hx of      Breast Cancer No family hx of      Prostate Cancer No family hx of      Thyroid Disease No family hx of        Medications:  Current Outpatient Medications   Medication Sig Dispense Refill     acetaminophen (TYLENOL) 325 MG tablet Take 325-650 mg by mouth every 6 hours as needed for mild pain or fever       ASPIRIN 81 MG OR TABS 1 tab po QD (Once per day) 100  3     atorvastatin (LIPITOR) 20 MG tablet Take 1 tablet (20 mg) by mouth daily 90 tablet 2     cholecalciferol (VITAMIN  -D) 1000 UNITS capsule Take 1 capsule by mouth daily       cyclobenzaprine (FLEXERIL) 10 MG tablet Take 1 tablet (10 mg) by mouth 3 times daily as needed for muscle spasms 30 tablet 1     diclofenac (VOLTAREN) 1 % topical gel Apply 4 grams to shoulders four times daily using enclosed dosing card. 100 g 1     gabapentin (NEURONTIN) 300 MG capsule Take 1 capsule (300 mg) by mouth 2 times daily as needed for neuropathic pain 180 capsule 1     lisinopril (ZESTRIL) 2.5 MG tablet Take 1 tablet (2.5 mg) by mouth daily 90 tablet 1     melatonin 3 MG tablet Take 6 mg by mouth nightly as needed for sleep       omeprazole (PRILOSEC) 20 MG DR capsule Take 20 mg by mouth daily       Semaglutide, 2 MG/DOSE, (OZEMPIC) 8 MG/3ML pen Inject 2 mg Subcutaneous every 7 days Take after 1 month of semaglutide 1 mg 9 mL 3     topiramate (TOPAMAX) 25 MG tablet Take 3 tablets (75 mg) by mouth daily 270 tablet 1     No current facility-administered medications for this visit.       Review of Systems  A complete 10 point review of systems was asked and answered in the negative unless specifically commented upon in the HPI    Objective:         Vitals:    06/13/24 0823   BP: 133/81   Pulse: 66   Weight: 106.6 kg (235 lb)   Height: 1.829 m (6')     Body mass index is 31.87 kg/m .     Physical Exam  General: No acute distress  HEENT: Anicteric, wearing corrective lenses  Chest: Normal respiratory excursion  Abdomen: Obese, nontender  Extremities: No edema  Neuro: No tremor    Labs and Diagnostic tests:  Reviewed    Imaging:  reviewed    Procedures:  Fibrosis Scan:  Median Fibrosis: 9.2kPa  Consistent with F2-F3 fibrosis    Assessment and Plan:      Steatotic Liver Disease (SLD, formerly referred to as Non-alcoholic Fatty Liver Disease)  - I had a long discussion with the patient about metabolic dysfunction-associated steatotic  liver disease (MASLD).  We discussed how fat deposits in the liver, how this leads to inflammation, and how chronic inflammation (metabolic dysfunction-associated steatohepatitis/MASH) can ultimately lead to scarring and cirrhosis.  The long-term complications of fatty liver disease were discussed with the patient, including the increased risk of cardiovascular disease complications,the risk of developing diabetes (if not already), and variable progression to cirrhosis and end stage liver disease.    -He has biopsy-proven steatohepatitis with advanced hepatic fibrosis from a liver biopsy in 2014  -Given his progressive weight gain I do have concerns he may have progressed to stage IV of 4, which is cirrhosis      -We will obtain a FibroScan today for noninvasive evaluation of advanced hepatic fibrosis    -We will obtain an abdominal ultrasound completed to evaluate for signs of portal hypertension    -If there is evidence of cirrhosis, we will plan on referring him for an outpatient EGD to screen for esophageal varices    Management of MASLD/MASH  - We spent time discussing necessary lifestyle modifications including: appropriate diet, exercise and weight loss plan.      - Recommend exercise regularly: at least 4 times per week, with a minimum of 40-45 minutes per day.      - It has shown that patients who exercise regularly can have improvement of insulin resistance, increase in baseline metabolic activity and resolution of fatty liver disease (even if  appreciable weight loss does not occur)    - Recommend a low-carbohydrate, low-calorie diet (~1700 calories per day).    - An ideal weight loss plan would be to lose 7-10% of body weight over the next six months.  This has been proven to resolve fat and inflammation in the liver, and can lead to regression of scarring that might be present  - If the patient has diabetes, we recommend assertive management: ideal goal Hgb A1c goal of =/< 7%.     - There are no overt  contraindications to medications used in the treatment of diabetes in persons with chronic liver disease  - Management of cholesterol is also very important.    - The use of statins are an effective means of therapy and are not contra-indicated in those with abnormal liver tests OR those with cirrhosis.  The value of these medications in this population far outweigh the minor risks of abnormal liver tests.   - Goal LDL in those with EDWARDS are < 100 mg/dL  - Consider the utility of liberalizing coffee consumption as some data that this may slow progression and reverse effects of EDWARDS-related fibrosis.  - We recommend a referral to the [facility]   - He has already been referred      Follow Up:  6 months     Thank you very much for the opportunity to participate in the care of this patient.  If you have any further questions, please don't hesitate to contact our office.    Thomas M. Leventhal, M.D.   of Medicine  Advanced & Transplant Hepatology  [facility]

## 2024-06-13 NOTE — NURSING NOTE
Chief Complaint   Patient presents with    New Patient     Hepatic Steatosis      /81   Pulse 66   Ht 1.829 m (6')   Wt 106.6 kg (235 lb)   BMI 31.87 kg/m    yMra Simeon MA on 6/13/2024 at 8:23 AM

## 2024-06-13 NOTE — PATIENT INSTRUCTIONS
A fibrosis scan today    Please schedule an ultrasound    We will get labs in 6 months and a follow up in 6 months    Steatotic Liver Disease (SLD, formerly referred to as Non-alcoholic Fatty Liver Disease)  - I had a long discussion with the patient about metabolic dysfunction-associated steatotic liver disease (MASLD).  We discussed how fat deposits in the liver, how this leads to inflammation, and how chronic inflammation (metabolic dysfunction-associated steatohepatitis/MASH) can ultimately lead to scarring and cirrhosis.  The long-term complications of fatty liver disease were discussed with the patient, including the increased risk of cardiovascular disease complications,the risk of developing diabetes (if not already), and variable progression to cirrhosis and end stage liver disease.  - If, in the setting of this metabolic dysfunction there is concomitant excessive alcohol consumption, the patient would meet criteria for the diagnosis of MetALD describing those with MASLD who consume greater amounts of alcohol per week (140 g/week and 210 g/week for females and males respectively).   - If the patient only has benign steatosis, they will have low risk of progression and no treatment will be needed at that time except for lifestyle modifications.  - It is important to note that liver tests alone as a screening test for MASH are not sensitive, as up to 20-30% of patients can have MASH with fibrosis despite having normal liver chemistries.     Management of MASLD/MASH  - We spent time discussing necessary lifestyle modifications including: appropriate diet, exercise and weight loss plan.      - Recommend exercise regularly: at least 4 times per week, with a minimum of 40-45 minutes per day.      - It has shown that patients who exercise regularly can have improvement of insulin resistance, increase in baseline metabolic activity and resolution of fatty liver disease (even if  appreciable weight loss does not  occur)    - Recommend a low-carbohydrate, low-calorie diet (~1700 calories per day).    - An ideal weight loss plan would be to lose 7-10% of body weight over the next six months.  This has been proven to resolve fat and inflammation in the liver, and can lead to regression of scarring that might be present  - If the patient has diabetes, we recommend assertive management: ideal goal Hgb A1c goal of =/< 7%.     - There are no overt contraindications to medications used in the treatment of diabetes in persons with chronic liver disease  - Management of cholesterol is also very important.    - The use of statins are an effective means of therapy and are not contra-indicated in those with abnormal liver tests OR those with cirrhosis.  The value of these medications in this population far outweigh the minor risks of abnormal liver tests.   - Goal LDL in those with EDWARDS are < 100 mg/dL  - Consider the utility of liberalizing coffee consumption as some data that this may slow progression and reverse effects of EDWARDS-related fibrosis.  - We recommend a referral to the [facility] Comprehensive [facility] Management Clinic

## 2024-06-14 ENCOUNTER — HOSPITAL ENCOUNTER (OUTPATIENT)
Dept: ULTRASOUND IMAGING | Facility: HOSPITAL | Age: 59
Discharge: HOME | End: 2024-06-14
Attending: INTERNAL MEDICINE | Admitting: INTERNAL MEDICINE
Payer: COMMERCIAL

## 2024-06-14 DIAGNOSIS — K76.0 METABOLIC DYSFUNCTION-ASSOCIATED STEATOTIC LIVER DISEASE (MASLD): ICD-10-CM

## 2024-06-14 PROCEDURE — 76705 ECHO EXAM OF ABDOMEN: CPT

## 2024-07-24 ENCOUNTER — MYC MEDICAL ADVICE (OUTPATIENT)
Dept: ORTHOPEDICS | Facility: CLINIC | Age: 59
End: 2024-07-24
Payer: COMMERCIAL

## 2024-07-24 NOTE — TELEPHONE ENCOUNTER
Sent Mychart (1st Attempt) for the patient to call back and schedule the following:    Appointment type: Return Weight Mgmt  Provider: Marine Flores  Specialty phone number: [phone]  Additonal Notes: Patient answered saying they were William, was confused about 9/25 appt, then said they were not William? Sent MyC with info

## 2024-07-29 ENCOUNTER — TELEPHONE (OUTPATIENT)
Dept: ENDOCRINOLOGY | Facility: CLINIC | Age: 59
End: 2024-07-29
Payer: COMMERCIAL

## 2024-07-29 NOTE — TELEPHONE ENCOUNTER
Left Voicemail (2nd Attempt) for the patient to call back and schedule the following:    Appointment type: Return Weight Mgmt  Provider: Marine Flores  Return date: September Specialty phone number: [phone]

## 2024-08-11 ENCOUNTER — HEALTH MAINTENANCE LETTER (OUTPATIENT)
Age: 59
End: 2024-08-11

## 2024-08-17 DIAGNOSIS — E11.42 DIABETIC POLYNEUROPATHY ASSOCIATED WITH TYPE 2 DIABETES MELLITUS (H): ICD-10-CM

## 2024-08-19 ENCOUNTER — VIRTUAL VISIT (OUTPATIENT)
Dept: ENDOCRINOLOGY | Facility: CLINIC | Age: 59
End: 2024-08-19
Payer: COMMERCIAL

## 2024-08-19 ENCOUNTER — TELEPHONE (OUTPATIENT)
Dept: ENDOCRINOLOGY | Facility: CLINIC | Age: 59
End: 2024-08-19

## 2024-08-19 VITALS — BODY MASS INDEX: 31.15 KG/M2 | WEIGHT: 230 LBS | HEIGHT: 72 IN

## 2024-08-19 DIAGNOSIS — E11.8 TYPE 2 DIABETES MELLITUS WITH COMPLICATION, WITHOUT LONG-TERM CURRENT USE OF INSULIN (H): Primary | ICD-10-CM

## 2024-08-19 DIAGNOSIS — E66.3 OVERWEIGHT (BMI 25.0-29.9): ICD-10-CM

## 2024-08-19 PROCEDURE — 99212 OFFICE O/P EST SF 10 MIN: CPT | Mod: 95 | Performed by: PHYSICIAN ASSISTANT

## 2024-08-19 RX ORDER — TOPIRAMATE 25 MG/1
TABLET, FILM COATED ORAL
Qty: 90 TABLET | Refills: 3 | Status: SHIPPED | OUTPATIENT
Start: 2024-08-19

## 2024-08-19 ASSESSMENT — PAIN SCALES - GENERAL: PAINLEVEL: MODERATE PAIN (5)

## 2024-08-19 NOTE — LETTER
2024       RE: William Montes  [address]  [facility] [address]     Dear Colleague,    Thank you for referring your patient, William Montes, to the [facility] WEIGHT MANAGEMENT CLINIC [place] at [facility]. Please see a copy of my visit note below.      Return Medical Weight Management Note     William Montes  MRN:  [id number]  :  1965  NAKUL:  2024    Dear Sirena Talley DO,    I had the pleasure of seeing your patient William Montes. He is a 59 year old male who I am continuing to see for treatment of obesity related to:        10/26/2018     4:11 PM   --   I have the following health issues associated with obesity Type II Diabetes    GERD (Reflux)    Fatty Liver       Assessment & Plan  Problem List Items Addressed This Visit          Endocrine Diagnoses    Type 2 diabetes mellitus with complication, without long-term current use of insulin (H) - Primary    Relevant Medications    tirzepatide (MOUNJARO) 7.5 MG/0.5ML pen     Other Visit Diagnoses       Overweight (BMI 25.0-29.9)        Relevant Medications    topiramate (TOPAMAX) 25 MG tablet           He has lost from 225 to 207 and regained to 230. He would like to lose more weight to around 210 lbs     Taking Ozempic 2mg increased to 2mg two months ago. Has not noticed increase in side effects. No weight loss yet.  Blood sugars stable.     Stopped topiramate to see if neuropathy symptoms improved.  Responded very well in the past.  Off of it he has increase in interest in food and some weight gain since stopping it.    Hx of fatty liver disease.  Saw hepatology for follow up 2024. With his weight regain there was concern of progression of fibrosis or cirrhosis.  Fibroscan and US were reassuring and showed steatosis but no significant scarring/cirrhosis.  It was recommended to continue with his lifestyle changes and weight loss.    Plan to try to change from ozempic to mounjaro to  see if more effective for weight loss.  Start 7.5mg.  Plan to restart topiramate as it was very effective for thoughts about food and weight loss and he has had had regain and increase in food noise since stopping it.        Follow up plan:    BO 6 weeks   Marine 3-4 mo       INTERVAL HISTORY:    Anti-obesity medication history    Current:   Ozempic 2mg increased to 2mg two months ago      Past/Failed/contraindicated:   Topiramate stopped to see if neuropathy improved. It has improved minimally.     CURRENT WEIGHT:   230 lbs 0 oz    Initial Weight (lbs): 251 lbs  Last Visits Weight: 102.1 kg (225 lb)  Cumulative weight loss (lbs): 21  Weight Loss Percentage: 8.37%        8/19/2024    10:19 AM   Changes and Difficulties   I have made the following changes to my diet since my last visit: None   With regards to my diet, I am still struggling with: Portion size   I have made the following changes to my activity/exercise since my last visit: Walking Three times a week about 3 miles   With regards to my activity/exercise, I am still struggling with: Consistency       MEDICATIONS:   Current Outpatient Medications   Medication Sig Dispense Refill     tirzepatide (MOUNJARO) 7.5 MG/0.5ML pen Inject 7.5 mg subcutaneously every 7 days 2 mL 3     topiramate (TOPAMAX) 25 MG tablet 25mg at bedtime for week 1, 50mg at bedtime for 1 week, and 75mg at bedtime thereafter 90 tablet 3     acetaminophen (TYLENOL) 325 MG tablet Take 325-650 mg by mouth every 6 hours as needed for mild pain or fever       ASPIRIN 81 MG OR TABS 1 tab po QD (Once per day) 100 3     atorvastatin (LIPITOR) 20 MG tablet Take 1 tablet (20 mg) by mouth daily 90 tablet 2     cholecalciferol (VITAMIN  -D) 1000 UNITS capsule Take 1 capsule by mouth daily       cyclobenzaprine (FLEXERIL) 10 MG tablet Take 1 tablet (10 mg) by mouth 3 times daily as needed for muscle spasms 30 tablet 1     diclofenac (VOLTAREN) 1 % topical gel Apply 4 grams to shoulders four times  daily using enclosed dosing card. 100 g 1     gabapentin (NEURONTIN) 300 MG capsule Take 1 capsule (300 mg) by mouth 2 times daily as needed for neuropathic pain 180 capsule 1     lisinopril (ZESTRIL) 2.5 MG tablet Take 1 tablet (2.5 mg) by mouth daily 90 tablet 1     melatonin 3 MG tablet Take 6 mg by mouth nightly as needed for sleep       omeprazole (PRILOSEC) 20 MG DR capsule Take 20 mg by mouth daily       Semaglutide, 2 MG/DOSE, (OZEMPIC) 8 MG/3ML pen Inject 2 mg Subcutaneous every 7 days Take after 1 month of semaglutide 1 mg 9 mL 3     topiramate (TOPAMAX) 25 MG tablet Take 3 tablets (75 mg) by mouth daily 270 tablet 1           8/19/2024    10:19 AM   Weight Loss Medication History Reviewed With Patient   Which weight loss medications are you currently taking on a regular basis? Ozempic   Are you having any side effects from the weight loss medication that we have prescribed you? No       Office Visit on 05/02/2024   Component Date Value Ref Range Status     Creatinine Urine mg/dL 05/02/2024 202.0  mg/dL Final    The reference ranges have not been established in urine creatinine. The results should be integrated into the clinical context for interpretation.     Albumin Urine mg/L 05/02/2024 <12.0  mg/L Final    The reference ranges have not been established in urine albumin. The results should be integrated into the clinical context for interpretation.     Albumin Urine mg/g Cr 05/02/2024    Final    Unable to calculate, urine albumin and/or urine creatinine is outside detectable limits.  Microalbuminuria is defined as an albumin:creatinine ratio of 17 to 299 for males and 25 to 299 for females. A ratio of albumin:creatinine of 300 or higher is indicative of overt proteinuria.  Due to biologic variability, positive results should be confirmed by a second, first-morning random or 24-hour timed urine specimen. If there is discrepancy, a third specimen is recommended. When 2 out of 3 results are in the  microalbuminuria range, this is evidence for incipient nephropathy and warrants increased efforts at glucose control, blood pressure control, and institution of therapy with an angiotensin-converting-enzyme (ACE) inhibitor (if the patient can tolerate it).       Hemoglobin A1C 05/02/2024 6.2 (H)  0.0 - 5.6 % Final    Normal <5.7%   Prediabetes 5.7-6.4%    Diabetes 6.5% or higher     Note: Adopted from ADA consensus guidelines.     Sodium 05/02/2024 138  135 - 145 mmol/L Final    Reference intervals for this test were updated on 09/26/2023 to more accurately reflect our healthy population. There may be differences in the flagging of prior results with similar values performed with this method. Interpretation of those prior results can be made in the context of the updated reference intervals.      Potassium 05/02/2024 4.1  3.4 - 5.3 mmol/L Final     Chloride 05/02/2024 108 (H)  98 - 107 mmol/L Final     Carbon Dioxide (CO2) 05/02/2024 21 (L)  22 - 29 mmol/L Final     Anion Gap 05/02/2024 9  7 - 15 mmol/L Final     Urea Nitrogen 05/02/2024 14.5  8.0 - 23.0 mg/dL Final     Creatinine 05/02/2024 1.48 (H)  0.67 - 1.17 mg/dL Final     GFR Estimate 05/02/2024 54 (L)  >60 mL/min/1.73m2 Final     Calcium 05/02/2024 9.0  8.8 - 10.2 mg/dL Final     Glucose 05/02/2024 117 (H)  70 - 99 mg/dL Final     Hold Specimen 05/02/2024 JIC   Final     Hemoglobin 05/02/2024 14.4  13.3 - 17.7 g/dL Final         PHYSICAL EXAM:  Objective   Ht 1.829 m (6')   Wt 104.3 kg (230 lb)   BMI 31.19 kg/m      Vitals - Patient Reported  Pain Score: Moderate Pain (5)  Pain Loc: Low Back        GENERAL: alert and no distress  EYES: Eyes grossly normal to inspection.  No discharge or erythema, or obvious scleral/conjunctival abnormalities.  RESP: No audible wheeze, cough, or visible cyanosis.    SKIN: Visible skin clear. No significant rash, abnormal pigmentation or lesions.  NEURO: Cranial nerves grossly intact.  Mentation and speech appropriate for  age.  PSYCH: Appropriate affect, tone, and pace of words        Sincerely,    Marine Flores PA-C      20 minutes spent by me on the date of the encounter doing chart review, history and exam, documentation and further activities per the note    Virtual Visit Details    Type of service:  Video Visit     Originating Location (pt. Location): Home    Distant Location (provider location):  Off-site  Platform used for Video Visit: [facility]      Again, thank you for allowing me to participate in the care of your patient.      Sincerely,    Marine Flores PA-C

## 2024-08-19 NOTE — TELEPHONE ENCOUNTER
PA Initiation    Medication: MOUNJARO 7.5 MG/0.5ML SC SOPN  Insurance Company: [employer] ([facility]) - Phone [phone] Fax [phone]  Pharmacy Filling the Rx: [id number] [place] [address] AT [place] [address]  Filling Pharmacy Phone:    Filling Pharmacy Fax:    Start Date: 8/19/2024    [id number]

## 2024-08-19 NOTE — TELEPHONE ENCOUNTER
Prior Authorization Approval    Medication: MOUNJARO 7.5 MG/0.5ML SC SOPN  Authorization Effective Date: 8/19/2024  Authorization Expiration Date: 8/19/2025  Approved Dose/Quantity: 4 pens per 28 days  Reference #: [id number]   Insurance Company: [employer] ([facility]) - Phone [phone] Fax [phone]  Expected CoPay: $ 50  CoPay Card Available:      Financial Assistance Needed: Unknown  Which Pharm[facility] is filling the prescription[id number] [address] AT [place] [address]  Pharmacy Notified: Released Rx  Patient Notified: By pharmacy

## 2024-08-19 NOTE — NURSING NOTE
Current patient location: [facility] [address]  [facility] [address]    Is the patient currently in the state of [place]? YES    Visit mode:VIDEO    If the visit is dropped, the patient can be reconnected by: VIDEO VISIT: Text to cell phone:   Telephone Information:   Mobile [phone]       Will anyone else be joining the visit? NO  (If patient encounters technical issues they should call [phone]    How would you like to obtain your AVS? MyChart    Are changes needed to the allergy or medication list? No    Are refills needed on medications prescribed by this physician? NO    Rooming Documentation:  Questionnaire(s) completed      Reason for visit: RECHECK    Pt states 5/10 chronic low back pain today.    Thai TREJOF

## 2024-08-19 NOTE — PROGRESS NOTES
Return Medical Weight Management Note     Wililam Montes  MRN:  [id number]  :  1965  NAKUL:  2024    Dear Sirena Talley DO,    I had the pleasure of seeing your patient William Montes. He is a 59 year old male who I am continuing to see for treatment of obesity related to:        10/26/2018     4:11 PM   --   I have the following health issues associated with obesity Type II Diabetes    GERD (Reflux)    Fatty Liver       Assessment & Plan   Problem List Items Addressed This Visit          Endocrine Diagnoses    Type 2 diabetes mellitus with complication, without long-term current use of insulin (H) - Primary    Relevant Medications    tirzepatide (MOUNJARO) 7.5 MG/0.5ML pen     Other Visit Diagnoses       Overweight (BMI 25.0-29.9)        Relevant Medications    topiramate (TOPAMAX) 25 MG tablet           He has lost from 225 to 207 and regained to 230. He would like to lose more weight to around 210 lbs     Taking Ozempic 2mg increased to 2mg two months ago. Has not noticed increase in side effects. No weight loss yet.  Blood sugars stable.     Stopped topiramate to see if neuropathy symptoms improved.  Responded very well in the past.  Off of it he has increase in interest in food and some weight gain since stopping it.    Hx of fatty liver disease.  Saw hepatology for follow up 2024. With his weight regain there was concern of progression of fibrosis or cirrhosis.  Fibroscan and US were reassuring and showed steatosis but no significant scarring/cirrhosis.  It was recommended to continue with his lifestyle changes and weight loss.    Plan to try to change from ozempic to mounjaro to see if more effective for weight loss.  Start 7.5mg.  Plan to restart topiramate as it was very effective for thoughts about food and weight loss and he has had had regain and increase in food noise since stopping it.        Follow up plan:    MTTRISH 6 weeks   Marnie 3-4 mo       INTERVAL HISTORY:    Anti-obesity  medication history    Current:   Ozempic 2mg increased to 2mg two months ago      Past/Failed/contraindicated:   Topiramate stopped to see if neuropathy improved. It has improved minimally.     CURRENT WEIGHT:   230 lbs 0 oz    Initial Weight (lbs): 251 lbs  Last Visits Weight: 102.1 kg (225 lb)  Cumulative weight loss (lbs): 21  Weight Loss Percentage: 8.37%        8/19/2024    10:19 AM   Changes and Difficulties   I have made the following changes to my diet since my last visit: None   With regards to my diet, I am still struggling with: Portion size   I have made the following changes to my activity/exercise since my last visit: Walking Three times a week about 3 miles   With regards to my activity/exercise, I am still struggling with: Consistency       MEDICATIONS:   Current Outpatient Medications   Medication Sig Dispense Refill    tirzepatide (MOUNJARO) 7.5 MG/0.5ML pen Inject 7.5 mg subcutaneously every 7 days 2 mL 3    topiramate (TOPAMAX) 25 MG tablet 25mg at bedtime for week 1, 50mg at bedtime for 1 week, and 75mg at bedtime thereafter 90 tablet 3    acetaminophen (TYLENOL) 325 MG tablet Take 325-650 mg by mouth every 6 hours as needed for mild pain or fever      ASPIRIN 81 MG OR TABS 1 tab po QD (Once per day) 100 3    atorvastatin (LIPITOR) 20 MG tablet Take 1 tablet (20 mg) by mouth daily 90 tablet 2    cholecalciferol (VITAMIN  -D) 1000 UNITS capsule Take 1 capsule by mouth daily      cyclobenzaprine (FLEXERIL) 10 MG tablet Take 1 tablet (10 mg) by mouth 3 times daily as needed for muscle spasms 30 tablet 1    diclofenac (VOLTAREN) 1 % topical gel Apply 4 grams to shoulders four times daily using enclosed dosing card. 100 g 1    gabapentin (NEURONTIN) 300 MG capsule Take 1 capsule (300 mg) by mouth 2 times daily as needed for neuropathic pain 180 capsule 1    lisinopril (ZESTRIL) 2.5 MG tablet Take 1 tablet (2.5 mg) by mouth daily 90 tablet 1    melatonin 3 MG tablet Take 6 mg by mouth nightly as  needed for sleep      omeprazole (PRILOSEC) 20 MG DR capsule Take 20 mg by mouth daily      Semaglutide, 2 MG/DOSE, (OZEMPIC) 8 MG/3ML pen Inject 2 mg Subcutaneous every 7 days Take after 1 month of semaglutide 1 mg 9 mL 3    topiramate (TOPAMAX) 25 MG tablet Take 3 tablets (75 mg) by mouth daily 270 tablet 1           8/19/2024    10:19 AM   Weight Loss Medication History Reviewed With Patient   Which weight loss medications are you currently taking on a regular basis? Ozempic   Are you having any side effects from the weight loss medication that we have prescribed you? No       Office Visit on 05/02/2024   Component Date Value Ref Range Status    Creatinine Urine mg/dL 05/02/2024 202.0  mg/dL Final    The reference ranges have not been established in urine creatinine. The results should be integrated into the clinical context for interpretation.    Albumin Urine mg/L 05/02/2024 <12.0  mg/L Final    The reference ranges have not been established in urine albumin. The results should be integrated into the clinical context for interpretation.    Albumin Urine mg/g Cr 05/02/2024    Final    Unable to calculate, urine albumin and/or urine creatinine is outside detectable limits.  Microalbuminuria is defined as an albumin:creatinine ratio of 17 to 299 for males and 25 to 299 for females. A ratio of albumin:creatinine of 300 or higher is indicative of overt proteinuria.  Due to biologic variability, positive results should be confirmed by a second, first-morning random or 24-hour timed urine specimen. If there is discrepancy, a third specimen is recommended. When 2 out of 3 results are in the microalbuminuria range, this is evidence for incipient nephropathy and warrants increased efforts at glucose control, blood pressure control, and institution of therapy with an angiotensin-converting-enzyme (ACE) inhibitor (if the patient can tolerate it).      Hemoglobin A1C 05/02/2024 6.2 (H)  0.0 - 5.6 % Final    Normal <5.7%    Prediabetes 5.7-6.4%    Diabetes 6.5% or higher     Note: Adopted from ADA consensus guidelines.    Sodium 05/02/2024 138  135 - 145 mmol/L Final    Reference intervals for this test were updated on 09/26/2023 to more accurately reflect our healthy population. There may be differences in the flagging of prior results with similar values performed with this method. Interpretation of those prior results can be made in the context of the updated reference intervals.     Potassium 05/02/2024 4.1  3.4 - 5.3 mmol/L Final    Chloride 05/02/2024 108 (H)  98 - 107 mmol/L Final    Carbon Dioxide (CO2) 05/02/2024 21 (L)  22 - 29 mmol/L Final    Anion Gap 05/02/2024 9  7 - 15 mmol/L Final    Urea Nitrogen 05/02/2024 14.5  8.0 - 23.0 mg/dL Final    Creatinine 05/02/2024 1.48 (H)  0.67 - 1.17 mg/dL Final    GFR Estimate 05/02/2024 54 (L)  >60 mL/min/1.73m2 Final    Calcium 05/02/2024 9.0  8.8 - 10.2 mg/dL Final    Glucose 05/02/2024 117 (H)  70 - 99 mg/dL Final    Hold Specimen 05/02/2024 JIC   Final    Hemoglobin 05/02/2024 14.4  13.3 - 17.7 g/dL Final         PHYSICAL EXAM:  Objective    Ht 1.829 m (6')   Wt 104.3 kg (230 lb)   BMI 31.19 kg/m      Vitals - Patient Reported  Pain Score: Moderate Pain (5)  Pain Loc: Low Back        GENERAL: alert and no distress  EYES: Eyes grossly normal to inspection.  No discharge or erythema, or obvious scleral/conjunctival abnormalities.  RESP: No audible wheeze, cough, or visible cyanosis.    SKIN: Visible skin clear. No significant rash, abnormal pigmentation or lesions.  NEURO: Cranial nerves grossly intact.  Mentation and speech appropriate for age.  PSYCH: Appropriate affect, tone, and pace of words        Sincerely,    Marine Flores PA-C      20 minutes spent by me on the date of the encounter doing chart review, history and exam, documentation and further activities per the note    Virtual Visit Details    Type of service:  Video Visit     Originating Location (pt.  Location): Home    Distant Location (provider location):  Off-site  Platform used for Video Visit: Ronna

## 2024-08-20 RX ORDER — GABAPENTIN 300 MG/1
CAPSULE ORAL
Qty: 180 CAPSULE | Refills: 0 | Status: SHIPPED | OUTPATIENT
Start: 2024-08-20

## 2024-08-21 ENCOUNTER — TELEPHONE (OUTPATIENT)
Dept: ENDOCRINOLOGY | Facility: CLINIC | Age: 59
End: 2024-08-21
Payer: COMMERCIAL

## 2024-08-21 NOTE — TELEPHONE ENCOUNTER
Left Voicemail (1st Attempt) and Sent Mychart (1st Attempt) for the patient to call back and schedule the following:    Appointment type: RET MWM  Provider: Marine Flores  Return date: around 12/19/24  Specialty phone number: [phone]    Additional appointment(s) needed:      Appointment type: RET MTM  Provider: Lauren Bloch  Return date: around 9/30/24  Specialty phone number: [phone]

## 2024-09-30 ENCOUNTER — OFFICE VISIT (OUTPATIENT)
Dept: OPHTHALMOLOGY | Facility: CLINIC | Age: 59
End: 2024-09-30
Payer: COMMERCIAL

## 2024-09-30 DIAGNOSIS — H40.003 GLAUCOMA SUSPECT, BILATERAL: ICD-10-CM

## 2024-09-30 DIAGNOSIS — H52.4 PRESBYOPIA: Primary | ICD-10-CM

## 2024-09-30 DIAGNOSIS — Z01.01 ENCOUNTER FOR EXAMINATION OF EYES AND VISION WITH ABNORMAL FINDINGS: ICD-10-CM

## 2024-09-30 DIAGNOSIS — E11.8 TYPE 2 DIABETES MELLITUS WITH COMPLICATION, WITHOUT LONG-TERM CURRENT USE OF INSULIN (H): Primary | ICD-10-CM

## 2024-09-30 DIAGNOSIS — E11.3299 BACKGROUND DIABETIC RETINOPATHY (H): ICD-10-CM

## 2024-09-30 DIAGNOSIS — H52.4 PRESBYOPIA: ICD-10-CM

## 2024-09-30 PROCEDURE — 92015 DETERMINE REFRACTIVE STATE: CPT | Performed by: OPHTHALMOLOGY

## 2024-09-30 PROCEDURE — 92014 COMPRE OPH EXAM EST PT 1/>: CPT | Performed by: OPHTHALMOLOGY

## 2024-09-30 ASSESSMENT — TONOMETRY
OD_IOP_MMHG: 13
OS_IOP_MMHG: 15
IOP_METHOD: APPLANATION

## 2024-09-30 ASSESSMENT — REFRACTION_MANIFEST
OD_SPHERE: +1.75
OS_ADD: +2.50
OS_SPHERE: +1.50
OD_CYLINDER: SPHERE
OD_ADD: +2.50
OS_CYLINDER: SPHERE

## 2024-09-30 ASSESSMENT — REFRACTION_WEARINGRX
OS_ADD: +2.53
SPECS_TYPE: PAL
OS_AXIS: 109
OD_SPHERE: +1.50
OD_ADD: +2.52
OS_SPHERE: +1.50
OD_CYLINDER: +0.50
OS_CYLINDER: +0.25
OD_AXIS: 044

## 2024-09-30 ASSESSMENT — VISUAL ACUITY
OS_CC: 20/20
OD_CC: 20/20
OS_CC: J2
OD_CC: J1
METHOD: SNELLEN - LINEAR
CORRECTION_TYPE: GLASSES

## 2024-09-30 ASSESSMENT — CONF VISUAL FIELD
OS_SUPERIOR_NASAL_RESTRICTION: 0
OD_INFERIOR_NASAL_RESTRICTION: 0
OD_INFERIOR_TEMPORAL_RESTRICTION: 0
OS_INFERIOR_TEMPORAL_RESTRICTION: 0
OD_SUPERIOR_TEMPORAL_RESTRICTION: 0
OD_NORMAL: 1
OD_SUPERIOR_NASAL_RESTRICTION: 0
OS_SUPERIOR_TEMPORAL_RESTRICTION: 0
OS_NORMAL: 1
OS_INFERIOR_NASAL_RESTRICTION: 0

## 2024-09-30 ASSESSMENT — EXTERNAL EXAM - RIGHT EYE: OD_EXAM: NORMAL

## 2024-09-30 ASSESSMENT — CUP TO DISC RATIO
OS_RATIO: 0.7
OD_RATIO: 0.7

## 2024-09-30 ASSESSMENT — SLIT LAMP EXAM - LIDS
COMMENTS: 1+ PTOSIS, 1+ SCLERAL SHOW
COMMENTS: 1+ PTOSIS, 1+ SCLERAL SHOW

## 2024-09-30 ASSESSMENT — EXTERNAL EXAM - LEFT EYE: OS_EXAM: NORMAL

## 2024-09-30 NOTE — PROGRESS NOTES
Current Eye Medications:  none.      Subjective:  Patient is here for a Diabetic Eye Exam.  No vision changes or concerns.   Lab Results   Component Value Date    A1C 6.2 05/02/2024    A1C 5.8 12/08/2023    A1C 6.3 06/02/2023    A1C 5.9 12/02/2022    A1C 5.8 08/19/2022    A1C 5.6 06/22/2021    A1C 5.8 11/12/2020    A1C 5.6 07/23/2020    A1C 6.1 03/09/2020    A1C 6.5 11/11/2019          Objective:  See Ophthalmology Exam.       Assessment:      Plan:   See Patient Instructions.

## 2024-09-30 NOTE — Clinical Note
9/30/2024      William Montes  [address]  [facility] [address]      Dear Colleague,    Thank you for referring your patient, William Montes, to the [facility]. Please see a copy of my visit note below.    No notes on file    Again, thank you for allowing me to participate in the care of your patient.        Sincerely,        Harry Hodgson MD

## 2024-09-30 NOTE — PATIENT INSTRUCTIONS
"Glasses prescription given - optional    May use artificial tears up to four times a day (like Refresh Optive, Systane Balance, or TheraTears. Avoid \"get the red out\" drops and generic artifical tears).     Call in May 2025 for an appointment in September 2025 for Complete Exam    Dr. Hodgson [phone]      Patient Education   Diabetes weakens the blood vessels all over the body, including the eyes. Damage to the blood vessels in the eyes can cause swelling or bleeding into part of the eye (called the retina). This is called diabetic retinopathy (GERALDINE-tin--pu-thee). If not treated, this disease can cause vision loss or blindness.   Symptoms may include blurred or distorted vision, but many people have no symptoms. It's important to see your eye doctor regularly to check for problems.   Early treatment and good control can help protect your vision. Here are the things you can do to help prevent vision loss:      1. Keep your blood sugar levels under tight control.      2. Bring high blood pressure under control.      3. No smoking.      4. Have yearly dilated eye exams.     "

## 2024-11-05 ENCOUNTER — TELEPHONE (OUTPATIENT)
Dept: GASTROENTEROLOGY | Facility: CLINIC | Age: 59
End: 2024-11-05
Payer: COMMERCIAL

## 2024-11-05 NOTE — TELEPHONE ENCOUNTER
Patient confirmed scheduled appointment:     Date: 2/27/25  Time: 10:30 am  Appointment Type: Return Liver  Provider: Dr. Thomas Leventhal  Location: [place]  Testing/imaging: Labs  Additional Notes:

## 2024-11-07 ENCOUNTER — OFFICE VISIT (OUTPATIENT)
Dept: FAMILY MEDICINE | Facility: CLINIC | Age: 59
End: 2024-11-07
Payer: COMMERCIAL

## 2024-11-07 VITALS
BODY MASS INDEX: 30.45 KG/M2 | DIASTOLIC BLOOD PRESSURE: 80 MMHG | TEMPERATURE: 98.1 F | RESPIRATION RATE: 16 BRPM | HEART RATE: 65 BPM | HEIGHT: 72 IN | OXYGEN SATURATION: 99 % | WEIGHT: 224.8 LBS | SYSTOLIC BLOOD PRESSURE: 128 MMHG

## 2024-11-07 DIAGNOSIS — Z12.5 SCREENING PSA (PROSTATE SPECIFIC ANTIGEN): ICD-10-CM

## 2024-11-07 DIAGNOSIS — E55.9 VITAMIN D DEFICIENCY: ICD-10-CM

## 2024-11-07 DIAGNOSIS — E11.42 DIABETIC POLYNEUROPATHY ASSOCIATED WITH TYPE 2 DIABETES MELLITUS (H): ICD-10-CM

## 2024-11-07 DIAGNOSIS — L98.9 SKIN LESION: ICD-10-CM

## 2024-11-07 DIAGNOSIS — Z00.00 ROUTINE GENERAL MEDICAL EXAMINATION AT A HEALTH CARE FACILITY: Primary | ICD-10-CM

## 2024-11-07 DIAGNOSIS — E11.8 TYPE 2 DIABETES MELLITUS WITH COMPLICATION, WITHOUT LONG-TERM CURRENT USE OF INSULIN (H): ICD-10-CM

## 2024-11-07 DIAGNOSIS — E11.3299 BACKGROUND DIABETIC RETINOPATHY (H): ICD-10-CM

## 2024-11-07 DIAGNOSIS — M54.50 CHRONIC BILATERAL LOW BACK PAIN WITHOUT SCIATICA: ICD-10-CM

## 2024-11-07 DIAGNOSIS — R79.89 ELEVATED LFTS: ICD-10-CM

## 2024-11-07 DIAGNOSIS — G89.29 CHRONIC BILATERAL LOW BACK PAIN WITHOUT SCIATICA: ICD-10-CM

## 2024-11-07 DIAGNOSIS — I10 BENIGN ESSENTIAL HYPERTENSION: ICD-10-CM

## 2024-11-07 DIAGNOSIS — Z86.39 HX OF OBESITY: ICD-10-CM

## 2024-11-07 DIAGNOSIS — E78.5 HYPERLIPIDEMIA LDL GOAL <100: ICD-10-CM

## 2024-11-07 LAB
ALBUMIN SERPL BCG-MCNC: 4.6 G/DL (ref 3.5–5.2)
ALP SERPL-CCNC: 91 U/L (ref 40–150)
ALT SERPL W P-5'-P-CCNC: 37 U/L (ref 0–70)
ANION GAP SERPL CALCULATED.3IONS-SCNC: 12 MMOL/L (ref 7–15)
AST SERPL W P-5'-P-CCNC: 28 U/L (ref 0–45)
BILIRUB DIRECT SERPL-MCNC: <0.2 MG/DL (ref 0–0.3)
BILIRUB SERPL-MCNC: 0.3 MG/DL
BUN SERPL-MCNC: 17.4 MG/DL (ref 8–23)
CALCIUM SERPL-MCNC: 9.1 MG/DL (ref 8.8–10.4)
CHLORIDE SERPL-SCNC: 108 MMOL/L (ref 98–107)
CHOLEST SERPL-MCNC: 133 MG/DL
CREAT SERPL-MCNC: 1.66 MG/DL (ref 0.67–1.17)
EGFRCR SERPLBLD CKD-EPI 2021: 47 ML/MIN/1.73M2
EST. AVERAGE GLUCOSE BLD GHB EST-MCNC: 120 MG/DL
GLUCOSE SERPL-MCNC: 94 MG/DL (ref 70–99)
HBA1C MFR BLD: 5.8 % (ref 0–5.6)
HCO3 SERPL-SCNC: 18 MMOL/L (ref 22–29)
HDLC SERPL-MCNC: 31 MG/DL
HOLD SPECIMEN: NORMAL
LDLC SERPL CALC-MCNC: 81 MG/DL
NONHDLC SERPL-MCNC: 102 MG/DL
POTASSIUM SERPL-SCNC: 4.2 MMOL/L (ref 3.4–5.3)
PROT SERPL-MCNC: 6.8 G/DL (ref 6.4–8.3)
PSA SERPL DL<=0.01 NG/ML-MCNC: 0.79 NG/ML (ref 0–4.5)
SODIUM SERPL-SCNC: 138 MMOL/L (ref 135–145)
TRIGL SERPL-MCNC: 103 MG/DL
VIT D+METAB SERPL-MCNC: 42 NG/ML (ref 20–50)

## 2024-11-07 PROCEDURE — 82248 BILIRUBIN DIRECT: CPT | Performed by: FAMILY MEDICINE

## 2024-11-07 PROCEDURE — 99396 PREV VISIT EST AGE 40-64: CPT | Mod: 25 | Performed by: FAMILY MEDICINE

## 2024-11-07 PROCEDURE — 36415 COLL VENOUS BLD VENIPUNCTURE: CPT | Performed by: FAMILY MEDICINE

## 2024-11-07 PROCEDURE — 82306 VITAMIN D 25 HYDROXY: CPT | Performed by: FAMILY MEDICINE

## 2024-11-07 PROCEDURE — 90471 IMMUNIZATION ADMIN: CPT | Performed by: FAMILY MEDICINE

## 2024-11-07 PROCEDURE — 83036 HEMOGLOBIN GLYCOSYLATED A1C: CPT | Performed by: FAMILY MEDICINE

## 2024-11-07 PROCEDURE — 90673 RIV3 VACCINE NO PRESERV IM: CPT | Performed by: FAMILY MEDICINE

## 2024-11-07 PROCEDURE — 80053 COMPREHEN METABOLIC PANEL: CPT | Performed by: FAMILY MEDICINE

## 2024-11-07 PROCEDURE — 80061 LIPID PANEL: CPT | Performed by: FAMILY MEDICINE

## 2024-11-07 PROCEDURE — 91320 SARSCV2 VAC 30MCG TRS-SUC IM: CPT | Performed by: FAMILY MEDICINE

## 2024-11-07 PROCEDURE — 90480 ADMN SARSCOV2 VAC 1/ONLY CMP: CPT | Performed by: FAMILY MEDICINE

## 2024-11-07 PROCEDURE — G0103 PSA SCREENING: HCPCS | Performed by: FAMILY MEDICINE

## 2024-11-07 RX ORDER — LISINOPRIL 2.5 MG/1
2.5 TABLET ORAL DAILY
Qty: 90 TABLET | Refills: 1 | Status: SHIPPED | OUTPATIENT
Start: 2024-11-07

## 2024-11-07 RX ORDER — TOPIRAMATE 100 MG/1
100 TABLET, FILM COATED ORAL DAILY
Qty: 90 TABLET | Refills: 1 | Status: SHIPPED | OUTPATIENT
Start: 2024-11-07

## 2024-11-07 RX ORDER — ATORVASTATIN CALCIUM 20 MG/1
20 TABLET, FILM COATED ORAL DAILY
Qty: 90 TABLET | Refills: 3 | Status: SHIPPED | OUTPATIENT
Start: 2024-11-07

## 2024-11-07 RX ORDER — GABAPENTIN 300 MG/1
300 CAPSULE ORAL 2 TIMES DAILY
Qty: 180 CAPSULE | Refills: 3 | Status: SHIPPED | OUTPATIENT
Start: 2024-11-07

## 2024-11-07 SDOH — HEALTH STABILITY: PHYSICAL HEALTH: ON AVERAGE, HOW MANY MINUTES DO YOU ENGAGE IN EXERCISE AT THIS LEVEL?: 60 MIN

## 2024-11-07 SDOH — HEALTH STABILITY: PHYSICAL HEALTH: ON AVERAGE, HOW MANY DAYS PER WEEK DO YOU ENGAGE IN MODERATE TO STRENUOUS EXERCISE (LIKE A BRISK WALK)?: 2 DAYS

## 2024-11-07 ASSESSMENT — PAIN SCALES - GENERAL: PAINLEVEL_OUTOF10: NO PAIN (0)

## 2024-11-07 ASSESSMENT — SOCIAL DETERMINANTS OF HEALTH (SDOH): HOW OFTEN DO YOU GET TOGETHER WITH FRIENDS OR RELATIVES?: MORE THAN THREE TIMES A WEEK

## 2024-11-07 NOTE — PATIENT INSTRUCTIONS
Patient Education   Preventive Care Advice   This is general advice given by our system to help you stay healthy. However, your care team may have specific advice just for you. Please talk to your care team about your preventive care needs.  Nutrition  Eat 5 or more servings of fruits and vegetables each day.  Try wheat bread, brown rice and whole grain pasta (instead of white bread, rice, and pasta).  Get enough calcium and vitamin D. Check the label on foods and aim for 100% of the RDA (recommended daily allowance).  Lifestyle  Exercise at least 150 minutes each week  (30 minutes a day, 5 days a week).  Do muscle strengthening activities 2 days a week. These help control your weight and prevent disease.  No smoking.  Wear sunscreen to prevent skin cancer.  Have a dental exam and cleaning every 6 months.  Yearly exams  See your health care team every year to talk about:  Any changes in your health.  Any medicines your care team has prescribed.  Preventive care, family planning, and ways to prevent chronic diseases.  Shots (vaccines)   HPV shots (up to age 26), if you've never had them before.  Hepatitis B shots (up to age 59), if you've never had them before.  COVID-19 shot: Get this shot when it's due.  Flu shot: Get a flu shot every year.  Tetanus shot: Get a tetanus shot every 10 years.  Pneumococcal, hepatitis A, and RSV shots: Ask your care team if you need these based on your risk.  Shingles shot (for age 50 and up)  General health tests  Diabetes screening:  Starting at age 35, Get screened for diabetes at least every 3 years.  If you are younger than age 35, ask your care team if you should be screened for diabetes.  Cholesterol test: At age 39, start having a cholesterol test every 5 years, or more often if advised.  Bone density scan (DEXA): At age 50, ask your care team if you should have this scan for osteoporosis (brittle bones).  Hepatitis C: Get tested at least once in your life.  STIs (sexually  transmitted infections)  Before age 24: Ask your care team if you should be screened for STIs.  After age 24: Get screened for STIs if you're at risk. You are at risk for STIs (including HIV) if:  You are sexually active with more than one person.  You don't use condoms every time.  You or a partner was diagnosed with a sexually transmitted infection.  If you are at risk for HIV, ask about PrEP medicine to prevent HIV.  Get tested for HIV at least once in your life, whether you are at risk for HIV or not.  Cancer screening tests  Cervical cancer screening: If you have a cervix, begin getting regular cervical cancer screening tests starting at age 21.  Breast cancer scan (mammogram): If you've ever had breasts, begin having regular mammograms starting at age 40. This is a scan to check for breast cancer.  Colon cancer screening: It is important to start screening for colon cancer at age 45.  Have a colonoscopy test every 10 years (or more often if you're at risk) Or, ask your provider about stool tests like a FIT test every year or Cologuard test every 3 years.  To learn more about your testing options, visit:   .  For help making a decision, visit:   https://bit.ly/ef68759.  Prostate cancer screening test: If you have a prostate, ask your care team if a prostate cancer screening test (PSA) at age 55 is right for you.  Lung cancer screening: If you are a current or former smoker ages 50 to 80, ask your care team if ongoing lung cancer screenings are right for you.  For informational purposes only. Not to replace the advice of your health care provider. Copyright   2023 [facility]. All rights reserved. Clinically reviewed by the [facility]itions [facility]. [employer] 974155 - REV 01/24.   Learning About Risk for Heart Attack and Stroke (01:56)  Your health professional recommends that you watch this short online health video.  Learn what raises your risk for having a heart attack or stroke and  how you can lower your risk.   Purpose: Explains risk factors for heart attack and stroke and ways to lower the risk.  Goal: Users will understand what it means to be at risk for having a heart attack or stroke and what they can do to reduce their risk.    Watch: Scan the QR code or visit the link to view video       https://hwi.se/r/[email or web address]  Current as of: June 24, 2023  Content Version: 14.2 2024 [facility] [employer], [employer].   Care instructions adapted under license by your healthcare professional. If you have questions about a medical condition or this instruction, always ask your healthcare professional. [employer] disclaims any warranty or liability for your use of this information.

## 2024-11-07 NOTE — PROGRESS NOTES
Preventive Care Visit  [facility]  Sirenaloan Talley DO, Family Medicine  Nov 7, 2024      Assessment & Plan     Routine general medical examination at a health care facility      Type 2 diabetes mellitus with complication, without long-term current use of insulin (H)  The current medical regimen is effective;  continue present plan and medications.  Doing well on Mounjaro  - HEMOGLOBIN A1C; Future  - HEMOGLOBIN A1C    Background diabetic retinopathy (H)      Screening PSA (prostate specific antigen)    - Prostate Specific Antigen Screen; Future  - Prostate Specific Antigen Screen    Benign essential hypertension  The current medical regimen is effective;  continue present plan and medications.    - lisinopril (ZESTRIL) 2.5 MG tablet; Take 1 tablet (2.5 mg) by mouth daily.  - Basic metabolic panel  (Ca, Cl, CO2, Creat, Gluc, K, Na, BUN); Future  - Basic metabolic panel  (Ca, Cl, CO2, Creat, Gluc, K, Na, BUN)    Diabetic polyneuropathy associated with type 2 diabetes mellitus (H)  The current medical regimen is effective;  continue present plan and medications.    - gabapentin (NEURONTIN) 300 MG capsule; Take 1 capsule (300 mg) by mouth 2 times daily.    Hyperlipidemia LDL goal <100    - atorvastatin (LIPITOR) 20 MG tablet; Take 1 tablet (20 mg) by mouth daily.  - Lipid panel reflex to direct LDL Fasting; Future  - Lipid panel reflex to direct LDL Fasting    Hx of obesity    - topiramate (TOPAMAX) 100 MG tablet; Take 1 tablet (100 mg) by mouth daily.    Elevated LFTs  Monitor with labs today  - Hepatic panel (Albumin, ALT, AST, Bili, Alk Phos, TP); Future  - Hepatic panel (Albumin, ALT, AST, Bili, Alk Phos, TP)    Vitamin D deficiency  Monitor with labs today  - Vitamin D Deficiency; Future  - Vitamin D Deficiency    Chronic bilateral lower back pain without sciatica  Improved with physical therapy and maintaining activity at the gym      BMI  Estimated body mass index is 30.49 kg/m  as  calculated from the following:    Height as of this encounter: 1.829 m (6').    Weight as of this encounter: 102 kg (224 lb 12.8 oz).   Weight management plan: Discussed healthy diet and exercise guidelines    Counseling  Appropriate preventive services were addressed with this patient via screening, questionnaire, or discussion as appropriate for fall prevention, nutrition, physical activity, Tobacco-use cessation, social engagement, weight loss and cognition.  Checklist reviewing preventive services available has been given to the patient.  Reviewed patient's diet, addressing concerns and/or questions.   He is at risk for lack of exercise and has been provided with information to increase physical activity for the benefit of his well-being.       Follow-up in 6 months for diabetes    Mary Thomas is a 59 year old, presenting for the following:  Physical        11/7/2024     9:03 AM   Additional Questions   Roomed by Nery ROSA   Accompanied by self          HPI    He is complaining of lower back pain.  He did PT which helps some.  He is also working out more which is helping.  He had flexeril which he didn't take often due to side effects.   He has not had any imaging.  The pain is now only with prolonged standing.  There is no sciatic symptoms.       Diabetes Follow-up    How often are you checking your blood sugar? A few times a month  What time of day are you checking your blood sugars (select all that apply)?  Before meals  Have you had any blood sugars above 200?  No  Have you had any blood sugars below 70?  No  What symptoms do you notice when your blood sugar is low?  Shaky, Dizzy, and Weak  What concerns do you have today about your diabetes? None   Do you have any of these symptoms? (Select all that apply)  No numbness or tingling in feet.  No redness, sores or blisters on feet.  No complaints of excessive thirst.  No reports of blurry vision.  No significant changes to weight.    Mounjaro 7.5 mg  weekly  Lab Results   Component Value Date    A1C 5.8 11/07/2024    A1C 6.2 05/02/2024    A1C 5.8 12/08/2023    A1C 6.3 06/02/2023    A1C 5.9 12/02/2022    A1C 5.6 06/22/2021    A1C 5.8 11/12/2020    A1C 5.6 07/23/2020    A1C 6.1 03/09/2020    A1C 6.5 11/11/2019             Hyperlipidemia Follow-Up    Are you regularly taking any medication or supplement to lower your cholesterol?   Yes- atorvastatin  Are you having muscle aches or other side effects that you think could be caused by your cholesterol lowering medication?  Yes- always has aches  LDL Cholesterol Calculated   Date Value Ref Range Status   12/08/2023 68 <=100 mg/dL Final   11/12/2020 66 <100 mg/dL Final     Comment:     Desirable:       <100 mg/dl         Hypertension Follow-up    Do you check your blood pressure regularly outside of the clinic? Yes   Are you following a low salt diet? Yes  Are your blood pressures ever more than 140 on the top number (systolic) OR more   than 90 on the bottom number (diastolic), for example 140/90? No 120/70  Lisinopril 2.5 mg daily    BP Readings from Last 2 Encounters:   11/07/24 128/80   06/13/24 133/81     Hemoglobin A1C (%)   Date Value   11/07/2024 5.8 (H)   05/02/2024 6.2 (H)   06/22/2021 5.6   11/12/2020 5.8 (H)     LDL Cholesterol Calculated (mg/dL)   Date Value   12/08/2023 68   12/02/2022 70   11/12/2020 66   04/04/2019 60         Health Care Directive  Patient does not have a Health Care Directive: Discussed advance care planning with patient; however, patient declined at this time.      11/7/2024   General Health   How would you rate your overall physical health? Good   Feel stress (tense, anxious, or unable to sleep) Not at all            11/7/2024   Nutrition   Three or more servings of calcium each day? (!) I DON'T KNOW   Diet: Regular (no restrictions)   How many servings of fruit and vegetables per day? (!) 0-1   How many sweetened beverages each day? 0-1            11/7/2024   Exercise   Days per  week of moderate/strenous exercise 2 days   Average minutes spent exercising at this level 60 min      (!) EXERCISE CONCERN      11/7/2024   Social Factors   Frequency of gathering with friends or relatives More than three times a week   Worry food won't last until get money to buy more No   Food not last or not have enough money for food? No   Do you have housing? (Housing is defined as stable permanent housing and does not include staying ouside in a car, in a tent, in an abandoned building, in an overnight shelter, or couch-surfing.) Yes   Are you worried about losing your housing? No   Lack of transportation? No   Unable to get utilities (heat,electricity)? No            11/7/2024   Fall Risk   Fallen 2 or more times in the past year? No    Trouble with walking or balance? No        Patient-reported          11/7/2024   Dental   Dentist two times every year? Yes            11/7/2024   TB Screening   Were you born outside of the [place]? Yes                  11/7/2024   Substance Use   Alcohol more than 3/day or more than 7/wk No   Do you use any other substances recreationally? No        Social History     Tobacco Use    Smoking status: Never     Passive exposure: Never    Smokeless tobacco: Never   Vaping Use    Vaping status: Never Used   Substance Use Topics    Alcohol use: No     Comment: none for 11/2011    Drug use: No           11/7/2024   STI Screening   New sexual partner(s) since last STI/HIV test? No      Last PSA:   Prostate Specific Antigen Screen   Date Value Ref Range Status   06/02/2023 0.76 0.00 - 4.50 ng/mL Final   08/19/2022 0.69 0.00 - 4.00 ug/L Final     ASCVD Risk   The ASCVD Risk score (Marvin PÉREZ, et al., 2019) failed to calculate for the following reasons:    The valid total cholesterol range is 130 to 320 mg/dL           Reviewed and updated as needed this visit by Provider                          Review of Systems  Constitutional, HEENT, cardiovascular, pulmonary, gi and gu systems  are negative, except as otherwise noted.     Objective    Exam  /80 (BP Location: Right arm, Patient Position: Sitting, Cuff Size: Adult Large)   Pulse 65   Temp 98.1  F (36.7  C) (Oral)   Resp 16   Ht 1.829 m (6')   Wt 102 kg (224 lb 12.8 oz)   SpO2 99%   BMI 30.49 kg/m     Estimated body mass index is 30.49 kg/m  as calculated from the following:    Height as of this encounter: 1.829 m (6').    Weight as of this encounter: 102 kg (224 lb 12.8 oz).    Physical Exam  GENERAL: alert and no distress  EYES: Eyes grossly normal to inspection, PERRL and conjunctivae and sclerae normal  HENT: ear canals and TM's normal, nose and mouth without ulcers or lesions  NECK: no adenopathy, no asymmetry, masses, or scars  RESP: lungs clear to auscultation - no rales, rhonchi or wheezes  CV: regular rate and rhythm, normal S1 S2, no S3 or S4, no murmur, click or rub, no peripheral edema  ABDOMEN: soft, nontender, no hepatosplenomegaly, no masses and bowel sounds normal  MS: no gross musculoskeletal defects noted, no edema  SKIN: no suspicious lesions or rashes  NEURO: Normal strength and tone, mentation intact and speech normal  PSYCH: mentation appears normal, affect normal/bright        Signed Electronically by: Sirena Talley DO

## 2024-11-14 ENCOUNTER — TELEPHONE (OUTPATIENT)
Dept: NEPHROLOGY | Facility: CLINIC | Age: 59
End: 2024-11-14
Payer: COMMERCIAL

## 2024-11-14 DIAGNOSIS — N18.30 STAGE 3 CHRONIC KIDNEY DISEASE, UNSPECIFIED WHETHER STAGE 3A OR 3B CKD (H): Primary | ICD-10-CM

## 2024-11-14 NOTE — TELEPHONE ENCOUNTER
Spoke with patient to assist him in scheduling with nephrology. Patient informed that Dr Fragoso is not available until May. An return virtual visit with Remberto FERGUSON offered and patient accepted. Lab appointment also scheduled.  DONNIE Koenig Care Coordinator  Nephrology

## 2024-11-21 ENCOUNTER — LAB (OUTPATIENT)
Dept: LAB | Facility: CLINIC | Age: 59
End: 2024-11-21
Payer: COMMERCIAL

## 2024-11-21 DIAGNOSIS — N18.30 STAGE 3 CHRONIC KIDNEY DISEASE, UNSPECIFIED WHETHER STAGE 3A OR 3B CKD (H): ICD-10-CM

## 2024-11-21 LAB
ALBUMIN MFR UR ELPH: 6.9 MG/DL
ALBUMIN SERPL BCG-MCNC: 4.4 G/DL (ref 3.5–5.2)
ALBUMIN UR-MCNC: NEGATIVE MG/DL
ANION GAP SERPL CALCULATED.3IONS-SCNC: 10 MMOL/L (ref 7–15)
APPEARANCE UR: CLEAR
BACTERIA #/AREA URNS HPF: ABNORMAL /HPF
BILIRUB UR QL STRIP: NEGATIVE
BUN SERPL-MCNC: 13 MG/DL (ref 8–23)
CALCIUM SERPL-MCNC: 9.4 MG/DL (ref 8.8–10.4)
CHLORIDE SERPL-SCNC: 109 MMOL/L (ref 98–107)
COLOR UR AUTO: YELLOW
CREAT SERPL-MCNC: 1.49 MG/DL (ref 0.67–1.17)
CREAT UR-MCNC: 201 MG/DL
EGFRCR SERPLBLD CKD-EPI 2021: 53 ML/MIN/1.73M2
ERYTHROCYTE [DISTWIDTH] IN BLOOD BY AUTOMATED COUNT: 12.5 % (ref 10–15)
GLUCOSE SERPL-MCNC: 96 MG/DL (ref 70–99)
GLUCOSE UR STRIP-MCNC: NEGATIVE MG/DL
HCO3 SERPL-SCNC: 21 MMOL/L (ref 22–29)
HCT VFR BLD AUTO: 43.1 % (ref 40–53)
HGB BLD-MCNC: 14.2 G/DL (ref 13.3–17.7)
HGB UR QL STRIP: NEGATIVE
KETONES UR STRIP-MCNC: NEGATIVE MG/DL
LEUKOCYTE ESTERASE UR QL STRIP: NEGATIVE
MCH RBC QN AUTO: 29.3 PG (ref 26.5–33)
MCHC RBC AUTO-ENTMCNC: 32.9 G/DL (ref 31.5–36.5)
MCV RBC AUTO: 89 FL (ref 78–100)
NITRATE UR QL: NEGATIVE
PH UR STRIP: 6.5 [PH] (ref 5–8)
PHOSPHATE SERPL-MCNC: 3.1 MG/DL (ref 2.5–4.5)
PLATELET # BLD AUTO: 161 10E3/UL (ref 150–450)
POTASSIUM SERPL-SCNC: 4.2 MMOL/L (ref 3.4–5.3)
PROT/CREAT 24H UR: 0.03 MG/MG CR (ref 0–0.2)
RBC # BLD AUTO: 4.85 10E6/UL (ref 4.4–5.9)
RBC #/AREA URNS AUTO: ABNORMAL /HPF
SODIUM SERPL-SCNC: 140 MMOL/L (ref 135–145)
SP GR UR STRIP: 1.02 (ref 1–1.03)
SQUAMOUS #/AREA URNS AUTO: ABNORMAL /LPF
UROBILINOGEN UR STRIP-ACNC: 0.2 E.U./DL
WBC # BLD AUTO: 4.1 10E3/UL (ref 4–11)
WBC #/AREA URNS AUTO: ABNORMAL /HPF

## 2024-11-25 ENCOUNTER — VIRTUAL VISIT (OUTPATIENT)
Dept: NEPHROLOGY | Facility: CLINIC | Age: 59
End: 2024-11-25
Attending: PHYSICIAN ASSISTANT
Payer: COMMERCIAL

## 2024-11-25 DIAGNOSIS — R79.89 ELEVATED SERUM CREATININE: Primary | ICD-10-CM

## 2024-11-25 DIAGNOSIS — E87.20 METABOLIC ACIDOSIS: ICD-10-CM

## 2024-11-25 DIAGNOSIS — E11.8 TYPE 2 DIABETES MELLITUS WITH COMPLICATION, WITHOUT LONG-TERM CURRENT USE OF INSULIN (H): ICD-10-CM

## 2024-11-25 PROCEDURE — 99214 OFFICE O/P EST MOD 30 MIN: CPT | Mod: 95 | Performed by: PHYSICIAN ASSISTANT

## 2024-11-25 NOTE — NURSING NOTE
Current patient location: Patient declined to provide     Is the patient currently in the state of [place]? YES    Visit mode:VIDEO    If the visit is dropped, the patient can be reconnected by:VIDEO VISIT: Text to cell phone:   Telephone Information:   Mobile [phone]       Will anyone else be joining the visit? NO  (If patient encounters technical issues they should call [phone]    Are changes needed to the allergy or medication list? No    Are refills needed on medications prescribed by this physician? NO    Rooming Documentation:  Not applicable    Reason for visit: RECHECK    Tamanna CORDERO

## 2024-11-25 NOTE — LETTER
11/25/2024       RE: William Montes  [address]  [facility] [address]     Dear Colleague,    Thank you for referring your patient, William Montes, to the [facility] [place] at [facility]. Please see a copy of my visit note below.    Virtual Visit Details    Type of service:  Video Visit   Start time: 9:36 am  End time: 9:55 am    Originating Location (pt. Location): Home    Distant Location (provider location):  On-site  Platform used for Video Visit: [facility]      Assessment and Plan:  59 year old male with history of diabetes mellitus since 1999, mild/ borderline hypertension, and mild retinopathy, fatty liver, overweight,  who presents for followup of elevated creatinine. His Scr is 1.3-1.4 but cystatin C was 0.77 with eGFR >90 in 2019.  His Scr recently is 1.4     #Elevated creatinine/ normal GFR by cystatin - his Scr going back many years was 1.1-1.2, but is up to 1.3-1.4 range in recent years in setting of being on lisinopril. He has 20+ year history of diabetes which is well controlled, no retinopathy and no hypertension.  - lisinopril may be elevating creatinine to some extent, but did not change significantly off/ on lower dose lisinopril  - cystatin C was 0.9 (12/8/2023) with well preserved GFR 89- significant discordance between creatinine and cystatin based GFR  - Scr 1.49, eGFR 53  - UPCR 0.03 mg/mg;  UA normal   - can consider iothalamate but for now will consider him normal GFR/ not CKD  - reassured him today and will do another cystatin C.   - continue lisinopril given he is a diabetic, as tolerated, for /80 - 130/80- he is on 2.5mg and BP is 110 range, thus will not increase it, given no proteinuria at this time   -US kidney- normal  - topamax- discussed small risk of kidney stones, does not have symptoms, discussed with patient      - causing metabolic acidosis due to CA inhibitor action     # Hypertension: mild /  borderline hypertension, controlled, on lisinopril 2.5 mg at this time  - continue lisinopril    # Electrolytes:   - Potassium; level 4.2;  Na 140, Bicarb 21  - low bicarbonate- suspect metabolic acidosis due to carbonic anhydrase inhibition by topamax- he will do high Fruit and vegetable diet    #No anemia  - Hgb 14.2    # Mineral Bone Disorder:   - Calcium; level is:  Normal phos, Vit D 25, hydroxy 42    #DM  - taking mounjaro  - HgbA1c 5.8     Assessment and plan was discussed with patient and he voiced his understanding and agreement.    #Disposition:     HPI:  He is a very pleasant male (originally from [facility]), who presents for followup of elevated creatinine.  His cystatin C was 0.7 with eGFR >90 at time of creatinine of 1.3-1.4 thus creatinine is underestimating his kidney function. This was discussed with the patient at prior visit and again today.  Given he is at risk, however, I would like to continue lisinopril and continue to monitor renal function and check cystatin C yearly to compare.  He has had diabetes since 1999 which has been well controlled  He is wondering if he can stop lisinopril, discussed risk vs benefit. Will decide once we have repeated cystatin C.     Review of labs show creatinine of 1-1.2 for many years, but has been up to 1.3-1.4s in the last couple of years. His albuminuria has always been normal except for one incidence of mild elevation.  He does not have high blood pressure but was started on lisinopril a few years back for renal protection.  He denies any skin or joint issues. He does not take NSAIDs or other OTC.s  He has fatty liver and has been seen in hepatology for this. He lost 40 lbs once he was diagnosed with this but has gained back a few pounds since covid. Now losing weight again, ~ 10 lbs so far.  He is otherwise fairly healthy and denies issues.  We discussed normal kidney function, creatinine measurement and indication of kidney function, muscle mass impact, and  the factors that help preserve kidney function and avoid DERRICK.  He denies family history of renal failure.     He has less appetite with mounjaro, no n/v/d. He denies LE swelling. He has no dizziness or lightheadedness.     Renal History:   DM    ROS:   A comprehensive review of systems was obtained and negative, except as noted in the HPI or PMH.    Active Medical Problems:  Patient Active Problem List   Diagnosis     Chronic gingivitis     Esophageal reflux     Hyperlipidemia LDL goal <100     Vitamin D deficiency     Elevated LFTs     Other chronic nonalcoholic liver disease     Glaucoma suspect, bilateral     Type 2 diabetes mellitus with complication, without long-term current use of insulin (H)     Background diabetic retinopathy, mild, ou     Hx of obesity     Arthritis of both glenohumeral joints     Benign essential hypertension     Stage 3 chronic kidney disease, unspecified whether stage 3a or 3b CKD (H)     PMH:   Medical record was reviewed and PMH was discussed with patient and noted below.  Past Medical History:   Diagnosis Date     ABNORMAL LIVER FUNCTION STUDY 01/04/2008    increased ast, alt Normal since 2008     Arthritis of both glenohumeral joints 02/18/2022     Background diabetic retinopathy, mild, ou 04/01/2018     Benign essential hypertension 12/02/2022     Chronic gingivitis      Esophageal reflux      Glaucoma      History of blood transfusion     My son has aplastic anemia and has had bld transfusions     Nonspecific abnormal results of liver function study      OBESITY NOS 12/13/2006     Pure hypercholesterolemia      Type II or unspecified type diabetes mellitus without mention of complication, not stated as uncontrolled      Uncomplicated asthma     My mother has asthma     PSH:   Past Surgical History:   Procedure Laterality Date     BIOPSY      My son had bone marrow biopsy     COLONOSCOPY N/A 5/22/2019    Procedure: COLONOSCOPY;  Surgeon: Leventhal, Thomas Michael, MD;  Location:   OR     ENT SURGERY      My son has had tympanoplasty     NO HISTORY OF SURGERY       ORTHOPEDIC SURGERY      My mother had left hip replacement surgery       Family Hx:   Family History   Problem Relation Age of Onset     Hypertension Mother      Diabetes Sister      Diabetes Cousin      Hypertension Other      C.A.D. No family hx of      Cancer - colorectal No family hx of      Glaucoma No family hx of      Macular Degeneration No family hx of      Cancer No family hx of      Cerebrovascular Disease No family hx of      Breast Cancer No family hx of      Prostate Cancer No family hx of      Thyroid Disease No family hx of      Personal Hx:   Social History     Tobacco Use     Smoking status: Never     Passive exposure: Never     Smokeless tobacco: Never   Substance Use Topics     Alcohol use: No     Comment: none for 11/2011       Allergies:  No Known Allergies    Medications:  Current Outpatient Medications   Medication Sig Dispense Refill     acetaminophen (TYLENOL) 325 MG tablet Take 325-650 mg by mouth every 6 hours as needed for mild pain or fever       ASPIRIN 81 MG OR TABS 1 tab po QD (Once per day) 100 3     atorvastatin (LIPITOR) 20 MG tablet Take 1 tablet (20 mg) by mouth daily. 90 tablet 3     cholecalciferol (VITAMIN  -D) 1000 UNITS capsule Take 1 capsule by mouth daily       cyclobenzaprine (FLEXERIL) 10 MG tablet Take 1 tablet (10 mg) by mouth 3 times daily as needed for muscle spasms 30 tablet 1     diclofenac (VOLTAREN) 1 % topical gel Apply 4 grams to shoulders four times daily using enclosed dosing card. 100 g 1     gabapentin (NEURONTIN) 300 MG capsule Take 1 capsule (300 mg) by mouth 2 times daily. 180 capsule 3     lisinopril (ZESTRIL) 2.5 MG tablet Take 1 tablet (2.5 mg) by mouth daily. 90 tablet 1     melatonin 3 MG tablet Take 6 mg by mouth nightly as needed for sleep       omeprazole (PRILOSEC) 20 MG DR capsule Take 20 mg by mouth daily       tirzepatide (MOUNJARO) 7.5 MG/0.5ML pen Inject  7.5 mg subcutaneously every 7 days 2 mL 3     topiramate (TOPAMAX) 100 MG tablet Take 1 tablet (100 mg) by mouth daily. 90 tablet 1     No current facility-administered medications for this visit.      Vitals:  There were no vitals taken for this visit.    GENERAL: Healthy, alert and no distress    LABS:   CMP  Recent Labs   Lab Test 11/21/24  0819 11/07/24  0855 05/02/24  0845 12/08/23  0807 07/27/21  0935 06/22/21  1420 02/09/21  1529 01/04/21  1235 11/12/20  1158    138 138 139   < > 135 135 138 138   POTASSIUM 4.2 4.2 4.1 4.2   < > 4.1 4.1 4.2 4.0   CHLORIDE 109* 108* 108* 109*   < > 106 108 110* 110*   CO2 21* 18* 21* 19*   < > 23 23 22 21   ANIONGAP 10 12 9 11   < > 6 4 6 7   GLC 96 94 117* 113*   < > 91 71 91 82   BUN 13.0 17.4 14.5 15.5   < > 13 14 17 14   CR 1.49* 1.66* 1.48* 1.46*   < > 1.47* 1.40* 1.60* 1.40*   GFRESTIMATED 53* 47* 54* 55*   < > 52* 55* 47* 55*   GFRESTBLACK  --   --   --   --   --  60* 64 54* 64   SHANA 9.4 9.1 9.0 9.2   < > 8.7 9.0 8.8 8.6    < > = values in this interval not displayed.     Recent Labs   Lab Test 11/07/24  0855 06/02/23  0947 07/01/22  0945 12/13/21  1252   BILITOTAL 0.3 0.3 0.2 0.5   ALKPHOS 91 69 76 77   ALT 37 28 26 33   AST 28 25 24 21     CBC  Recent Labs   Lab Test 11/21/24  0819 05/02/24  0845 06/02/23  0947 01/23/23  0818 07/27/21  0935 07/27/21  0935 01/04/21  1235   HGB 14.2 14.4 14.5 15.0   < > 14.9 15.8   WBC 4.1  --  3.6*  --   --  3.4* 2.9*   RBC 4.85  --  4.97  --   --  5.08 5.40   HCT 43.1  --  43.7  --   --  44.6 47.8   MCV 89  --  88  --   --  88 89   MCH 29.3  --  29.2  --   --  29.3 29.3   MCHC 32.9  --  33.2  --   --  33.4 33.1   RDW 12.5  --  11.8  --   --  12.9 13.0     --  145*  --   --  159 130*    < > = values in this interval not displayed.     URINE STUDIES  Recent Labs   Lab Test 11/21/24  0819 12/08/23  0913 07/27/21  0935 02/09/21  1538   COLOR Yellow Yellow Yellow Yellow   APPEARANCE Clear Clear Clear Clear   URINEGLC Negative  Negative Negative Negative   URINEBILI Negative Negative Negative Negative   URINEKETONE Negative Negative Negative Negative   SG 1.020 1.020 1.025 1.020   UBLD Negative Negative Negative Negative   URINEPH 6.5 6.5 5.0 7.0   PROTEIN Negative Negative Negative Negative   UROBILINOGEN 0.2 0.2 0.2 0.2   NITRITE Negative Negative Negative Negative   LEUKEST Negative Negative Negative Negative   RBCU 0-2 None Seen 0-2 O - 2   WBCU None Seen None Seen 0-5 0 - 5     Recent Labs   Lab Test 07/27/21  0935   UTPG 0.05     PTH  Recent Labs   Lab Test 01/23/23  0818 06/22/21  1422 11/07/18  0939   PTHI 34 30 59     IRON STUDIES  Recent Labs   Lab Test 06/02/23  0947   LISET 124         Kike Sr PA-C    Visit length 19 minutes. An additional 15 minutes were spent on date of service in chart review, documentation, and other activities as noted.       Again, thank you for allowing me to participate in the care of your patient.      Sincerely,    KIKE GRIMES PA-C

## 2024-11-25 NOTE — PROGRESS NOTES
Virtual Visit Details    Type of service:  Video Visit   Start time: 9:36 am  End time: 9:55 am    Originating Location (pt. Location): Home    Distant Location (provider location):  On-site  Platform used for Video Visit: Ronna      Assessment and Plan:  59 year old male with history of diabetes mellitus since 1999, mild/ borderline hypertension, and mild retinopathy, fatty liver, overweight,  who presents for followup of elevated creatinine. His Scr is 1.3-1.4 but cystatin C was 0.77 with eGFR >90 in 2019.  His Scr recently is 1.4     #Elevated creatinine/ normal GFR by cystatin - his Scr going back many years was 1.1-1.2, but is up to 1.3-1.4 range in recent years in setting of being on lisinopril. He has 20+ year history of diabetes which is well controlled, no retinopathy and no hypertension.  - lisinopril may be elevating creatinine to some extent, but did not change significantly off/ on lower dose lisinopril  - cystatin C was 0.9 (12/8/2023) with well preserved GFR 89- significant discordance between creatinine and cystatin based GFR  - Scr 1.49, eGFR 53  - UPCR 0.03 mg/mg;  UA normal   - can consider iothalamate but for now will consider him normal GFR/ not CKD  - reassured him today and will do another cystatin C.   - continue lisinopril given he is a diabetic, as tolerated, for /80 - 130/80- he is on 2.5mg and BP is 110 range, thus will not increase it, given no proteinuria at this time   -US kidney- normal  - topamax- discussed small risk of kidney stones, does not have symptoms, discussed with patient      - causing metabolic acidosis due to CA inhibitor action     # Hypertension: mild / borderline hypertension, controlled, on lisinopril 2.5 mg at this time  - continue lisinopril    # Electrolytes:   - Potassium; level 4.2;  Na 140, Bicarb 21  - low bicarbonate- suspect metabolic acidosis due to carbonic anhydrase inhibition by topamax- he will do high Fruit and vegetable diet    #No anemia  -  Hgb 14.2    # Mineral Bone Disorder:   - Calcium; level is:  Normal phos, Vit D 25, hydroxy 42    #DM  - taking mounjaro  - HgbA1c 5.8     Assessment and plan was discussed with patient and he voiced his understanding and agreement.    #Disposition:     HPI:  He is a very pleasant male (originally from [facility]), who presents for followup of elevated creatinine.  His cystatin C was 0.7 with eGFR >90 at time of creatinine of 1.3-1.4 thus creatinine is underestimating his kidney function. This was discussed with the patient at prior visit and again today.  Given he is at risk, however, I would like to continue lisinopril and continue to monitor renal function and check cystatin C yearly to compare.  He has had diabetes since 1999 which has been well controlled  He is wondering if he can stop lisinopril, discussed risk vs benefit. Will decide once we have repeated cystatin C.     Review of labs show creatinine of 1-1.2 for many years, but has been up to 1.3-1.4s in the last couple of years. His albuminuria has always been normal except for one incidence of mild elevation.  He does not have high blood pressure but was started on lisinopril a few years back for renal protection.  He denies any skin or joint issues. He does not take NSAIDs or other OTC.s  He has fatty liver and has been seen in hepatology for this. He lost 40 lbs once he was diagnosed with this but has gained back a few pounds since covid. Now losing weight again, ~ 10 lbs so far.  He is otherwise fairly healthy and denies issues.  We discussed normal kidney function, creatinine measurement and indication of kidney function, muscle mass impact, and the factors that help preserve kidney function and avoid DERRICK.  He denies family history of renal failure.     He has less appetite with mounjaro, no n/v/d. He denies LE swelling. He has no dizziness or lightheadedness.     Renal History:   DM    ROS:   A comprehensive review of systems was obtained and negative,  except as noted in the HPI or PMH.    Active Medical Problems:  Patient Active Problem List   Diagnosis    Chronic gingivitis    Esophageal reflux    Hyperlipidemia LDL goal <100    Vitamin D deficiency    Elevated LFTs    Other chronic nonalcoholic liver disease    Glaucoma suspect, bilateral    Type 2 diabetes mellitus with complication, without long-term current use of insulin (H)    Background diabetic retinopathy, mild, ou    Hx of obesity    Arthritis of both glenohumeral joints    Benign essential hypertension    Stage 3 chronic kidney disease, unspecified whether stage 3a or 3b CKD (H)     PMH:   Medical record was reviewed and PMH was discussed with patient and noted below.  Past Medical History:   Diagnosis Date    ABNORMAL LIVER FUNCTION STUDY 01/04/2008    increased ast, alt Normal since 2008    Arthritis of both glenohumeral joints 02/18/2022    Background diabetic retinopathy, mild, ou 04/01/2018    Benign essential hypertension 12/02/2022    Chronic gingivitis     Esophageal reflux     Glaucoma     History of blood transfusion     My son has aplastic anemia and has had bld transfusions    Nonspecific abnormal results of liver function study     OBESITY NOS 12/13/2006    Pure hypercholesterolemia     Type II or unspecified type diabetes mellitus without mention of complication, not stated as uncontrolled     Uncomplicated asthma     My mother has asthma     PSH:   Past Surgical History:   Procedure Laterality Date    BIOPSY      My son had bone marrow biopsy    COLONOSCOPY N/A 5/22/2019    Procedure: COLONOSCOPY;  Surgeon: Leventhal, Thomas Michael, MD;  Location: [facility] OR    ENT SURGERY      My son has had tympanoplasty    NO HISTORY OF SURGERY      ORTHOPEDIC SURGERY      My mother had left hip replacement surgery       Family Hx:   Family History   Problem Relation Age of Onset    Hypertension Mother     Diabetes Sister     Diabetes Cousin     Hypertension Other     C.A.D. No family hx of     Cancer -  colorectal No family hx of     Glaucoma No family hx of     Macular Degeneration No family hx of     Cancer No family hx of     Cerebrovascular Disease No family hx of     Breast Cancer No family hx of     Prostate Cancer No family hx of     Thyroid Disease No family hx of      Personal Hx:   Social History     Tobacco Use    Smoking status: Never     Passive exposure: Never    Smokeless tobacco: Never   Substance Use Topics    Alcohol use: No     Comment: none for 11/2011       Allergies:  No Known Allergies    Medications:  Current Outpatient Medications   Medication Sig Dispense Refill    acetaminophen (TYLENOL) 325 MG tablet Take 325-650 mg by mouth every 6 hours as needed for mild pain or fever      ASPIRIN 81 MG OR TABS 1 tab po QD (Once per day) 100 3    atorvastatin (LIPITOR) 20 MG tablet Take 1 tablet (20 mg) by mouth daily. 90 tablet 3    cholecalciferol (VITAMIN  -D) 1000 UNITS capsule Take 1 capsule by mouth daily      cyclobenzaprine (FLEXERIL) 10 MG tablet Take 1 tablet (10 mg) by mouth 3 times daily as needed for muscle spasms 30 tablet 1    diclofenac (VOLTAREN) 1 % topical gel Apply 4 grams to shoulders four times daily using enclosed dosing card. 100 g 1    gabapentin (NEURONTIN) 300 MG capsule Take 1 capsule (300 mg) by mouth 2 times daily. 180 capsule 3    lisinopril (ZESTRIL) 2.5 MG tablet Take 1 tablet (2.5 mg) by mouth daily. 90 tablet 1    melatonin 3 MG tablet Take 6 mg by mouth nightly as needed for sleep      omeprazole (PRILOSEC) 20 MG DR capsule Take 20 mg by mouth daily      tirzepatide (MOUNJARO) 7.5 MG/0.5ML pen Inject 7.5 mg subcutaneously every 7 days 2 mL 3    topiramate (TOPAMAX) 100 MG tablet Take 1 tablet (100 mg) by mouth daily. 90 tablet 1     No current facility-administered medications for this visit.      Vitals:  There were no vitals taken for this visit.    GENERAL: Healthy, alert and no distress    LABS:   [facility]  Recent Labs   Lab Test 11/21/24  0819 11/07/24  0855  05/02/24  0845 12/08/23  0807 07/27/21  0935 06/22/21  1420 02/09/21  1529 01/04/21  1235 11/12/20  1158    138 138 139   < > 135 135 138 138   POTASSIUM 4.2 4.2 4.1 4.2   < > 4.1 4.1 4.2 4.0   CHLORIDE 109* 108* 108* 109*   < > 106 108 110* 110*   CO2 21* 18* 21* 19*   < > 23 23 22 21   ANIONGAP 10 12 9 11   < > 6 4 6 7   GLC 96 94 117* 113*   < > 91 71 91 82   BUN 13.0 17.4 14.5 15.5   < > 13 14 17 14   CR 1.49* 1.66* 1.48* 1.46*   < > 1.47* 1.40* 1.60* 1.40*   GFRESTIMATED 53* 47* 54* 55*   < > 52* 55* 47* 55*   GFRESTBLACK  --   --   --   --   --  60* 64 54* 64   SHANA 9.4 9.1 9.0 9.2   < > 8.7 9.0 8.8 8.6    < > = values in this interval not displayed.     Recent Labs   Lab Test 11/07/24  0855 06/02/23  0947 07/01/22  0945 12/13/21  1252   BILITOTAL 0.3 0.3 0.2 0.5   ALKPHOS 91 69 76 77   ALT 37 28 26 33   AST 28 25 24 21     CBC  Recent Labs   Lab Test 11/21/24  0819 05/02/24  0845 06/02/23  0947 01/23/23  0818 07/27/21  0935 07/27/21  0935 01/04/21  1235   HGB 14.2 14.4 14.5 15.0   < > 14.9 15.8   WBC 4.1  --  3.6*  --   --  3.4* 2.9*   RBC 4.85  --  4.97  --   --  5.08 5.40   HCT 43.1  --  43.7  --   --  44.6 47.8   MCV 89  --  88  --   --  88 89   MCH 29.3  --  29.2  --   --  29.3 29.3   MCHC 32.9  --  33.2  --   --  33.4 33.1   RDW 12.5  --  11.8  --   --  12.9 13.0     --  145*  --   --  159 130*    < > = values in this interval not displayed.     URINE STUDIES  Recent Labs   Lab Test 11/21/24  0819 12/08/23  0913 07/27/21  0935 02/09/21  1538   COLOR Yellow Yellow Yellow Yellow   APPEARANCE Clear Clear Clear Clear   URINEGLC Negative Negative Negative Negative   URINEBILI Negative Negative Negative Negative   URINEKETONE Negative Negative Negative Negative   SG 1.020 1.020 1.025 1.020   UBLD Negative Negative Negative Negative   URINEPH 6.5 6.5 5.0 7.0   PROTEIN Negative Negative Negative Negative   UROBILINOGEN 0.2 0.2 0.2 0.2   NITRITE Negative Negative Negative Negative   LEUKEST Negative  Negative Negative Negative   RBCU 0-2 None Seen 0-2 O - 2   WBCU None Seen None Seen 0-5 0 - 5     Recent Labs   Lab Test 07/27/21  0935   [facility] 0.05     PTH  Recent Labs   Lab Test 01/23/23  0818 06/22/21  1422 11/07/18  0939   PTHI 34 30 59     IRON STUDIES  Recent Labs   Lab Test 06/02/23  0947   LISET 124         Myra Sr PA-C    Visit length 19 minutes. An additional 15 minutes were spent on date of service in chart review, documentation, and other activities as noted.

## 2024-11-25 NOTE — PATIENT INSTRUCTIONS
Check cystatin C. Will call with results/recommendations.   Avoid ibuprofen/aleve.   Check blood pressure daily, keep log.   Continue good hydration, low sodium diet   Follow up pending lab results.

## 2024-12-22 NOTE — PROGRESS NOTES
UROLOGY CLINIC NEW VISIT    Chief Complaint:  Peyronie's Disease, Erectile Dysfunction      Referring Provider:  [place] Mendoza    Subjective:     William Montes is a 59 year old male with a PMHx significant for those items listed, who is presenting to clinic today to discuss  Peyronie's Disease with ED.    Patient was having some pain with erections several months ago. First noted symptoms around 1 year ago. Started with pain 1 year ago, now with some curvature since early 2024   Feels narrowing at the base of the penis, and the glans has dorsal curvature.   Has around 15-30 degrees of curvature. Curvature, has noted that the curvature dorsally newer but stable since last 3 or more months.     Per note from [place] palpable plaque nears the ventral glans to the left of midline   Degree     Still gets erection, has decent quality. Minimal erectile dysfunction though not sexually active at this time.   Sees cardiology, works out able to run/walk on treadmill flights of stairs without symptoms. Appropriate METS    Currently the patient has no fever, chills, nausea, vomiting or other signs/symptoms suggestive of acute systemic infection.    PMH  Past Medical History:   Diagnosis Date    ABNORMAL LIVER FUNCTION STUDY 01/04/2008    increased ast, alt Normal since 2008    Arthritis of both glenohumeral joints 02/18/2022    Background diabetic retinopathy, mild, ou 04/01/2018    Benign essential hypertension 12/02/2022    Chronic gingivitis     Esophageal reflux     Glaucoma     History of blood transfusion     My son has aplastic anemia and has had bld transfusions    Nonspecific abnormal results of liver function study     OBESITY NOS 12/13/2006    Pure hypercholesterolemia     Type II or unspecified type diabetes mellitus without mention of complication, not stated as uncontrolled     Uncomplicated asthma     My mother has asthma     PSH  Past Surgical History:   Procedure Laterality Date    BIOPSY      My son had bone  "marrow biopsy    COLONOSCOPY N/A 5/22/2019    Procedure: COLONOSCOPY;  Surgeon: Leventhal, Thomas Michael, MD;  Location: [facility] OR    ENT SURGERY      My son has had tympanoplasty    NO HISTORY OF SURGERY      ORTHOPEDIC SURGERY      My mother had left hip replacement surgery     FAMHx  Family History   Problem Relation Age of Onset    Hypertension Mother     Diabetes Sister     Diabetes Cousin     Hypertension Other     C.A.D. No family hx of     Cancer - colorectal No family hx of     Glaucoma No family hx of     Macular Degeneration No family hx of     Cancer No family hx of     Cerebrovascular Disease No family hx of     Breast Cancer No family hx of     Prostate Cancer No family hx of     Thyroid Disease No family hx of      ALLERGY  No Known Allergies  MEDICATIONS  has a current medication list which includes the following prescription(s): tadalafil, acetaminophen, aspirin, atorvastatin, cholecalciferol, cyclobenzaprine, diclofenac, gabapentin, lisinopril, melatonin, omeprazole, tirzepatide, and topiramate.  ROS:  Constitutional: negative  Eyes: negative  Ears/Nose/Throat/Mouth: negative  Respiratory: negative  Cardiovascular: negative  Gastrointestinal: negative  Genito-Urinary: as documented above in HPI  Musculoskeletal: negative  Neurological: negative  Integumentary: negative      Objective:     There were no vitals taken for this visit.   General appearance: Healthy, in no apparent distress  Eyes: No conjunctival erythema or drainage.   Pulmonary: Unlabored breathing.  Abdomen: Prior scars from incisions noted: none  Skin: No rashes or ulcers visible  Neuro/Psych: Alert, oriented to person and place, appropriate affect      LABORATORY:  No results found for: \"CREATININE\"  Lab Results   Component Value Date    PSA 0.79 11/07/2024    PSA 0.76 06/02/2023    PSA 0.69 08/19/2022    PSA 0.66 08/20/2021       Most Recent  Urinalysis:  Recent Labs   Lab Test 11/21/24  0819   COLOR Yellow   APPEARANCE Clear "   URINEGLC Negative   URINEBILI Negative   URINEKETONE Negative   SG 1.020   UBLD Negative   URINEPH 6.5   PROTEIN Negative   UROBILINOGEN 0.2   NITRITE Negative   LEUKEST Negative   RBCU 0-2   WBCU None Seen         Assessment:       William Montes is a 59 year old male with a PMHx significant for those items listed, who is presenting to clinic today to discuss  Peyronie's Disease with ED.     Plan:      Peyronie's disease  Combined arterial insufficiency and corporo-venous occlusive erectile dysfunction    We reviewed the pathophysiology of Peyronie's disease and that it is essentially a disorder of scar tissue deposition. When scar tissue is laid down, it does not stretch out like normal tissue and leads to a long side and a short side of the penis during an erection. Depending on where the scar tissue is located, the curve can be upwards, downwards, left, or right. There is an active and stable phase of the disease. Usually treatments are carried out during the stable phase. We reviewed the common treatments and possible side effects including but not limited to:  Pentoxifylline: helps stabilize plaque during active phase. Side effects: GI upset/intolerance  Xiaflex: the only FDA approved medication for Peyronie's treatment. Side  effects: bruising, pain, corporal rupture  Plication: surgical treatment to help straighten out the penis by placing stitches on the side opposite of the curvature. Side effects: possible decrease in penile sensation, slight shortening of the penis, pain, infection, bleeding.  Plaque incision/excision with grafting: remove or cut the plaque and then use a patch to graft the exposed area. Side effects: worsening of erectile function, pain, infection, bleeding  Penile prosthesis with modeling: surgically implanted prosthetic device to help with erections as well as modelling to help straighten the penis. Side effects: pain, infection, bleeding, injury to the surrounding structures,  possible need for further surgery, possible anesthetic complications.    We also discussed doing a diagnostic injection with a vasoactive substance to induce an erection so we can further evaluate the curvature. An ultrasound examination to further evaluate the plaque may be carried out after injection of a vasoactive substance. As a side effect, one may experience a priapism. If an erection lasts for more than 4 hrs, you should return to the Er. Some strategies to help decrease erections include ice, ejaculation, 60 mg sudafed.    After a thorough discussion the patient would like to proceed with   - Patient now appears to be in the stable phase. Penile duplex for assessment of curvature and erectile function.   - Potentially a good candidate for Xiaflex, can discuss post ultrasound   - Daily Cialis discussed plan to start, precautions reviewed

## 2024-12-26 ENCOUNTER — VIRTUAL VISIT (OUTPATIENT)
Dept: UROLOGY | Facility: CLINIC | Age: 59
End: 2024-12-26
Payer: COMMERCIAL

## 2024-12-26 DIAGNOSIS — N48.6 PEYRONIE'S DISEASE: Primary | ICD-10-CM

## 2024-12-26 DIAGNOSIS — N52.03 COMBINED ARTERIAL INSUFFICIENCY AND CORPORO-VENOUS OCCLUSIVE ERECTILE DYSFUNCTION: ICD-10-CM

## 2024-12-26 RX ORDER — TADALAFIL 5 MG/1
5 TABLET ORAL EVERY 24 HOURS
Qty: 60 TABLET | Refills: 4 | Status: SHIPPED | OUTPATIENT
Start: 2024-12-26 | End: 2025-10-22

## 2024-12-26 NOTE — PROGRESS NOTES
Virtual Visit Details    Type of service:  Video Visit     Originating Location (pt. Location): Home    Distant Location (provider location):  Off-site  Platform used for Video Visit: Ronna

## 2024-12-26 NOTE — NURSING NOTE
Current patient location: [facility] [address]  [facility] [address]    Is the patient currently in the state of [place]? YES    Visit mode:VIDEO    If the visit is dropped, the patient can be reconnected by:VIDEO VISIT: Text to cell phone:   Telephone Information:   Mobile [phone]       Will anyone else be joining the visit? NO  (If patient encounters technical issues they should call [phone])    Are changes needed to the allergy or medication list? No and Pt stated no med changes    Are refills needed on medications prescribed by this physician? NO    Rooming Documentation:  Not applicable    Reason for visit: SONYA CORDERO

## 2024-12-27 ENCOUNTER — TELEPHONE (OUTPATIENT)
Dept: ENDOCRINOLOGY | Facility: CLINIC | Age: 59
End: 2024-12-27
Payer: COMMERCIAL

## 2024-12-30 ENCOUNTER — MYC MEDICAL ADVICE (OUTPATIENT)
Dept: ENDOCRINOLOGY | Facility: CLINIC | Age: 59
End: 2024-12-30
Payer: COMMERCIAL

## 2024-12-30 DIAGNOSIS — E11.8 TYPE 2 DIABETES MELLITUS WITH COMPLICATION, WITHOUT LONG-TERM CURRENT USE OF INSULIN (H): ICD-10-CM

## 2024-12-30 NOTE — TELEPHONE ENCOUNTER
tirzepatide (MOUNJARO) 7.5 MG/0.5ML pen   Disp-2 mL, R-3   Start: 08/19/2024 8/19/2024  [facility]    Marine Flores PA-C  Surgery    Nv: 1/9/25    Routed because:  request per pt call.med inactive. option pop up for alternatives.

## 2025-01-03 NOTE — PROGRESS NOTES
[facility] Dermatology Note  Encounter Date: Jan 29, 2025  Office Visit     Reviewed patients past medical history and pertinent chart review prior to patients visit today.     Dermatology Problem List:  Last skin check: 1/29/25    0. NUB right nasal sidewall shave biopsy performed and midline thoracic back punch bx performed 01/29/25 .    Personal hx: no hx of skin cancer  _________________________________________    Assessment & Plan:       # Neoplasm of uncertain behavior:  R nasal sidewall   DDx includes Lentigo vs Lentigo maligna. Shave biopsy today.    Procedure Note: Biopsy by shave technique  The risks and benefits of the procedure were described to the patient. These include but are not limited to bleeding, infection, scar, incomplete removal, and non-diagnostic biopsy. Verbal informed consent was obtained. The above site(s) was cleansed with an alcohol pad and injected with 1% lidocaine with epinephrine. Once anesthesia was obtained, a biopsy(ies) was performed with Gilette blade. The tissue(s) was placed in a labeled container(s) with formalin and sent to pathology. Hemostasis was achieved with aluminum chloride. Vaseline and a bandage were applied to the wound(s). The patient tolerated the procedure well and was given post biopsy care instructions.     # Neoplasm of uncertain behavior:  midline thoracic back  DDx includes cyst vs other. Punch biopsy today.    Punch biopsy:  After discussion of benefits and risks including but not limited to bleeding/bruising, pain/swelling, infection, scar, incomplete removal, nerve damage/numbness, recurrence, and non-diagnostic biopsy. verbal consent and photographs were obtained. Time-out was performed. The area was cleaned with isopropyl alcohol. 0.5mL of 1% lidocaine with epinephrine was injected to obtain adequate anesthesia of the lesion. A 4mm punch biopsy was performed.  4-0 monocryl and 5-0 vicryl rapide used to close site. White petroleum  jelly/VaselineTM and a bandage was applied to the wound.  Patient to continue with Vaseline to biopsy site once daily until sutures dissolved.  Explicit verbal and written wound care instructions were provided.  The patient left the Dermatology Clinic in good condition. The patient was counseled that sutures should dissolve on their own.      # Benign skin findings including: seborrheic keratoses, cherry angioma, lentigines and benign nevi.   - No further intervention required. Patient to report changes.   - Patient reassured of the benign nature of these lesions.    #Signs and Symptoms of non-melanoma skin cancer and ABCDEs of melanoma reviewed with patient. Patient encouraged to perform monthly self skin exams and educated on how to perform them. UV precautions reviewed with patient. Patient was asked about new or changing moles/lesions on body.     #Reviewed Sunscreen: Apply 20 minutes prior to going outdoors and reapply every two hours, when wet or sweating. We recommend using an SPF 30 or higher, and to use one that is water resistant.       Follow-up:  2 year(s) for follow up full body skin exam, prn for new or changing lesions or new concerns    Pat Ramirez PA-C  [facility]  Dermatology     ____________________________________________    CC: No chief complaint on file.    HPI:  Mr. William Montes is a(n) 59 year old male who presents today as a new patient for a full body skin cancer screening.  Patient has lesions of concern on his back.  Lesions were pointed out to him by his primary care provider.  Additionally, he has a lesion of concern on his midline thoracic back.  This lesion has been present for several months and can be very pruritic for him.  He is able to express material from it.  He is wondering what potential removal.  No other concerns.  Denies been diligent with photoprotection.    Patient is otherwise feeling well, without additional skin concerns.     Physical Exam:  Vitals:  There were no vitals taken for this visit.  SKIN: Total skin excluding the genitalia areas was performed. The exam included the head/face, neck, both arms, chest, back, abdomen, both legs, digits, mons pubis, buttock and nails.   -NUB, midline thoracic back, firm, mobile, nodule with an overlying punctum   -NUB, right nasal sidewall, asymmetric brown patch   -back, dark brown macules with reticular pigment network  -several 1-2mm red dome shaped symmetric papules scattered on the trunk  -multiple tan/brown flat round macules and raised papules scattered throughout trunk, extremities and head. No worrisome features for malignancy noted on examination.  -scattered tan, homogenous macules scattered on sun exposed areas of trunk, extremities and face.   -scattered waxy, stuck on tan/brown papules and patches on the trunk           - No other lesions of concern on areas examined.     Medications:  Current Outpatient Medications   Medication Sig Dispense Refill    acetaminophen (TYLENOL) 325 MG tablet Take 325-650 mg by mouth every 6 hours as needed for mild pain or fever      ASPIRIN 81 MG OR TABS 1 tab po QD (Once per day) 100 3    atorvastatin (LIPITOR) 20 MG tablet Take 1 tablet (20 mg) by mouth daily. 90 tablet 3    cholecalciferol (VITAMIN  -D) 1000 UNITS capsule Take 1 capsule by mouth daily      cyclobenzaprine (FLEXERIL) 10 MG tablet Take 1 tablet (10 mg) by mouth 3 times daily as needed for muscle spasms 30 tablet 1    diclofenac (VOLTAREN) 1 % topical gel Apply 4 grams to shoulders four times daily using enclosed dosing card. 100 g 1    gabapentin (NEURONTIN) 300 MG capsule Take 1 capsule (300 mg) by mouth 2 times daily. 180 capsule 3    lisinopril (ZESTRIL) 2.5 MG tablet Take 1 tablet (2.5 mg) by mouth daily. 90 tablet 1    melatonin 3 MG tablet Take 6 mg by mouth nightly as needed for sleep      omeprazole (PRILOSEC) 20 MG DR capsule Take 20 mg by mouth daily      tadalafil (CIALIS) 5 MG tablet Take 1 tablet  (5 mg) by mouth every 24 hours. 60 tablet 4    Tirzepatide 7.5 MG/0.5ML SOAJ Inject 0.5 mLs (7.5 mg) subcutaneously once a week. 6 mL 1    topiramate (TOPAMAX) 100 MG tablet Take 1 tablet (100 mg) by mouth daily. 90 tablet 1     No current facility-administered medications for this visit.      Past Medical History:   Patient Active Problem List   Diagnosis    Chronic gingivitis    Esophageal reflux    Hyperlipidemia LDL goal <100    Vitamin D deficiency    Elevated LFTs    Other chronic nonalcoholic liver disease    Glaucoma suspect, bilateral    Type 2 diabetes mellitus with complication, without long-term current use of insulin (H)    Background diabetic retinopathy, mild, ou    Hx of obesity    Arthritis of both glenohumeral joints    Benign essential hypertension    Stage 3 chronic kidney disease, unspecified whether stage 3a or 3b CKD (H)     Past Medical History:   Diagnosis Date    ABNORMAL LIVER FUNCTION STUDY 01/04/2008    increased ast, alt Normal since 2008    Arthritis of both glenohumeral joints 02/18/2022    Background diabetic retinopathy, mild, ou 04/01/2018    Benign essential hypertension 12/02/2022    Chronic gingivitis     Esophageal reflux     Glaucoma     History of blood transfusion     My son has aplastic anemia and has had bld transfusions    Nonspecific abnormal results of liver function study     OBESITY NOS 12/13/2006    Pure hypercholesterolemia     Type II or unspecified type diabetes mellitus without mention of complication, not stated as uncontrolled     Uncomplicated asthma     My mother has asthma       CC Sirena Talley DO  [address] [place] [address] on close of this encounter.

## 2025-01-07 ENCOUNTER — MYC MEDICAL ADVICE (OUTPATIENT)
Dept: FAMILY MEDICINE | Facility: CLINIC | Age: 60
End: 2025-01-07
Payer: COMMERCIAL

## 2025-01-07 NOTE — TELEPHONE ENCOUNTER
RN replied to patient via [employer]. See message for details.     Darwin Soria RN, BSN, PHN  [facility]: [place]

## 2025-01-09 ENCOUNTER — VIRTUAL VISIT (OUTPATIENT)
Dept: ENDOCRINOLOGY | Facility: CLINIC | Age: 60
End: 2025-01-09
Payer: COMMERCIAL

## 2025-01-09 VITALS — HEIGHT: 72 IN | BODY MASS INDEX: 29.53 KG/M2 | WEIGHT: 218 LBS

## 2025-01-09 DIAGNOSIS — E66.3 OVERWEIGHT (BMI 25.0-29.9): ICD-10-CM

## 2025-01-09 DIAGNOSIS — K21.9 GASTROESOPHAGEAL REFLUX DISEASE WITHOUT ESOPHAGITIS: ICD-10-CM

## 2025-01-09 DIAGNOSIS — E11.8 TYPE 2 DIABETES MELLITUS WITH COMPLICATION, WITHOUT LONG-TERM CURRENT USE OF INSULIN (H): Primary | ICD-10-CM

## 2025-01-09 DIAGNOSIS — Z86.39 HX OF OBESITY: ICD-10-CM

## 2025-01-09 ASSESSMENT — PAIN SCALES - GENERAL: PAINLEVEL_OUTOF10: NO PAIN (0)

## 2025-01-09 NOTE — LETTER
"2025       RE: William Montes  [address]  [facility] [address]     Dear Colleague,    Thank you for referring your patient, William Montes, to the [facility] [place] at [facility]. Please see a copy of my visit note below.      Return Medical Weight Management Note     William Montes  MRN:  [id number]  :  1965  NAKUL:  2025    Dear Sirena Talley DO,    I had the pleasure of seeing your patient William Montes. He is a 59 year old male who I am continuing to see for treatment of obesity related to:        10/26/2018     4:11 PM   --   I have the following health issues associated with obesity Type II Diabetes    GERD (Reflux)    Fatty Liver       Assessment & Plan  Problem List Items Addressed This Visit    None       Comprehensive weight management follow up visit    Weight loss from highest 251 to 218 today (13%)  Weight change since last visit: 12 lbs    Currently taking the following medications that can help with weight loss/weight mgmt:  Topiramate, stopped in the past due to tingling.  Had some weight regain so restarted it.  Taking 100mg now.  Tingling is not as bothersome.   Tirzepatide 7.5mg weekly    Past weight loss medication history and considerations:  Ozempic prior to Mounjaro, switch for more effectiveness    Hx of fatty liver disease. Saw hepatology for follow up 2024. With his weight regain there was concern of progression of fibrosis or cirrhosis. Fibroscan and US were reassuring and showed steatosis but no significant scarring/cirrhosis. It was recommended to continue with his lifestyle changes and weight loss.     GERD: taking famotidine but feels \"indigestion\" worse recently     Plan today:  Continue topiramate 100mg daily  Continue tirzepatide 7.5mg weekly  Restart omeprazole 20mg daily, can take famotidine at night.     Follow up plan:  Marine 6 months return MWM    INTERVAL " HISTORY:    CURRENT WEIGHT:   218 lbs 0 oz    Initial Weight (lbs): 251.2 lbs     Cumulative weight loss (lbs): 33.2  Weight Loss Percentage: 13.22%        1/9/2025    10:49 AM   Changes and Difficulties   I have made the following changes to my diet since my last visit: Eating less   With regards to my diet, I am still struggling with: I'm fine   I have made the following changes to my activity/exercise since my last visit: Exercise weekly   With regards to my activity/exercise, I am still struggling with: Finding time             1/9/2025    10:49 AM   Weight Loss Medication History Reviewed With Patient   Which weight loss medications are you currently taking on a regular basis? Topamax (topiramate)   Are you having any side effects from the weight loss medication that we have prescribed you? Yes   If you are having side effects please describe: Indigestion       Lab on 11/21/2024   Component Date Value Ref Range Status     Sodium 11/21/2024 140  135 - 145 mmol/L Final     Potassium 11/21/2024 4.2  3.4 - 5.3 mmol/L Final     Chloride 11/21/2024 109 (H)  98 - 107 mmol/L Final     Carbon Dioxide (CO2) 11/21/2024 21 (L)  22 - 29 mmol/L Final     Anion Gap 11/21/2024 10  7 - 15 mmol/L Final     Glucose 11/21/2024 96  70 - 99 mg/dL Final     Urea Nitrogen 11/21/2024 13.0  8.0 - 23.0 mg/dL Final     Creatinine 11/21/2024 1.49 (H)  0.67 - 1.17 mg/dL Final     GFR Estimate 11/21/2024 53 (L)  >60 mL/min/1.73m2 Final    eGFR calculated using 2021 CKD-EPI equation.     Calcium 11/21/2024 9.4  8.8 - 10.4 mg/dL Final    Reference intervals for this test were updated on 7/16/2024 to reflect our healthy population more accurately. There may be differences in the flagging of prior results with similar values performed with this method. Those prior results can be interpreted in the context of the updated reference intervals.     Albumin 11/21/2024 4.4  3.5 - 5.2 g/dL Final     Phosphorus 11/21/2024 3.1  2.5 - 4.5 mg/dL Final      Color Urine 11/21/2024 Yellow  Colorless, Straw, Light Yellow, Yellow Final     Appearance Urine 11/21/2024 Clear  Clear Final     Glucose Urine 11/21/2024 Negative  Negative mg/dL Final     Bilirubin Urine 11/21/2024 Negative  Negative Final     Ketones Urine 11/21/2024 Negative  Negative mg/dL Final     Specific Gravity Urine 11/21/2024 1.020  1.005 - 1.030 Final     Blood Urine 11/21/2024 Negative  Negative Final     pH Urine 11/21/2024 6.5  5.0 - 8.0 Final     Protein Albumin Urine 11/21/2024 Negative  Negative mg/dL Final     Urobilinogen Urine 11/21/2024 0.2  0.2, 1.0 E.U./dL Final     Nitrite Urine 11/21/2024 Negative  Negative Final     Leukocyte Esterase Urine 11/21/2024 Negative  Negative Final     Total Protein Urine mg/dL 11/21/2024 6.9    mg/dL Final    The reference ranges have not been established in urine protein. The results should be integrated into the clinical context for interpretation.     Total Protein Urine mg/mg Creat 11/21/2024 0.03  0.00 - 0.20 mg/mg Cr Final     Creatinine Urine mg/dL 11/21/2024 201.0  mg/dL Final    The reference ranges have not been established in urine creatinine. The results should be integrated into the clinical context for interpretation.     WBC Count 11/21/2024 4.1  4.0 - 11.0 10e3/uL Final     RBC Count 11/21/2024 4.85  4.40 - 5.90 10e6/uL Final     Hemoglobin 11/21/2024 14.2  13.3 - 17.7 g/dL Final     Hematocrit 11/21/2024 43.1  40.0 - 53.0 % Final     MCV 11/21/2024 89  78 - 100 fL Final     MCH 11/21/2024 29.3  26.5 - 33.0 pg Final     MCHC 11/21/2024 32.9  31.5 - 36.5 g/dL Final     RDW 11/21/2024 12.5  10.0 - 15.0 % Final     Platelet Count 11/21/2024 161  150 - 450 10e3/uL Final     Bacteria Urine 11/21/2024 Few (A)  None Seen /HPF Final     RBC Urine 11/21/2024 0-2  0-2 /HPF /HPF Final     WBC Urine 11/21/2024 None Seen  0-5 /HPF /HPF Final     Squamous Epithelials Urine 11/21/2024 Few (A)  None Seen /LPF Final       MEDICATIONS:   Current Outpatient  Medications   Medication Sig Dispense Refill     acetaminophen (TYLENOL) 325 MG tablet Take 325-650 mg by mouth every 6 hours as needed for mild pain or fever       ASPIRIN 81 MG OR TABS 1 tab po QD (Once per day) 100 3     atorvastatin (LIPITOR) 20 MG tablet Take 1 tablet (20 mg) by mouth daily. 90 tablet 3     cholecalciferol (VITAMIN  -D) 1000 UNITS capsule Take 1 capsule by mouth daily       cyclobenzaprine (FLEXERIL) 10 MG tablet Take 1 tablet (10 mg) by mouth 3 times daily as needed for muscle spasms 30 tablet 1     diclofenac (VOLTAREN) 1 % topical gel Apply 4 grams to shoulders four times daily using enclosed dosing card. 100 g 1     gabapentin (NEURONTIN) 300 MG capsule Take 1 capsule (300 mg) by mouth 2 times daily. 180 capsule 3     lisinopril (ZESTRIL) 2.5 MG tablet Take 1 tablet (2.5 mg) by mouth daily. 90 tablet 1     melatonin 3 MG tablet Take 6 mg by mouth nightly as needed for sleep       omeprazole (PRILOSEC) 20 MG DR capsule Take 20 mg by mouth daily       tadalafil (CIALIS) 5 MG tablet Take 1 tablet (5 mg) by mouth every 24 hours. 60 tablet 4     Tirzepatide 7.5 MG/0.5ML SOAJ Inject 0.5 mLs (7.5 mg) subcutaneously once a week. 6 mL 1     topiramate (TOPAMAX) 100 MG tablet Take 1 tablet (100 mg) by mouth daily. 90 tablet 1         PHYSICAL EXAM:  Objective   Ht 1.829 m (6')   Wt 98.9 kg (218 lb)   BMI 29.57 kg/m      Vitals - Patient Reported  Pain Score: No Pain (0)        GENERAL: alert and no distress  EYES: Eyes grossly normal to inspection.  No discharge or erythema, or obvious scleral/conjunctival abnormalities.  RESP: No audible wheeze, cough, or visible cyanosis.    SKIN: Visible skin clear. No significant rash, abnormal pigmentation or lesions.  NEURO: Cranial nerves grossly intact.  Mentation and speech appropriate for age.  PSYCH: Appropriate affect, tone, and pace of words        Sincerely,    Marine Flores PA-C      10 minutes spent by me on the date of the encounter doing  chart review, history and exam, documentation and further activities per the note    Virtual Visit Details    Type of service:  Video Visit     Originating Location (pt. Location): Home    Distant Location (provider location):  On-site  Platform used for Video Visit: [employer]    The longitudinal plan of care for the diagnosis(es)/condition(s) as documented were addressed during this visit. Due to the added complexity in care, I will continue to support William in the subsequent management and with ongoing continuity of care.      Again, thank you for allowing me to participate in the care of your patient.      Sincerely,    Marine Flores PA-C

## 2025-01-09 NOTE — NURSING NOTE
Is the patient currently in the state of [place]? YES    Visit mode:VIDEO    If the visit is dropped, the patient can be reconnected by: VIDEO VISIT: Send to e-mail at: [email or web address]    Will anyone else be joining the visit? NO  (If patient encounters technical issues they should call [phone]    How would you like to obtain your AVS? MyChart    Are changes needed to the allergy or medication list? No    Are refills needed on medications prescribed by this physician? NO    Reason for visit: SONYA CORDERO

## 2025-01-09 NOTE — PROGRESS NOTES
"  Return Medical Weight Management Note     William Montes  MRN:  [id number]  :  1965  NAKUL:  2025    Dear Sirena Talley DO,    I had the pleasure of seeing your patient William Montes. He is a 59 year old male who I am continuing to see for treatment of obesity related to:        10/26/2018     4:11 PM   --   I have the following health issues associated with obesity Type II Diabetes    GERD (Reflux)    Fatty Liver       Assessment & Plan   Problem List Items Addressed This Visit          Endocrine Diagnoses    Type 2 diabetes mellitus with complication, without long-term current use of insulin (H) - Primary    Relevant Medications    Tirzepatide 7.5 MG/0.5ML SOAJ       Other    Esophageal reflux    Relevant Medications    omeprazole (PRILOSEC) 20 MG DR capsule    Overweight (BMI 25.0-29.9)    Hx of obesity        Comprehensive weight management follow up visit    Weight loss from highest 251 to 218 today (13%)  Weight change since last visit: 12 lbs    Currently taking the following medications that can help with weight loss/weight mgmt:  Topiramate, stopped in the past due to tingling.  Had some weight regain so restarted it.  Taking 100mg now.  Tingling is not as bothersome.   Tirzepatide 7.5mg weekly    Past weight loss medication history and considerations:  Ozempic prior to Mounjaro, switch for more effectiveness    Hx of fatty liver disease. Saw hepatology for follow up 2024. With his weight regain there was concern of progression of fibrosis or cirrhosis. Fibroscan and US were reassuring and showed steatosis but no significant scarring/cirrhosis. It was recommended to continue with his lifestyle changes and weight loss.     GERD: taking famotidine but feels \"indigestion\" worse recently     Plan today:  Continue topiramate 100mg daily  Continue tirzepatide 7.5mg weekly  Restart omeprazole 20mg daily, can take famotidine at night.     Follow up plan:  [place] 6 months return MWM    INTERVAL " HISTORY:    CURRENT WEIGHT:   218 lbs 0 oz    Initial Weight (lbs): 251.2 lbs     Cumulative weight loss (lbs): 33.2  Weight Loss Percentage: 13.22%        1/9/2025    10:49 AM   Changes and Difficulties   I have made the following changes to my diet since my last visit: Eating less   With regards to my diet, I am still struggling with: I'm fine   I have made the following changes to my activity/exercise since my last visit: Exercise weekly   With regards to my activity/exercise, I am still struggling with: Finding time             1/9/2025    10:49 AM   Weight Loss Medication History Reviewed With Patient   Which weight loss medications are you currently taking on a regular basis? Topamax (topiramate)   Are you having any side effects from the weight loss medication that we have prescribed you? Yes   If you are having side effects please describe: Indigestion       Lab on 11/21/2024   Component Date Value Ref Range Status    Sodium 11/21/2024 140  135 - 145 mmol/L Final    Potassium 11/21/2024 4.2  3.4 - 5.3 mmol/L Final    Chloride 11/21/2024 109 (H)  98 - 107 mmol/L Final    Carbon Dioxide (CO2) 11/21/2024 21 (L)  22 - 29 mmol/L Final    Anion Gap 11/21/2024 10  7 - 15 mmol/L Final    Glucose 11/21/2024 96  70 - 99 mg/dL Final    Urea Nitrogen 11/21/2024 13.0  8.0 - 23.0 mg/dL Final    Creatinine 11/21/2024 1.49 (H)  0.67 - 1.17 mg/dL Final    GFR Estimate 11/21/2024 53 (L)  >60 mL/min/1.73m2 Final    eGFR calculated using 2021 CKD-EPI equation.    Calcium 11/21/2024 9.4  8.8 - 10.4 mg/dL Final    Reference intervals for this test were updated on 7/16/2024 to reflect our healthy population more accurately. There may be differences in the flagging of prior results with similar values performed with this method. Those prior results can be interpreted in the context of the updated reference intervals.    Albumin 11/21/2024 4.4  3.5 - 5.2 g/dL Final    Phosphorus 11/21/2024 3.1  2.5 - 4.5 mg/dL Final    Color Urine  11/21/2024 Yellow  Colorless, Straw, Light Yellow, Yellow Final    Appearance Urine 11/21/2024 Clear  Clear Final    Glucose Urine 11/21/2024 Negative  Negative mg/dL Final    Bilirubin Urine 11/21/2024 Negative  Negative Final    Ketones Urine 11/21/2024 Negative  Negative mg/dL Final    Specific Gravity Urine 11/21/2024 1.020  1.005 - 1.030 Final    Blood Urine 11/21/2024 Negative  Negative Final    pH Urine 11/21/2024 6.5  5.0 - 8.0 Final    Protein Albumin Urine 11/21/2024 Negative  Negative mg/dL Final    Urobilinogen Urine 11/21/2024 0.2  0.2, 1.0 E.U./dL Final    Nitrite Urine 11/21/2024 Negative  Negative Final    Leukocyte Esterase Urine 11/21/2024 Negative  Negative Final    Total Protein Urine mg/dL 11/21/2024 6.9    mg/dL Final    The reference ranges have not been established in urine protein. The results should be integrated into the clinical context for interpretation.    Total Protein Urine mg/mg Creat 11/21/2024 0.03  0.00 - 0.20 mg/mg Cr Final    Creatinine Urine mg/dL 11/21/2024 201.0  mg/dL Final    The reference ranges have not been established in urine creatinine. The results should be integrated into the clinical context for interpretation.    WBC Count 11/21/2024 4.1  4.0 - 11.0 10e3/uL Final    RBC Count 11/21/2024 4.85  4.40 - 5.90 10e6/uL Final    Hemoglobin 11/21/2024 14.2  13.3 - 17.7 g/dL Final    Hematocrit 11/21/2024 43.1  40.0 - 53.0 % Final    MCV 11/21/2024 89  78 - 100 fL Final    MCH 11/21/2024 29.3  26.5 - 33.0 pg Final    MCHC 11/21/2024 32.9  31.5 - 36.5 g/dL Final    RDW 11/21/2024 12.5  10.0 - 15.0 % Final    Platelet Count 11/21/2024 161  150 - 450 10e3/uL Final    Bacteria Urine 11/21/2024 Few (A)  None Seen /HPF Final    RBC Urine 11/21/2024 0-2  0-2 /HPF /HPF Final    WBC Urine 11/21/2024 None Seen  0-5 /HPF /HPF Final    Squamous Epithelials Urine 11/21/2024 Few (A)  None Seen /LPF Final       MEDICATIONS:   Current Outpatient Medications   Medication Sig Dispense  Refill    omeprazole (PRILOSEC) 20 MG DR capsule Take 1 capsule (20 mg) by mouth daily. 90 capsule 3    Tirzepatide 7.5 MG/0.5ML SOAJ Inject 0.5 mLs (7.5 mg) subcutaneously once a week. 6 mL 1    acetaminophen (TYLENOL) 325 MG tablet Take 325-650 mg by mouth every 6 hours as needed for mild pain or fever      ASPIRIN 81 MG OR TABS 1 tab po QD (Once per day) 100 3    atorvastatin (LIPITOR) 20 MG tablet Take 1 tablet (20 mg) by mouth daily. 90 tablet 3    cholecalciferol (VITAMIN  -D) 1000 UNITS capsule Take 1 capsule by mouth daily      cyclobenzaprine (FLEXERIL) 10 MG tablet Take 1 tablet (10 mg) by mouth 3 times daily as needed for muscle spasms 30 tablet 1    diclofenac (VOLTAREN) 1 % topical gel Apply 4 grams to shoulders four times daily using enclosed dosing card. 100 g 1    gabapentin (NEURONTIN) 300 MG capsule Take 1 capsule (300 mg) by mouth 2 times daily. 180 capsule 3    lisinopril (ZESTRIL) 2.5 MG tablet Take 1 tablet (2.5 mg) by mouth daily. 90 tablet 1    melatonin 3 MG tablet Take 6 mg by mouth nightly as needed for sleep      tadalafil (CIALIS) 5 MG tablet Take 1 tablet (5 mg) by mouth every 24 hours. 60 tablet 4    topiramate (TOPAMAX) 100 MG tablet Take 1 tablet (100 mg) by mouth daily. 90 tablet 1         PHYSICAL EXAM:  Objective    Ht 1.829 m (6')   Wt 98.9 kg (218 lb)   BMI 29.57 kg/m      Vitals - Patient Reported  Pain Score: No Pain (0)        GENERAL: alert and no distress  EYES: Eyes grossly normal to inspection.  No discharge or erythema, or obvious scleral/conjunctival abnormalities.  RESP: No audible wheeze, cough, or visible cyanosis.    SKIN: Visible skin clear. No significant rash, abnormal pigmentation or lesions.  NEURO: Cranial nerves grossly intact.  Mentation and speech appropriate for age.  PSYCH: Appropriate affect, tone, and pace of words        Sincerely,    Marine Flores PA-C      10 minutes spent by me on the date of the encounter doing chart review, history and  exam, documentation and further activities per the note    Virtual Visit Details    Type of service:  Video Visit     Originating Location (pt. Location): Home    Distant Location (provider location):  On-site  Platform used for Video Visit: Ronna    The longitudinal plan of care for the diagnosis(es)/condition(s) as documented were addressed during this visit. Due to the added complexity in care, I will continue to support William in the subsequent management and with ongoing continuity of care.

## 2025-01-13 DIAGNOSIS — N48.6 PEYRONIE'S DISEASE: Primary | ICD-10-CM

## 2025-01-17 ENCOUNTER — TELEPHONE (OUTPATIENT)
Dept: UROLOGY | Facility: CLINIC | Age: 60
End: 2025-01-17

## 2025-01-17 ENCOUNTER — ANCILLARY PROCEDURE (OUTPATIENT)
Dept: ULTRASOUND IMAGING | Facility: CLINIC | Age: 60
End: 2025-01-17
Attending: STUDENT IN AN ORGANIZED HEALTH CARE EDUCATION/TRAINING PROGRAM
Payer: COMMERCIAL

## 2025-01-17 DIAGNOSIS — N48.6 PEYRONIE'S DISEASE: ICD-10-CM

## 2025-01-17 PROCEDURE — 93980 PENILE VASCULAR STUDY: CPT | Performed by: STUDENT IN AN ORGANIZED HEALTH CARE EDUCATION/TRAINING PROGRAM

## 2025-01-21 ENCOUNTER — OFFICE VISIT (OUTPATIENT)
Dept: UROLOGY | Facility: CLINIC | Age: 60
End: 2025-01-21
Payer: COMMERCIAL

## 2025-01-21 VITALS — DIASTOLIC BLOOD PRESSURE: 76 MMHG | TEMPERATURE: 97.3 F | SYSTOLIC BLOOD PRESSURE: 158 MMHG | HEART RATE: 54 BPM

## 2025-01-21 DIAGNOSIS — N48.6 PEYRONIE'S DISEASE: Primary | ICD-10-CM

## 2025-01-21 PROCEDURE — 99214 OFFICE O/P EST MOD 30 MIN: CPT | Performed by: STUDENT IN AN ORGANIZED HEALTH CARE EDUCATION/TRAINING PROGRAM

## 2025-01-21 RX ORDER — TIRZEPATIDE 7.5 MG/.5ML
INJECTION, SOLUTION SUBCUTANEOUS
COMMUNITY
Start: 2024-10-02

## 2025-01-21 NOTE — PROGRESS NOTES
UROLOGY CLINIC NEW VISIT    Chief Complaint:  Peyronie's Disease, Erectile Dysfunction      Referring Provider:  [place] Mendoza    Subjective:     William Montes is a 59 year old male with a PMHx significant for those items listed, who is presenting to clinic today to discuss  Peyronie's Disease with ED.    Patient was having some pain with erections several months ago. First noted symptoms around 1 year ago. Started with pain 1 year ago, now with some curvature since early 2024   Feels narrowing at the base of the penis, and the glans has dorsal curvature.   Has around 15-30 degrees of curvature. Curvature, has noted that the curvature dorsally newer but stable since last 3 or more months.     Per note from [place] palpable plaque nears the ventral glans to the left of midline   Degree     Still gets erection, has decent quality. Minimal erectile dysfunction though not sexually active at this time.   Sees cardiology, works out able to run/walk on treadmill flights of stairs without symptoms. Appropriate METS    1/21/2024  From picture around 30 degree curvature distal dorsal (in MyChart).   PSA 0.79  No LUTS or other men's health issues. Curvature not that less bother now at this particular moment.     Penile Duplex   No evidence of arteriogenic ED and no evidence of venous leak.     Currently the patient has no fever, chills, nausea, vomiting or other signs/symptoms suggestive of acute systemic infection.    PMH  Past Medical History:   Diagnosis Date    ABNORMAL LIVER FUNCTION STUDY 01/04/2008    increased ast, alt Normal since 2008    Arthritis of both glenohumeral joints 02/18/2022    Background diabetic retinopathy, mild, ou 04/01/2018    Benign essential hypertension 12/02/2022    Chronic gingivitis     Esophageal reflux     Glaucoma     History of blood transfusion     My son has aplastic anemia and has had bld transfusions    Nonspecific abnormal results of liver function study     OBESITY NOS 12/13/2006     Pure hypercholesterolemia     Type II or unspecified type diabetes mellitus without mention of complication, not stated as uncontrolled     Uncomplicated asthma     My mother has asthma     PSH  Past Surgical History:   Procedure Laterality Date    BIOPSY      My son had bone marrow biopsy    COLONOSCOPY N/A 5/22/2019    Procedure: COLONOSCOPY;  Surgeon: Leventhal, Thomas Michael, MD;  Location: [facility] OR    ENT SURGERY      My son has had tympanoplasty    NO HISTORY OF SURGERY      ORTHOPEDIC SURGERY      My mother had left hip replacement surgery     FAMHx  Family History   Problem Relation Age of Onset    Hypertension Mother     Diabetes Sister     Diabetes Cousin     Hypertension Other     C.A.D. No family hx of     Cancer - colorectal No family hx of     Glaucoma No family hx of     Macular Degeneration No family hx of     Cancer No family hx of     Cerebrovascular Disease No family hx of     Breast Cancer No family hx of     Prostate Cancer No family hx of     Thyroid Disease No family hx of      ALLERGY  No Known Allergies  MEDICATIONS  has a current medication list which includes the following prescription(s): acetaminophen, aspirin, atorvastatin, cholecalciferol, cyclobenzaprine, diclofenac, gabapentin, lisinopril, melatonin, mounjaro, omeprazole, tadalafil, tirzepatide, and topiramate.  ROS:  Constitutional: negative  Eyes: negative  Ears/Nose/Throat/Mouth: negative  Respiratory: negative  Cardiovascular: negative  Gastrointestinal: negative  Genito-Urinary: as documented above in HPI  Musculoskeletal: negative  Neurological: negative  Integumentary: negative      Objective:     BP (!) 158/76 (BP Location: Left arm, Patient Position: Sitting, Cuff Size: Adult Regular)   Pulse 54   Temp 97.3  F (36.3  C) (Tympanic)    General appearance: Healthy, in no apparent distress  Eyes: No conjunctival erythema or drainage.   Pulmonary: Unlabored breathing.  Abdomen: Prior scars from incisions noted: none  Skin: No  "rashes or ulcers visible  Neuro/Psych: Alert, oriented to person and place, appropriate affect    : has dorsal plaque around 1 cm size fingerbreadth from the corona  Proximal 1 cm plaque at around the base of the penis.     LABORATORY:  No results found for: \"CREATININE\"  Lab Results   Component Value Date    PSA 0.79 2024    PSA 0.76 2023    PSA 0.69 2022    PSA 0.66 2021       Most Recent  Urinalysis:  Recent Labs   Lab Test 24  0819   COLOR Yellow   APPEARANCE Clear   URINEGLC Negative   URINEBILI Negative   URINEKETONE Negative   SG 1.020   UBLD Negative   URINEPH 6.5   PROTEIN Negative   UROBILINOGEN 0.2   NITRITE Negative   LEUKEST Negative   RBCU 0-2   WBCU None Seen         Assessment:       William Montes is a 59 year old male with a PMHx significant for those items listed, who is presenting to clinic today to discuss  Peyronie's Disease with ED.     Plan:   Peyronie's disease    Peyronie's disease    The patient and I had a long discussion about the different options for his Peyronie disease.     These include the followin.  Observation.  We discussed the likelihood of progression.  Given that his curvature has stabilized, I would quote him a relatively low risk of progression.  Overall, it is thought that a third of patients get better, a third stay the same and a third get worse.  My experience is fewer get better and more get worse or stay the same.  For active disease I recommend Nonsteroidal antiinflammatory medications.    2.  The next option would be Xiaflex.  This is the only FDA approved drug for Peyronie's disease.  This is intralesional therapy with side effects including local irritation, bruising, swelling and a 1% chance of corporeal rupture.  Investigations have demonstrated an approximate 30% curvature improvement.  Patient's must have at least a documented 30 degree curvature to qualify. Like all invasive penile treatment, there is the possibility of " long-term impact on erectile function and sensation, although rare.    3.  The next option is a penile traction device such as Restorex.  This is a mechanical traction device with limited randomized trial data to support its use.  On average mean achieved a 17 degree and 28% reduction in curvature after 3 months of use.  This can be combined with xiaflex as well with potentially greater improvements compared to either treatment alone.     4.  Final option would be surgical interventions.     For moderate curvature less than 90 degrees, without significant hourglass narrowing, and good erectile function, penile plication offers a simple, safe approach.  The principle involves placing sutures in the side opposite the curvature.  These plication sutures in the tunica albuginea slightly shorten the tunica albuginea to normal or long side of the penis.  The chance of correcting the curvature to within 15 degrees of normal is approximately 90%.  There can be slight shortening of the penis and 85% will be less than 1.5 cm. A permanent altering of penile sensation may occur in up to 10% patients. The risks a relatively limited including a small risk of infection, bruising and numbness. The risk of erectile dysfunction is between 2.5% and 21% in published series, with psychogenic sexual dysfunction occuring in a subset of men due to the significant psychological factors and effects on interpersonal relationships.  There is a very small subset of men who may have discomfort at the suture site requiring removal of the sutures.    The alternative approach is incision of the plaque and grafting.  This is a more extensive procedure which involves immobilization of the neurovascular bundle dorsally, or corpus spongiosum ventrally, an incision through the tunica albuginea at the point of scarring, and placement of a graft in to substitute for the tunica albuginea.  In my hands, I use a Tutoplast graft to patch the tunica albuginea.   This operation takes approximately 5 hours to complete, and has a considerably longer penile rehabilitation period.  It may take up to 2-3 months to fully regain sexual function.  There are also significant risks of erectile dysfunction with this approach.  Penile length can vary from 1 cm of shortening to 2 cm of gain.  Approximately 12% to 15% of patients will have complete erectile dysfunction after grafting and require a penile implant.  Another 35% of patient's will need use erectile aid such as Viagra or penile injections.  The remaining 50% will then have normal erections.  There is also risk of numbness when we are immobilizing the dorsal neurovascular bundle, approximately 15%.  In my experience, most of these resolve (5% permanent), but may take 3 to 6 months for resolution to occur.  90% of patients have a straight penis, but in the few residual patients with persistent curvature, plications may be necessary. Despite being a tunica lengthening procedure, penile shortening can still occur.    With both surgical procedures a degloving incision on the penis is often used which involves a circumferential incision proximal to the coronal sulcus with absorbable sutures.    For patients unable to obtain an erection without or with pharmacologic therapy placement of an inflatable penile implant +/- plication or grafting in order to treat his underlying organic erectile dysfunction and penile curvature is an option.  We then discussed the inflatable penile implants at greater length.  We discussed some of the different features of these including the pump, the reservoir, and the cylinders.  I explained the implantation method through a penoscrotal incision, dilation of the corpora cavernosa, measurement of the penile size, selection of appropriate implant size, placement of the reservoir, and placement of the pump.  We discussed some of the expectations about the effectiveness of the device.  It should make the  penis rigid upon demand, for an indefinite period of time.     We discussed some of the risks and benefits including the risk of infection of an implant (1-2% requiring removal of the device and salvage antibiotic treatment with replacement of a new device, the latter being successful 50-85% of time); bleeding (small risk of scrotal hematoma and swelling); mechanical failure (5% at 5 years, significantly greater by 10-15 years); penile shortening (although we will put in as long a device as the penis can accommodate); adjacent organ injury (less than 1% risk of bladder or urethral injury, each of which generally require termination of the implant procedure and return on another visit); as well as the general risks of surgery in an older man including risks of pneumonia, stroke deep venous thrombosis (DVT), and the like. I also discussed patient and partner satisfaction, and I explained the satisfaction rate from clinical trials including patient and partner satisfaction levels in the 75-90% range.    Thus, many options exist for Peyronie disease and choice depends on the severity of curvature, the absence of progressive pain or curvature, erectile function and the length of the penis.  In all cases of progressive disease, I offer nonsteroidal antiinflammatory medications and offer a traction device.  When the condition is stabilized and there is no new curvature for 6 months then xiaflex, traction, or surgery is appropriate.  Patients with severe curvature, very significant shortening, or profound hourglass narrowing or destabilizing hinge need grafting procedures or inflatable penile implant or some combination.  All other patients are potentially candidates for penile plication with its lower risk profile.  With the discussion of any type of Peyronie's surgery, a particular cosmetic result cannot be guaranteed.  These isssues were carefully discussed with the patient.      After a thorough discussion the patient  would like to proceed with   - Conservative management/watchful waiting  - He would be a good Xiaflex vs. Plication candidate in the future if he decides though for now not interested in procedures    - Noticed some improvement on Cialis. Will finish the course and if he wants a renewal will reach out to me or PCP   - Otherwise at this point he can follow up PRN if he would like to consider corrective procedures in the future as there is no oral option for the curve at this point     30 minutes spent on the date of the encounter doing (chart review/review of outside records/review of test results/interpretation of tests/reviewing imaging/patient visit/documentation/discussion with other provider(s)/discussion with family.    Junito Cano MD

## 2025-01-22 ENCOUNTER — OFFICE VISIT (OUTPATIENT)
Dept: FAMILY MEDICINE | Facility: CLINIC | Age: 60
End: 2025-01-22
Payer: COMMERCIAL

## 2025-01-22 VITALS
BODY MASS INDEX: 30.34 KG/M2 | OXYGEN SATURATION: 99 % | HEART RATE: 57 BPM | HEIGHT: 72 IN | WEIGHT: 224 LBS | RESPIRATION RATE: 16 BRPM | SYSTOLIC BLOOD PRESSURE: 135 MMHG | TEMPERATURE: 97 F | DIASTOLIC BLOOD PRESSURE: 78 MMHG

## 2025-01-22 DIAGNOSIS — Z71.84 TRAVEL ADVICE ENCOUNTER: Primary | ICD-10-CM

## 2025-01-22 PROCEDURE — 99401 PREV MED CNSL INDIV APPRX 15: CPT | Mod: GA | Performed by: NURSE PRACTITIONER

## 2025-01-22 RX ORDER — ATOVAQUONE AND PROGUANIL HYDROCHLORIDE 250; 100 MG/1; MG/1
1 TABLET, FILM COATED ORAL DAILY
Qty: 35 TABLET | Refills: 0 | Status: SHIPPED | OUTPATIENT
Start: 2025-01-22

## 2025-01-22 RX ORDER — AZITHROMYCIN 500 MG/1
500 TABLET, FILM COATED ORAL DAILY
Qty: 3 TABLET | Refills: 0 | Status: SHIPPED | OUTPATIENT
Start: 2025-01-22 | End: 2025-01-25

## 2025-01-22 ASSESSMENT — PAIN SCALES - GENERAL: PAINLEVEL_OUTOF10: NO PAIN (0)

## 2025-01-22 NOTE — PATIENT INSTRUCTIONS
Thank you for visiting the [facility] : [phone]  Today January 22, 2025 you received the    Typhoid - Oral. This prescription has been faxed to your pharmacy, please take as directed.     Follow up vaccine appointments can be made as a NURSE ONLY visit at the Travel Clinic, (BE PREPARED TO WAIT, ) or at designated [facility].    If you are receiving the Rabies vaccines series, it is important that you follow the exact schedule ordered.     Pre-travel     We recommend that you purchase Medical Evacuation Insurance prior to your departure.  Https://wwwnc.cdc.gov/travel/page/insurance    [place] your travel plans with the [place] Department of [employer] through STEP ( Smart Traveler Enrollment Program ) https://step.state.gov.  STEP is a free service to allow [place] citizens and nationals traveling and living abroad to enroll their trip with the nearest [place] Embassy or Consulate.    Animal Exposure: Avoid all mammals even if they look healthy.  If there is a bite, scratch or even a lick, wash area immediately with soap and water for 15 minutes and seek medical care within 24 hours for evaluation of Rabies post exposure treatment.  Contact your Medical Evacuation Insurance.    Repiratory illness prevention strategies ( Covid and Influenza ) CDC recommendations:  Consider wearing a mask in crowded or poorly ventilated indoor areas, including on public transportation and in transportation hubs.  Hand washing: frequent, thorough handwashing with soap and water for 20 seconds. Use an alcohol-based hand  with at least 60% alcohol if soap and water are not readily available. Avoid touching face, nose, eyes, mouth unless you have done appropriate hand washing as above.  VACCINES: Staying up to date on COVID-19 vaccines significantly lowers the risk of getting very sick, being hospitalized, or dying from COVID-19. CDC recommends that everyone stay up to date on their  COVID-19 vaccines, especially people with weakened immune systems.    Travel Covid 19 Testing:  updated 12/06/2021  International travelers: Pre-travel:  See country specific Embassy websites or airline websites.    Post travel: CDC recommends getting tested 3-5 days after your trip     Post-travel illness:  Contact your provider or [facility] if you develop a fever, rash, cough, diarrhea or other symptoms for up to 1 year after travel.  Inform your healthcare provider when and where you traveled to.    Please call the [facility] with any questions [phone]  Or send your provider a 'My Chart' note.

## 2025-01-22 NOTE — PROGRESS NOTES
"Nurse Note ( Pre-Travel Consult)    Itinerary:  [facility]    Departure Date: 2/23/25    Return Date: 3/19/25    Length of Trip 4 weeks    Reason for Travel: Visiting friends and relatives         Urban or rural: both    Accommodations: Family home        IMMUNIZATION HISTORY  Have you received any immunizations within the past 4 weeks?  No  Have you ever fainted from having your blood drawn or from an injection?  No  Have you ever had a fever reaction to vaccination?  No  Have you ever had any bad reaction or side effect from any vaccination?  No  Do you live (or work closely) with anyone who has AIDS, an AIDS-like condition, any other immune disorder or who is on chemotherapy for cancer?  No  Do you have a family history of immunodeficiency?  No  Have you received any injection of immune globulin or any blood products during the past 12 months?  No    Patient roomed by John Hernandez  William SALDANA Jyoti is a 59 year old male seen today alone for counsultation for international travel.   Patient will be departing in  1 month(s) and  traveling with sister .      Patient itinerary :  will be in the urban  region of [place]  which risk for Malaria and Yellow Fever. exposure.      Patient's activities will include visiting friends and relatives.    Patient's country of birth is [place]    Special medical concerns: HTN  Pre-travel questionnaire was completed by patient and reviewed by provider.     Vitals: /78   Pulse 57   Temp 97  F (36.1  C) (Temporal)   Resp 16   Ht 1.816 m (5' 11.5\")   Wt 101.6 kg (224 lb)   SpO2 99%   BMI 30.81 kg/m    BMI= Body mass index is 30.81 kg/m .    EXAM:  General:  Well-nourished, well-developed in no acute distress.  Appears to be stated age, interacts appropriately and expresses understanding of information given to patient.    Current Outpatient Medications   Medication Sig Dispense Refill    atovaquone-proguanil (MALARONE) 250-100 MG tablet Take 1 tablet " by mouth daily. Start 2 days before exposure to Malaria and continue daily till  7 days after exposure. 35 tablet 0    azithromycin (ZITHROMAX) 500 MG tablet Take 1 tablet (500 mg) by mouth daily for 3 doses. Take 1 tablet a day for up to 3 days for severe diarrhea 3 tablet 0    typhoid (VIVOTIF) CR capsule Take 1 capsule by mouth every other day. 4 capsule 0    acetaminophen (TYLENOL) 325 MG tablet Take 325-650 mg by mouth every 6 hours as needed for mild pain or fever      ASPIRIN 81 MG OR TABS 1 tab po QD (Once per day) 100 3    atorvastatin (LIPITOR) 20 MG tablet Take 1 tablet (20 mg) by mouth daily. 90 tablet 3    cholecalciferol (VITAMIN  -D) 1000 UNITS capsule Take 1 capsule by mouth daily      cyclobenzaprine (FLEXERIL) 10 MG tablet Take 1 tablet (10 mg) by mouth 3 times daily as needed for muscle spasms 30 tablet 1    diclofenac (VOLTAREN) 1 % topical gel Apply 4 grams to shoulders four times daily using enclosed dosing card. 100 g 1    gabapentin (NEURONTIN) 300 MG capsule Take 1 capsule (300 mg) by mouth 2 times daily. 180 capsule 3    lisinopril (ZESTRIL) 2.5 MG tablet Take 1 tablet (2.5 mg) by mouth daily. 90 tablet 1    melatonin 3 MG tablet Take 6 mg by mouth nightly as needed for sleep      MOUNJARO 7.5 MG/0.5ML SOAJ ADMINISTER 7.5 MG UNDER THE SKIN EVERY 7 DAYS      omeprazole (PRILOSEC) 20 MG DR capsule Take 1 capsule (20 mg) by mouth daily. 90 capsule 3    tadalafil (CIALIS) 5 MG tablet Take 1 tablet (5 mg) by mouth every 24 hours. 60 tablet 4    Tirzepatide 7.5 MG/0.5ML SOAJ Inject 0.5 mLs (7.5 mg) subcutaneously once a week. 6 mL 1    topiramate (TOPAMAX) 100 MG tablet Take 1 tablet (100 mg) by mouth daily. 90 tablet 1     Patient Active Problem List   Diagnosis    Chronic gingivitis    Esophageal reflux    Hyperlipidemia LDL goal <100    Vitamin D deficiency    Elevated LFTs    Other chronic nonalcoholic liver disease    Glaucoma suspect, bilateral    Type 2 diabetes mellitus with complication,  without long-term current use of insulin (H)    Background diabetic retinopathy, mild, ou    Overweight (BMI 25.0-29.9)    Hx of obesity    Arthritis of both glenohumeral joints    Benign essential hypertension    Stage 3 chronic kidney disease, unspecified whether stage 3a or 3b CKD (H)     No Known Allergies      Immunizations discussed include:   Covid 19: Up to date  Hepatitis A:  Up to date  Hepatitis B: immune  Influenza: Up to date  Typhoid: Oral Typhoid (Vivotiff) Rx sent to pharmacy  Rabies: Declined  reviewed managment of a animal bite or scratch (washing wound, seek medical care within 24 hours for post exposure prophylaxis )  Yellow Fever: Up to date  Japanese Encephalitis: Not indicated  Meningococcus: Not indicated  Tetanus/Diphtheria: Up to date  Measles/Mumps/Rubella: immune  Cholera: Not needed  Polio: Up to date  Pneumococcal: Up to date  Varicella: Immune by disease history per patient report  Shingrix: Up to date  HPV:  Not indicated   Chikunguya: Not indicated   Mpox:  Not indicated     TB: low risk    Altitude Exposure on this trip: no  Past tolerance to Altitude: na    ASSESSMENT/PLAN:  William was seen today for travel clinic.    Diagnoses and all orders for this visit:    Travel advice encounter  -     typhoid (VIVOTIF) CR capsule; Take 1 capsule by mouth every other day.  -     atovaquone-proguanil (MALARONE) 250-100 MG tablet; Take 1 tablet by mouth daily. Start 2 days before exposure to Malaria and continue daily till  7 days after exposure.  -     azithromycin (ZITHROMAX) 500 MG tablet; Take 1 tablet (500 mg) by mouth daily for 3 doses. Take 1 tablet a day for up to 3 days for severe diarrhea      I have reviewed general recommendations for safe travel   including: food/water precautions, insect precautions, safer sex   practices given high prevalence of Zika, HIV and other STDs,   roadway safety. Educational materials and [employer] report provided.    Malaraia prophylaxis recommended:  Demond  Symptomatic treatment for traveler's diarrhea: azithromycin        Evacuation insurance advised and resources were provided to patient.    Total visit time 20 minutes  with over 50% of time spent counseling patient and shared decision making as detailed above.    Katt Dolan CNP  Certificate in Travel Health

## 2025-01-24 ENCOUNTER — TELEPHONE (OUTPATIENT)
Dept: FAMILY MEDICINE | Facility: CLINIC | Age: 60
End: 2025-01-24
Payer: COMMERCIAL

## 2025-01-24 NOTE — TELEPHONE ENCOUNTER
LH,   Patient left voicemail on travel line.   Had questions about oral typhoid pricing.   Patient was told by pharmacy the prescription would be $140, but recalls a conversation between the two of you on how the prescription would be $50.   Patient is wondering where he can find the prescription that costs $50.   Please give him a call back when you can.   Thanks!  Francesca ORTEGA

## 2025-01-25 ENCOUNTER — MYC MEDICAL ADVICE (OUTPATIENT)
Dept: ENDOCRINOLOGY | Facility: CLINIC | Age: 60
End: 2025-01-25
Payer: COMMERCIAL

## 2025-01-25 DIAGNOSIS — E11.8 TYPE 2 DIABETES MELLITUS WITH COMPLICATION, WITHOUT LONG-TERM CURRENT USE OF INSULIN (H): ICD-10-CM

## 2025-01-27 DIAGNOSIS — E11.8 TYPE 2 DIABETES MELLITUS WITH COMPLICATION, WITHOUT LONG-TERM CURRENT USE OF INSULIN (H): Primary | ICD-10-CM

## 2025-01-27 RX ORDER — TIRZEPATIDE 7.5 MG/.5ML
7.5 INJECTION, SOLUTION SUBCUTANEOUS
Qty: 6 ML | Refills: 0 | Status: SHIPPED | OUTPATIENT
Start: 2025-01-27

## 2025-01-27 NOTE — TELEPHONE ENCOUNTER
I contacted patient today. He states that he was able to reach his insurance.  Rx for Oral Typhoid will be covered up to $50 as patient was told during visit.  Katt Dolan (Lori) CNP  [facility]  Certificate in Travel Health

## 2025-01-27 NOTE — TELEPHONE ENCOUNTER
Hello,    Can you confirm the patient is now using Zepbound and not Mounjaro? Mounjaro was released this morning at 9:41AM to Josh in [address]. It also states per test claim rejection Mounjaro was filled today 01/27/2025.    Test Claim Rejection for Mounjaro:      Thank You!    Roseline [facility]  [email or web address]  Phone: [phone]  Fax: [phone]

## 2025-01-29 ENCOUNTER — OFFICE VISIT (OUTPATIENT)
Dept: DERMATOLOGY | Facility: CLINIC | Age: 60
End: 2025-01-29
Attending: FAMILY MEDICINE
Payer: COMMERCIAL

## 2025-01-29 DIAGNOSIS — L82.1 SEBORRHEIC KERATOSIS: ICD-10-CM

## 2025-01-29 DIAGNOSIS — D18.01 CHERRY ANGIOMA: ICD-10-CM

## 2025-01-29 DIAGNOSIS — D48.9 NEOPLASM OF UNCERTAIN BEHAVIOR: ICD-10-CM

## 2025-01-29 DIAGNOSIS — L98.9 SKIN LESION: ICD-10-CM

## 2025-01-29 DIAGNOSIS — L82.1 SK (SEBORRHEIC KERATOSIS): Primary | ICD-10-CM

## 2025-01-29 NOTE — NURSING NOTE
William Montes's goals for this visit include:   Chief Complaint   Patient presents with    Skin Check     Patient is here for a skin check, areas of concern PCP spots on back, no HX of skin cancer        He requests these members of his care team be copied on today's visit information:     PCP: Sirena Talley    Referring Provider:  Sirena Tlaley DO  [address] [place] [address]    There were no vitals taken for this visit.    Do you need any medication refills at today's visit?         Radha Rowland EMT

## 2025-01-29 NOTE — LETTER
1/29/2025      William Montes  [address]  [facility] [address]      Dear Colleague,    Thank you for referring your patient, William Montes, to the [facility]. Please see a copy of my visit note below.    [facility] Dermatology Note  Encounter Date: Jan 29, 2025  Office Visit     Reviewed patients past medical history and pertinent chart review prior to patients visit today.     Dermatology Problem List:  Last skin check: 1/29/25    0. NUB right nasal sidewall shave biopsy performed and midline thoracic back punch bx performed 01/29/25 .    Personal hx: no hx of skin cancer  _________________________________________    Assessment & Plan:       # Neoplasm of uncertain behavior:  R nasal sidewall   DDx includes Lentigo vs Lentigo maligna. Shave biopsy today.    Procedure Note: Biopsy by shave technique  The risks and benefits of the procedure were described to the patient. These include but are not limited to bleeding, infection, scar, incomplete removal, and non-diagnostic biopsy. Verbal informed consent was obtained. The above site(s) was cleansed with an alcohol pad and injected with 1% lidocaine with epinephrine. Once anesthesia was obtained, a biopsy(ies) was performed with Gilette blade. The tissue(s) was placed in a labeled container(s) with formalin and sent to pathology. Hemostasis was achieved with aluminum chloride. Vaseline and a bandage were applied to the wound(s). The patient tolerated the procedure well and was given post biopsy care instructions.     # Neoplasm of uncertain behavior:  midline thoracic back  DDx includes cyst vs other. Punch biopsy today.    Punch biopsy:  After discussion of benefits and risks including but not limited to bleeding/bruising, pain/swelling, infection, scar, incomplete removal, nerve damage/numbness, recurrence, and non-diagnostic biopsy. verbal consent and photographs were obtained. Time-out was performed. The area was  cleaned with isopropyl alcohol. 0.5mL of 1% lidocaine with epinephrine was injected to obtain adequate anesthesia of the lesion. A 4mm punch biopsy was performed.  4-0 monocryl and 5-0 vicryl rapide used to close site. White petroleum jelly/VaselineTM and a bandage was applied to the wound.  Patient to continue with Vaseline to biopsy site once daily until sutures dissolved.  Explicit verbal and written wound care instructions were provided.  The patient left the Dermatology Clinic in good condition. The patient was counseled that sutures should dissolve on their own.      # Benign skin findings including: seborrheic keratoses, cherry angioma, lentigines and benign nevi.   - No further intervention required. Patient to report changes.   - Patient reassured of the benign nature of these lesions.    #Signs and Symptoms of non-melanoma skin cancer and ABCDEs of melanoma reviewed with patient. Patient encouraged to perform monthly self skin exams and educated on how to perform them. UV precautions reviewed with patient. Patient was asked about new or changing moles/lesions on body.     #Reviewed Sunscreen: Apply 20 minutes prior to going outdoors and reapply every two hours, when wet or sweating. We recommend using an SPF 30 or higher, and to use one that is water resistant.       Follow-up:  2 year(s) for follow up full body skin exam, prn for new or changing lesions or new concerns    Pat Ramirez PA-C  [facility]  Dermatology     ____________________________________________    CC: No chief complaint on file.    HPI:  Mr. William Montes is a(n) 59 year old male who presents today as a new patient for a full body skin cancer screening.  Patient has lesions of concern on his back.  Lesions were pointed out to him by his primary care provider.  Additionally, he has a lesion of concern on his midline thoracic back.  This lesion has been present for several months and can be very pruritic for him.  He is able  to express material from it.  He is wondering what potential removal.  No other concerns.  Denies been diligent with photoprotection.    Patient is otherwise feeling well, without additional skin concerns.     Physical Exam:  Vitals: There were no vitals taken for this visit.  SKIN: Total skin excluding the genitalia areas was performed. The exam included the head/face, neck, both arms, chest, back, abdomen, both legs, digits, mons pubis, buttock and nails.   -NUB, midline thoracic back, firm, mobile, nodule with an overlying punctum   -NUB, right nasal sidewall, asymmetric brown patch   -back, dark brown macules with reticular pigment network  -several 1-2mm red dome shaped symmetric papules scattered on the trunk  -multiple tan/brown flat round macules and raised papules scattered throughout trunk, extremities and head. No worrisome features for malignancy noted on examination.  -scattered tan, homogenous macules scattered on sun exposed areas of trunk, extremities and face.   -scattered waxy, stuck on tan/brown papules and patches on the trunk           - No other lesions of concern on areas examined.     Medications:  Current Outpatient Medications   Medication Sig Dispense Refill     acetaminophen (TYLENOL) 325 MG tablet Take 325-650 mg by mouth every 6 hours as needed for mild pain or fever       ASPIRIN 81 MG OR TABS 1 tab po QD (Once per day) 100 3     atorvastatin (LIPITOR) 20 MG tablet Take 1 tablet (20 mg) by mouth daily. 90 tablet 3     cholecalciferol (VITAMIN  -D) 1000 UNITS capsule Take 1 capsule by mouth daily       cyclobenzaprine (FLEXERIL) 10 MG tablet Take 1 tablet (10 mg) by mouth 3 times daily as needed for muscle spasms 30 tablet 1     diclofenac (VOLTAREN) 1 % topical gel Apply 4 grams to shoulders four times daily using enclosed dosing card. 100 g 1     gabapentin (NEURONTIN) 300 MG capsule Take 1 capsule (300 mg) by mouth 2 times daily. 180 capsule 3     lisinopril (ZESTRIL) 2.5 MG tablet  Take 1 tablet (2.5 mg) by mouth daily. 90 tablet 1     melatonin 3 MG tablet Take 6 mg by mouth nightly as needed for sleep       omeprazole (PRILOSEC) 20 MG DR capsule Take 20 mg by mouth daily       tadalafil (CIALIS) 5 MG tablet Take 1 tablet (5 mg) by mouth every 24 hours. 60 tablet 4     Tirzepatide 7.5 MG/0.5ML SOAJ Inject 0.5 mLs (7.5 mg) subcutaneously once a week. 6 mL 1     topiramate (TOPAMAX) 100 MG tablet Take 1 tablet (100 mg) by mouth daily. 90 tablet 1     No current facility-administered medications for this visit.      Past Medical History:   Patient Active Problem List   Diagnosis     Chronic gingivitis     Esophageal reflux     Hyperlipidemia LDL goal <100     Vitamin D deficiency     Elevated LFTs     Other chronic nonalcoholic liver disease     Glaucoma suspect, bilateral     Type 2 diabetes mellitus with complication, without long-term current use of insulin (H)     Background diabetic retinopathy, mild, ou     Hx of obesity     Arthritis of both glenohumeral joints     Benign essential hypertension     Stage 3 chronic kidney disease, unspecified whether stage 3a or 3b CKD (H)     Past Medical History:   Diagnosis Date     ABNORMAL LIVER FUNCTION STUDY 01/04/2008    increased ast, alt Normal since 2008     Arthritis of both glenohumeral joints 02/18/2022     Background diabetic retinopathy, mild, ou 04/01/2018     Benign essential hypertension 12/02/2022     Chronic gingivitis      Esophageal reflux      Glaucoma      History of blood transfusion     My son has aplastic anemia and has had bld transfusions     Nonspecific abnormal results of liver function study      OBESITY NOS 12/13/2006     Pure hypercholesterolemia      Type II or unspecified type diabetes mellitus without mention of complication, not stated as uncontrolled      Uncomplicated asthma     My mother has asthma       CC Sirena Talley, DO  [address] [place] [address] on close of this encounter.      Again, thank  you for allowing me to participate in the care of your patient.        Sincerely,        Pat Ramirez PA-C    Electronically signed

## 2025-02-02 LAB
PATH REPORT.COMMENTS IMP SPEC: NORMAL
PATH REPORT.COMMENTS IMP SPEC: NORMAL
PATH REPORT.FINAL DX SPEC: NORMAL
PATH REPORT.GROSS SPEC: NORMAL
PATH REPORT.MICROSCOPIC SPEC OTHER STN: NORMAL
PATH REPORT.RELEVANT HX SPEC: NORMAL

## 2025-02-04 ENCOUNTER — TELEPHONE (OUTPATIENT)
Dept: DERMATOLOGY | Facility: CLINIC | Age: 60
End: 2025-02-04
Payer: COMMERCIAL

## 2025-02-04 NOTE — TELEPHONE ENCOUNTER
Pt read Philo message and will call the clinic with any questions or concerns.   Kavitha Mcmahon RN on 2/4/2025 at 7:38 AM      Final Diagnosis  A. Midline thoracic back:  - Infundibular cyst (epidermal inclusion cyst) - (see description)      B. Right nasal sidewall:  - Seborrheic keratosis, inflamed - (see description)  Electronically signed by Bob Buitrago MD on 2/2/2025 at 11:57 AM    Malissa Thomas,     Your biopsy results from the midline thoracic back showed a benign,  infundibular cyst. No further treatment is needed at site.  Biopsy from your right nasal sidewall resulted as an inflamed seborrheic keratosis.  No further treatment is needed at this site.  Continue the wound care until the biopsy site is fully healed. If you have any further questions or concerns please send me a message.     Pat Ramirez PA-C  [facility]  Dermatology  Written by Pat Ramirez PA-C on 2/3/2025  1:17 PM CST  Seen by patient William Montes on 2/3/2025  2:15 PM

## 2025-03-25 DIAGNOSIS — I10 BENIGN ESSENTIAL HYPERTENSION: ICD-10-CM

## 2025-03-25 DIAGNOSIS — Z86.39 HX OF OBESITY: ICD-10-CM

## 2025-03-25 RX ORDER — TOPIRAMATE 100 MG/1
100 TABLET, FILM COATED ORAL DAILY
Qty: 90 TABLET | Refills: 1 | Status: SHIPPED | OUTPATIENT
Start: 2025-03-25

## 2025-03-25 RX ORDER — LISINOPRIL 2.5 MG/1
2.5 TABLET ORAL DAILY
Qty: 90 TABLET | Refills: 1 | Status: SHIPPED | OUTPATIENT
Start: 2025-03-25

## 2025-04-07 ENCOUNTER — TELEPHONE (OUTPATIENT)
Dept: PHARMACY | Facility: CLINIC | Age: 60
End: 2025-04-07
Payer: COMMERCIAL

## 2025-04-07 DIAGNOSIS — N18.30 STAGE 3 CHRONIC KIDNEY DISEASE, UNSPECIFIED WHETHER STAGE 3A OR 3B CKD (H): Primary | ICD-10-CM

## 2025-04-07 NOTE — TELEPHONE ENCOUNTER
Renal panel and cystatin C approved by Myra Sr, PAJohnnieC     Lauren Bloch, PharmD, BCACP   Medication Therapy Management Pharmacist   [facility]

## 2025-05-09 PROBLEM — E11.3299 BACKGROUND DIABETIC RETINOPATHY (H): Status: RESOLVED | Noted: 2018-04-01 | Resolved: 2025-05-09

## 2025-05-12 ENCOUNTER — RESULTS FOLLOW-UP (OUTPATIENT)
Dept: PHARMACY | Facility: CLINIC | Age: 60
End: 2025-05-12

## 2025-07-02 ENCOUNTER — LAB (OUTPATIENT)
Dept: LAB | Facility: CLINIC | Age: 60
End: 2025-07-02
Payer: COMMERCIAL

## 2025-07-02 DIAGNOSIS — K76.0 METABOLIC DYSFUNCTION-ASSOCIATED STEATOTIC LIVER DISEASE (MASLD): ICD-10-CM

## 2025-07-02 LAB
ALBUMIN SERPL BCG-MCNC: 4.2 G/DL (ref 3.5–5.2)
ALP SERPL-CCNC: 80 U/L (ref 40–150)
ALT SERPL W P-5'-P-CCNC: 40 U/L (ref 0–70)
ANION GAP SERPL CALCULATED.3IONS-SCNC: 10 MMOL/L (ref 7–15)
AST SERPL W P-5'-P-CCNC: 33 U/L (ref 0–45)
BILIRUB SERPL-MCNC: 0.4 MG/DL
BILIRUBIN DIRECT (ROCHE PRO & PURE): 0.18 MG/DL (ref 0–0.45)
BUN SERPL-MCNC: 15.1 MG/DL (ref 8–23)
CALCIUM SERPL-MCNC: 9.2 MG/DL (ref 8.8–10.4)
CHLORIDE SERPL-SCNC: 106 MMOL/L (ref 98–107)
CREAT SERPL-MCNC: 1.39 MG/DL (ref 0.67–1.17)
EGFRCR SERPLBLD CKD-EPI 2021: 57 ML/MIN/1.73M2
ERYTHROCYTE [DISTWIDTH] IN BLOOD BY AUTOMATED COUNT: 12.4 % (ref 10–15)
GLUCOSE SERPL-MCNC: 91 MG/DL (ref 70–99)
HCO3 SERPL-SCNC: 23 MMOL/L (ref 22–29)
HCT VFR BLD AUTO: 44.7 % (ref 40–53)
HGB BLD-MCNC: 14.7 G/DL (ref 13.3–17.7)
INR PPP: 1.17 (ref 0.85–1.15)
MCH RBC QN AUTO: 28.5 PG (ref 26.5–33)
MCHC RBC AUTO-ENTMCNC: 32.9 G/DL (ref 31.5–36.5)
MCV RBC AUTO: 87 FL (ref 78–100)
PLATELET # BLD AUTO: 133 10E3/UL (ref 150–450)
POTASSIUM SERPL-SCNC: 4.3 MMOL/L (ref 3.4–5.3)
PROT SERPL-MCNC: 6.8 G/DL (ref 6.4–8.3)
PROTHROMBIN TIME: 14.8 SECONDS (ref 11.8–14.8)
RBC # BLD AUTO: 5.15 10E6/UL (ref 4.4–5.9)
SODIUM SERPL-SCNC: 139 MMOL/L (ref 135–145)
WBC # BLD AUTO: 3.5 10E3/UL (ref 4–11)

## 2025-07-02 PROCEDURE — 85027 COMPLETE CBC AUTOMATED: CPT

## 2025-07-02 PROCEDURE — 85610 PROTHROMBIN TIME: CPT

## 2025-07-02 PROCEDURE — 36415 COLL VENOUS BLD VENIPUNCTURE: CPT

## 2025-07-02 PROCEDURE — 82248 BILIRUBIN DIRECT: CPT

## 2025-07-02 PROCEDURE — 80053 COMPREHEN METABOLIC PANEL: CPT

## 2025-07-03 ENCOUNTER — OFFICE VISIT (OUTPATIENT)
Dept: GASTROENTEROLOGY | Facility: CLINIC | Age: 60
End: 2025-07-03
Attending: INTERNAL MEDICINE
Payer: COMMERCIAL

## 2025-07-03 ENCOUNTER — RESULTS FOLLOW-UP (OUTPATIENT)
Dept: GASTROENTEROLOGY | Facility: CLINIC | Age: 60
End: 2025-07-03

## 2025-07-03 DIAGNOSIS — E78.5 HYPERLIPIDEMIA LDL GOAL <100: ICD-10-CM

## 2025-07-03 DIAGNOSIS — K76.0 METABOLIC DYSFUNCTION-ASSOCIATED STEATOTIC LIVER DISEASE (MASLD): Primary | ICD-10-CM

## 2025-07-03 DIAGNOSIS — E66.3 OVERWEIGHT (BMI 25.0-29.9): ICD-10-CM

## 2025-07-03 DIAGNOSIS — E11.8 TYPE 2 DIABETES MELLITUS WITH COMPLICATION, WITHOUT LONG-TERM CURRENT USE OF INSULIN (H): ICD-10-CM

## 2025-07-03 NOTE — Clinical Note
The order for follow up didn't pull up when I logged in to this chart  Can we get him set for 1 year follow up?  Thanks!

## 2025-07-03 NOTE — PATIENT INSTRUCTIONS
- we will get an endoscopy of the liver to look for changes of chronic liver disease    - we will get an ultrasound    - Labs and an ultrasound in 6 months    - labs, ultrasound and see me in 1 year

## 2025-07-03 NOTE — PROGRESS NOTES
Date of Service: July 3, 2025       Subjective:            William Montes is a 60 year old male presenting for follow up of abnormal liver tests    History of Present Illness   William Montes is a 60 year old male with past medical history of obesity, diabetes mellitus, hypercholesterolemia, CKD and known MASLD with advanced fibrosis.    Since last seen he has been doing very well.  He was on topiramate as well as his baseline Mounjaro for management of his diabetes and the topiramate to help stimulate weight loss.  He lost approximately 20 pounds over the last year.  He notes he goes to the gym 2-3 times per week, uses the treadmill for approximately 30 minutes and then does weightlifting.    Notes that he has had normal energy levels, normal appetite, normal bowel movements    Did have some issues with tingling in bilateral feet that has improved    Has worked really closely with his primary provider with regard to management of his hypertension and weight and diabetes    Noted that he is overdue for an abdominal ultrasound for screening    He did undergo FibroScan in June 2024 which did not demonstrate any overt fibrosis.  This was contrary to his liver biopsy from 2014 which demonstrated stage III of 4 fibrosis in the setting of steatohepatitis    Past Medical History:  Past Medical History:   Diagnosis Date    ABNORMAL LIVER FUNCTION STUDY 01/04/2008    increased ast, alt Normal since 2008    Arthritis of both glenohumeral joints 02/18/2022    Background diabetic retinopathy, mild, ou 04/01/2018    Benign essential hypertension 12/02/2022    Chronic gingivitis     Esophageal reflux     Glaucoma     History of blood transfusion     My son has aplastic anemia and has had bld transfusions    Nonspecific abnormal results of liver function study     OBESITY NOS 12/13/2006    Pure hypercholesterolemia     Type II or unspecified type diabetes mellitus without mention of complication, not stated as uncontrolled      Uncomplicated asthma     My mother has asthma       Surgical History:  Past Surgical History:   Procedure Laterality Date    BIOPSY      My son had bone marrow biopsy    COLONOSCOPY N/A 5/22/2019    Procedure: COLONOSCOPY;  Surgeon: Leventhal, Thomas Michael, MD;  Location: [facility] OR    ENT SURGERY      My son has had tympanoplasty    NO HISTORY OF SURGERY      ORTHOPEDIC SURGERY      My mother had left hip replacement surgery       Social History:  Social History     Tobacco Use    Smoking status: Never     Passive exposure: Never    Smokeless tobacco: Never   Vaping Use    Vaping status: Never Used   Substance Use Topics    Alcohol use: No     Comment: none for 11/2011    Drug use: No        Family History:  Family History   Problem Relation Age of Onset    Hypertension Mother     Diabetes Sister     Diabetes Cousin     Hypertension Other     C.A.D. No family hx of     Cancer - colorectal No family hx of     Glaucoma No family hx of     Macular Degeneration No family hx of     Cancer No family hx of     Cerebrovascular Disease No family hx of     Breast Cancer No family hx of     Prostate Cancer No family hx of     Thyroid Disease No family hx of        Medications:  Current Outpatient Medications   Medication Sig Dispense Refill    acetaminophen (TYLENOL) 500 MG tablet Take 1,000 mg by mouth every 6 hours as needed for mild pain.      ASPIRIN 81 MG OR TABS 1 tab po QD (Once per day) 100 3    atorvastatin (LIPITOR) 20 MG tablet Take 1 tablet (20 mg) by mouth daily. 90 tablet 3    cyclobenzaprine (FLEXERIL) 10 MG tablet Take 1 tablet (10 mg) by mouth 3 times daily as needed for muscle spasms 30 tablet 1    diclofenac (VOLTAREN) 1 % topical gel Apply 2 g topically 4 times daily as needed for moderate pain.      gabapentin (NEURONTIN) 300 MG capsule Take 1 capsule (300 mg) by mouth 2 times daily. 180 capsule 3    lisinopril (ZESTRIL) 2.5 MG tablet TAKE 1 TABLET(2.5 MG) BY MOUTH DAILY 90 tablet 1    MOUNJARO 7.5 MG/0.5ML  SOAJ Inject 0.5 mLs (7.5 mg) as directed every 7 days. 6 mL 0    multivitamin w/minerals (THERA-VIT-M) tablet Take 1 tablet by mouth daily.      omeprazole (PRILOSEC) 20 MG DR capsule Take 1 capsule (20 mg) by mouth daily. 90 capsule 3    tadalafil (CIALIS) 5 MG tablet Take 1 tablet (5 mg) by mouth every 24 hours. 60 tablet 4     No current facility-administered medications for this visit.       Review of Systems  A complete 10 point review of systems was asked and answered in the negative unless specifically commented upon in the HPI    Objective:         There were no vitals filed for this visit.    There is no height or weight on file to calculate BMI.     Physical Exam  General: No acute distress  HEENT: Anicteric, wearing corrective lenses  Chest: Normal respiratory excursion  Abdomen: Obese, nontender  Extremities: No edema  Neuro: No tremor    Labs and Diagnostic tests:  Reviewed    Imaging:  reviewed    Procedures:  Fibrosis Scan: 2019  Median Fibrosis: 9.2kPa  Consistent with F2-F3 fibrosis    Fibrosis Scan 6/2024  1. Estimated liver fibrosis is Stage 0   2. Steatosis Grade S3     Assessment and Plan:      Steatotic Liver Disease (SLD, formerly referred to as Non-alcoholic Fatty Liver Disease)  - I had a long discussion with the patient about metabolic dysfunction-associated steatotic liver disease (MASLD).  We discussed how fat deposits in the liver, how this leads to inflammation, and how chronic inflammation (metabolic dysfunction-associated steatohepatitis/MASH) can ultimately lead to scarring and cirrhosis.  The long-term complications of fatty liver disease were discussed with the patient, including the increased risk of cardiovascular disease complications,the risk of developing diabetes (if not already), and variable progression to cirrhosis and end stage liver disease.    -He has biopsy-proven steatohepatitis with advanced hepatic fibrosis from a liver biopsy in 2014  -Given his progressive weight  gain I do have concerns he may have progressed to stage IV of 4, which is cirrhosis    Management of MASLD/MASH  - We spent time discussing necessary lifestyle modifications including: appropriate diet, exercise and weight loss plan.      - Recommend exercise regularly: at least 4 times per week, with a minimum of 40-45 minutes per day.      - It has shown that patients who exercise regularly can have improvement of insulin resistance, increase in baseline metabolic activity and resolution of fatty liver disease (even if  appreciable weight loss does not occur)    - Recommend a low-carbohydrate, low-calorie diet (~1700 calories per day).    - An ideal weight loss plan would be to lose 7-10% of body weight over the next six months.  This has been proven to resolve fat and inflammation in the liver, and can lead to regression of scarring that might be present  - If the patient has diabetes, we recommend assertive management: ideal goal Hgb A1c goal of =/< 7%.     - There are no overt contraindications to medications used in the treatment of diabetes in persons with chronic liver disease  - Management of cholesterol is also very important.    - The use of statins are an effective means of therapy and are not contra-indicated in those with abnormal liver tests OR those with cirrhosis.  The value of these medications in this population far outweigh the minor risks of abnormal liver tests.   - Goal LDL in those with EDWARDS are < 100 mg/dL  - Consider the utility of liberalizing coffee consumption as some data that this may slow progression and reverse effects of EDWARDS-related fibrosis.    Plan:  - Ultrasound now  - Ultrasound in 6 to 12 months  - Labs in 6 and 12 months  - EGD now  - RTC in 12 months      Follow Up:  12 months     Thank you very much for the opportunity to participate in the care of this patient.  If you have any further questions, please don't hesitate to contact our office.    Thomas M. Leventhal,  M.D.   of Medicine  Advanced & Transplant Hepatology  [facility]    I spent 30 minutes on the date of the encounter doing chart review, history and exam, documentation and further activities as noted above.      The longitudinal plan of care for MASLD with fibrosis (and possible complications) was addressed during this visit. Due to the added complexity in care, I will continue to support this patient in the subsequent management of this condition(s) and with the ongoing continuity of care of this condition

## 2025-07-03 NOTE — LETTER
7/3/2025      William Montes  [address]  [facility] [address]      Dear Colleague,    Thank you for referring your patient, William Montes, to the [facility] [place]. Please see a copy of my visit note below.    Date of Service: July 3, 2025       Subjective:            William Montes is a 60 year old male presenting for follow up of abnormal liver tests    History of Present Illness   William Montes is a 60 year old male with past medical history of obesity, diabetes mellitus, hypercholesterolemia, CKD and known MASLD with advanced fibrosis.    Since last seen he has been doing very well.  He was on topiramate as well as his baseline Mounjaro for management of his diabetes and the topiramate to help stimulate weight loss.  He lost approximately 20 pounds over the last year.  He notes he goes to the gym 2-3 times per week, uses the treadmill for approximately 30 minutes and then does weightlifting.    Notes that he has had normal energy levels, normal appetite, normal bowel movements    Did have some issues with tingling in bilateral feet that has improved    Has worked really closely with his primary provider with regard to management of his hypertension and weight and diabetes    Noted that he is overdue for an abdominal ultrasound for screening    He did undergo FibroScan in June 2024 which did not demonstrate any overt fibrosis.  This was contrary to his liver biopsy from 2014 which demonstrated stage III of 4 fibrosis in the setting of steatohepatitis    Past Medical History:  Past Medical History:   Diagnosis Date     ABNORMAL LIVER FUNCTION STUDY 01/04/2008    increased ast, alt Normal since 2008     Arthritis of both glenohumeral joints 02/18/2022     Background diabetic retinopathy, mild, ou 04/01/2018     Benign essential hypertension 12/02/2022     Chronic gingivitis      Esophageal reflux      Glaucoma      History of blood transfusion     My son has aplastic anemia and  has had bld transfusions     Nonspecific abnormal results of liver function study      OBESITY NOS 12/13/2006     Pure hypercholesterolemia      Type II or unspecified type diabetes mellitus without mention of complication, not stated as uncontrolled      Uncomplicated asthma     My mother has asthma       Surgical History:  Past Surgical History:   Procedure Laterality Date     BIOPSY      My son had bone marrow biopsy     COLONOSCOPY N/A 5/22/2019    Procedure: COLONOSCOPY;  Surgeon: Leventhal, Thomas Michael, MD;  Location: [facility] OR     ENT SURGERY      My son has had tympanoplasty     NO HISTORY OF SURGERY       ORTHOPEDIC SURGERY      My mother had left hip replacement surgery       Social History:  Social History     Tobacco Use     Smoking status: Never     Passive exposure: Never     Smokeless tobacco: Never   Vaping Use     Vaping status: Never Used   Substance Use Topics     Alcohol use: No     Comment: none for 11/2011     Drug use: No        Family History:  Family History   Problem Relation Age of Onset     Hypertension Mother      Diabetes Sister      Diabetes Cousin      Hypertension Other      C.A.D. No family hx of      Cancer - colorectal No family hx of      Glaucoma No family hx of      Macular Degeneration No family hx of      Cancer No family hx of      Cerebrovascular Disease No family hx of      Breast Cancer No family hx of      Prostate Cancer No family hx of      Thyroid Disease No family hx of        Medications:  Current Outpatient Medications   Medication Sig Dispense Refill     acetaminophen (TYLENOL) 500 MG tablet Take 1,000 mg by mouth every 6 hours as needed for mild pain.       ASPIRIN 81 MG OR TABS 1 tab po QD (Once per day) 100 3     atorvastatin (LIPITOR) 20 MG tablet Take 1 tablet (20 mg) by mouth daily. 90 tablet 3     cyclobenzaprine (FLEXERIL) 10 MG tablet Take 1 tablet (10 mg) by mouth 3 times daily as needed for muscle spasms 30 tablet 1     diclofenac (VOLTAREN) 1 % topical  gel Apply 2 g topically 4 times daily as needed for moderate pain.       gabapentin (NEURONTIN) 300 MG capsule Take 1 capsule (300 mg) by mouth 2 times daily. 180 capsule 3     lisinopril (ZESTRIL) 2.5 MG tablet TAKE 1 TABLET(2.5 MG) BY MOUTH DAILY 90 tablet 1     MOUNJARO 7.5 MG/0.5ML SOAJ Inject 0.5 mLs (7.5 mg) as directed every 7 days. 6 mL 0     multivitamin w/minerals (THERA-VIT-M) tablet Take 1 tablet by mouth daily.       omeprazole (PRILOSEC) 20 MG DR capsule Take 1 capsule (20 mg) by mouth daily. 90 capsule 3     tadalafil (CIALIS) 5 MG tablet Take 1 tablet (5 mg) by mouth every 24 hours. 60 tablet 4     No current facility-administered medications for this visit.       Review of Systems  A complete 10 point review of systems was asked and answered in the negative unless specifically commented upon in the HPI    Objective:         There were no vitals filed for this visit.    There is no height or weight on file to calculate BMI.     Physical Exam  General: No acute distress  HEENT: Anicteric, wearing corrective lenses  Chest: Normal respiratory excursion  Abdomen: Obese, nontender  Extremities: No edema  Neuro: No tremor    Labs and Diagnostic tests:  Reviewed    Imaging:  reviewed    Procedures:  Fibrosis Scan: 2019  Median Fibrosis: 9.2kPa  Consistent with F2-F3 fibrosis    Fibrosis Scan 6/2024  1. Estimated liver fibrosis is Stage 0   2. Steatosis Grade S3     Assessment and Plan:      Steatotic Liver Disease (SLD, formerly referred to as Non-alcoholic Fatty Liver Disease)  - I had a long discussion with the patient about metabolic dysfunction-associated steatotic liver disease (MASLD).  We discussed how fat deposits in the liver, how this leads to inflammation, and how chronic inflammation (metabolic dysfunction-associated steatohepatitis/MASH) can ultimately lead to scarring and cirrhosis.  The long-term complications of fatty liver disease were discussed with the patient, including the increased  risk of cardiovascular disease complications,the risk of developing diabetes (if not already), and variable progression to cirrhosis and end stage liver disease.    -He has biopsy-proven steatohepatitis with advanced hepatic fibrosis from a liver biopsy in 2014  -Given his progressive weight gain I do have concerns he may have progressed to stage IV of 4, which is cirrhosis    Management of MASLD/MASH  - We spent time discussing necessary lifestyle modifications including: appropriate diet, exercise and weight loss plan.      - Recommend exercise regularly: at least 4 times per week, with a minimum of 40-45 minutes per day.      - It has shown that patients who exercise regularly can have improvement of insulin resistance, increase in baseline metabolic activity and resolution of fatty liver disease (even if  appreciable weight loss does not occur)    - Recommend a low-carbohydrate, low-calorie diet (~1700 calories per day).    - An ideal weight loss plan would be to lose 7-10% of body weight over the next six months.  This has been proven to resolve fat and inflammation in the liver, and can lead to regression of scarring that might be present  - If the patient has diabetes, we recommend assertive management: ideal goal Hgb A1c goal of =/< 7%.     - There are no overt contraindications to medications used in the treatment of diabetes in persons with chronic liver disease  - Management of cholesterol is also very important.    - The use of statins are an effective means of therapy and are not contra-indicated in those with abnormal liver tests OR those with cirrhosis.  The value of these medications in this population far outweigh the minor risks of abnormal liver tests.   - Goal LDL in those with EDWARDS are < 100 mg/dL  - Consider the utility of liberalizing coffee consumption as some data that this may slow progression and reverse effects of EDWARDS-related fibrosis.    Plan:  - Ultrasound now  - Ultrasound in 6 to 12  months  - Labs in 6 and 12 months  - EGD now  - RTC in 12 months      Follow Up:  12 months     Thank you very much for the opportunity to participate in the care of this patient.  If you have any further questions, please don't hesitate to contact our office.    Thomas M. Leventhal, M.D.   of Medicine  Advanced & Transplant Hepatology  [facility]    I spent 30 minutes on the date of the encounter doing chart review, history and exam, documentation and further activities as noted above.      The longitudinal plan of care for MASLD with fibrosis (and possible complications) was addressed during this visit. Due to the added complexity in care, I will continue to support this patient in the subsequent management of this condition(s) and with the ongoing continuity of care of this condition      Again, thank you for allowing me to participate in the care of your patient.        Sincerely,        Thomas M. Leventhal, MD    Electronically signed

## 2025-07-09 ENCOUNTER — VIRTUAL VISIT (OUTPATIENT)
Dept: ENDOCRINOLOGY | Facility: CLINIC | Age: 60
End: 2025-07-09
Attending: PHYSICIAN ASSISTANT
Payer: COMMERCIAL

## 2025-07-09 VITALS — WEIGHT: 217 LBS | HEIGHT: 72 IN | BODY MASS INDEX: 29.39 KG/M2

## 2025-07-09 DIAGNOSIS — Z86.39 HX OF OBESITY: ICD-10-CM

## 2025-07-09 DIAGNOSIS — N18.30 STAGE 3 CHRONIC KIDNEY DISEASE, UNSPECIFIED WHETHER STAGE 3A OR 3B CKD (H): ICD-10-CM

## 2025-07-09 DIAGNOSIS — E11.8 TYPE 2 DIABETES MELLITUS WITH COMPLICATION, WITHOUT LONG-TERM CURRENT USE OF INSULIN (H): Primary | ICD-10-CM

## 2025-07-09 DIAGNOSIS — E66.3 OVERWEIGHT (BMI 25.0-29.9): ICD-10-CM

## 2025-07-09 PROCEDURE — 98004 SYNCH AUDIO-VIDEO EST SF 10: CPT | Performed by: PHYSICIAN ASSISTANT

## 2025-07-09 PROCEDURE — 1125F AMNT PAIN NOTED PAIN PRSNT: CPT | Mod: 95 | Performed by: PHYSICIAN ASSISTANT

## 2025-07-09 ASSESSMENT — PAIN SCALES - GENERAL: PAINLEVEL_OUTOF10: MODERATE PAIN (5)

## 2025-07-09 NOTE — PROGRESS NOTES
Return Medical Weight Management Note     William Montes  MRN:  [id number]  :  1965  NAKUL:  2025    Dear Sirena Talley DO,    I had the pleasure of seeing your patient William Montes. He is a 60 year old male who I am continuing to see for treatment of obesity related to:        10/26/2018     4:11 PM   --   I have the following health issues associated with obesity Type II Diabetes    GERD (Reflux)    Fatty Liver       Assessment & Plan   Problem List Items Addressed This Visit          Endocrine Diagnoses    Type 2 diabetes mellitus with complication, without long-term current use of insulin (H) - Primary    Relevant Medications    tirzepatide (MOUNJARO) 10 MG/0.5ML SOAJ auto-injector pen        Comprehensive weight management follow up visit    Weight loss from highest 251 to 217 today (13%)  Weight stable today since last visit 2025    Currently taking the following medications that can help with weight loss/weight mgmt:  Mounjaro 7.5mg weekly  A1C 5.7    Past weight loss medication history and considerations:  Ozempic prior to Mounjaro, switch for more effectiveness  Topiramate stopped due to elevated creatinine, improved when stopping    Hx of fatty liver disease. Saw hepatology for follow up 2024. With his weight regain there was concern of progression of fibrosis or cirrhosis. Fibroscan and US were reassuring and showed steatosis but no significant scarring/cirrhosis. It was recommended to continue with his lifestyle changes and weight loss.      GERD: improved with taking omeprazole daily. Not taking famotidine    Exercising 3-4x per week walking 45 min and 20-30 strength      Plan today:  Increase mounjaro 10mg     Follow up plan:  MTM Oct already scheduled   Marine 6 mo return MWM    INTERVAL HISTORY:    CURRENT WEIGHT:   217 lbs 0 oz    Initial Weight (lbs): 251.2 lbs     Cumulative weight loss (lbs): 34.2  Weight Loss Percentage: 13.61%        2025     8:24 AM   Changes and  Difficulties   I have made the following changes to my diet since my last visit: none   With regards to my diet, I am still struggling with: nothing   I have made the following changes to my activity/exercise since my last visit: none   With regards to my activity/exercise, I am still struggling with: nothing             7/9/2025     8:24 AM   Weight Loss Medication History Reviewed With Patient   Are you having any side effects from the weight loss medication that we have prescribed you? No       Lab on 07/02/2025   Component Date Value Ref Range Status    Protein Total 07/02/2025 6.8  6.4 - 8.3 g/dL Final    Albumin 07/02/2025 4.2  3.5 - 5.2 g/dL Final    Bilirubin Total 07/02/2025 0.4  <=1.2 mg/dL Final    Alkaline Phosphatase 07/02/2025 80  40 - 150 U/L Final    AST 07/02/2025 33  0 - 45 U/L Final    ALT 07/02/2025 40  0 - 70 U/L Final    Bilirubin Direct 07/02/2025 0.18  0.00 - 0.45 mg/dL Final    As of 5/14/25, reference ranges and trending lines may vary depending on the testing location.    Sodium 07/02/2025 139  135 - 145 mmol/L Final    Potassium 07/02/2025 4.3  3.4 - 5.3 mmol/L Final    Chloride 07/02/2025 106  98 - 107 mmol/L Final    Carbon Dioxide (CO2) 07/02/2025 23  22 - 29 mmol/L Final    Anion Gap 07/02/2025 10  7 - 15 mmol/L Final    Urea Nitrogen 07/02/2025 15.1  8.0 - 23.0 mg/dL Final    Creatinine 07/02/2025 1.39 (H)  0.67 - 1.17 mg/dL Final    GFR Estimate 07/02/2025 57 (L)  >60 mL/min/1.73m2 Final    eGFR calculated using 2021 CKD-EPI equation.    Calcium 07/02/2025 9.2  8.8 - 10.4 mg/dL Final    Glucose 07/02/2025 91  70 - 99 mg/dL Final    WBC Count 07/02/2025 3.5 (L)  4.0 - 11.0 10e3/uL Final    RBC Count 07/02/2025 5.15  4.40 - 5.90 10e6/uL Final    Hemoglobin 07/02/2025 14.7  13.3 - 17.7 g/dL Final    Hematocrit 07/02/2025 44.7  40.0 - 53.0 % Final    MCV 07/02/2025 87  78 - 100 fL Final    MCH 07/02/2025 28.5  26.5 - 33.0 pg Final    MCHC 07/02/2025 32.9  31.5 - 36.5 g/dL Final    RDW  07/02/2025 12.4  10.0 - 15.0 % Final    Platelet Count 07/02/2025 133 (L)  150 - 450 10e3/uL Final    INR 07/02/2025 1.17 (H)  0.85 - 1.15 Final    PT 07/02/2025 14.8  11.8 - 14.8 Seconds Final       MEDICATIONS:   Current Outpatient Medications   Medication Sig Dispense Refill    tirzepatide (MOUNJARO) 10 MG/0.5ML SOAJ auto-injector pen Inject 0.5 mLs (10 mg) subcutaneously once a week. 2 mL 5    acetaminophen (TYLENOL) 500 MG tablet Take 1,000 mg by mouth every 6 hours as needed for mild pain.      ASPIRIN 81 MG OR TABS 1 tab po QD (Once per day) 100 3    atorvastatin (LIPITOR) 20 MG tablet Take 1 tablet (20 mg) by mouth daily. 90 tablet 3    cyclobenzaprine (FLEXERIL) 10 MG tablet Take 1 tablet (10 mg) by mouth 3 times daily as needed for muscle spasms 30 tablet 1    diclofenac (VOLTAREN) 1 % topical gel Apply 2 g topically 4 times daily as needed for moderate pain.      gabapentin (NEURONTIN) 300 MG capsule Take 1 capsule (300 mg) by mouth 2 times daily. 180 capsule 3    lisinopril (ZESTRIL) 2.5 MG tablet TAKE 1 TABLET(2.5 MG) BY MOUTH DAILY 90 tablet 1    multivitamin w/minerals (THERA-VIT-M) tablet Take 1 tablet by mouth daily.      omeprazole (PRILOSEC) 20 MG DR capsule Take 1 capsule (20 mg) by mouth daily. 90 capsule 3    tadalafil (CIALIS) 5 MG tablet Take 1 tablet (5 mg) by mouth every 24 hours. 60 tablet 4         PHYSICAL EXAM:  Objective    Ht 1.829 m (6')   Wt 98.4 kg (217 lb)   BMI 29.43 kg/m      Vitals - Patient Reported  Pain Score: Moderate Pain (5)  Pain Loc: Low Back        GENERAL: alert and no distress  EYES: Eyes grossly normal to inspection.  No discharge or erythema, or obvious scleral/conjunctival abnormalities.  RESP: No audible wheeze, cough, or visible cyanosis.    SKIN: Visible skin clear. No significant rash, abnormal pigmentation or lesions.  NEURO: Cranial nerves grossly intact.  Mentation and speech appropriate for age.  PSYCH: Appropriate affect, tone, and pace of  words        Sincerely,    Marine Flores PA-C      10 minutes spent by me on the date of the encounter doing chart review, history and exam, documentation and further activities per the note    Virtual Visit Details    Type of service:  Video Visit     Originating Location (pt. Location): Home    Distant Location (provider location):  Off-site  Platform used for Video Visit: Ronna    The longitudinal plan of care for the diagnosis(es)/condition(s) as documented were addressed during this visit. Due to the added complexity in care, I will continue to support William in the subsequent management and with ongoing continuity of care.

## 2025-07-09 NOTE — LETTER
2025       RE: William Montes  [address]  [facility] [address]     Dear Colleague,    Thank you for referring your patient, William Montes, to the [facility] [place] at [facility]. Please see a copy of my visit note below.      Return Medical Weight Management Note     William Montes  MRN:  [id number]  :  1965  NAKUL:  2025    Dear Sirena Talley DO,    I had the pleasure of seeing your patient William Montes. He is a 60 year old male who I am continuing to see for treatment of obesity related to:        10/26/2018     4:11 PM   --   I have the following health issues associated with obesity Type II Diabetes    GERD (Reflux)    Fatty Liver       Assessment & Plan  Problem List Items Addressed This Visit          Endocrine Diagnoses    Type 2 diabetes mellitus with complication, without long-term current use of insulin (H) - Primary    Relevant Medications    tirzepatide (MOUNJARO) 10 MG/0.5ML SOAJ auto-injector pen        Comprehensive weight management follow up visit    Weight loss from highest 251 to 217 today (13%)  Weight stable today since last visit 2025    Currently taking the following medications that can help with weight loss/weight mgmt:  Mounjaro 7.5mg weekly  A1C 5.7    Past weight loss medication history and considerations:  Ozempic prior to Mounjaro, switch for more effectiveness  Topiramate stopped due to elevated creatinine, improved when stopping    Hx of fatty liver disease. Saw hepatology for follow up 2024. With his weight regain there was concern of progression of fibrosis or cirrhosis. Fibroscan and US were reassuring and showed steatosis but no significant scarring/cirrhosis. It was recommended to continue with his lifestyle changes and weight loss.      GERD: improved with taking omeprazole daily. Not taking famotidine    Exercising 3-4x per week walking 45 min and 20-30  strength      Plan today:  Increase mounjaro 10mg     Follow up plan:  MTM Oct already scheduled   Marine 6 mo return MWM    INTERVAL HISTORY:    CURRENT WEIGHT:   217 lbs 0 oz    Initial Weight (lbs): 251.2 lbs     Cumulative weight loss (lbs): 34.2  Weight Loss Percentage: 13.61%        7/9/2025     8:24 AM   Changes and Difficulties   I have made the following changes to my diet since my last visit: none   With regards to my diet, I am still struggling with: nothing   I have made the following changes to my activity/exercise since my last visit: none   With regards to my activity/exercise, I am still struggling with: nothing             7/9/2025     8:24 AM   Weight Loss Medication History Reviewed With Patient   Are you having any side effects from the weight loss medication that we have prescribed you? No       Lab on 07/02/2025   Component Date Value Ref Range Status     Protein Total 07/02/2025 6.8  6.4 - 8.3 g/dL Final     Albumin 07/02/2025 4.2  3.5 - 5.2 g/dL Final     Bilirubin Total 07/02/2025 0.4  <=1.2 mg/dL Final     Alkaline Phosphatase 07/02/2025 80  40 - 150 U/L Final     AST 07/02/2025 33  0 - 45 U/L Final     ALT 07/02/2025 40  0 - 70 U/L Final     Bilirubin Direct 07/02/2025 0.18  0.00 - 0.45 mg/dL Final    As of 5/14/25, reference ranges and trending lines may vary depending on the testing location.     Sodium 07/02/2025 139  135 - 145 mmol/L Final     Potassium 07/02/2025 4.3  3.4 - 5.3 mmol/L Final     Chloride 07/02/2025 106  98 - 107 mmol/L Final     Carbon Dioxide (CO2) 07/02/2025 23  22 - 29 mmol/L Final     Anion Gap 07/02/2025 10  7 - 15 mmol/L Final     Urea Nitrogen 07/02/2025 15.1  8.0 - 23.0 mg/dL Final     Creatinine 07/02/2025 1.39 (H)  0.67 - 1.17 mg/dL Final     GFR Estimate 07/02/2025 57 (L)  >60 mL/min/1.73m2 Final    eGFR calculated using 2021 CKD-EPI equation.     Calcium 07/02/2025 9.2  8.8 - 10.4 mg/dL Final     Glucose 07/02/2025 91  70 - 99 mg/dL Final     WBC Count  07/02/2025 3.5 (L)  4.0 - 11.0 10e3/uL Final     RBC Count 07/02/2025 5.15  4.40 - 5.90 10e6/uL Final     Hemoglobin 07/02/2025 14.7  13.3 - 17.7 g/dL Final     Hematocrit 07/02/2025 44.7  40.0 - 53.0 % Final     MCV 07/02/2025 87  78 - 100 fL Final     MCH 07/02/2025 28.5  26.5 - 33.0 pg Final     MCHC 07/02/2025 32.9  31.5 - 36.5 g/dL Final     RDW 07/02/2025 12.4  10.0 - 15.0 % Final     Platelet Count 07/02/2025 133 (L)  150 - 450 10e3/uL Final     INR 07/02/2025 1.17 (H)  0.85 - 1.15 Final     PT 07/02/2025 14.8  11.8 - 14.8 Seconds Final       MEDICATIONS:   Current Outpatient Medications   Medication Sig Dispense Refill     tirzepatide (MOUNJARO) 10 MG/0.5ML SOAJ auto-injector pen Inject 0.5 mLs (10 mg) subcutaneously once a week. 2 mL 5     acetaminophen (TYLENOL) 500 MG tablet Take 1,000 mg by mouth every 6 hours as needed for mild pain.       ASPIRIN 81 MG OR TABS 1 tab po QD (Once per day) 100 3     atorvastatin (LIPITOR) 20 MG tablet Take 1 tablet (20 mg) by mouth daily. 90 tablet 3     cyclobenzaprine (FLEXERIL) 10 MG tablet Take 1 tablet (10 mg) by mouth 3 times daily as needed for muscle spasms 30 tablet 1     diclofenac (VOLTAREN) 1 % topical gel Apply 2 g topically 4 times daily as needed for moderate pain.       gabapentin (NEURONTIN) 300 MG capsule Take 1 capsule (300 mg) by mouth 2 times daily. 180 capsule 3     lisinopril (ZESTRIL) 2.5 MG tablet TAKE 1 TABLET(2.5 MG) BY MOUTH DAILY 90 tablet 1     multivitamin w/minerals (THERA-VIT-M) tablet Take 1 tablet by mouth daily.       omeprazole (PRILOSEC) 20 MG DR capsule Take 1 capsule (20 mg) by mouth daily. 90 capsule 3     tadalafil (CIALIS) 5 MG tablet Take 1 tablet (5 mg) by mouth every 24 hours. 60 tablet 4         PHYSICAL EXAM:  Objective   Ht 1.829 m (6')   Wt 98.4 kg (217 lb)   BMI 29.43 kg/m      Vitals - Patient Reported  Pain Score: Moderate Pain (5)  Pain Loc: Low Back        GENERAL: alert and no distress  EYES: Eyes grossly normal to  inspection.  No discharge or erythema, or obvious scleral/conjunctival abnormalities.  RESP: No audible wheeze, cough, or visible cyanosis.    SKIN: Visible skin clear. No significant rash, abnormal pigmentation or lesions.  NEURO: Cranial nerves grossly intact.  Mentation and speech appropriate for age.  PSYCH: Appropriate affect, tone, and pace of words        Sincerely,    Marine Flores PA-C      10 minutes spent by me on the date of the encounter doing chart review, history and exam, documentation and further activities per the note    Virtual Visit Details    Type of service:  Video Visit     Originating Location (pt. Location): Home    Distant Location (provider location):  Off-site  Platform used for Video Visit: Ronna    The longitudinal plan of care for the diagnosis(es)/condition(s) as documented were addressed during this visit. Due to the added complexity in care, I will continue to support William in the subsequent management and with ongoing continuity of care.    Again, thank you for allowing me to participate in the care of your patient.      Sincerely,    Marine Flores PA-C

## 2025-07-09 NOTE — NURSING NOTE
Current patient location: home    Is the patient currently in the state of [place]? YES    Visit mode: VIDEO    If the visit is dropped, the patient can be reconnected by:VIDEO VISIT: Text to cell phone:   Telephone Information:   Mobile [phone]       Will anyone else be joining the visit? NO  (If patient encounters technical issues they should call [phone]    Are changes needed to the allergy or medication list? Pt stated no changes to allergies and Pt stated no med changes    Are refills needed on medications prescribed by this physician? Discuss with provider    Rooming Documentation:  Questionnaire(s) completed      Reason for visit: RECHECK    Wt other than 24 hrs: 6/30    Pain more than one location:  no  Edelmira TREJOF

## 2025-07-10 ENCOUNTER — TELEPHONE (OUTPATIENT)
Dept: GASTROENTEROLOGY | Facility: CLINIC | Age: 60
End: 2025-07-10
Payer: COMMERCIAL

## 2025-07-10 NOTE — TELEPHONE ENCOUNTER
"Endoscopy Scheduling Screen    Caller: patient    Have you had any respiratory illness or flu-like symptoms in the last 10 days?  No    Patient is ACTIVE on [employer].  Inform patient that all appointment instructions will be sent via [employer].    Review patient's insurance for any non participating payor.    Ordering/Referring Provider: Leventhal, Thomas Michael, MD     (If ordering provider performs procedure, schedule with ordering provider unless otherwise instructed. )    BMI: Estimated body mass index is 29.43 kg/m  as calculated from the following:    Height as of 7/9/25: 1.829 m (6').    Weight as of 7/9/25: 98.4 kg (217 lb).     Sedation Ordered  moderate sedation.   If patient BMI > 50 do not schedule in [facility].    If patient BMI > 45 do not schedule at [facility].    Are you taking methadone or Suboxone?  NO, No RN review required.    Have you been diagnosed and are being treated for severe PTSD or severe anxiety?  NO, No RN review required.    Are you taking any prescription medications for pain 3 or more times per week?   NO, No RN review required.    Do you have a history of malignant hyperthermia?  No    (Females) Are you currently pregnant?   No     Have you been diagnosed or told you have pulmonary hypertension?   No    Do you have an LVAD?  No    Have you been told you have moderate to severe sleep apnea?  No.    Have you been told you have COPD, asthma, or any other lung disease?  No    Has your doctor ordered any cardiac tests like echo, angiogram, stress test, ablation, or EKG, that you have not completed yet?  No    Do you  have a history of any heart conditions?  No     Have you ever had or are you waiting for an organ transplant?  No. Continue scheduling, no site restrictions.    Have you had a stroke or transient ischemic attack (TIA aka \"mini stroke\") in the last 2 years?   No.    Have you been diagnosed with or been told you have cirrhosis of the liver?   No.    Are you currently on " dialysis?   No    Do you need assistance transferring?   No    BMI: Estimated body mass index is 29.43 kg/m  as calculated from the following:    Height as of 7/9/25: 1.829 m (6').    Weight as of 7/9/25: 98.4 kg (217 lb).     Is patients BMI > 40 and scheduling location [facility]?  No    Do you take an injectable or oral medication for weight loss or diabetes (excluding insulin)?  Yes, hold time can be up to 7 days. Please consult with you prescribing provider to discuss endoscopy recommendations. (Please schedule at least 7 days out.)    Do you take the medication Naltrexone?  No    Do you take blood thinners?  No - BABY ASPRIN      Prep   Are you currently on dialysis or do you have chronic kidney disease?  Yes ([facility])    Do you have a diagnosis of diabetes?  Yes ([facility] Prep)    Do you have a diagnosis of cystic fibrosis (CF)?  No    On a regular basis do you go 3 -5 days between bowel movements?  No    BMI > 40?  No    Preferred Pharmacy:    [id number] [address] AT [address] [address]  Phone: [phone] Fax: [phone]        Final Scheduling Details     Patient will like to attend [facility] for this procedure. Patient is to reach out to location to schedule or call back to finish schedule if unable to.

## 2025-08-04 ENCOUNTER — HOSPITAL ENCOUNTER (OUTPATIENT)
Dept: ULTRASOUND IMAGING | Facility: HOSPITAL | Age: 60
Discharge: HOME | End: 2025-08-04
Attending: INTERNAL MEDICINE | Admitting: INTERNAL MEDICINE
Payer: COMMERCIAL

## 2025-08-04 DIAGNOSIS — K76.0 METABOLIC DYSFUNCTION-ASSOCIATED STEATOTIC LIVER DISEASE (MASLD): ICD-10-CM

## 2025-08-04 PROCEDURE — 76705 ECHO EXAM OF ABDOMEN: CPT

## 2025-08-06 ENCOUNTER — TELEPHONE (OUTPATIENT)
Dept: ENDOCRINOLOGY | Facility: CLINIC | Age: 60
End: 2025-08-06
Payer: COMMERCIAL

## (undated) RX ORDER — FENTANYL CITRATE 50 UG/ML
INJECTION, SOLUTION INTRAMUSCULAR; INTRAVENOUS
Status: DISPENSED
Start: 2019-05-22